# Patient Record
Sex: MALE | Race: WHITE | NOT HISPANIC OR LATINO | Employment: OTHER | URBAN - METROPOLITAN AREA
[De-identification: names, ages, dates, MRNs, and addresses within clinical notes are randomized per-mention and may not be internally consistent; named-entity substitution may affect disease eponyms.]

---

## 2018-04-04 ENCOUNTER — HISTORICAL (OUTPATIENT)
Dept: MEDSURG UNIT | Facility: HOSPITAL | Age: 73
End: 2018-04-04

## 2018-04-05 LAB
ABS NEUT (OLG): 8.78 X10(3)/MCL (ref 2.1–9.2)
ALBUMIN SERPL-MCNC: 3.2 GM/DL (ref 3.4–5)
ALBUMIN/GLOB SERPL: 0.9 {RATIO}
ALP SERPL-CCNC: 73 UNIT/L (ref 50–136)
ALT SERPL-CCNC: 25 UNIT/L (ref 12–78)
AST SERPL-CCNC: 17 UNIT/L (ref 15–37)
BILIRUB SERPL-MCNC: 0.3 MG/DL (ref 0.2–1)
BILIRUBIN DIRECT+TOT PNL SERPL-MCNC: 0.1 MG/DL (ref 0–0.2)
BILIRUBIN DIRECT+TOT PNL SERPL-MCNC: 0.2 MG/DL (ref 0–0.8)
BUN SERPL-MCNC: 19 MG/DL (ref 7–18)
CALCIUM SERPL-MCNC: 8.9 MG/DL (ref 8.5–10.1)
CHLORIDE SERPL-SCNC: 110 MMOL/L (ref 98–107)
CO2 SERPL-SCNC: 26 MMOL/L (ref 21–32)
CREAT SERPL-MCNC: 1 MG/DL (ref 0.7–1.3)
ERYTHROCYTE [DISTWIDTH] IN BLOOD BY AUTOMATED COUNT: 12.9 % (ref 11.5–17)
GLOBULIN SER-MCNC: 3.7 GM/DL (ref 2.4–3.5)
GLUCOSE SERPL-MCNC: 87 MG/DL (ref 74–106)
HCT VFR BLD AUTO: 38.6 % (ref 42–52)
HGB BLD-MCNC: 12.8 GM/DL (ref 14–18)
LYMPHOCYTES NFR BLD MANUAL: 19 % (ref 13–40)
MCH RBC QN AUTO: 29.8 PG (ref 27–31)
MCHC RBC AUTO-ENTMCNC: 33.2 GM/DL (ref 33–36)
MCV RBC AUTO: 89.8 FL (ref 80–94)
MONOCYTES NFR BLD MANUAL: 15 % (ref 2–11)
NEUTROPHILS NFR BLD MANUAL: 67 % (ref 47–80)
PLATELET # BLD AUTO: 205 X10(3)/MCL (ref 130–400)
PLATELET # BLD EST: NORMAL 10*3/UL
PMV BLD AUTO: 9.7 FL (ref 7.4–10.4)
POTASSIUM SERPL-SCNC: 4 MMOL/L (ref 3.5–5.1)
PROT SERPL-MCNC: 6.9 GM/DL (ref 6.4–8.2)
RBC # BLD AUTO: 4.3 X10(6)/MCL (ref 4.7–6.1)
SODIUM SERPL-SCNC: 143 MMOL/L (ref 136–145)
WBC # SPEC AUTO: 14 X10(3)/MCL (ref 4.5–11.5)

## 2020-09-02 ENCOUNTER — HISTORICAL (OUTPATIENT)
Dept: ANESTHESIOLOGY | Facility: HOSPITAL | Age: 75
End: 2020-09-02

## 2020-09-03 ENCOUNTER — HISTORICAL (OUTPATIENT)
Dept: ANESTHESIOLOGY | Facility: HOSPITAL | Age: 75
End: 2020-09-03

## 2020-09-11 ENCOUNTER — HISTORICAL (OUTPATIENT)
Dept: ADMINISTRATIVE | Facility: HOSPITAL | Age: 75
End: 2020-09-11

## 2020-09-11 LAB
APPEARANCE, UA: CLEAR
BACTERIA SPEC CULT: ABNORMAL /HPF
BILIRUB UR QL STRIP: NEGATIVE
COLOR UR: YELLOW
GLUCOSE (UA): NEGATIVE
HGB UR QL STRIP: NEGATIVE
HYALINE CASTS #/AREA URNS LPF: ABNORMAL /[LPF]
KETONES UR QL STRIP: NEGATIVE
LEUKOCYTE ESTERASE UR QL STRIP: NEGATIVE
NITRITE UR QL STRIP: NEGATIVE
PH UR STRIP: 5 [PH] (ref 5–9)
PROT UR QL STRIP: NEGATIVE
RBC #/AREA URNS HPF: ABNORMAL /[HPF]
SP GR UR STRIP: 1.02 (ref 1–1.03)
SQUAMOUS EPITHELIAL, UA: ABNORMAL
UROBILINOGEN UR STRIP-ACNC: 1
WBC #/AREA URNS HPF: ABNORMAL /[HPF]

## 2020-09-21 ENCOUNTER — HOSPITAL ENCOUNTER (OUTPATIENT)
Dept: MEDSURG UNIT | Facility: HOSPITAL | Age: 75
End: 2020-09-22

## 2020-09-21 LAB
ABS NEUT (OLG): 10.1 X10(3)/MCL (ref 1.5–6.9)
ABS NEUT (OLG): 10.3 X10(3)/MCL (ref 1.5–6.9)
ALBUMIN SERPL-MCNC: 3.3 GM/DL (ref 3.4–4.8)
ALBUMIN/GLOB SERPL: 0.7 RATIO (ref 1.1–2)
ALP SERPL-CCNC: 126 UNIT/L (ref 40–150)
ALT SERPL-CCNC: 27 UNIT/L (ref 0–55)
APPEARANCE, UA: CLEAR
AST SERPL-CCNC: 23 UNIT/L (ref 5–34)
BACTERIA SPEC CULT: ABNORMAL /HPF
BASOPHILS NFR BLD MANUAL: 0 % (ref 0–1)
BASOPHILS NFR BLD MANUAL: 0 % (ref 0–1)
BILIRUB SERPL-MCNC: 0.3 MG/DL
BILIRUB UR QL STRIP: NEGATIVE
BILIRUBIN DIRECT+TOT PNL SERPL-MCNC: 0.1 MG/DL (ref 0–0.8)
BILIRUBIN DIRECT+TOT PNL SERPL-MCNC: 0.2 MG/DL (ref 0–0.5)
BUN SERPL-MCNC: 18 MG/DL (ref 8.4–25.7)
BUN SERPL-MCNC: 19 MG/DL (ref 8.4–25.7)
CALCIUM SERPL-MCNC: 10.8 MG/DL (ref 8.8–10)
CALCIUM SERPL-MCNC: 11.3 MG/DL (ref 8.8–10)
CHLORIDE SERPL-SCNC: 105 MMOL/L (ref 98–107)
CHLORIDE SERPL-SCNC: 107 MMOL/L (ref 98–107)
CO2 SERPL-SCNC: 28 MMOL/L (ref 23–31)
CO2 SERPL-SCNC: 31 MMOL/L (ref 23–31)
COLOR UR: YELLOW
CREAT SERPL-MCNC: 0.74 MG/DL (ref 0.73–1.18)
CREAT SERPL-MCNC: 0.96 MG/DL (ref 0.73–1.18)
CREAT/UREA NIT SERPL: 24
EOSINOPHIL NFR BLD MANUAL: 1 % (ref 0–5)
EOSINOPHIL NFR BLD MANUAL: 2 % (ref 0–5)
ERYTHROCYTE [DISTWIDTH] IN BLOOD BY AUTOMATED COUNT: 13.2 % (ref 11.5–17)
ERYTHROCYTE [DISTWIDTH] IN BLOOD BY AUTOMATED COUNT: 13.2 % (ref 11.5–17)
GLOBULIN SER-MCNC: 4.8 GM/DL (ref 2.4–3.5)
GLUCOSE (UA): NEGATIVE MG/DL
GLUCOSE SERPL-MCNC: 113 MG/DL (ref 82–115)
GLUCOSE SERPL-MCNC: 130 MG/DL (ref 82–115)
GRANULOCYTES NFR BLD MANUAL: 58 % (ref 47–80)
GRANULOCYTES NFR BLD MANUAL: 62 % (ref 47–80)
HCO3 UR-SCNC: 32.7 MMOL/L (ref 22–26)
HCO3 UR-SCNC: 32.7 MMOL/L (ref 22–26)
HCT VFR BLD AUTO: 49.6 % (ref 42–52)
HCT VFR BLD AUTO: 50.5 % (ref 42–52)
HGB BLD-MCNC: 16.2 GM/DL (ref 14–18)
HGB BLD-MCNC: 16.4 GM/DL (ref 14–18)
HGB UR QL STRIP: ABNORMAL
KETONES UR QL STRIP: NEGATIVE MG/DL
LEUKOCYTE ESTERASE UR QL STRIP: ABNORMAL
LYMPHOCYTES NFR BLD MANUAL: 29 % (ref 15–40)
LYMPHOCYTES NFR BLD MANUAL: 34 % (ref 15–40)
MCH RBC QN AUTO: 30 PG (ref 27–34)
MCH RBC QN AUTO: 30 PG (ref 27–34)
MCHC RBC AUTO-ENTMCNC: 32 GM/DL (ref 31–36)
MCHC RBC AUTO-ENTMCNC: 33 GM/DL (ref 31–36)
MCV RBC AUTO: 93 FL (ref 80–99)
MCV RBC AUTO: 93 FL (ref 80–99)
MONOCYTES NFR BLD MANUAL: 6 % (ref 4–12)
MONOCYTES NFR BLD MANUAL: 8 % (ref 4–12)
NITRITE UR QL STRIP: NEGATIVE
PCO2 BLDA: 50.9 MMHG (ref 35–45)
PCO2 BLDA: 50.9 MMHG (ref 35–45)
PH SMN: 7.42 [PH] (ref 7.35–7.45)
PH SMN: 7.42 [PH] (ref 7.35–7.45)
PH UR STRIP: 5 [PH] (ref 4.6–8)
PHENOBARB SERPL-MCNC: 41.3 UG/ML (ref 15–40)
PHENYTOIN SERPL-MCNC: 18.5 UG/ML (ref 10–20)
PLATELET # BLD AUTO: 277 X10(3)/MCL (ref 140–400)
PLATELET # BLD AUTO: 294 X10(3)/MCL (ref 140–400)
PLATELET # BLD EST: ADEQUATE 10*3/UL
PLATELET # BLD EST: ADEQUATE 10*3/UL
PMV BLD AUTO: 10.1 FL (ref 6.8–10)
PMV BLD AUTO: 10.4 FL (ref 6.8–10)
PO2 BLDA: 100 MMHG (ref 80–100)
PO2 BLDA: 100 MMHG (ref 80–100)
POC ALLENS TEST: POSITIVE
POC BE: 8 (ref -2–3)
POC BE: 8 MMOL/L (ref -2–3)
POC CO2: 34 MMOL/L (ref 22–27)
POC SATURATED O2: 98 % (ref 96–97)
POC SATURATED O2: 98 MMHG (ref 96–97)
POC SITE: ABNORMAL
POC TCO2: 34 MMOL/L (ref 24–29)
POC TREATMENT: ABNORMAL
POTASSIUM SERPL-SCNC: 3.6 MMOL/L (ref 3.5–5.1)
POTASSIUM SERPL-SCNC: 4.1 MMOL/L (ref 3.5–5.1)
PROT SERPL-MCNC: 8.1 GM/DL (ref 5.8–7.6)
PROT UR QL STRIP: NEGATIVE MG/DL
RBC # BLD AUTO: 5.33 X10(6)/MCL (ref 4.7–6.1)
RBC # BLD AUTO: 5.42 X10(6)/MCL (ref 4.7–6.1)
RBC #/AREA URNS HPF: ABNORMAL /HPF
RBC MORPH BLD: NORMAL
RBC MORPH BLD: NORMAL
SODIUM SERPL-SCNC: 148 MMOL/L (ref 136–145)
SODIUM SERPL-SCNC: 148 MMOL/L (ref 136–145)
SP GR UR STRIP: >=1.03 (ref 1–1.03)
SQUAMOUS EPITHELIAL, UA: ABNORMAL /LPF
TSH SERPL-ACNC: 1.68 UIU/ML (ref 0.35–4.94)
UROBILINOGEN UR STRIP-ACNC: 0.2 EU/DL
WBC # SPEC AUTO: 17.9 X10(3)/MCL (ref 4.5–11.5)
WBC # SPEC AUTO: 18.4 X10(3)/MCL (ref 4.5–11.5)
WBC #/AREA URNS HPF: ABNORMAL /HPF

## 2020-09-22 LAB
ABS NEUT (OLG): 7.4 X10(3)/MCL (ref 1.5–6.9)
BASOPHILS # BLD AUTO: 0.1 X10(3)/MCL (ref 0–0.1)
BASOPHILS NFR BLD AUTO: 1 % (ref 0–1)
BUN SERPL-MCNC: 16 MG/DL (ref 8.4–25.7)
CALCIUM SERPL-MCNC: 11 MG/DL (ref 8.8–10)
CHLORIDE SERPL-SCNC: 106 MMOL/L (ref 98–107)
CO2 SERPL-SCNC: 30 MMOL/L (ref 23–31)
CREAT SERPL-MCNC: 0.78 MG/DL (ref 0.73–1.18)
CREAT/UREA NIT SERPL: 21
EOSINOPHIL # BLD AUTO: 0.2 X10(3)/MCL (ref 0–0.6)
EOSINOPHIL NFR BLD AUTO: 1 % (ref 0–5)
ERYTHROCYTE [DISTWIDTH] IN BLOOD BY AUTOMATED COUNT: 13.1 % (ref 11.5–17)
GLUCOSE SERPL-MCNC: 110 MG/DL (ref 82–115)
HCT VFR BLD AUTO: 49.9 % (ref 42–52)
HGB BLD-MCNC: 16.1 GM/DL (ref 14–18)
IMM GRANULOCYTES # BLD AUTO: 0.07 10*3/UL (ref 0–0.02)
IMM GRANULOCYTES NFR BLD AUTO: 0.5 % (ref 0–0.43)
LYMPHOCYTES # BLD AUTO: 5.1 X10(3)/MCL (ref 0.5–4.1)
LYMPHOCYTES NFR BLD AUTO: 36 % (ref 15–40)
MCH RBC QN AUTO: 30 PG (ref 27–34)
MCHC RBC AUTO-ENTMCNC: 32 GM/DL (ref 31–36)
MCV RBC AUTO: 92 FL (ref 80–99)
MONOCYTES # BLD AUTO: 1.5 X10(3)/MCL (ref 0–1.1)
MONOCYTES NFR BLD AUTO: 10 % (ref 4–12)
NEUTROPHILS # BLD AUTO: 7.4 X10(3)/MCL (ref 1.5–6.9)
NEUTROPHILS NFR BLD AUTO: 52 % (ref 43–75)
PLATELET # BLD AUTO: 255 X10(3)/MCL (ref 140–400)
PMV BLD AUTO: 10.2 FL (ref 6.8–10)
POTASSIUM SERPL-SCNC: 3.7 MMOL/L (ref 3.5–5.1)
RBC # BLD AUTO: 5.43 X10(6)/MCL (ref 4.7–6.1)
SODIUM SERPL-SCNC: 146 MMOL/L (ref 136–145)
WBC # SPEC AUTO: 14.4 X10(3)/MCL (ref 4.5–11.5)

## 2020-09-24 LAB — FINAL CULTURE: NO GROWTH

## 2020-09-27 LAB — FINAL CULTURE: NORMAL

## 2020-10-04 ENCOUNTER — HISTORICAL (OUTPATIENT)
Dept: ADMINISTRATIVE | Facility: HOSPITAL | Age: 75
End: 2020-10-04

## 2020-10-04 LAB
ABS NEUT (OLG): 4.94 X10(3)/MCL (ref 2.1–9.2)
ALBUMIN SERPL-MCNC: 2.8 GM/DL (ref 3.4–4.8)
ALBUMIN/GLOB SERPL: 0.8 RATIO (ref 1.1–2)
ALP SERPL-CCNC: 92 UNIT/L (ref 40–150)
ALT SERPL-CCNC: 23 UNIT/L (ref 0–55)
AST SERPL-CCNC: 15 UNIT/L (ref 5–34)
BASOPHILS # BLD AUTO: 0.1 X10(3)/MCL (ref 0–0.2)
BASOPHILS NFR BLD AUTO: 1 %
BILIRUB SERPL-MCNC: 0.3 MG/DL
BILIRUBIN DIRECT+TOT PNL SERPL-MCNC: 0.1 MG/DL (ref 0–0.8)
BILIRUBIN DIRECT+TOT PNL SERPL-MCNC: 0.2 MG/DL (ref 0–0.5)
BUN SERPL-MCNC: 5.3 MG/DL (ref 8.4–25.7)
CALCIUM SERPL-MCNC: 8.6 MG/DL (ref 8.8–10)
CHLORIDE SERPL-SCNC: 103 MMOL/L (ref 98–107)
CHOLEST SERPL-MCNC: 114 MG/DL
CHOLEST/HDLC SERPL: 4 {RATIO} (ref 0–5)
CO2 SERPL-SCNC: 28 MMOL/L (ref 23–31)
CREAT SERPL-MCNC: 0.71 MG/DL (ref 0.73–1.18)
CRP SERPL HS-MCNC: 1.31 MG/DL
D DIMER PPP IA.FEU-MCNC: 0.57 MCG/ML FEU (ref 0–0.5)
DEPRECATED CALCIDIOL+CALCIFEROL SERPL-MC: 32.1 NG/ML (ref 6.6–49.9)
EOSINOPHIL # BLD AUTO: 0.4 X10(3)/MCL (ref 0–0.9)
EOSINOPHIL NFR BLD AUTO: 3 %
ERYTHROCYTE [DISTWIDTH] IN BLOOD BY AUTOMATED COUNT: 13 % (ref 11.5–17)
ERYTHROCYTE [SEDIMENTATION RATE] IN BLOOD: 28 MM/HR (ref 0–15)
FERRITIN SERPL-MCNC: 180.48 NG/ML (ref 21.81–274.66)
GLOBULIN SER-MCNC: 3.3 GM/DL (ref 2.4–3.5)
GLUCOSE SERPL-MCNC: 96 MG/DL (ref 82–115)
HCT VFR BLD AUTO: 40.1 % (ref 42–52)
HDLC SERPL-MCNC: 28 MG/DL (ref 35–60)
HGB BLD-MCNC: 12.8 GM/DL (ref 14–18)
LDLC SERPL CALC-MCNC: 66 MG/DL (ref 50–140)
LYMPHOCYTES # BLD AUTO: 5.3 X10(3)/MCL (ref 0.6–4.6)
LYMPHOCYTES NFR BLD AUTO: 45 %
MCH RBC QN AUTO: 29.8 PG (ref 27–31)
MCHC RBC AUTO-ENTMCNC: 31.9 GM/DL (ref 33–36)
MCV RBC AUTO: 93.5 FL (ref 80–94)
MONOCYTES # BLD AUTO: 1.1 X10(3)/MCL (ref 0.1–1.3)
MONOCYTES NFR BLD AUTO: 9 %
NEUTROPHILS # BLD AUTO: 4.94 X10(3)/MCL (ref 2.1–9.2)
NEUTROPHILS NFR BLD AUTO: 42 %
PLATELET # BLD AUTO: 256 X10(3)/MCL (ref 130–400)
PMV BLD AUTO: 10.4 FL (ref 9.4–12.4)
POTASSIUM SERPL-SCNC: 3.6 MMOL/L (ref 3.5–5.1)
PREALB SERPL-MCNC: 19 MG/DL (ref 16–42)
PROT SERPL-MCNC: 6.1 GM/DL (ref 5.8–7.6)
RBC # BLD AUTO: 4.29 X10(6)/MCL (ref 4.7–6.1)
SODIUM SERPL-SCNC: 140 MMOL/L (ref 136–145)
TRIGL SERPL-MCNC: 100 MG/DL (ref 34–140)
TSH SERPL-ACNC: 2 UIU/ML (ref 0.35–4.94)
VLDLC SERPL CALC-MCNC: 20 MG/DL
WBC # SPEC AUTO: 11.9 X10(3)/MCL (ref 4.5–11.5)

## 2020-10-13 ENCOUNTER — HISTORICAL (OUTPATIENT)
Dept: ADMINISTRATIVE | Facility: HOSPITAL | Age: 75
End: 2020-10-13

## 2020-10-13 LAB
ALBUMIN SERPL-MCNC: 2.8 GM/DL (ref 3.4–4.8)
ALBUMIN/GLOB SERPL: 0.9 RATIO (ref 1.1–2)
ALP SERPL-CCNC: 113 UNIT/L (ref 40–150)
ALT SERPL-CCNC: 19 UNIT/L (ref 0–55)
AST SERPL-CCNC: 13 UNIT/L (ref 5–34)
BILIRUB SERPL-MCNC: 0.3 MG/DL
BILIRUBIN DIRECT+TOT PNL SERPL-MCNC: 0.1 MG/DL (ref 0–0.8)
BILIRUBIN DIRECT+TOT PNL SERPL-MCNC: 0.2 MG/DL (ref 0–0.5)
BUN SERPL-MCNC: 11.1 MG/DL (ref 8.4–25.7)
CALCIUM SERPL-MCNC: 8.6 MG/DL (ref 8.8–10)
CHLORIDE SERPL-SCNC: 100 MMOL/L (ref 98–107)
CO2 SERPL-SCNC: 33 MMOL/L (ref 23–31)
CREAT SERPL-MCNC: 0.75 MG/DL (ref 0.73–1.18)
ERYTHROCYTE [DISTWIDTH] IN BLOOD BY AUTOMATED COUNT: 13.2 % (ref 11.5–17)
GLOBULIN SER-MCNC: 3.1 GM/DL (ref 2.4–3.5)
GLUCOSE SERPL-MCNC: 87 MG/DL (ref 82–115)
HCT VFR BLD AUTO: 38.7 % (ref 42–52)
HGB BLD-MCNC: 12.5 GM/DL (ref 14–18)
MCH RBC QN AUTO: 30.7 PG (ref 27–31)
MCHC RBC AUTO-ENTMCNC: 32.3 GM/DL (ref 33–36)
MCV RBC AUTO: 95.1 FL (ref 80–94)
PHENYTOIN SERPL-MCNC: 5.9 UG/ML (ref 10–20)
PLATELET # BLD AUTO: 299 X10(3)/MCL (ref 130–400)
PMV BLD AUTO: 10.3 FL (ref 9.4–12.4)
POTASSIUM SERPL-SCNC: 3.7 MMOL/L (ref 3.5–5.1)
PROT SERPL-MCNC: 5.9 GM/DL (ref 5.8–7.6)
RBC # BLD AUTO: 4.07 X10(6)/MCL (ref 4.7–6.1)
SODIUM SERPL-SCNC: 139 MMOL/L (ref 136–145)
WBC # SPEC AUTO: 11.4 X10(3)/MCL (ref 4.5–11.5)

## 2020-11-03 ENCOUNTER — HISTORICAL (OUTPATIENT)
Dept: ADMINISTRATIVE | Facility: HOSPITAL | Age: 75
End: 2020-11-03

## 2020-11-03 LAB
ABS NEUT (OLG): 3.29 X10(3)/MCL (ref 2.1–9.2)
ALBUMIN SERPL-MCNC: 3 GM/DL (ref 3.4–4.8)
ALBUMIN/GLOB SERPL: 0.9 RATIO (ref 1.1–2)
ALP SERPL-CCNC: 107 UNIT/L (ref 40–150)
ALT SERPL-CCNC: 14 UNIT/L (ref 0–55)
ANISOCYTOSIS BLD QL SMEAR: 1
AST SERPL-CCNC: 13 UNIT/L (ref 5–34)
BASOPHILS # BLD AUTO: 0.1 X10(3)/MCL (ref 0–0.2)
BASOPHILS NFR BLD AUTO: 1 %
BILIRUB SERPL-MCNC: 0.3 MG/DL
BILIRUBIN DIRECT+TOT PNL SERPL-MCNC: 0.1 MG/DL (ref 0–0.5)
BILIRUBIN DIRECT+TOT PNL SERPL-MCNC: 0.2 MG/DL (ref 0–0.8)
BUN SERPL-MCNC: 12.4 MG/DL (ref 8.4–25.7)
CALCIUM SERPL-MCNC: 8.9 MG/DL (ref 8.8–10)
CHLORIDE SERPL-SCNC: 101 MMOL/L (ref 98–107)
CHOLEST SERPL-MCNC: 112 MG/DL
CHOLEST/HDLC SERPL: 3 {RATIO} (ref 0–5)
CO2 SERPL-SCNC: 31 MMOL/L (ref 23–31)
CREAT SERPL-MCNC: 0.75 MG/DL (ref 0.73–1.18)
EOSINOPHIL # BLD AUTO: 0.3 X10(3)/MCL (ref 0–0.9)
EOSINOPHIL NFR BLD AUTO: 3 %
ERYTHROCYTE [DISTWIDTH] IN BLOOD BY AUTOMATED COUNT: 13 % (ref 11.5–17)
GLOBULIN SER-MCNC: 3.2 GM/DL (ref 2.4–3.5)
GLUCOSE SERPL-MCNC: 114 MG/DL (ref 82–115)
HCT VFR BLD AUTO: 45 % (ref 42–52)
HDLC SERPL-MCNC: 38 MG/DL (ref 35–60)
HGB BLD-MCNC: 14.4 GM/DL (ref 14–18)
IMM GRANULOCYTES # BLD AUTO: 0 10*3/UL
IMM GRANULOCYTES NFR BLD AUTO: 0 %
LDLC SERPL CALC-MCNC: 55 MG/DL (ref 50–140)
LYMPHOCYTES # BLD AUTO: 4.4 X10(3)/MCL (ref 0.6–4.6)
LYMPHOCYTES NFR BLD AUTO: 52 %
MACROCYTES BLD QL SMEAR: 1 /MCL
MAGNESIUM SERPL-MCNC: 1.8 MG/DL (ref 1.6–2.6)
MCH RBC QN AUTO: 30.1 PG (ref 27–31)
MCHC RBC AUTO-ENTMCNC: 32 GM/DL (ref 33–36)
MCV RBC AUTO: 93.9 FL (ref 80–94)
MONOCYTES # BLD AUTO: 0.4 X10(3)/MCL (ref 0.1–1.3)
MONOCYTES NFR BLD AUTO: 5 %
NEUTROPHILS # BLD AUTO: 3.29 X10(3)/MCL (ref 2.1–9.2)
NEUTROPHILS NFR BLD AUTO: 39 %
PHENOBARB SERPL-MCNC: 44.1 UG/ML (ref 15–40)
PHENYTOIN SERPL-MCNC: 7.1 UG/ML (ref 10–20)
PLATELET # BLD AUTO: 241 X10(3)/MCL (ref 130–400)
PLATELET # BLD EST: ADEQUATE 10*3/UL
PMV BLD AUTO: 11.2 FL (ref 9.4–12.4)
POTASSIUM SERPL-SCNC: 4.1 MMOL/L (ref 3.5–5.1)
PROT SERPL-MCNC: 6.2 GM/DL (ref 5.8–7.6)
RBC # BLD AUTO: 4.79 X10(6)/MCL (ref 4.7–6.1)
RBC MORPH BLD: ABNORMAL
SODIUM SERPL-SCNC: 141 MMOL/L (ref 136–145)
TRIGL SERPL-MCNC: 96 MG/DL (ref 34–140)
TSH SERPL-ACNC: 2.23 UIU/ML (ref 0.35–4.94)
VLDLC SERPL CALC-MCNC: 19 MG/DL
WBC # SPEC AUTO: 8.5 X10(3)/MCL (ref 4.5–11.5)

## 2020-11-25 ENCOUNTER — HISTORICAL (OUTPATIENT)
Dept: ADMINISTRATIVE | Facility: HOSPITAL | Age: 75
End: 2020-11-25

## 2020-11-25 LAB
ALBUMIN SERPL-MCNC: 3 GM/DL (ref 3.4–4.8)
ALBUMIN/GLOB SERPL: 1 RATIO (ref 1.1–2)
ALP SERPL-CCNC: 101 UNIT/L (ref 40–150)
ALT SERPL-CCNC: 13 UNIT/L (ref 0–55)
AST SERPL-CCNC: 11 UNIT/L (ref 5–34)
BILIRUB SERPL-MCNC: 0.2 MG/DL
BILIRUBIN DIRECT+TOT PNL SERPL-MCNC: 0.1 MG/DL (ref 0–0.5)
BILIRUBIN DIRECT+TOT PNL SERPL-MCNC: 0.1 MG/DL (ref 0–0.8)
BUN SERPL-MCNC: 13.8 MG/DL (ref 8.4–25.7)
CALCIUM SERPL-MCNC: 9.1 MG/DL (ref 8.8–10)
CHLORIDE SERPL-SCNC: 102 MMOL/L (ref 98–107)
CO2 SERPL-SCNC: 26 MMOL/L (ref 23–31)
CREAT SERPL-MCNC: 0.84 MG/DL (ref 0.73–1.18)
GLOBULIN SER-MCNC: 3 GM/DL (ref 2.4–3.5)
GLUCOSE SERPL-MCNC: 120 MG/DL (ref 82–115)
PHENOBARB SERPL-MCNC: 42.3 UG/ML (ref 15–40)
PHENYTOIN SERPL-MCNC: <1.8 UG/ML (ref 10–20)
POTASSIUM SERPL-SCNC: 4.1 MMOL/L (ref 3.5–5.1)
PROT SERPL-MCNC: 6 GM/DL (ref 5.8–7.6)
PSA SERPL-MCNC: 0.86 NG/ML
SODIUM SERPL-SCNC: 139 MMOL/L (ref 136–145)

## 2020-11-30 ENCOUNTER — HISTORICAL (OUTPATIENT)
Dept: ADMINISTRATIVE | Facility: HOSPITAL | Age: 75
End: 2020-11-30

## 2020-11-30 LAB
ABS NEUT (OLG): 5.24 X10(3)/MCL (ref 2.1–9.2)
APTT PPP: 33.5 SECOND(S) (ref 23.2–33.7)
BASOPHILS # BLD AUTO: 0.1 X10(3)/MCL (ref 0–0.2)
BASOPHILS NFR BLD AUTO: 1 %
EOSINOPHIL # BLD AUTO: 0.3 X10(3)/MCL (ref 0–0.9)
EOSINOPHIL NFR BLD AUTO: 2 %
ERYTHROCYTE [DISTWIDTH] IN BLOOD BY AUTOMATED COUNT: 13.8 % (ref 11.5–17)
HCT VFR BLD AUTO: 39.5 % (ref 42–52)
HGB BLD-MCNC: 12.7 GM/DL (ref 14–18)
INR PPP: 1.1 (ref 0–1.3)
LYMPHOCYTES # BLD AUTO: 4.2 X10(3)/MCL (ref 0.6–4.6)
LYMPHOCYTES NFR BLD AUTO: 39 %
MCH RBC QN AUTO: 30.2 PG (ref 27–31)
MCHC RBC AUTO-ENTMCNC: 32.2 GM/DL (ref 33–36)
MCV RBC AUTO: 94 FL (ref 80–94)
MONOCYTES # BLD AUTO: 1 X10(3)/MCL (ref 0.1–1.3)
MONOCYTES NFR BLD AUTO: 9 %
NEUTROPHILS # BLD AUTO: 5.24 X10(3)/MCL (ref 2.1–9.2)
NEUTROPHILS NFR BLD AUTO: 49 %
PHENOBARB SERPL-MCNC: 33.7 UG/ML (ref 15–40)
PLATELET # BLD AUTO: 231 X10(3)/MCL (ref 130–400)
PMV BLD AUTO: 12 FL (ref 9.4–12.4)
PROTHROMBIN TIME: 13.9 SECOND(S) (ref 11.1–13.7)
RBC # BLD AUTO: 4.2 X10(6)/MCL (ref 4.7–6.1)
WBC # SPEC AUTO: 10.8 X10(3)/MCL (ref 4.5–11.5)

## 2021-01-22 ENCOUNTER — HISTORICAL (OUTPATIENT)
Dept: ADMINISTRATIVE | Facility: HOSPITAL | Age: 76
End: 2021-01-22

## 2021-01-22 LAB
ABS NEUT (OLG): 3 X10(3)/MCL (ref 2.1–9.2)
ALBUMIN SERPL-MCNC: 3.6 GM/DL (ref 3.4–4.8)
ALBUMIN/GLOB SERPL: 1.1 RATIO (ref 1.1–2)
ALP SERPL-CCNC: 152 UNIT/L (ref 40–150)
ALT SERPL-CCNC: 19 UNIT/L (ref 0–55)
AST SERPL-CCNC: 15 UNIT/L (ref 5–34)
BASOPHILS # BLD AUTO: 0.1 X10(3)/MCL (ref 0–0.2)
BASOPHILS NFR BLD AUTO: 1 %
BILIRUB SERPL-MCNC: 0.2 MG/DL
BILIRUBIN DIRECT+TOT PNL SERPL-MCNC: 0.1 MG/DL (ref 0–0.5)
BILIRUBIN DIRECT+TOT PNL SERPL-MCNC: 0.1 MG/DL (ref 0–0.8)
BUN SERPL-MCNC: 12.7 MG/DL (ref 8.4–25.7)
CALCIUM SERPL-MCNC: 9.4 MG/DL (ref 8.8–10)
CHLORIDE SERPL-SCNC: 103 MMOL/L (ref 98–107)
CO2 SERPL-SCNC: 27 MMOL/L (ref 23–31)
CREAT SERPL-MCNC: 0.73 MG/DL (ref 0.73–1.18)
EOSINOPHIL # BLD AUTO: 0.4 X10(3)/MCL (ref 0–0.9)
EOSINOPHIL NFR BLD AUTO: 5 %
ERYTHROCYTE [DISTWIDTH] IN BLOOD BY AUTOMATED COUNT: 14.3 % (ref 11.5–17)
GLOBULIN SER-MCNC: 3.4 GM/DL (ref 2.4–3.5)
GLUCOSE SERPL-MCNC: 82 MG/DL (ref 82–115)
HCT VFR BLD AUTO: 46.7 % (ref 42–52)
HGB BLD-MCNC: 14.5 GM/DL (ref 14–18)
LYMPHOCYTES # BLD AUTO: 4.5 X10(3)/MCL (ref 0.6–4.6)
LYMPHOCYTES NFR BLD AUTO: 50 %
MCH RBC QN AUTO: 29.1 PG (ref 27–31)
MCHC RBC AUTO-ENTMCNC: 31 GM/DL (ref 33–36)
MCV RBC AUTO: 93.6 FL (ref 80–94)
MONOCYTES # BLD AUTO: 0.9 X10(3)/MCL (ref 0.1–1.3)
MONOCYTES NFR BLD AUTO: 10 %
NEUTROPHILS # BLD AUTO: 3 X10(3)/MCL (ref 2.1–9.2)
NEUTROPHILS NFR BLD AUTO: 34 %
PHENOBARB SERPL-MCNC: 30.8 UG/ML (ref 15–40)
PHENYTOIN SERPL-MCNC: 11.5 UG/ML (ref 10–20)
PLATELET # BLD AUTO: 251 X10(3)/MCL (ref 130–400)
PMV BLD AUTO: 11.5 FL (ref 9.4–12.4)
POTASSIUM SERPL-SCNC: 4.3 MMOL/L (ref 3.5–5.1)
PROT SERPL-MCNC: 7 GM/DL (ref 5.8–7.6)
RBC # BLD AUTO: 4.99 X10(6)/MCL (ref 4.7–6.1)
SODIUM SERPL-SCNC: 138 MMOL/L (ref 136–145)
WBC # SPEC AUTO: 9 X10(3)/MCL (ref 4.5–11.5)

## 2021-02-09 ENCOUNTER — HISTORICAL (OUTPATIENT)
Dept: ADMINISTRATIVE | Facility: HOSPITAL | Age: 76
End: 2021-02-09

## 2021-02-09 LAB
PHENOBARB SERPL-MCNC: 28.4 UG/ML (ref 15–40)
PHENYTOIN SERPL-MCNC: 8.4 UG/ML (ref 10–20)

## 2021-02-24 ENCOUNTER — HISTORICAL (OUTPATIENT)
Dept: ADMINISTRATIVE | Facility: HOSPITAL | Age: 76
End: 2021-02-24

## 2021-02-24 LAB — PHENYTOIN SERPL-MCNC: 9 UG/ML (ref 10–20)

## 2021-03-02 ENCOUNTER — HISTORICAL (OUTPATIENT)
Dept: ADMINISTRATIVE | Facility: HOSPITAL | Age: 76
End: 2021-03-02

## 2021-03-02 LAB
ABS NEUT (OLG): 3.31 X10(3)/MCL (ref 2.1–9.2)
ALBUMIN SERPL-MCNC: 3.1 GM/DL (ref 3.4–4.8)
ALBUMIN/GLOB SERPL: 1.1 RATIO (ref 1.1–2)
ALP SERPL-CCNC: 99 UNIT/L (ref 40–150)
ALT SERPL-CCNC: 16 UNIT/L (ref 0–55)
AST SERPL-CCNC: 13 UNIT/L (ref 5–34)
BASOPHILS # BLD AUTO: 0.1 X10(3)/MCL (ref 0–0.2)
BASOPHILS NFR BLD AUTO: 1 %
BILIRUB SERPL-MCNC: 0.2 MG/DL
BILIRUBIN DIRECT+TOT PNL SERPL-MCNC: 0.1 MG/DL (ref 0–0.5)
BILIRUBIN DIRECT+TOT PNL SERPL-MCNC: 0.1 MG/DL (ref 0–0.8)
BUN SERPL-MCNC: 10.1 MG/DL (ref 8.4–25.7)
CALCIUM SERPL-MCNC: 8.8 MG/DL (ref 8.8–10)
CHLORIDE SERPL-SCNC: 109 MMOL/L (ref 98–107)
CO2 SERPL-SCNC: 26 MMOL/L (ref 23–31)
CREAT SERPL-MCNC: 0.69 MG/DL (ref 0.73–1.18)
EOSINOPHIL # BLD AUTO: 0.4 X10(3)/MCL (ref 0–0.9)
EOSINOPHIL NFR BLD AUTO: 3 %
ERYTHROCYTE [DISTWIDTH] IN BLOOD BY AUTOMATED COUNT: 14.9 % (ref 11.5–17)
GLOBULIN SER-MCNC: 2.8 GM/DL (ref 2.4–3.5)
GLUCOSE SERPL-MCNC: 81 MG/DL (ref 82–115)
HCT VFR BLD AUTO: 40.1 % (ref 42–52)
HGB BLD-MCNC: 12.6 GM/DL (ref 14–18)
LYMPHOCYTES # BLD AUTO: 5.4 X10(3)/MCL (ref 0.6–4.6)
LYMPHOCYTES NFR BLD AUTO: 53 %
MCH RBC QN AUTO: 28.8 PG (ref 27–31)
MCHC RBC AUTO-ENTMCNC: 31.4 GM/DL (ref 33–36)
MCV RBC AUTO: 91.8 FL (ref 80–94)
MONOCYTES # BLD AUTO: 1 X10(3)/MCL (ref 0.1–1.3)
MONOCYTES NFR BLD AUTO: 10 %
NEUTROPHILS # BLD AUTO: 3.31 X10(3)/MCL (ref 2.1–9.2)
NEUTROPHILS NFR BLD AUTO: 33 %
PHENYTOIN SERPL-MCNC: 6.2 UG/ML (ref 10–20)
PLATELET # BLD AUTO: 200 X10(3)/MCL (ref 130–400)
PMV BLD AUTO: 11.1 FL (ref 9.4–12.4)
POTASSIUM SERPL-SCNC: 4.1 MMOL/L (ref 3.5–5.1)
PROT SERPL-MCNC: 5.9 GM/DL (ref 5.8–7.6)
RBC # BLD AUTO: 4.37 X10(6)/MCL (ref 4.7–6.1)
SODIUM SERPL-SCNC: 141 MMOL/L (ref 136–145)
WBC # SPEC AUTO: 10.2 X10(3)/MCL (ref 4.5–11.5)

## 2021-05-13 ENCOUNTER — HISTORICAL (OUTPATIENT)
Dept: LAB | Facility: HOSPITAL | Age: 76
End: 2021-05-13

## 2021-05-13 LAB
ABS NEUT (OLG): 3.9 X10(3)/MCL (ref 1.5–6.9)
ALBUMIN SERPL-MCNC: 3.4 GM/DL (ref 3.4–4.8)
ALBUMIN/GLOB SERPL: 1 RATIO (ref 1.1–2)
ALP SERPL-CCNC: 104 UNIT/L (ref 40–150)
ALT SERPL-CCNC: 18 UNIT/L (ref 0–55)
AST SERPL-CCNC: 17 UNIT/L (ref 5–34)
BASOPHILS NFR BLD MANUAL: 0 % (ref 0–1)
BILIRUB SERPL-MCNC: 0.2 MG/DL
BILIRUBIN DIRECT+TOT PNL SERPL-MCNC: 0.1 MG/DL (ref 0–0.5)
BILIRUBIN DIRECT+TOT PNL SERPL-MCNC: 0.1 MG/DL (ref 0–0.8)
BUN SERPL-MCNC: 10 MG/DL (ref 8.4–25.7)
CALCIUM SERPL-MCNC: 9.7 MG/DL (ref 8.8–10)
CHLORIDE SERPL-SCNC: 105 MMOL/L (ref 98–107)
CO2 SERPL-SCNC: 30 MMOL/L (ref 23–31)
CREAT SERPL-MCNC: 0.84 MG/DL (ref 0.73–1.18)
EOSINOPHIL NFR BLD MANUAL: 1 % (ref 0–5)
ERYTHROCYTE [DISTWIDTH] IN BLOOD BY AUTOMATED COUNT: 14 % (ref 11.5–17)
GLOBULIN SER-MCNC: 3.5 GM/DL (ref 2.4–3.5)
GLUCOSE SERPL-MCNC: 106 MG/DL (ref 82–115)
GRANULOCYTES NFR BLD MANUAL: 34 % (ref 47–80)
HCT VFR BLD AUTO: 45.5 % (ref 42–52)
HGB BLD-MCNC: 14.4 GM/DL (ref 14–18)
LYMPHOCYTES NFR BLD MANUAL: 54 % (ref 15–40)
MCH RBC QN AUTO: 29 PG (ref 27–34)
MCHC RBC AUTO-ENTMCNC: 32 GM/DL (ref 31–36)
MCV RBC AUTO: 92 FL (ref 80–99)
MONOCYTES NFR BLD MANUAL: 11 % (ref 4–12)
PHENOBARB SERPL-MCNC: 24.6 UG/ML (ref 15–40)
PLATELET # BLD AUTO: 271 X10(3)/MCL (ref 140–400)
PLATELET # BLD EST: ADEQUATE 10*3/UL
PMV BLD AUTO: 10 FL (ref 6.8–10)
POTASSIUM SERPL-SCNC: 4.5 MMOL/L (ref 3.5–5.1)
PROT SERPL-MCNC: 6.9 GM/DL (ref 5.8–7.6)
RBC # BLD AUTO: 4.94 X10(6)/MCL (ref 4.7–6.1)
RBC MORPH BLD: NORMAL
SODIUM SERPL-SCNC: 141 MMOL/L (ref 136–145)
WBC # SPEC AUTO: 12.4 X10(3)/MCL (ref 4.5–11.5)

## 2021-06-08 ENCOUNTER — HISTORICAL (OUTPATIENT)
Dept: ADMINISTRATIVE | Facility: HOSPITAL | Age: 76
End: 2021-06-08

## 2021-06-08 LAB
ABS NEUT (OLG): 3.8 X10(3)/MCL (ref 1.5–6.9)
BASOPHILS NFR BLD MANUAL: 0 % (ref 0–1)
EOSINOPHIL NFR BLD MANUAL: 1 % (ref 0–5)
ERYTHROCYTE [DISTWIDTH] IN BLOOD BY AUTOMATED COUNT: 14 % (ref 11.5–17)
GRANULOCYTES NFR BLD MANUAL: 24 % (ref 47–80)
HCT VFR BLD AUTO: 47.6 % (ref 42–52)
HGB BLD-MCNC: 15.7 GM/DL (ref 14–18)
LYMPHOCYTES NFR BLD MANUAL: 60 % (ref 15–40)
MCH RBC QN AUTO: 30 PG (ref 27–34)
MCHC RBC AUTO-ENTMCNC: 33 GM/DL (ref 31–36)
MCV RBC AUTO: 90 FL (ref 80–99)
MONOCYTES NFR BLD MANUAL: 15 % (ref 4–12)
PLATELET # BLD AUTO: 209 X10(3)/MCL (ref 140–400)
PLATELET # BLD EST: ADEQUATE 10*3/UL
PMV BLD AUTO: 10.9 FL (ref 6.8–10)
RBC # BLD AUTO: 5.31 X10(6)/MCL (ref 4.7–6.1)
RBC MORPH BLD: NORMAL
WBC # SPEC AUTO: 13.2 X10(3)/MCL (ref 4.5–11.5)

## 2021-06-29 ENCOUNTER — HISTORICAL (OUTPATIENT)
Dept: LAB | Facility: HOSPITAL | Age: 76
End: 2021-06-29

## 2021-06-29 LAB
ABS NEUT (OLG): 4 X10(3)/MCL (ref 1.5–6.9)
ALBUMIN SERPL-MCNC: 3.6 GM/DL (ref 3.4–4.8)
ALBUMIN/GLOB SERPL: 1 RATIO (ref 1.1–2)
ALP SERPL-CCNC: 101 UNIT/L (ref 40–150)
ALT SERPL-CCNC: 19 UNIT/L (ref 0–55)
AST SERPL-CCNC: 18 UNIT/L (ref 5–34)
BILIRUB SERPL-MCNC: 0.2 MG/DL
BILIRUBIN DIRECT+TOT PNL SERPL-MCNC: 0.1 MG/DL (ref 0–0.5)
BILIRUBIN DIRECT+TOT PNL SERPL-MCNC: 0.1 MG/DL (ref 0–0.8)
BUN SERPL-MCNC: 11 MG/DL (ref 8.4–25.7)
CALCIUM SERPL-MCNC: 9.4 MG/DL (ref 8.8–10)
CHLORIDE SERPL-SCNC: 107 MMOL/L (ref 98–107)
CHOLEST SERPL-MCNC: 118 MG/DL
CHOLEST/HDLC SERPL: 3 {RATIO} (ref 0–5)
CO2 SERPL-SCNC: 26 MMOL/L (ref 23–31)
CREAT SERPL-MCNC: 0.83 MG/DL (ref 0.73–1.18)
ERYTHROCYTE [DISTWIDTH] IN BLOOD BY AUTOMATED COUNT: 14.2 % (ref 11.5–17)
GLOBULIN SER-MCNC: 3.6 GM/DL (ref 2.4–3.5)
GLUCOSE SERPL-MCNC: 83 MG/DL (ref 82–115)
HCT VFR BLD AUTO: 45 % (ref 42–52)
HDLC SERPL-MCNC: 43 MG/DL (ref 35–60)
HGB BLD-MCNC: 15 GM/DL (ref 14–18)
LDLC SERPL CALC-MCNC: 58 MG/DL (ref 50–140)
MCH RBC QN AUTO: 30 PG (ref 27–34)
MCHC RBC AUTO-ENTMCNC: 33 GM/DL (ref 31–36)
MCV RBC AUTO: 89 FL (ref 80–99)
PHENOBARB SERPL-MCNC: 27.9 UG/ML (ref 15–40)
PHENYTOIN SERPL-MCNC: 10.6 UG/ML (ref 10–20)
PLATELET # BLD AUTO: 166 X10(3)/MCL (ref 140–400)
PMV BLD AUTO: 10.6 FL (ref 6.8–10)
POTASSIUM SERPL-SCNC: 4.6 MMOL/L (ref 3.5–5.1)
PROT SERPL-MCNC: 7.2 GM/DL (ref 5.8–7.6)
RBC # BLD AUTO: 5.05 X10(6)/MCL (ref 4.7–6.1)
SODIUM SERPL-SCNC: 140 MMOL/L (ref 136–145)
TRIGL SERPL-MCNC: 84 MG/DL (ref 34–140)
VLDLC SERPL CALC-MCNC: 17 MG/DL
WBC # SPEC AUTO: 13.7 X10(3)/MCL (ref 4.5–11.5)

## 2021-08-03 ENCOUNTER — HISTORICAL (OUTPATIENT)
Dept: ADMINISTRATIVE | Facility: HOSPITAL | Age: 76
End: 2021-08-03

## 2021-08-03 LAB
PHENOBARB SERPL-MCNC: 27 UG/ML (ref 15–40)
PHENYTOIN SERPL-MCNC: 11.8 UG/ML (ref 10–20)

## 2021-08-17 ENCOUNTER — HISTORICAL (OUTPATIENT)
Dept: LAB | Facility: HOSPITAL | Age: 76
End: 2021-08-17

## 2021-08-17 LAB
ABS NEUT (OLG): 3.9 X10(3)/MCL (ref 1.5–6.9)
ALBUMIN SERPL-MCNC: 3.5 GM/DL (ref 3.4–4.8)
ALBUMIN/GLOB SERPL: 1.2 RATIO (ref 1.1–2)
ALP SERPL-CCNC: 90 UNIT/L (ref 40–150)
ALT SERPL-CCNC: 17 UNIT/L (ref 0–55)
AST SERPL-CCNC: 15 UNIT/L (ref 5–34)
BILIRUB SERPL-MCNC: 0.4 MG/DL
BILIRUBIN DIRECT+TOT PNL SERPL-MCNC: 0.2 MG/DL (ref 0–0.5)
BILIRUBIN DIRECT+TOT PNL SERPL-MCNC: 0.2 MG/DL (ref 0–0.8)
BUN SERPL-MCNC: 10 MG/DL (ref 8.4–25.7)
CALCIUM SERPL-MCNC: 9.6 MG/DL (ref 8.8–10)
CHLORIDE SERPL-SCNC: 108 MMOL/L (ref 98–107)
CHOLEST SERPL-MCNC: 103 MG/DL
CHOLEST/HDLC SERPL: 3 {RATIO} (ref 0–5)
CO2 SERPL-SCNC: 24 MMOL/L (ref 23–31)
CREAT SERPL-MCNC: 0.84 MG/DL (ref 0.73–1.18)
ERYTHROCYTE [DISTWIDTH] IN BLOOD BY AUTOMATED COUNT: 14.6 % (ref 11.5–17)
GLOBULIN SER-MCNC: 3 GM/DL (ref 2.4–3.5)
GLUCOSE SERPL-MCNC: 122 MG/DL (ref 82–115)
HCT VFR BLD AUTO: 46.6 % (ref 42–52)
HDLC SERPL-MCNC: 33 MG/DL (ref 35–60)
HGB BLD-MCNC: 15.1 GM/DL (ref 14–18)
LDLC SERPL CALC-MCNC: 56 MG/DL (ref 50–140)
MAGNESIUM SERPL-MCNC: 1.7 MG/DL (ref 1.6–2.6)
MCH RBC QN AUTO: 30 PG (ref 27–34)
MCHC RBC AUTO-ENTMCNC: 32 GM/DL (ref 31–36)
MCV RBC AUTO: 92 FL (ref 80–99)
PLATELET # BLD AUTO: 116 X10(3)/MCL (ref 140–400)
PMV BLD AUTO: 11.9 FL (ref 6.8–10)
POTASSIUM SERPL-SCNC: 4.5 MMOL/L (ref 3.5–5.1)
PROT SERPL-MCNC: 6.5 GM/DL (ref 5.8–7.6)
RBC # BLD AUTO: 5.09 X10(6)/MCL (ref 4.7–6.1)
SODIUM SERPL-SCNC: 141 MMOL/L (ref 136–145)
TRIGL SERPL-MCNC: 69 MG/DL (ref 34–140)
VLDLC SERPL CALC-MCNC: 14 MG/DL
WBC # SPEC AUTO: 15.2 X10(3)/MCL (ref 4.5–11.5)

## 2021-09-28 ENCOUNTER — HISTORICAL (OUTPATIENT)
Dept: ADMINISTRATIVE | Facility: HOSPITAL | Age: 76
End: 2021-09-28

## 2021-09-28 LAB
ABS NEUT (OLG): 5.1 X10(3)/MCL (ref 1.5–6.9)
ALBUMIN SERPL-MCNC: 3.6 GM/DL (ref 3.4–4.8)
ALBUMIN/GLOB SERPL: 1 RATIO (ref 1.1–2)
ALP SERPL-CCNC: 87 UNIT/L (ref 40–150)
ALT SERPL-CCNC: 22 UNIT/L (ref 0–55)
AST SERPL-CCNC: 15 UNIT/L (ref 5–34)
BASOPHILS NFR BLD MANUAL: 0 % (ref 0–1)
BILIRUB SERPL-MCNC: 0.4 MG/DL
BILIRUBIN DIRECT+TOT PNL SERPL-MCNC: 0.2 MG/DL (ref 0–0.5)
BILIRUBIN DIRECT+TOT PNL SERPL-MCNC: 0.2 MG/DL (ref 0–0.8)
BUN SERPL-MCNC: 13 MG/DL (ref 8.4–25.7)
CALCIUM SERPL-MCNC: 9.6 MG/DL (ref 8.8–10)
CHLORIDE SERPL-SCNC: 105 MMOL/L (ref 98–107)
CO2 SERPL-SCNC: 27 MMOL/L (ref 23–31)
CREAT SERPL-MCNC: 0.89 MG/DL (ref 0.73–1.18)
EOSINOPHIL NFR BLD MANUAL: 0 % (ref 0–5)
ERYTHROCYTE [DISTWIDTH] IN BLOOD BY AUTOMATED COUNT: 14.2 % (ref 11.5–17)
GLOBULIN SER-MCNC: 3.5 GM/DL (ref 2.4–3.5)
GLUCOSE SERPL-MCNC: 110 MG/DL (ref 82–115)
GRANULOCYTES NFR BLD MANUAL: 30 % (ref 47–80)
HCT VFR BLD AUTO: 48.1 % (ref 42–52)
HGB BLD-MCNC: 15.6 GM/DL (ref 14–18)
IGG SERPL-MCNC: 1477 MG/DL (ref 240–1822)
LDH SERPL-CCNC: 165 UNIT/L (ref 140–271)
LYMPHOCYTES NFR BLD MANUAL: 57 % (ref 15–40)
MCH RBC QN AUTO: 30 PG (ref 27–34)
MCHC RBC AUTO-ENTMCNC: 32 GM/DL (ref 31–36)
MCV RBC AUTO: 91 FL (ref 80–99)
MONOCYTES NFR BLD MANUAL: 13 % (ref 4–12)
PLATELET # BLD AUTO: 205 X10(3)/MCL (ref 140–400)
PLATELET # BLD EST: ADEQUATE 10*3/UL
PMV BLD AUTO: 10.6 FL (ref 6.8–10)
POTASSIUM SERPL-SCNC: 4 MMOL/L (ref 3.5–5.1)
PROT SERPL-MCNC: 7.1 GM/DL (ref 5.8–7.6)
RBC # BLD AUTO: 5.28 X10(6)/MCL (ref 4.7–6.1)
RBC MORPH BLD: NORMAL
SODIUM SERPL-SCNC: 138 MMOL/L (ref 136–145)
WBC # SPEC AUTO: 18.4 X10(3)/MCL (ref 4.5–11.5)

## 2022-04-13 ENCOUNTER — HISTORICAL (OUTPATIENT)
Dept: ADMINISTRATIVE | Facility: HOSPITAL | Age: 77
End: 2022-04-13
Payer: MEDICARE

## 2022-04-13 LAB
ABS NEUT (OLG): 3.9 (ref 1.5–6.9)
ALBUMIN SERPL-MCNC: 3.8 G/DL (ref 3.4–4.8)
ALBUMIN/GLOB SERPL: 0.9 {RATIO} (ref 1.1–2)
ALP SERPL-CCNC: 87 U/L (ref 40–150)
ALT SERPL-CCNC: 20 U/L (ref 0–55)
AST SERPL-CCNC: 21 U/L (ref 5–34)
BASOPHILS # BLD AUTO: 0.2 10*3/UL (ref 0–0.1)
BASOPHILS NFR BLD AUTO: 1 % (ref 0–1)
BILIRUB SERPL-MCNC: 0.2 MG/DL
BILIRUBIN DIRECT+TOT PNL SERPL-MCNC: 0.1 (ref 0–0.5)
BILIRUBIN DIRECT+TOT PNL SERPL-MCNC: 0.1 (ref 0–0.8)
BUN SERPL-MCNC: 16 MG/DL (ref 8.4–25.7)
CALCIUM SERPL-MCNC: 9.8 MG/DL (ref 8.7–10.5)
CHLORIDE SERPL-SCNC: 106 MMOL/L (ref 98–107)
CO2 SERPL-SCNC: 20 MMOL/L (ref 23–31)
CREAT SERPL-MCNC: 0.89 MG/DL (ref 0.73–1.18)
EOSINOPHIL # BLD AUTO: 0.2 10*3/UL (ref 0–0.6)
EOSINOPHIL NFR BLD AUTO: 2 % (ref 0–5)
ERYTHROCYTE [DISTWIDTH] IN BLOOD BY AUTOMATED COUNT: 14.3 % (ref 11.5–17)
GLOBULIN SER-MCNC: 4.2 G/DL (ref 2.4–3.5)
GLUCOSE SERPL-MCNC: 82 MG/DL (ref 82–115)
HCT VFR BLD AUTO: 43.9 % (ref 42–52)
HGB BLD-MCNC: 15.3 G/DL (ref 14–18)
ICTERIC INTERF INDEX SERPL-ACNC: 0
IMM GRANULOCYTES # BLD AUTO: 0.05 10*3/UL (ref 0–0.02)
IMM GRANULOCYTES NFR BLD AUTO: 0.3 % (ref 0–0.43)
LDH SERPL-CCNC: 435 U/L (ref 140–271)
LIPEMIC INTERF INDEX SERPL-ACNC: >10
LYMPHOCYTES # BLD AUTO: 10.4 10*3/UL (ref 0.5–4.1)
LYMPHOCYTES NFR BLD AUTO: 67 % (ref 15–40)
MANUAL DIFF? (OHS): NO
MCH RBC QN AUTO: 29 PG (ref 27–34)
MCHC RBC AUTO-ENTMCNC: 35 G/DL (ref 31–36)
MCV RBC AUTO: 83 FL (ref 80–99)
MONOCYTES # BLD AUTO: 0.8 10*3/UL (ref 0–1.1)
MONOCYTES NFR BLD AUTO: 5 % (ref 4–12)
NEUTROPHILS # BLD AUTO: 3.9 10*3/UL (ref 1.5–6.9)
NEUTROPHILS NFR BLD AUTO: 25 % (ref 43–75)
PLATELET # BLD AUTO: 176 10*3/UL (ref 140–400)
PMV BLD AUTO: 11.4 FL (ref 6.8–10)
POTASSIUM SERPL-SCNC: 4.6 MMOL/L (ref 3.5–5.1)
PROT SERPL-MCNC: 8 G/DL (ref 5.8–7.6)
RBC # BLD AUTO: 5.29 10*6/UL (ref 4.7–6.1)
SODIUM SERPL-SCNC: 136 MMOL/L (ref 136–145)
WBC # SPEC AUTO: 15.6 10*3/UL (ref 4.5–11.5)

## 2022-04-14 LAB — IGG SERPL-MCNC: 1490 MG/DL (ref 240–1822)
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
## 2022-07-08 ENCOUNTER — OFFICE VISIT (OUTPATIENT)
Dept: HEMATOLOGY/ONCOLOGY | Facility: CLINIC | Age: 77
End: 2022-07-08
Payer: MEDICARE

## 2022-07-08 DIAGNOSIS — C91.10 CLL (CHRONIC LYMPHOCYTIC LEUKEMIA): Primary | ICD-10-CM

## 2022-07-08 PROCEDURE — 99442 PR PHYSICIAN TELEPHONE EVALUATION 11-20 MIN: ICD-10-PCS | Mod: 95,,,

## 2022-07-08 PROCEDURE — 99442 PR PHYSICIAN TELEPHONE EVALUATION 11-20 MIN: CPT | Mod: 95,,,

## 2022-07-08 RX ORDER — ATORVASTATIN CALCIUM 40 MG/1
40 TABLET, FILM COATED ORAL NIGHTLY
COMMUNITY

## 2022-07-08 RX ORDER — METOPROLOL SUCCINATE 25 MG/1
1 TABLET, EXTENDED RELEASE ORAL 2 TIMES DAILY
COMMUNITY
Start: 2022-06-28

## 2022-07-08 RX ORDER — FUROSEMIDE 40 MG/1
1 TABLET ORAL DAILY
Status: ON HOLD | COMMUNITY
Start: 2022-07-05 | End: 2022-12-12 | Stop reason: SDUPTHER

## 2022-07-08 RX ORDER — LEVETIRACETAM 1000 MG/1
1 TABLET ORAL 2 TIMES DAILY
COMMUNITY
Start: 2022-06-28

## 2022-07-08 RX ORDER — PHENOBARBITAL 64.8 MG/1
1 TABLET ORAL 2 TIMES DAILY
Status: ON HOLD | COMMUNITY
Start: 2022-06-18 | End: 2022-10-14 | Stop reason: HOSPADM

## 2022-07-08 RX ORDER — PANTOPRAZOLE SODIUM 40 MG/1
40 TABLET, DELAYED RELEASE ORAL DAILY
COMMUNITY

## 2022-07-08 RX ORDER — CLOPIDOGREL BISULFATE 75 MG/1
1 TABLET ORAL DAILY
COMMUNITY
Start: 2022-06-28

## 2022-07-08 RX ORDER — TAMSULOSIN HYDROCHLORIDE 0.4 MG/1
1 CAPSULE ORAL NIGHTLY
COMMUNITY
Start: 2022-06-30

## 2022-07-08 RX ORDER — PHENYTOIN 50 MG/1
1 TABLET, CHEWABLE ORAL DAILY
Status: ON HOLD | COMMUNITY
Start: 2022-07-05 | End: 2022-10-14 | Stop reason: HOSPADM

## 2022-07-08 RX ORDER — LISINOPRIL 5 MG/1
1 TABLET ORAL DAILY
COMMUNITY
Start: 2022-06-30

## 2022-07-08 RX ORDER — APIXABAN 5 MG/1
1 TABLET, FILM COATED ORAL 2 TIMES DAILY
COMMUNITY
Start: 2022-07-05

## 2022-07-08 RX ORDER — VENLAFAXINE 75 MG/1
75 TABLET ORAL 2 TIMES DAILY
Status: ON HOLD | COMMUNITY
End: 2022-10-14 | Stop reason: SDUPTHER

## 2022-07-08 RX ORDER — PHENYTOIN SODIUM 100 MG/1
1 CAPSULE, EXTENDED RELEASE ORAL 2 TIMES DAILY
Status: ON HOLD | COMMUNITY
Start: 2022-07-05 | End: 2022-10-14 | Stop reason: HOSPADM

## 2022-07-08 NOTE — PROGRESS NOTES
Established Patient - Audio Only Telehealth Visit     The patient location is: Home  The chief complaint leading to consultation is: CLL  Visit type: Virtual visit with audio only (telephone)  Total time spent with patient: 12 minutes       The reason for the audio only service rather than synchronous audio and video virtual visit was related to technical difficulties or patient preference/necessity.     Each patient to whom I provide medical services by telemedicine is:  (1) informed of the relationship between the physician and patient and the respective role of any other health care provider with respect to management of the patient; and (2) notified that they may decline to receive medical services by telemedicine and may withdraw from such care at any time. Patient verbally consented to receive this service via voice-only telephone call.       HPI:    Mr. Wayne is a 74yo male referred by Dr. Schmidt for CLL. He is a telephone visit  from the nursing home. His nurse is Flavia is with him for today's visit.He is feeling well today. Has no complaints. No seizures since 1/18/22. No fevers or recurring infections. Denies any abdominal pain, diarrhea or constipation or reflux. He denies abnormal bruising/bleeding, abnormal lumps or masses.   No SOB or CP. No pain.     Assessment and plan:     1. CLL - diagnosis. No anemia, thrombocytopenia, massive splenomegaly.  today. Normal IgG level. He does not meet criteria for treatment at this time. Recommend continued close observation.   2. R axillary adenopathy- No adenopathy palpated on exam today. Will continue to monitor closely.   3. Weight loss. He has lost 22 pounds since May 2021. Weight stable since last visit. He denies trying to loose weight. He does have poor dentition. States he does not feel it has affected his eating. Recommend close monitoring.         Telemedicine w/NP 3 months - cbc, cmp, IgG, LDH.   Please call in the interim if anything concerning  including but not limited to - fevers, night sweats, new or unexplained pain or weight loss.     Diane Jones, LAURENCEP-C                     This service was not originating from a related E/M service provided within the previous 7 days nor will  to an E/M service or procedure within the next 24 hours or my soonest available appointment.  Prevailing standard of care was able to be met in this audio-only visit.

## 2022-07-20 ENCOUNTER — HOSPITAL ENCOUNTER (EMERGENCY)
Facility: HOSPITAL | Age: 77
Discharge: HOME | End: 2022-07-20
Attending: EMERGENCY MEDICINE
Payer: MEDICARE

## 2022-07-20 VITALS
RESPIRATION RATE: 13 BRPM | BODY MASS INDEX: 35.36 KG/M2 | WEIGHT: 220 LBS | HEIGHT: 66 IN | DIASTOLIC BLOOD PRESSURE: 83 MMHG | OXYGEN SATURATION: 96 % | SYSTOLIC BLOOD PRESSURE: 145 MMHG | HEART RATE: 67 BPM

## 2022-07-20 DIAGNOSIS — Z13.9 ENCOUNTER FOR MEDICAL SCREENING EXAMINATION: ICD-10-CM

## 2022-07-20 PROCEDURE — 93005 ELECTROCARDIOGRAM TRACING: CPT

## 2022-07-20 PROCEDURE — 99284 EMERGENCY DEPT VISIT MOD MDM: CPT | Mod: 25

## 2022-07-20 NOTE — ED NOTES
Pt transported per [facility] form [facility] who reported pt c/o sob, chest pain and low O2 sats. Pt awake and alert without complaints at present, brother at bedside.
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
## 2022-07-20 NOTE — DISCHARGE INSTRUCTIONS
Your chest x-ray is clear of any infiltrates or concerns of infection or fluid overload.  Your EKG shows no concerning findings.  Your oxygen saturation is between 94 95% on room air.    Any worsening of symptoms or concerns please seek medical attention promptly.

## 2022-10-05 ENCOUNTER — HOSPITAL ENCOUNTER (EMERGENCY)
Facility: HOSPITAL | Age: 77
Discharge: ANOTHER HOSPITAL | End: 2022-10-05
Attending: STUDENT IN AN ORGANIZED HEALTH CARE EDUCATION/TRAINING PROGRAM
Payer: MEDICARE

## 2022-10-05 VITALS
DIASTOLIC BLOOD PRESSURE: 53 MMHG | OXYGEN SATURATION: 100 % | HEIGHT: 67 IN | HEART RATE: 63 BPM | TEMPERATURE: 98 F | WEIGHT: 264.56 LBS | BODY MASS INDEX: 41.52 KG/M2 | SYSTOLIC BLOOD PRESSURE: 90 MMHG | RESPIRATION RATE: 20 BRPM

## 2022-10-05 DIAGNOSIS — J39.8 TRACHEAL STRICTURE: ICD-10-CM

## 2022-10-05 DIAGNOSIS — N30.00 ACUTE CYSTITIS WITHOUT HEMATURIA: ICD-10-CM

## 2022-10-05 DIAGNOSIS — R06.03 RESPIRATORY DISTRESS: ICD-10-CM

## 2022-10-05 DIAGNOSIS — T88.4XXA DIFFICULT AIRWAY FOR INTUBATION, INITIAL ENCOUNTER: ICD-10-CM

## 2022-10-05 DIAGNOSIS — G40.201 STATUS EPILEPTICUS DUE TO COMPLEX PARTIAL SEIZURE: Primary | ICD-10-CM

## 2022-10-05 LAB
ABS NEUT CALC (OHS): 5.57 X10(3)/MCL (ref 2.1–9.2)
ALBUMIN SERPL-MCNC: 3.6 GM/DL (ref 3.4–4.8)
ALBUMIN/GLOB SERPL: 0.9 RATIO (ref 1.1–2)
ALLENS TEST: ABNORMAL
ALP SERPL-CCNC: 120 UNIT/L (ref 40–150)
ALT SERPL-CCNC: 21 UNIT/L (ref 0–55)
AMPHET UR QL SCN: NEGATIVE
APPEARANCE UR: ABNORMAL
APTT PPP: 24.1 SECONDS (ref 23.2–33.7)
AST SERPL-CCNC: 17 UNIT/L (ref 5–34)
BACTERIA #/AREA URNS AUTO: ABNORMAL /HPF
BARBITURATE SCN PRESENT UR: POSITIVE
BENZODIAZ UR QL SCN: NEGATIVE
BILIRUB UR QL STRIP.AUTO: NEGATIVE MG/DL
BILIRUBIN DIRECT+TOT PNL SERPL-MCNC: 0.3 MG/DL
BNP BLD-MCNC: 71.5 PG/ML
BUN SERPL-MCNC: 11 MG/DL (ref 8.4–25.7)
CALCIUM SERPL-MCNC: 9.2 MG/DL (ref 8.8–10)
CANNABINOIDS UR QL SCN: NEGATIVE
CHLORIDE SERPL-SCNC: 101 MMOL/L (ref 98–107)
CO2 SERPL-SCNC: 25 MMOL/L (ref 23–31)
COCAINE UR QL SCN: NEGATIVE
COLOR UR AUTO: YELLOW
CREAT SERPL-MCNC: 0.94 MG/DL (ref 0.73–1.18)
DELSYS: ABNORMAL
EOSINOPHIL NFR BLD MANUAL: 0.73 X10(3)/MCL (ref 0–0.9)
EOSINOPHIL NFR BLD MANUAL: 3 % (ref 0–8)
ERYTHROCYTE [DISTWIDTH] IN BLOOD BY AUTOMATED COUNT: 14.3 % (ref 11.5–17)
ETHANOL SERPL-MCNC: <10 MG/DL
FENTANYL UR QL SCN: NEGATIVE
GFR SERPLBLD CREATININE-BSD FMLA CKD-EPI: >60 MLS/MIN/1.73/M2
GLOBULIN SER-MCNC: 3.9 GM/DL (ref 2.4–3.5)
GLUCOSE SERPL-MCNC: 107 MG/DL (ref 82–115)
GLUCOSE UR QL STRIP.AUTO: NEGATIVE MG/DL
HCO3 UR-SCNC: 25.7 MMOL/L (ref 24–28)
HCT VFR BLD AUTO: 49.7 % (ref 42–52)
HGB BLD-MCNC: 15.5 GM/DL (ref 14–18)
IMM GRANULOCYTES # BLD AUTO: 0.07 X10(3)/MCL (ref 0–0.04)
IMM GRANULOCYTES NFR BLD AUTO: 0.3 %
INR BLD: 0.99 (ref 0–1.3)
KETONES UR QL STRIP.AUTO: NEGATIVE MG/DL
LACTATE SERPL-SCNC: 1.8 MMOL/L (ref 0.5–2.2)
LEUKOCYTE ESTERASE UR QL STRIP.AUTO: ABNORMAL UNIT/L
LIPASE SERPL-CCNC: 14 U/L
LYMPHOCYTES NFR BLD MANUAL: 16.46 X10(3)/MCL
LYMPHOCYTES NFR BLD MANUAL: 68 % (ref 13–40)
MAGNESIUM SERPL-MCNC: 1.7 MG/DL (ref 1.6–2.6)
MCH RBC QN AUTO: 29 PG (ref 27–31)
MCHC RBC AUTO-ENTMCNC: 31.2 MG/DL (ref 33–36)
MCV RBC AUTO: 92.9 FL (ref 80–94)
MDMA UR QL SCN: NEGATIVE
MONOCYTES NFR BLD MANUAL: 1.45 X10(3)/MCL (ref 0.1–1.3)
MONOCYTES NFR BLD MANUAL: 6 % (ref 2–11)
NEUTROPHILS NFR BLD MANUAL: 23 % (ref 47–80)
NITRITE UR QL STRIP.AUTO: POSITIVE
OPIATES UR QL SCN: NEGATIVE
PCO2 BLDA: 45.7 MMHG (ref 35–45)
PCP UR QL: NEGATIVE
PH SMN: 7.36 [PH] (ref 7.35–7.45)
PH UR STRIP.AUTO: 6.5 [PH]
PH UR: 6.5 [PH] (ref 3–11)
PHENOBARB SERPL-MCNC: 34.5 UG/ML (ref 15–40)
PHENYTOIN SERPL-MCNC: 11 UG/ML (ref 10–20)
PHOSPHATE SERPL-MCNC: 3.5 MG/DL (ref 2.3–4.7)
PLATELET # BLD AUTO: 234 X10(3)/MCL (ref 130–400)
PLATELET # BLD EST: ADEQUATE 10*3/UL
PMV BLD AUTO: 9.5 FL (ref 7.4–10.4)
PO2 BLDA: 146 MMHG (ref 80–100)
POC BE: 0 MMOL/L
POC SATURATED O2: 99 % (ref 95–100)
POC TCO2: 27 MMOL/L (ref 23–27)
POTASSIUM SERPL-SCNC: 3.9 MMOL/L (ref 3.5–5.1)
PROT SERPL-MCNC: 7.5 GM/DL (ref 5.8–7.6)
PROT UR QL STRIP.AUTO: ABNORMAL MG/DL
PROTHROMBIN TIME: 13 SECONDS (ref 12.5–14.5)
RBC # BLD AUTO: 5.35 X10(6)/MCL (ref 4.7–6.1)
RBC #/AREA URNS AUTO: ABNORMAL /HPF
RBC MORPH BLD: NORMAL
RBC UR QL AUTO: ABNORMAL UNIT/L
SAMPLE: ABNORMAL
SITE: ABNORMAL
SODIUM SERPL-SCNC: 134 MMOL/L (ref 136–145)
SP GR UR STRIP.AUTO: 1.02
SPECIFIC GRAVITY, URINE AUTO (.000) (OHS): 1.02 (ref 1–1.03)
SQUAMOUS #/AREA URNS AUTO: ABNORMAL /HPF
TROPONIN I SERPL-MCNC: 0.04 NG/ML (ref 0–0.04)
TSH SERPL-ACNC: 3.11 UIU/ML (ref 0.35–4.94)
UROBILINOGEN UR STRIP-ACNC: 0.2 MG/DL
WBC # SPEC AUTO: 24.2 X10(3)/MCL (ref 4.5–11.5)
WBC #/AREA URNS AUTO: ABNORMAL /HPF

## 2022-10-05 PROCEDURE — 51702 INSERT TEMP BLADDER CATH: CPT

## 2022-10-05 PROCEDURE — 63600175 PHARM REV CODE 636 W HCPCS: Performed by: STUDENT IN AN ORGANIZED HEALTH CARE EDUCATION/TRAINING PROGRAM

## 2022-10-05 PROCEDURE — 25000003 PHARM REV CODE 250

## 2022-10-05 PROCEDURE — 82077 ASSAY SPEC XCP UR&BREATH IA: CPT | Performed by: STUDENT IN AN ORGANIZED HEALTH CARE EDUCATION/TRAINING PROGRAM

## 2022-10-05 PROCEDURE — 83735 ASSAY OF MAGNESIUM: CPT | Performed by: STUDENT IN AN ORGANIZED HEALTH CARE EDUCATION/TRAINING PROGRAM

## 2022-10-05 PROCEDURE — 85025 COMPLETE CBC W/AUTO DIFF WBC: CPT | Performed by: STUDENT IN AN ORGANIZED HEALTH CARE EDUCATION/TRAINING PROGRAM

## 2022-10-05 PROCEDURE — 80184 ASSAY OF PHENOBARBITAL: CPT | Performed by: STUDENT IN AN ORGANIZED HEALTH CARE EDUCATION/TRAINING PROGRAM

## 2022-10-05 PROCEDURE — 36600 WITHDRAWAL OF ARTERIAL BLOOD: CPT

## 2022-10-05 PROCEDURE — 85610 PROTHROMBIN TIME: CPT | Performed by: STUDENT IN AN ORGANIZED HEALTH CARE EDUCATION/TRAINING PROGRAM

## 2022-10-05 PROCEDURE — 87040 BLOOD CULTURE FOR BACTERIA: CPT | Performed by: STUDENT IN AN ORGANIZED HEALTH CARE EDUCATION/TRAINING PROGRAM

## 2022-10-05 PROCEDURE — 87186 SC STD MICRODIL/AGAR DIL: CPT | Performed by: STUDENT IN AN ORGANIZED HEALTH CARE EDUCATION/TRAINING PROGRAM

## 2022-10-05 PROCEDURE — 81001 URINALYSIS AUTO W/SCOPE: CPT | Performed by: STUDENT IN AN ORGANIZED HEALTH CARE EDUCATION/TRAINING PROGRAM

## 2022-10-05 PROCEDURE — 84484 ASSAY OF TROPONIN QUANT: CPT | Performed by: STUDENT IN AN ORGANIZED HEALTH CARE EDUCATION/TRAINING PROGRAM

## 2022-10-05 PROCEDURE — 83690 ASSAY OF LIPASE: CPT | Performed by: STUDENT IN AN ORGANIZED HEALTH CARE EDUCATION/TRAINING PROGRAM

## 2022-10-05 PROCEDURE — 99291 CRITICAL CARE FIRST HOUR: CPT | Mod: 25

## 2022-10-05 PROCEDURE — 96365 THER/PROPH/DIAG IV INF INIT: CPT | Mod: 59

## 2022-10-05 PROCEDURE — 85730 THROMBOPLASTIN TIME PARTIAL: CPT | Performed by: STUDENT IN AN ORGANIZED HEALTH CARE EDUCATION/TRAINING PROGRAM

## 2022-10-05 PROCEDURE — 96375 TX/PRO/DX INJ NEW DRUG ADDON: CPT | Mod: 59

## 2022-10-05 PROCEDURE — 93010 EKG 12-LEAD: ICD-10-PCS | Mod: ,,, | Performed by: INTERNAL MEDICINE

## 2022-10-05 PROCEDURE — 99900031 HC PATIENT EDUCATION (STAT)

## 2022-10-05 PROCEDURE — 27200966 HC CLOSED SUCTION SYSTEM

## 2022-10-05 PROCEDURE — 99900035 HC TECH TIME PER 15 MIN (STAT)

## 2022-10-05 PROCEDURE — 83880 ASSAY OF NATRIURETIC PEPTIDE: CPT | Performed by: STUDENT IN AN ORGANIZED HEALTH CARE EDUCATION/TRAINING PROGRAM

## 2022-10-05 PROCEDURE — 99292 CRITICAL CARE ADDL 30 MIN: CPT | Mod: 25

## 2022-10-05 PROCEDURE — 85027 COMPLETE CBC AUTOMATED: CPT | Performed by: STUDENT IN AN ORGANIZED HEALTH CARE EDUCATION/TRAINING PROGRAM

## 2022-10-05 PROCEDURE — 93010 ELECTROCARDIOGRAM REPORT: CPT | Mod: ,,, | Performed by: INTERNAL MEDICINE

## 2022-10-05 PROCEDURE — 96374 THER/PROPH/DIAG INJ IV PUSH: CPT | Mod: 59

## 2022-10-05 PROCEDURE — 80307 DRUG TEST PRSMV CHEM ANLYZR: CPT | Performed by: STUDENT IN AN ORGANIZED HEALTH CARE EDUCATION/TRAINING PROGRAM

## 2022-10-05 PROCEDURE — 93005 ELECTROCARDIOGRAM TRACING: CPT | Mod: 59

## 2022-10-05 PROCEDURE — 84443 ASSAY THYROID STIM HORMONE: CPT | Performed by: STUDENT IN AN ORGANIZED HEALTH CARE EDUCATION/TRAINING PROGRAM

## 2022-10-05 PROCEDURE — 80185 ASSAY OF PHENYTOIN TOTAL: CPT | Performed by: STUDENT IN AN ORGANIZED HEALTH CARE EDUCATION/TRAINING PROGRAM

## 2022-10-05 PROCEDURE — 94761 N-INVAS EAR/PLS OXIMETRY MLT: CPT

## 2022-10-05 PROCEDURE — 36556 INSERT NON-TUNNEL CV CATH: CPT

## 2022-10-05 PROCEDURE — 83605 ASSAY OF LACTIC ACID: CPT | Performed by: STUDENT IN AN ORGANIZED HEALTH CARE EDUCATION/TRAINING PROGRAM

## 2022-10-05 PROCEDURE — 84100 ASSAY OF PHOSPHORUS: CPT | Performed by: STUDENT IN AN ORGANIZED HEALTH CARE EDUCATION/TRAINING PROGRAM

## 2022-10-05 PROCEDURE — 36415 COLL VENOUS BLD VENIPUNCTURE: CPT | Performed by: STUDENT IN AN ORGANIZED HEALTH CARE EDUCATION/TRAINING PROGRAM

## 2022-10-05 PROCEDURE — 94002 VENT MGMT INPAT INIT DAY: CPT

## 2022-10-05 PROCEDURE — 80053 COMPREHEN METABOLIC PANEL: CPT | Performed by: STUDENT IN AN ORGANIZED HEALTH CARE EDUCATION/TRAINING PROGRAM

## 2022-10-05 PROCEDURE — 31500 INSERT EMERGENCY AIRWAY: CPT

## 2022-10-05 PROCEDURE — 25000003 PHARM REV CODE 250: Performed by: STUDENT IN AN ORGANIZED HEALTH CARE EDUCATION/TRAINING PROGRAM

## 2022-10-05 PROCEDURE — 96376 TX/PRO/DX INJ SAME DRUG ADON: CPT | Mod: 59

## 2022-10-05 RX ORDER — ROCURONIUM BROMIDE 10 MG/ML
80 INJECTION, SOLUTION INTRAVENOUS
Status: COMPLETED | OUTPATIENT
Start: 2022-10-05 | End: 2022-10-05

## 2022-10-05 RX ORDER — CIPROFLOXACIN 2 MG/ML
400 INJECTION, SOLUTION INTRAVENOUS
Status: DISCONTINUED | OUTPATIENT
Start: 2022-10-05 | End: 2022-10-06 | Stop reason: HOSPADM

## 2022-10-05 RX ORDER — AMIODARONE HYDROCHLORIDE 200 MG/1
200 TABLET ORAL DAILY
COMMUNITY
Start: 2022-07-18

## 2022-10-05 RX ORDER — ETOMIDATE 2 MG/ML
20 INJECTION INTRAVENOUS
Status: COMPLETED | OUTPATIENT
Start: 2022-10-05 | End: 2022-10-05

## 2022-10-05 RX ORDER — PHENOBARBITAL 32.4 MG/1
32.4 TABLET ORAL DAILY
Status: ON HOLD | COMMUNITY
End: 2022-10-14 | Stop reason: SDUPTHER

## 2022-10-05 RX ORDER — PROPOFOL 10 MG/ML
0-50 INJECTION, EMULSION INTRAVENOUS CONTINUOUS
Status: DISCONTINUED | OUTPATIENT
Start: 2022-10-05 | End: 2022-10-06 | Stop reason: HOSPADM

## 2022-10-05 RX ORDER — KETAMINE HYDROCHLORIDE 50 MG/ML
INJECTION, SOLUTION INTRAMUSCULAR; INTRAVENOUS
Status: COMPLETED
Start: 2022-10-05 | End: 2022-10-05

## 2022-10-05 RX ORDER — LORAZEPAM 2 MG/ML
2 INJECTION INTRAMUSCULAR
Status: COMPLETED | OUTPATIENT
Start: 2022-10-05 | End: 2022-10-05

## 2022-10-05 RX ORDER — ROCURONIUM BROMIDE 10 MG/ML
100 INJECTION, SOLUTION INTRAVENOUS ONCE
Status: COMPLETED | OUTPATIENT
Start: 2022-10-05 | End: 2022-10-05

## 2022-10-05 RX ORDER — ROCURONIUM BROMIDE 10 MG/ML
20 INJECTION, SOLUTION INTRAVENOUS ONCE
Status: COMPLETED | OUTPATIENT
Start: 2022-10-05 | End: 2022-10-05

## 2022-10-05 RX ORDER — LEVETIRACETAM 500 MG/5ML
1000 INJECTION, SOLUTION, CONCENTRATE INTRAVENOUS
Status: COMPLETED | OUTPATIENT
Start: 2022-10-05 | End: 2022-10-05

## 2022-10-05 RX ADMIN — LEVETIRACETAM 1000 MG: 100 INJECTION, SOLUTION INTRAVENOUS at 07:10

## 2022-10-05 RX ADMIN — ROCURONIUM BROMIDE 80 MG: 10 INJECTION INTRAVENOUS at 08:10

## 2022-10-05 RX ADMIN — KETAMINE HYDROCHLORIDE 120 MG: 50 INJECTION INTRAMUSCULAR; INTRAVENOUS at 09:10

## 2022-10-05 RX ADMIN — ROCURONIUM BROMIDE 100 MG: 10 SOLUTION INTRAVENOUS at 10:10

## 2022-10-05 RX ADMIN — ETOMIDATE 20 MG: 2 INJECTION INTRAVENOUS at 10:10

## 2022-10-05 RX ADMIN — LORAZEPAM 2 MG: 2 INJECTION INTRAMUSCULAR; INTRAVENOUS at 07:10

## 2022-10-05 RX ADMIN — ROCURONIUM BROMIDE 20 MG: 10 SOLUTION INTRAVENOUS at 08:10

## 2022-10-05 RX ADMIN — PROPOFOL 10 MCG/KG/MIN: 10 INJECTION, EMULSION INTRAVENOUS at 08:10

## 2022-10-05 RX ADMIN — CIPROFLOXACIN 400 MG: 2 INJECTION, SOLUTION INTRAVENOUS at 10:10

## 2022-10-05 RX ADMIN — ETOMIDATE 20 MG: 2 INJECTION INTRAVENOUS at 08:10

## 2022-10-06 ENCOUNTER — HOSPITAL ENCOUNTER (INPATIENT)
Facility: HOSPITAL | Age: 77
LOS: 8 days | Discharge: NURSING, REHAB OR LONG-TERM CARE | DRG: 100 | End: 2022-10-14
Attending: EMERGENCY MEDICINE | Admitting: INTERNAL MEDICINE
Payer: MEDICARE

## 2022-10-06 DIAGNOSIS — D72.829 LEUKOCYTOSIS, UNSPECIFIED TYPE: ICD-10-CM

## 2022-10-06 DIAGNOSIS — N30.01 ACUTE CYSTITIS WITH HEMATURIA: ICD-10-CM

## 2022-10-06 DIAGNOSIS — G40.802 FRONTAL LOBE EPILEPSY: ICD-10-CM

## 2022-10-06 DIAGNOSIS — J96.02 ACUTE RESPIRATORY FAILURE WITH HYPERCAPNIA: Primary | ICD-10-CM

## 2022-10-06 PROBLEM — R56.9 SEIZURES: Status: ACTIVE | Noted: 2022-10-06

## 2022-10-06 LAB
ALBUMIN SERPL-MCNC: 3.1 GM/DL (ref 3.4–4.8)
ALBUMIN/GLOB SERPL: 1 RATIO (ref 1.1–2)
ALP SERPL-CCNC: 106 UNIT/L (ref 40–150)
ALT SERPL-CCNC: 16 UNIT/L (ref 0–55)
APPEARANCE UR: ABNORMAL
AST SERPL-CCNC: 16 UNIT/L (ref 5–34)
BACTERIA #/AREA URNS AUTO: ABNORMAL /HPF
BASOPHILS # BLD AUTO: 0.09 X10(3)/MCL (ref 0–0.2)
BASOPHILS NFR BLD AUTO: 0.4 %
BILIRUB UR QL STRIP.AUTO: NEGATIVE MG/DL
BILIRUBIN DIRECT+TOT PNL SERPL-MCNC: 0.3 MG/DL
BUN SERPL-MCNC: 10.2 MG/DL (ref 8.4–25.7)
CALCIUM SERPL-MCNC: 8.9 MG/DL (ref 8.8–10)
CHLORIDE SERPL-SCNC: 106 MMOL/L (ref 98–107)
CO2 SERPL-SCNC: 25 MMOL/L (ref 23–31)
COLOR UR AUTO: ABNORMAL
CORRECTED TEMPERATURE (PCO2): 43 MMHG (ref 35–45)
CORRECTED TEMPERATURE (PH): 7.39 (ref 7.35–7.45)
CORRECTED TEMPERATURE (PO2): 65 MMHG (ref 80–100)
CREAT SERPL-MCNC: 0.91 MG/DL (ref 0.73–1.18)
EOSINOPHIL # BLD AUTO: 0.18 X10(3)/MCL (ref 0–0.9)
EOSINOPHIL NFR BLD AUTO: 0.8 %
ERYTHROCYTE [DISTWIDTH] IN BLOOD BY AUTOMATED COUNT: 14.4 % (ref 11.5–17)
GFR SERPLBLD CREATININE-BSD FMLA CKD-EPI: >60 MLS/MIN/1.73/M2
GLOBULIN SER-MCNC: 3 GM/DL (ref 2.4–3.5)
GLUCOSE SERPL-MCNC: 150 MG/DL (ref 82–115)
GLUCOSE UR QL STRIP.AUTO: NEGATIVE MG/DL
HCO3 UR-SCNC: 26 MMOL/L (ref 22–26)
HCT VFR BLD AUTO: 43.2 % (ref 42–52)
HGB BLD-MCNC: 13.7 GM/DL (ref 14–18)
HGB BLD-MCNC: 13.9 G/DL (ref 12–16)
IMM GRANULOCYTES # BLD AUTO: 0.07 X10(3)/MCL (ref 0–0.04)
IMM GRANULOCYTES NFR BLD AUTO: 0.3 %
KETONES UR QL STRIP.AUTO: NEGATIVE MG/DL
LACTATE SERPL-SCNC: 1.8 MMOL/L (ref 0.5–2.2)
LEUKOCYTE ESTERASE UR QL STRIP.AUTO: ABNORMAL UNIT/L
LYMPHOCYTES # BLD AUTO: 11.87 X10(3)/MCL (ref 0.6–4.6)
LYMPHOCYTES NFR BLD AUTO: 53.8 %
MAGNESIUM SERPL-MCNC: 1.8 MG/DL (ref 1.6–2.6)
MCH RBC QN AUTO: 28.8 PG (ref 27–31)
MCHC RBC AUTO-ENTMCNC: 31.7 MG/DL (ref 33–36)
MCV RBC AUTO: 90.8 FL (ref 80–94)
MONOCYTES # BLD AUTO: 1.48 X10(3)/MCL (ref 0.1–1.3)
MONOCYTES NFR BLD AUTO: 6.7 %
NEUTROPHILS # BLD AUTO: 8.4 X10(3)/MCL (ref 2.1–9.2)
NEUTROPHILS NFR BLD AUTO: 38 %
NITRITE UR QL STRIP.AUTO: POSITIVE
NRBC BLD AUTO-RTO: 0 %
PCO2 BLDA: 42 MMHG
PCO2 BLDA: 43 MMHG (ref 35–45)
PH SMN: 7.39 [PH] (ref 7.35–7.45)
PH SMN: 7.43 [PH]
PH UR STRIP.AUTO: 6 [PH]
PHOSPHATE SERPL-MCNC: 3.1 MG/DL (ref 2.3–4.7)
PLATELET # BLD AUTO: 204 X10(3)/MCL (ref 130–400)
PMV BLD AUTO: 9.8 FL (ref 7.4–10.4)
PO2 BLDA: 62 MMHG
PO2 BLDA: 65 MMHG (ref 80–100)
POC BASE DEFICIT: 0.7 MMOL/L (ref -2–2)
POC BASE DEFICIT: 3.2 MMOL/L
POC COHB: 1.5 %
POC HCO3: 27.9 MMOL/L
POC IONIZED CALCIUM: 1.17 MMOL/L (ref 1.12–1.23)
POC IONIZED CALCIUM: 1.2 MMOL/L
POC METHB: 1.1 % (ref 0.4–1.5)
POC O2HB: 92.5 % (ref 94–97)
POC SATURATED O2: 92 %
POC SATURATED O2: 92.2 %
POC TEMPERATURE: 37 C
POC TEMPERATURE: 37 °C
POTASSIUM BLD-SCNC: 3.2 MMOL/L (ref 3.5–5)
POTASSIUM BLD-SCNC: 3.3 MMOL/L
POTASSIUM SERPL-SCNC: 3.7 MMOL/L (ref 3.5–5.1)
PROT SERPL-MCNC: 6.1 GM/DL (ref 5.8–7.6)
PROT UR QL STRIP.AUTO: ABNORMAL MG/DL
RBC # BLD AUTO: 4.76 X10(6)/MCL (ref 4.7–6.1)
RBC #/AREA URNS AUTO: ABNORMAL /HPF
RBC UR QL AUTO: ABNORMAL UNIT/L
SARS-COV-2 RDRP RESP QL NAA+PROBE: NEGATIVE
SODIUM BLD-SCNC: 132 MMOL/L (ref 137–145)
SODIUM BLD-SCNC: 135 MMOL/L (ref 137–145)
SODIUM SERPL-SCNC: 136 MMOL/L (ref 136–145)
SP GR UR STRIP.AUTO: >1.03 (ref 1–1.03)
SPECIMEN SOURCE: ABNORMAL
SPECIMEN SOURCE: ABNORMAL
SQUAMOUS #/AREA URNS AUTO: ABNORMAL /HPF
TSH SERPL-ACNC: 3.22 UIU/ML (ref 0.35–4.94)
UROBILINOGEN UR STRIP-ACNC: 0.2 MG/DL
VIT B12 SERPL-MCNC: 297 PG/ML (ref 213–816)
WBC # SPEC AUTO: 22.1 X10(3)/MCL (ref 4.5–11.5)
WBC #/AREA URNS AUTO: ABNORMAL /HPF

## 2022-10-06 PROCEDURE — 25000003 PHARM REV CODE 250: Performed by: INTERNAL MEDICINE

## 2022-10-06 PROCEDURE — 82607 VITAMIN B-12: CPT | Performed by: STUDENT IN AN ORGANIZED HEALTH CARE EDUCATION/TRAINING PROGRAM

## 2022-10-06 PROCEDURE — 81001 URINALYSIS AUTO W/SCOPE: CPT | Performed by: STUDENT IN AN ORGANIZED HEALTH CARE EDUCATION/TRAINING PROGRAM

## 2022-10-06 PROCEDURE — 85025 COMPLETE CBC W/AUTO DIFF WBC: CPT | Performed by: STUDENT IN AN ORGANIZED HEALTH CARE EDUCATION/TRAINING PROGRAM

## 2022-10-06 PROCEDURE — 99291 CRITICAL CARE FIRST HOUR: CPT | Mod: 25

## 2022-10-06 PROCEDURE — 25000242 PHARM REV CODE 250 ALT 637 W/ HCPCS: Performed by: STUDENT IN AN ORGANIZED HEALTH CARE EDUCATION/TRAINING PROGRAM

## 2022-10-06 PROCEDURE — 25000242 PHARM REV CODE 250 ALT 637 W/ HCPCS: Performed by: INTERNAL MEDICINE

## 2022-10-06 PROCEDURE — 99900035 HC TECH TIME PER 15 MIN (STAT)

## 2022-10-06 PROCEDURE — 95819 EEG AWAKE AND ASLEEP: CPT

## 2022-10-06 PROCEDURE — 99900026 HC AIRWAY MAINTENANCE (STAT)

## 2022-10-06 PROCEDURE — 84100 ASSAY OF PHOSPHORUS: CPT | Performed by: STUDENT IN AN ORGANIZED HEALTH CARE EDUCATION/TRAINING PROGRAM

## 2022-10-06 PROCEDURE — 63600175 PHARM REV CODE 636 W HCPCS: Performed by: INTERNAL MEDICINE

## 2022-10-06 PROCEDURE — 94640 AIRWAY INHALATION TREATMENT: CPT

## 2022-10-06 PROCEDURE — 51702 INSERT TEMP BLADDER CATH: CPT

## 2022-10-06 PROCEDURE — 27000221 HC OXYGEN, UP TO 24 HOURS

## 2022-10-06 PROCEDURE — 25000003 PHARM REV CODE 250: Performed by: STUDENT IN AN ORGANIZED HEALTH CARE EDUCATION/TRAINING PROGRAM

## 2022-10-06 PROCEDURE — 99900031 HC PATIENT EDUCATION (STAT)

## 2022-10-06 PROCEDURE — 36600 WITHDRAWAL OF ARTERIAL BLOOD: CPT

## 2022-10-06 PROCEDURE — 31500 INSERT EMERGENCY AIRWAY: CPT

## 2022-10-06 PROCEDURE — 94761 N-INVAS EAR/PLS OXIMETRY MLT: CPT

## 2022-10-06 PROCEDURE — 63600175 PHARM REV CODE 636 W HCPCS: Performed by: EMERGENCY MEDICINE

## 2022-10-06 PROCEDURE — 94002 VENT MGMT INPAT INIT DAY: CPT

## 2022-10-06 PROCEDURE — 36415 COLL VENOUS BLD VENIPUNCTURE: CPT | Performed by: STUDENT IN AN ORGANIZED HEALTH CARE EDUCATION/TRAINING PROGRAM

## 2022-10-06 PROCEDURE — 51798 US URINE CAPACITY MEASURE: CPT

## 2022-10-06 PROCEDURE — 80053 COMPREHEN METABOLIC PANEL: CPT | Performed by: STUDENT IN AN ORGANIZED HEALTH CARE EDUCATION/TRAINING PROGRAM

## 2022-10-06 PROCEDURE — 84443 ASSAY THYROID STIM HORMONE: CPT | Performed by: STUDENT IN AN ORGANIZED HEALTH CARE EDUCATION/TRAINING PROGRAM

## 2022-10-06 PROCEDURE — 83605 ASSAY OF LACTIC ACID: CPT | Performed by: STUDENT IN AN ORGANIZED HEALTH CARE EDUCATION/TRAINING PROGRAM

## 2022-10-06 PROCEDURE — 82803 BLOOD GASES ANY COMBINATION: CPT

## 2022-10-06 PROCEDURE — 27200966 HC CLOSED SUCTION SYSTEM

## 2022-10-06 PROCEDURE — 20000000 HC ICU ROOM

## 2022-10-06 PROCEDURE — 83735 ASSAY OF MAGNESIUM: CPT | Performed by: STUDENT IN AN ORGANIZED HEALTH CARE EDUCATION/TRAINING PROGRAM

## 2022-10-06 PROCEDURE — C9113 INJ PANTOPRAZOLE SODIUM, VIA: HCPCS | Performed by: STUDENT IN AN ORGANIZED HEALTH CARE EDUCATION/TRAINING PROGRAM

## 2022-10-06 PROCEDURE — 63600175 PHARM REV CODE 636 W HCPCS: Performed by: STUDENT IN AN ORGANIZED HEALTH CARE EDUCATION/TRAINING PROGRAM

## 2022-10-06 PROCEDURE — 87635 SARS-COV-2 COVID-19 AMP PRB: CPT | Performed by: EMERGENCY MEDICINE

## 2022-10-06 RX ORDER — HYDRALAZINE HYDROCHLORIDE 20 MG/ML
10 INJECTION INTRAMUSCULAR; INTRAVENOUS EVERY 4 HOURS PRN
Status: DISCONTINUED | OUTPATIENT
Start: 2022-10-06 | End: 2022-10-14 | Stop reason: HOSPADM

## 2022-10-06 RX ORDER — LEVETIRACETAM 100 MG/ML
1000 SOLUTION ORAL 2 TIMES DAILY
Status: DISCONTINUED | OUTPATIENT
Start: 2022-10-06 | End: 2022-10-12

## 2022-10-06 RX ORDER — LEVETIRACETAM 500 MG/5ML
500 INJECTION, SOLUTION, CONCENTRATE INTRAVENOUS EVERY 12 HOURS
Status: DISCONTINUED | OUTPATIENT
Start: 2022-10-06 | End: 2022-10-06

## 2022-10-06 RX ORDER — PHENYTOIN 50 MG/1
100 TABLET, CHEWABLE ORAL 3 TIMES DAILY
Status: DISCONTINUED | OUTPATIENT
Start: 2022-10-06 | End: 2022-10-06

## 2022-10-06 RX ORDER — IPRATROPIUM BROMIDE AND ALBUTEROL SULFATE 2.5; .5 MG/3ML; MG/3ML
3 SOLUTION RESPIRATORY (INHALATION)
Status: DISCONTINUED | OUTPATIENT
Start: 2022-10-06 | End: 2022-10-10

## 2022-10-06 RX ORDER — AMIODARONE HYDROCHLORIDE 200 MG/1
200 TABLET ORAL DAILY
Status: DISCONTINUED | OUTPATIENT
Start: 2022-10-06 | End: 2022-10-14 | Stop reason: HOSPADM

## 2022-10-06 RX ORDER — PHENOBARBITAL 32.4 MG/1
64.8 TABLET ORAL 2 TIMES DAILY
Status: DISCONTINUED | OUTPATIENT
Start: 2022-10-06 | End: 2022-10-14 | Stop reason: HOSPADM

## 2022-10-06 RX ORDER — SODIUM CHLORIDE 0.9 % (FLUSH) 0.9 %
10 SYRINGE (ML) INJECTION
Status: DISCONTINUED | OUTPATIENT
Start: 2022-10-06 | End: 2022-10-14 | Stop reason: HOSPADM

## 2022-10-06 RX ORDER — LEVETIRACETAM 500 MG/5ML
1000 INJECTION, SOLUTION, CONCENTRATE INTRAVENOUS EVERY 12 HOURS
Status: DISCONTINUED | OUTPATIENT
Start: 2022-10-06 | End: 2022-10-06

## 2022-10-06 RX ORDER — PANTOPRAZOLE SODIUM 40 MG/10ML
40 INJECTION, POWDER, LYOPHILIZED, FOR SOLUTION INTRAVENOUS DAILY
Status: DISCONTINUED | OUTPATIENT
Start: 2022-10-06 | End: 2022-10-06

## 2022-10-06 RX ORDER — CIPROFLOXACIN 2 MG/ML
400 INJECTION, SOLUTION INTRAVENOUS
Status: DISCONTINUED | OUTPATIENT
Start: 2022-10-06 | End: 2022-10-08

## 2022-10-06 RX ORDER — PROPOFOL 10 MG/ML
0-50 INJECTION, EMULSION INTRAVENOUS CONTINUOUS
Status: DISCONTINUED | OUTPATIENT
Start: 2022-10-06 | End: 2022-10-07

## 2022-10-06 RX ORDER — CLOPIDOGREL BISULFATE 75 MG/1
75 TABLET ORAL DAILY
Status: DISCONTINUED | OUTPATIENT
Start: 2022-10-06 | End: 2022-10-14 | Stop reason: HOSPADM

## 2022-10-06 RX ORDER — LISINOPRIL 5 MG/1
5 TABLET ORAL DAILY
Status: DISCONTINUED | OUTPATIENT
Start: 2022-10-06 | End: 2022-10-14 | Stop reason: HOSPADM

## 2022-10-06 RX ORDER — LABETALOL HYDROCHLORIDE 5 MG/ML
20 INJECTION, SOLUTION INTRAVENOUS EVERY 4 HOURS PRN
Status: DISCONTINUED | OUTPATIENT
Start: 2022-10-06 | End: 2022-10-14 | Stop reason: HOSPADM

## 2022-10-06 RX ORDER — METOPROLOL SUCCINATE 25 MG/1
25 TABLET, EXTENDED RELEASE ORAL DAILY
Status: DISCONTINUED | OUTPATIENT
Start: 2022-10-06 | End: 2022-10-14 | Stop reason: HOSPADM

## 2022-10-06 RX ORDER — MUPIROCIN 20 MG/G
OINTMENT TOPICAL 2 TIMES DAILY
Status: COMPLETED | OUTPATIENT
Start: 2022-10-06 | End: 2022-10-10

## 2022-10-06 RX ORDER — VENLAFAXINE 37.5 MG/1
75 TABLET ORAL 2 TIMES DAILY
Status: DISCONTINUED | OUTPATIENT
Start: 2022-10-06 | End: 2022-10-14 | Stop reason: HOSPADM

## 2022-10-06 RX ORDER — PANTOPRAZOLE SODIUM 40 MG/1
40 FOR SUSPENSION ORAL DAILY
Status: DISCONTINUED | OUTPATIENT
Start: 2022-10-07 | End: 2022-10-14 | Stop reason: HOSPADM

## 2022-10-06 RX ORDER — ATORVASTATIN CALCIUM 40 MG/1
40 TABLET, FILM COATED ORAL NIGHTLY
Status: DISCONTINUED | OUTPATIENT
Start: 2022-10-06 | End: 2022-10-14 | Stop reason: HOSPADM

## 2022-10-06 RX ORDER — PHENYTOIN 125 MG/5ML
100 SUSPENSION ORAL 3 TIMES DAILY
Status: DISCONTINUED | OUTPATIENT
Start: 2022-10-06 | End: 2022-10-07

## 2022-10-06 RX ORDER — FUROSEMIDE 40 MG/1
40 TABLET ORAL DAILY
Status: DISCONTINUED | OUTPATIENT
Start: 2022-10-06 | End: 2022-10-10

## 2022-10-06 RX ORDER — SODIUM CHLORIDE 9 MG/ML
INJECTION, SOLUTION INTRAVENOUS CONTINUOUS
Status: DISCONTINUED | OUTPATIENT
Start: 2022-10-06 | End: 2022-10-07

## 2022-10-06 RX ADMIN — METOPROLOL SUCCINATE 25 MG: 25 TABLET, EXTENDED RELEASE ORAL at 05:10

## 2022-10-06 RX ADMIN — APIXABAN 5 MG: 5 TABLET, FILM COATED ORAL at 08:10

## 2022-10-06 RX ADMIN — SODIUM CHLORIDE: 9 INJECTION, SOLUTION INTRAVENOUS at 03:10

## 2022-10-06 RX ADMIN — PROPOFOL 5 MCG/KG/MIN: 10 INJECTION, EMULSION INTRAVENOUS at 01:10

## 2022-10-06 RX ADMIN — LEVETIRACETAM 1000 MG: 100 SOLUTION ORAL at 08:10

## 2022-10-06 RX ADMIN — VENLAFAXINE 75 MG: 37.5 TABLET ORAL at 08:10

## 2022-10-06 RX ADMIN — PHENOBARBITAL 64.8 MG: 32.4 TABLET ORAL at 08:10

## 2022-10-06 RX ADMIN — PANTOPRAZOLE SODIUM 40 MG: 40 INJECTION, POWDER, FOR SOLUTION INTRAVENOUS at 08:10

## 2022-10-06 RX ADMIN — IPRATROPIUM BROMIDE AND ALBUTEROL SULFATE 3 ML: 2.5; .5 SOLUTION RESPIRATORY (INHALATION) at 04:10

## 2022-10-06 RX ADMIN — MUPIROCIN: 20 OINTMENT TOPICAL at 09:10

## 2022-10-06 RX ADMIN — IPRATROPIUM BROMIDE AND ALBUTEROL SULFATE 3 ML: 2.5; .5 SOLUTION RESPIRATORY (INHALATION) at 08:10

## 2022-10-06 RX ADMIN — PHENYTOIN 100 MG: 125 SUSPENSION ORAL at 08:10

## 2022-10-06 RX ADMIN — IPRATROPIUM BROMIDE AND ALBUTEROL SULFATE 3 ML: 2.5; .5 SOLUTION RESPIRATORY (INHALATION) at 11:10

## 2022-10-06 RX ADMIN — CIPROFLOXACIN 400 MG: 2 INJECTION, SOLUTION INTRAVENOUS at 06:10

## 2022-10-06 RX ADMIN — FUROSEMIDE 40 MG: 40 TABLET ORAL at 05:10

## 2022-10-06 RX ADMIN — PROPOFOL 5 MCG/KG/MIN: 10 INJECTION, EMULSION INTRAVENOUS at 03:10

## 2022-10-06 RX ADMIN — SODIUM CHLORIDE: 9 INJECTION, SOLUTION INTRAVENOUS at 02:10

## 2022-10-06 RX ADMIN — PHENYTOIN 100 MG: 125 SUSPENSION ORAL at 03:10

## 2022-10-06 RX ADMIN — AMIODARONE HYDROCHLORIDE 200 MG: 200 TABLET ORAL at 08:10

## 2022-10-06 RX ADMIN — CIPROFLOXACIN 400 MG: 2 INJECTION, SOLUTION INTRAVENOUS at 07:10

## 2022-10-06 RX ADMIN — CLOPIDOGREL 75 MG: 75 TABLET, FILM COATED ORAL at 05:10

## 2022-10-06 RX ADMIN — LISINOPRIL 5 MG: 5 TABLET ORAL at 05:10

## 2022-10-06 RX ADMIN — HYDROCORTISONE SODIUM SUCCINATE 100 MG: 100 INJECTION, POWDER, FOR SOLUTION INTRAMUSCULAR; INTRAVENOUS at 08:10

## 2022-10-06 RX ADMIN — LEVETIRACETAM 500 MG: 100 INJECTION, SOLUTION INTRAVENOUS at 08:10

## 2022-10-06 RX ADMIN — ATORVASTATIN CALCIUM 40 MG: 40 TABLET, FILM COATED ORAL at 08:10

## 2022-10-06 NOTE — PLAN OF CARE
Problem: Adult Inpatient Plan of Care  Goal: Plan of Care Review  Outcome: Ongoing, Progressing  Goal: Patient-Specific Goal (Individualized)  Outcome: Ongoing, Progressing  Goal: Absence of Hospital-Acquired Illness or Injury  Outcome: Ongoing, Progressing  Goal: Optimal Comfort and Wellbeing  Outcome: Ongoing, Progressing  Goal: Readiness for Transition of Care  Outcome: Ongoing, Progressing     Problem: Bariatric Environmental Safety  Goal: Safety Maintained with Care  Outcome: Ongoing, Progressing     Problem: Infection  Goal: Absence of Infection Signs and Symptoms  Outcome: Ongoing, Progressing     Problem: Communication Impairment (Mechanical Ventilation, Invasive)  Goal: Effective Communication  Outcome: Ongoing, Progressing     Problem: Device-Related Complication Risk (Mechanical Ventilation, Invasive)  Goal: Optimal Device Function  Outcome: Ongoing, Progressing     Problem: Inability to Wean (Mechanical Ventilation, Invasive)  Goal: Mechanical Ventilation Liberation  Outcome: Ongoing, Progressing     Problem: Nutrition Impairment (Mechanical Ventilation, Invasive)  Goal: Optimal Nutrition Delivery  Outcome: Ongoing, Progressing     Problem: Skin and Tissue Injury (Mechanical Ventilation, Invasive)  Goal: Absence of Device-Related Skin and Tissue Injury  Outcome: Ongoing, Progressing     Problem: Ventilator-Induced Lung Injury (Mechanical Ventilation, Invasive)  Goal: Absence of Ventilator-Induced Lung Injury  Outcome: Ongoing, Progressing     Problem: Communication Impairment (Artificial Airway)  Goal: Effective Communication  Outcome: Ongoing, Progressing     Problem: Device-Related Complication Risk (Artificial Airway)  Goal: Optimal Device Function  Outcome: Ongoing, Progressing     Problem: Skin and Tissue Injury (Artificial Airway)  Goal: Absence of Device-Related Skin or Tissue Injury  Outcome: Ongoing, Progressing     Problem: Noninvasive Ventilation Acute  Goal: Effective Unassisted Ventilation  and Oxygenation  Outcome: Ongoing, Progressing     Problem: Skin Injury Risk Increased  Goal: Skin Health and Integrity  Outcome: Ongoing, Progressing

## 2022-10-06 NOTE — H&P
[facility] - 7th Floor ICU  Pulmonary Critical Care Note    Patient Name: John Wayne  MRN: [id number]  Admission Date: 10/6/2022  Hospital Length of Stay: 0 days  Code Status: Full Code  Attending Provider: Jenifer Muniz MD  Primary Care Provider: SHAY Schmidt MD     Subjective:     HPI:   Pt is a 75 y/o male with PMHx of HTN, CAD, NSTEMI, seizure disorder, A. fib/a flutter on Eliquis, COPD, and chronic venous insufficiency who presented to outside facility from Nursing Home via EMS with complaints of seizures of unknown quantity and duration. It is unknown if pt had seizure at the nursing home prior arrival.   Due to continued deterioration in respiratory status, pt was sedated and intubated at [facility]. Loaded with Keppra. EKG showing aflutter with 1st degree AV block.      Patient last seen in March and April 2021 for similar episode. Per chart review, is currently on Keppra, Phenytoin, and Phenobarbital for seizures. Since arrival, pt has had no seizure-like activity. He is arousable and responsive to requests. Admitted to ICU for acute hypoxemic respiratory failure.       Past Medical History:   Diagnosis Date    Anticoagulant long-term use     COPD (chronic obstructive pulmonary disease)     Coronary artery disease     Difficult intubation     GERD (gastroesophageal reflux disease)     Hypertension     Seizures    CAD  NSTEMI  Afib/Aflutter on Eliquis  Chronic venous insufficiency    Social History     Socioeconomic History    Marital status:    Tobacco Use    Smoking status: Former    Smokeless tobacco: Never   Substance and Sexual Activity    Alcohol use: Not Currently    Drug use: Never           Objective:     Current Outpatient Medications   Medication Instructions    amiodarone (PACERONE) 200 mg, Oral, Daily    atorvastatin (LIPITOR) 40 mg, Oral, Nightly    clopidogreL (PLAVIX) 75 mg tablet 1 tablet, Oral, Daily    ELIQUIS 5 mg Tab 1 tablet, Oral, 2 times daily    furosemide (LASIX) 40  MG tablet 1 tablet, Oral, Daily    levETIRAcetam (KEPPRA) 1000 MG tablet 1 tablet, Oral, 2 times daily    lisinopriL (PRINIVIL,ZESTRIL) 5 MG tablet 1 tablet, Oral, Daily    metoprolol succinate (TOPROL-XL) 25 MG 24 hr tablet 1 tablet, Oral, Daily    pantoprazole (PROTONIX) 40 mg, Oral, Daily    PHENobarbitaL (LUMINAL) 64.8 MG tablet 1 tablet, Oral, 2 times daily    PHENobarbitaL 32.4 mg, Oral, Daily    phenytoin (DILANTIN) 100 MG ER capsule 1 capsule, Oral, 2 times daily    phenytoin (DILANTIN) 50 mg chewable tablet 1 tablet, Oral, Daily    tamsulosin (FLOMAX) 0.4 mg Cap 1 tablet, Oral, Daily    venlafaxine (EFFEXOR) 75 mg, Oral, 2 times daily       Current Inpatient Medications   mupirocin   Nasal BID         No intake or output data in the 24 hours ending 10/06/22 0155    Review of Systems   Unable to perform ROS: Intubated      Vital Signs (Most Recent):  Temp: 97.5 °F (36.4 °C) (10/06/22 0034)  Pulse: 87 (10/06/22 0040)  Resp: (!) 24 (10/06/22 0040)  BP: 122/64 (10/06/22 0034)  SpO2: 98 % (10/06/22 0040)    Body mass index is 41.43 kg/m².  Weight: 120 kg (264 lb 8.8 oz) Vital Signs (24h Range):  Temp:  [97.5 °F (36.4 °C)-97.6 °F (36.4 °C)] 97.5 °F (36.4 °C)  Pulse:  [] 87  Resp:  [16-40] 24  SpO2:  [88 %-100 %] 98 %  BP: ()/() 122/64     Physical Exam  Constitutional:       Appearance: He is obese.      Interventions: He is sedated and intubated.   HENT:      Head: Normocephalic and atraumatic.      Right Ear: External ear normal.      Left Ear: External ear normal.   Eyes:      Extraocular Movements: Extraocular movements intact.      Pupils: Pupils are equal, round, and reactive to light.   Cardiovascular:      Rate and Rhythm: Regular rhythm. Bradycardia present.      Pulses: Normal pulses.   Pulmonary:      Effort: Pulmonary effort is normal. No respiratory distress. He is intubated.      Breath sounds: Normal breath sounds.   Abdominal:      General: Bowel sounds are decreased.       Palpations: Abdomen is soft.   Musculoskeletal:      Cervical back: Normal range of motion.      Right lower le+ Pitting Edema present.      Left lower le+ Pitting Edema present.      Right ankle: Swelling present.      Left ankle: Swelling present.      Right foot: Swelling present.      Left foot: Swelling present.      Comments: BLE 2+ pitting edema. Hyperkeratosis on bilateral feet.   Neurological:      GCS: GCS eye subscore is 4. GCS verbal subscore is 1. GCS motor subscore is 6.      Sensory: Sensation is intact.      Comments: Gag reflex +, pupillary reflex +; withdraws from pain.         Mechanical ventilation support:  Vent Mode: A/C (10/06/22 0040)  Ventilator Initiated: Yes (10/06/22 0040)  Set Rate: 20 BPM (10/06/22 0040)  Vt Set: 470 mL (10/06/22 0040)  PEEP/CPAP: 5 cmH20 (10/06/22 0040)  Oxygen Concentration (%): 100 (10/06/22 0040)  Peak Airway Pressure: 18 cmH2O (10/06/22 0040)  Total Ve: 11.2 mL (10/06/22 0040)  F/VT Ratio<105 (RSBI): (!) 59.41 (10/06/22 0040)    Lines/Drains/Airways       Central Venous Catheter Line  Duration             Percutaneous Central Line Insertion/Assessment - Triple Lumen  10/05/22 2200 right femoral <1 day              Drain  Duration                  NG/OG Tube 10/05/22 2225 orogastric 18 Fr. Center mouth <1 day         Urethral Catheter 10/05/22 2200 16 Fr. <1 day              Airway  Duration                  Airway - Non-Surgical 10/05/22 2200 Endotracheal Tube <1 day              Peripheral Intravenous Line  Duration                  Peripheral IV - Single Lumen 10/05/22 2130 18 G Anterior;Distal;Right Upper Arm <1 day                    Significant Labs:    Lab Results   Component Value Date    WBC 24.2 (H) 10/05/2022    HGB 15.5 10/05/2022    HCT 49.7 10/05/2022    MCV 92.9 10/05/2022     10/05/2022         BMP  Lab Results   Component Value Date     (L) 10/05/2022    K 3.9 10/05/2022    CO2 25 10/05/2022    BUN 11.0 10/05/2022    CREATININE  0.94 10/05/2022    CALCIUM 9.2 10/05/2022    EGFRNONAA >60 04/13/2022       ABG  Recent Labs   Lab 10/05/22  1914   PH 7.358   PO2 146*   PCO2 45.7*   HCO3 25.7   BE 0           Significant Imaging:  Chest x-ray 10/05/2022:  My read.  ET tube visible airway looks patent.  No bony deformities or fractures.  Cardiac silhouette appears to be within normal limits.  Diaphragm is obscured on the left.  Costophrenic angle visible on the right.  No appreciable edema or effusion.  Diffuse patchy opacities on lung bilateral fields.        Assessment/Plan:     Assessment  Acute hypoxemic respiratory failure  Seizure disorder, recurrent despite reported compliance with medications  Afib/Aflutter with variable AV block  Acute Cystitis  Chronic venous insufficiency   Ischemic Cardiomyopathy- last echo in 10/2021 reportedly with EF of 25-30%   HTN  CAD w/ history of NSTEMI      Plan  -admit to ICU under seizure monitoring.  -Continue to monitor respiratory status. Wean vent as tolerated.  -received Keppra 1000 mg in the E D.  Will continue Keppra 500 IV BID in the morning.   -Holding home phenobarbital and phenytoin.   -Continue propofol; wean as tolerated.  -Awaiting medical records from nursing home.  -Seizure precautions  -Consult Neurology ordered EEG in a.m. Ordered non-con head CT.  -Continue Cipro started in [facility] ED. Deescalate as sensitivities result.  -Repeat labs.  Replace electrolytes as needed. Await blood culture, urine culture results.        DVT Prophylaxis: SCD  GI Prophylaxis: Protonix     Greater than 30 minutes of critical care was time spent personally by me on the following activities: development of treatment plan with patient or surrogate and bedside caregivers, discussions with consultants, evaluation of patient's response to treatment, examination of patient, ordering and performing treatments and interventions, ordering and review of laboratory studies, ordering and review of radiographic studies, pulse  oximetry, re-evaluation of patient's condition.  This critical care time did not overlap with that of any other provider or involve time for any procedures.     Baudilio Nj MD  Pulmonary Critical Care Medicine  [facility] - 7th Floor ICU

## 2022-10-06 NOTE — HPI
75 y/o M with PMH frontal lobe epilepsy, COPD, CAD, a. fib/flutter on eliquis, CLL, CHF, and HTN who presented to [facility] ED from the NH where he resides on 10/5 for seizures.  pt reportedly had witnessed seizure at NH lasting approx 25 min. seizure activity described as generalized muscle fasciculations and tachypnea w/o LOC, which are how his seizures usually present. pt had additional seizure while at [facility]'s ED followed by stridor and was subsequently intubated. en route from [facility] to [facility] ED pt noted to have another seizure with associated respiratory distress and was given ativan IV.     pt's neurologist Dr. Eckert and home AEDs include, keppra 1 gm BID, phenobarbital 64.8 mg BID, and phenytoin 100 mg TID, which have been resumed. phenobarbital and phenytoin serum levels therapeutic. labs significant for UTI. CT head w/o negative for acute intracranial processes.    of note, pt treated several other times for breakthrough seizures and admitted approx 1-1.5 yrs ago for status epilepticus. during that admission there was some concern regarding pt receiving AEDs correctly at NH per pt's neurologist's office notes.

## 2022-10-06 NOTE — CONSULTS
[facility] - 7th Floor ICU  Neurology  Consult Note    Patient Name: John Wayne  MRN: [id number]  Admission Date: 10/6/2022  Hospital Length of Stay: 0 days  Code Status: Full Code   Attending Provider: Jenifer Muniz MD   Consulting Provider: Sharee Martin [facility]  Primary Care Physician: SHAY Schmidt MD  Principal Problem:<principal problem not specified>    Inpatient consult to Neurology  Consult performed by: Sharee Martin [facility]  Consult ordered by: Baudilio Nj MD         Subjective:     Chief Complaint:       HPI:   75 y/o M with PMH frontal lobe epilepsy, COPD, CAD, a. fib/flutter on eliquis, CLL, CHF, and HTN who presented to [facility] ED from the NH where he resides on 10/5 for seizures.  pt reportedly had witnessed seizure at NH lasting approx 25 min. seizure activity described as generalized muscle fasciculations and tachypnea w/o LOC, which are how his seizures usually present. pt had additional seizure while at [facility]'s ED followed by stridor and was subsequently intubated. en route from [facility] to [facility] ED pt noted to have another seizure with associated respiratory distress and was given ativan IV.     pt's neurologist Dr. Eckert and home AEDs include, keppra 1 gm BID, phenobarbital 64.8 mg BID, and phenytoin 100 mg TID, which have been resumed. phenobarbital and phenytoin serum levels therapeutic. labs significant for UTI. CT head w/o negative for acute intracranial processes.    of note, pt treated several other times for breakthrough seizures and admitted approx 1-1.5 yrs ago for status epilepticus. during that admission there was some concern regarding pt receiving AEDs correctly at NH per pt's neurologist's office notes.        Past Medical History:   Diagnosis Date    Anticoagulant long-term use     COPD (chronic obstructive pulmonary disease)     Coronary artery disease     Difficult intubation     GERD (gastroesophageal reflux disease)      Hypertension     Seizures        Past Surgical History:   Procedure Laterality Date    cardiac catheterisation  10/18/2016    PERCUTANEOUS BALLOON VALVULOPLASTY  04/04/2018       Review of patient's allergies indicates:   Allergen Reactions    Ancef in dextrose (iso-osm)     Codeine     Penicillins        Current Neurological Medications:     Current Facility-Administered Medications on File Prior to Encounter   Medication    [COMPLETED] etomidate injection 20 mg    [COMPLETED] etomidate injection 20 mg    [COMPLETED] ketamine (KETALAR) 50 mg/mL injection    [COMPLETED] levETIRAcetam injection 1,000 mg    [COMPLETED] lorazepam injection 2 mg    [COMPLETED] lorazepam injection 2 mg    [COMPLETED] rocuronium injection 100 mg    [COMPLETED] rocuronium injection 20 mg    [COMPLETED] rocuronium injection 80 mg    [DISCONTINUED] ciprofloxacin (CIPRO)400mg/200ml D5W IVPB 400 mg    [DISCONTINUED] propofol (DIPRIVAN) 10 mg/mL infusion     Current Outpatient Medications on File Prior to Encounter   Medication Sig    amiodarone (PACERONE) 200 MG Tab Take 200 mg by mouth once daily.    atorvastatin (LIPITOR) 40 MG tablet Take 40 mg by mouth every evening.    clopidogreL (PLAVIX) 75 mg tablet Take 1 tablet by mouth Daily.    ELIQUIS 5 mg Tab Take 1 tablet by mouth 2 (two) times a day.    furosemide (LASIX) 40 MG tablet Take 1 tablet by mouth Daily.    levETIRAcetam (KEPPRA) 1000 MG tablet Take 1 tablet by mouth 2 (two) times a day.    lisinopriL (PRINIVIL,ZESTRIL) 5 MG tablet Take 1 tablet by mouth Daily.    metoprolol succinate (TOPROL-XL) 25 MG 24 hr tablet Take 1 tablet by mouth Daily.    pantoprazole (PROTONIX) 40 MG tablet Take 40 mg by mouth Daily.    PHENobarbitaL (LUMINAL) 64.8 MG tablet Take 1 tablet by mouth 2 (two) times daily.    PHENobarbitaL 32.4 MG tablet Take 32.4 mg by mouth Daily.    phenytoin (DILANTIN) 100 MG ER capsule Take 1 capsule by mouth 2 (two) times a day.    phenytoin  (DILANTIN) 50 mg chewable tablet Take 1 tablet by mouth once daily.    tamsulosin (FLOMAX) 0.4 mg Cap Take 1 tablet by mouth Daily.    venlafaxine (EFFEXOR) 75 MG tablet Take 75 mg by mouth 2 (two) times daily.     Family History       Problem Relation (Age of Onset)    Coronary artery disease Father    Heart attack Father          Tobacco Use    Smoking status: Former    Smokeless tobacco: Never   Substance and Sexual Activity    Alcohol use: Not Currently    Drug use: Never    Sexual activity: Not on file     Review of Systems  Limited 2/2 pt recently extubated prior to exam    Objective:     Vital Signs (Most Recent):  Temp: 98.2 °F (36.8 °C) (10/06/22 0800)  Pulse: 62 (10/06/22 0930)  Resp: 18 (10/06/22 0930)  BP: (!) 163/67 (10/06/22 0930)  SpO2: (!) 94 % (10/06/22 0930)   Vital Signs (24h Range):  Temp:  [97.5 °F (36.4 °C)-98.2 °F (36.8 °C)] 98.2 °F (36.8 °C)  Pulse:  [] 62  Resp:  [4-40] 18  SpO2:  [88 %-100 %] 94 %  BP: ()/() 163/67     Weight: 118 kg (260 lb 2.3 oz)  Body mass index is 40.74 kg/m².    Physical Exam  Vitals reviewed.   Constitutional:       Appearance: He is obese.   HENT:      Head: Normocephalic and atraumatic.      Nose: Nose normal.      Mouth/Throat:      Mouth: Mucous membranes are moist.      Pharynx: Oropharynx is clear.   Eyes:      Extraocular Movements: Extraocular movements intact and EOM normal.      Pupils: Pupils are equal, round, and reactive to light.   Pulmonary:      Effort: Pulmonary effort is normal.   Skin:     General: Skin is warm and dry.   Neurological:      Mental Status: He is oriented to person, place, and time and easily aroused.   Psychiatric:         Speech: Speech normal.         Behavior: Behavior is cooperative.       NEUROLOGICAL EXAMINATION:     MENTAL STATUS   Oriented to person, place, and time.   Follows 1 step commands.   Attention: decreased. Concentration: decreased.   Speech: speech is normal   Level of consciousness:  drowsy ,  arousable by verbal stimuli    CRANIAL NERVES     CN II   Visual fields full to confrontation.     CN III, IV, VI   Pupils are equal, round, and reactive to light.  Extraocular motions are normal.   Nystagmus: none   Conjugate gaze: present    CN V   Facial sensation intact.     CN VII   Facial expression full, symmetric.     MOTOR EXAM   Overall muscle tone: normal    Strength   Right deltoid: 4/5  Left deltoid: 4/5  Right biceps: 4/5  Left biceps: 4/5  Right triceps: 4/5  Left triceps: 4/5  Right quadriceps: 2/5  Left quadriceps: 2/5  Right hamstrin/5  Left hamstrin/5  Right anterior tibial: 3/5  Left anterior tibial: 3/5  Right posterior tibial: 3/5  Left posterior tibial: 3/5    Significant Labs:   Recent Lab Results  (Last 5 results in the past 24 hours)        10/06/22  0936   10/06/22  0238   10/06/22  0223   10/06/22  0200   10/06/22  0110        Base Deficit 3.2     0.70           Correct Temperature (PH)     7.39           Corrected Temperature (pCO2)     43           Corrected Temperature (pO2)     65  Comment: Result is outside patient normal (reference) range.           POC COHb     1.5           POC MetHb     1.1           POC O2Hb     92.5  Comment: Result is outside patient normal (reference) range.           Specimen source Arterial sample     Arterial sample           Albumin/Globulin Ratio   1.0             Albumin   3.1             Alkaline Phosphatase   106             ALT   16             Appearance, UA       Cloudy         AST   16             Bacteria, UA       1+         Baso #   0.09             Basophil %   0.4             BILIRUBIN TOTAL   0.3             Bilirubin, UA       Negative         BUN   10.2             Calcium   8.9             Chloride   106             CO2   25             Color, UA       Light-Yellow         ID NOW COVID-19, ([place])         Negative       Creatinine   0.91             eGFR   >60             Eos #   0.18             Eosinophil %   0.8              Globulin, Total   3.0             Glucose   150             Glucose, UA       Negative         Hematocrit   43.2             Hemoglobin   13.7             Immature Grans (Abs)   0.07             Immature Granulocytes   0.3             Ketones, UA       Negative         Lactate, Kunal   1.8             Leukocytes, UA       1+         Lymph #   11.87             LYMPH %   53.8             Magnesium   1.80             MCH   28.8             MCHC   31.7             MCV   90.8             Mono #   1.48             Mono %   6.7             MPV   9.8             Neut #   8.4             Neut %   38.0             NITRITE UA       Positive         nRBC   0.0             Occult Blood UA       3+         pH, UA       6.0         Phosphorus   3.1             Platelets   204             POC HCO3 27.9     26.0           POC HEMOGLOBIN     13.9           POC Ionized Calcium 1.20     1.17           POC PCO2 42     43           POC PH 7.43     7.39           POC PO2 62  Comment: Result is outside patient normal (reference) range.     65  Comment: Result is outside patient normal (reference) range.           POC Potassium 3.3  Comment: Result is outside patient normal (reference) range.     3.2  Comment: Result is outside patient normal (reference) range.           POC SATURATED O2 92     92.2           POC Sodium 135  Comment: Result is outside patient normal (reference) range.     132  Comment: Result is outside patient normal (reference) range.           POC Temp 37.0     37.0           Potassium   3.7             PROTEIN TOTAL   6.1             Protein, UA       Trace         RBC   4.76             RBC, UA       6-10         RDW   14.4             Sodium   136             Specific Gravity,UA       >1.030         Squam Epithel, UA       5-10         Thyroid Stimulating Hormone   3.2192             Urobilinogen, UA       0.2         Vitamin B-12   297             WBC, UA       21-50         WBC   22.1                                     Significant Imaging:   CT head w/o 10/5/2022:  FINDINGS:  There is no acute intracranial hemorrhage or edema.  There is encephalomalacia in the left cerebellum.  Patchy hypodensities in the subcortical and periventricular white matter likely represent chronic microvascular ischemic changes.     There is no mass effect or midline shift.  There is diffuse parenchymal volume loss.  The basal cisterns are patent. There is no abnormal extra-axial fluid collection.  Carotid and vertebral artery calcifications are noted.     The calvarium and skull base are intact. The visualized paranasal sinuses and the mastoid air cells are clear.     Impression:     1. No acute intracranial abnormality.  2. Chronic microvascular ischemic changes.    I have reviewed all pertinent imaging results/findings within the past 24 hours.    Assessment and Plan:     Seizures  Keppra increased from 500 mg to 1000 mg BID (o/p dose)  Continue phenobarbital 64.8 mg BID and dilantin 100 mg TID  Phenobarbital and phenytoin serum levels therapeutic  Seizure precautions        VTE Risk Mitigation (From admission, onward)         Ordered     apixaban tablet 5 mg  2 times daily         10/06/22 0322     IP VTE HIGH RISK PATIENT  Once         10/06/22 0152     Place sequential compression device  Until discontinued         10/06/22 0152                Thank you for your consult. Further recommendations to follow per MD Sharee Martin, [facility]  Inpatient Neurology  [facility] - 7th Floor ICU

## 2022-10-06 NOTE — ED NOTES
Pt brought to ED via [facility] from [facility] who states pt had a seizure lasting 25 min.  No medications given per EMS.  Pt placed on non-rebreather R/T 78% RA saturation.  On arrival pt exhibits hyperapnea, increased work of breathing, accessory muscle use.  O2 saturation WNL.  +4 edema noted to BLE.  Pt able to follow commands and answer questions.

## 2022-10-06 NOTE — ED NOTES
Pt medical neuro physician note states pt has hx of frontal seizures where he is able to speak through them and he exhibits muscle spasms.

## 2022-10-06 NOTE — Clinical Note
Diagnosis: Acute respiratory failure with hypercapnia [[id number]]   Admitting Provider:: RADHA SMITH [[id number]]   Future Attending Provider: RADHA SMITH [[id number]]   Reason for IP Medical Treatment  (Clinical interventions that can only be accomplished in the IP setting? ) :: acute respiratory failure   Estimated Length of Stay:: 3-4 midnights   I certify that Inpatient services for greater than or equal to 2 midnights are medically necessary:: Yes   Plans for Post-Acute care--if anticipated (pick the single best option):: A. No post acute care anticipated at this time

## 2022-10-06 NOTE — ED PROVIDER NOTES
Encounter Date: 10/5/2022    SCRIBE #1 NOTE: I, Maddy Reddy, am scribing for, and in the presence of,  Valeria Pederson MD. I have scribed the following portions of the note - Other sections scribed: HPI, ROS, PE.     History     Chief Complaint   Patient presents with    Seizures     Intubated tx from [facility] w/ seizure       This is a 76 y.o. male, with a PMHx of frontal lobe epilepsy, who presents with complaint of 2 seizures prior to arrival. Patient was previously at [facility]. Per medical records, the patient had a seizure while at the hospital and had some stridor, so they intubated the patient. While intubating, the patient had some stenosis of his cords, but they eventually were able to place a 7 in. Per EMS, the patient had the first seizure at the hospital 20-35 minutes prior to their arrival. EMS personnel adds that when they arrived to the hospital the patient was in respiratory distress, and his respiratory rate was in the 40's. EMS personnel states that 10-15 minutes prior to arriving at this ED the patient had another seizure. EMS notes that he had some gasping like breaths prior to arriving at this ED.      The history is provided by the EMS personnel and medical records. The history is limited by the condition of the patient.   Seizures   This is a chronic problem. The current episode started just prior to arrival. There were 2 to 3 seizures. The episode was Witnessed. The seizures Did not continue in the ED. Medications administered prior to arrival include lorazepam IV.   Review of patient's allergies indicates:   Allergen Reactions    Ancef in dextrose (iso-osm)     Codeine     Penicillins      Past Medical History:   Diagnosis Date    Anticoagulant long-term use     COPD (chronic obstructive pulmonary disease)     Coronary artery disease     Difficult intubation     GERD (gastroesophageal reflux disease)     Hypertension     Seizures      Past Surgical History:   Procedure Laterality Date    cardiac  catheterisation  10/18/2016    PERCUTANEOUS BALLOON VALVULOPLASTY  04/04/2018     Family History   Problem Relation Age of Onset    Coronary artery disease Father     Heart attack Father      Social History     Tobacco Use    Smoking status: Former    Smokeless tobacco: Never   Substance Use Topics    Alcohol use: Not Currently    Drug use: Never     Review of Systems   Unable to perform ROS: Intubated   Neurological:  Positive for seizures.     Physical Exam     Initial Vitals [10/06/22 0034]   BP Pulse Resp Temp SpO2   122/64 68 20 97.5 °F (36.4 °C) 100 %      MAP       --         Physical Exam    Nursing note and vitals reviewed.  Constitutional: He appears well-developed and well-nourished. He is not diaphoretic. No distress. He is intubated.   HENT:   Head: Normocephalic and atraumatic.   Nose: Nose normal.   Mouth/Throat: Oropharynx is clear and moist.   Et tube in place   Eyes: Conjunctivae and EOM are normal. Pupils are equal, round, and reactive to light.   Pupils are 3mm and reactive.   Neck: Trachea normal. Neck supple.   Normal range of motion.  Cardiovascular:  Normal rate, regular rhythm, normal heart sounds and intact distal pulses.     Exam reveals no gallop and no friction rub.       No murmur heard.  Pulmonary/Chest: Breath sounds normal. He is intubated. No respiratory distress. He has no wheezes. He has no rhonchi. He has no rales. He exhibits no tenderness.   Lungs are clear.   Abdominal: Abdomen is soft. Bowel sounds are normal. He exhibits no distension and no mass. There is no abdominal tenderness. There is no rebound.   Genitourinary:    Genitourinary Comments: Anaya catheter in place     Musculoskeletal:         General: No tenderness or edema. Normal range of motion.      Cervical back: Normal range of motion and neck supple.      Lumbar back: Normal. No tenderness. Normal range of motion.      Comments: Let thigh cvl     Neurological: He is alert. No cranial nerve deficit or sensory  deficit.   Able to squeeze hand on right.   Skin: Skin is warm and dry. Capillary refill takes less than 2 seconds. No rash and no abscess noted. No erythema. No pallor.   Psychiatric: He has a normal mood and affect. His behavior is normal. Judgment and thought content normal.       ED Course   Critical Care    Date/Time: 10/6/2022 3:16 AM  Performed by: Valeria Pederson MD  Authorized by: Valeria Pederson MD   Direct patient critical care time: 10 minutes  Additional history critical care time: 5 minutes  Ordering / reviewing critical care time: 5 minutes  Documentation critical care time: 10 minutes  Consulting other physicians critical care time: 5 minutes  Total critical care time (exclusive of procedural time) : 35 minutes  Critical care was necessary to treat or prevent imminent or life-threatening deterioration of the following conditions: CNS failure or compromise and respiratory failure.  Critical care was time spent personally by me on the following activities: ventilator management, review of old charts, re-evaluation of patient's condition, pulse oximetry, ordering and review of radiographic studies, ordering and review of laboratory studies, ordering and performing treatments and interventions, examination of patient, obtaining history from patient or surrogate and discussions with consultants.  Subsequent provider of critical care: I assumed direction of critical care for this patient from another provider of my specialty.      Labs Reviewed          Imaging Results    None          Medications   propofol (DIPRIVAN) 10 mg/mL infusion (5 mcg/kg/min × 120 kg Intravenous New Bag 10/6/22 0115)   mupirocin 2 % ointment (has no administration in time range)   sodium chloride 0.9% flush 10 mL (has no administration in time range)   pantoprazole injection 40 mg (has no administration in time range)   hydrALAZINE injection 10 mg (has no administration in time range)   labetaloL injection 20 mg (has no  administration in time range)   lorazepam (ATIVAN) injection 4 mg (has no administration in time range)   0.9%  NaCl infusion (has no administration in time range)   levETIRAcetam injection 500 mg (has no administration in time range)     Medical Decision Making:   History:   Old Medical Records: I decided to obtain old medical records.  Old Records Summarized: records from previous admission(s) and records from another hospital.  Initial Assessment:   Seizure with respiratory distress, itnubated at outside hospital  Differential Diagnosis:   Ich, uti, electrolyute deranagement  Clinical Tests:   Lab Tests: Ordered and Reviewed  Radiological Study: Reviewed  Medical Tests: Reviewed  ED Management:  Propofol, review of labs, imaing, icu admit  Other:   I have discussed this case with another health care provider.        Scribe Attestation:   Scribe #1: I performed the above scribed service and the documentation accurately describes the services I performed. I attest to the accuracy of the note.  Comments: Attending:   Physician Attestation Statement for Scribe #1: IValeria MD, personally performed the services described in this documentation. All medical record entries made by the scribe were at my direction and in my presence.  I have reviewed the chart and agree that the record reflects my personal performance and is accurate and complete.        Attending Attestation:           Physician Attestation for Scribe:  Physician Attestation Statement for Scribe #1: Valeria JOYA MD, reviewed documentation, as scribed by Maddy Reddy in my presence, and it is both accurate and complete.           ED Course as of 10/06/22 0318   Thu Oct 06, 2022   0059 Paged hospitalist [AB]      ED Course User Index  [AB] Maddy Reddy                 Clinical Impression:   Final diagnoses:  [J96.02] Acute respiratory failure with hypercapnia (Primary)  [G40.802] Frontal lobe epilepsy  [D72.829] Leukocytosis,  unspecified type  [N30.01] Acute cystitis with hematuria      ED Disposition Condition    Admit Stable                Valeria Pederson MD  10/06/22 0318

## 2022-10-06 NOTE — ASSESSMENT & PLAN NOTE
Keppra increased from 500 mg to 1000 mg BID (o/p dose)  Continue phenobarbital 64.8 mg BID and dilantin 100 mg TID  Phenobarbital and phenytoin serum levels therapeutic  Seizure precautions

## 2022-10-06 NOTE — ED PROVIDER NOTES
"Encounter Date: 10/5/2022       History     Chief Complaint   Patient presents with    Respiratory Distress     Brought in per [facility] from [place] with reports of "seizures x25 mins." Pt arrives in respiratory distress.      HPI    76-year-old male with a past medical history significant for hypertension, CAD status post stents, epilepsy (per neurology note frontal lobe with localization/secondary generalization-states he remains awake during these seizures), AFib/a flutter on Eliquis, COPD, CLL, significantly to CHF who presents emergency department after seizures.  Supposedly he was having a seizure nursing home for approximately 25 minutes.  No medications wheezing given.  He presents emergency department with significant respiratory distress.  He seems to have a generalized muscle fasciculations and significant tachypnea which per chart review seems to be patient's seizures.    Currently calling the nursing home to see if he might have missed any doses of his antiepileptic drugs    Review of patient's allergies indicates:   Allergen Reactions    Ancef in dextrose (iso-osm)     Codeine     Penicillins      Past Medical History:   Diagnosis Date    Anticoagulant long-term use     COPD (chronic obstructive pulmonary disease)     Coronary artery disease     Difficult intubation     GERD (gastroesophageal reflux disease)     Hypertension     Seizures      Past Surgical History:   Procedure Laterality Date    cardiac catheterisation  10/18/2016    PERCUTANEOUS BALLOON VALVULOPLASTY  04/04/2018     Family History   Problem Relation Age of Onset    Coronary artery disease Father     Heart attack Father      Social History     Tobacco Use    Smoking status: Former    Smokeless tobacco: Never   Substance Use Topics    Alcohol use: Not Currently    Drug use: Never     Review of Systems   Unable to perform ROS: Acuity of condition     Physical Exam     Initial Vitals [10/05/22 1901]   BP Pulse Resp Temp SpO2   (!) 152/90 (!) " 145 (!) 40 97.6 °F (36.4 °C) 99 %      MAP       --         Physical Exam    Nursing note and vitals reviewed.  Constitutional: He appears lethargic. He is Obese . He has a sickly appearance. He appears ill. He appears distressed. Face mask in place.   HENT:   Head: Normocephalic and atraumatic.   Right Ear: External ear normal.   Left Ear: External ear normal.   Eyes: Pupils are equal, round, and reactive to light.   Cardiovascular:  Normal rate and regular rhythm.           3+ pitting edema bilateral lower extremity   Pulmonary/Chest: Accessory muscle usage present. Tachypnea noted. He is in respiratory distress. He has decreased breath sounds. He has no wheezes. He has no rales.   Stridorous   Abdominal: Abdomen is soft. Bowel sounds are normal.   Genitourinary:    Penis normal.   No discharge found.     Neurological: He appears lethargic. He displays tremor. No cranial nerve deficit. He displays seizure activity. GCS eye subscore is 3. GCS verbal subscore is 4. GCS motor subscore is 5.   Skin: Skin is warm. Capillary refill takes less than 2 seconds.   Skin grafts noted to the bilateral upper extremities with thickened skin       ED Course   Intubation    Date/Time: 10/5/2022 10:00 PM  Location procedure was performed: [facility] EMERGENCY DEPARTMENT  Performed by: Anibal Osborn MD  Authorized by: Anibal Osborn MD   Consent Done: Emergent Situation  Indications: respiratory distress, respiratory failure and airway protection  Description of findings: Stenotic tracheal opening   Intubation method: video-assisted  Patient status: paralyzed (RSI)  Preoxygenation: BVM  Sedatives: etomidate, see MAR for details, ketmine and propofol  Paralytic: pancuronium  Laryngoscope size: Glide 4  Tube size: 7.0 mm  Tube type: cuffed  Number of attempts: 3  Ventilation between attempts: BVM  Cricoid pressure: yes  Cords visualized: yes  Post-procedure assessment: chest rise and CO2 detector  Breath sounds: equal and absent  over the epigastrium  Cuff inflated: yes  ETT to lip: 25 cm  Tube secured with: adhesive tape  Chest x-ray interpreted by me.  Chest x-ray findings: endotracheal tube too low  Tube repositioned: tube repositioned successfully  Patient tolerance: Patient tolerated the procedure well with no immediate complications  Complications: Yes (see below)  Comments: The 1st attempt at intubation was at approximately 6:00 p.m..  Ultimately the IV went bad and infiltrated.  After he received rocuronium and etomidate patient was drowsy but still has significant gag reflex.  He ended up getting some a ketamine and propofol although the IV infiltrated that time.  He had skin grafts to the upper extremities with significant edema to the lower extremities.  This made extremely difficult to get an IV access.  Patient was still able to maintain his airway despite having all these medications.  A right-sided femoral central line was placed.  At 2200 20 of etomidate and 100 of rock was given and patient was successfully intubated.  Attempt with the 1st pass with a little too was unsuccessful as patient has significant stenosis near the vocal cords.  I then tried to pass a 7.5 but also was meeting significant resistance and was unable to pass the tube.  I then used a 7.0 ET tube and was able to successfully pass the tube although it did take quite amount of force.  It seems like there is some type of stricture versus mass right below the vocal cords.  I did note in his history that he has a difficult intubation.  His lowest O2 sat was 90%.    It is to note, with the significant amount of medications that were given I was concerned IV was not working appropriately.  After the etomidate route and propofol was given we were able to draw back blood and it was flushing well.  It was not until the last dose was given that the infiltrate was noted and the IV was no longer working.      Central Line    Date/Time: 10/5/2022 9:40 PM  Performed by:  Anibal Osborn MD  Authorized by: Anibal Osborn MD     Location procedure was performed:  [facility] EMERGENCY DEPARTMENT  Consent Done ?:  Emergent Situation  Time out complete?: Verified correct patient, procedure, equipment, staff, and site/side    Indications:  Med administration  Anesthesia:  Local infiltration  Local anesthetic:  Lidocaine 1% without epinephrine  Anesthetic total (ml):  5  Preparation:  Skin prepped with ChloraPrep  Skin prep agent dried: Skin prep agent completely dried prior to procedure    Sterile barriers: All five maximal sterile barriers used - gloves, gown, cap, mask and large sterile sheet    Hand hygiene: Hand hygiene performed immediately prior to central venous catheter insertion    Location:  Right femoral  Site selection rationale:  Patient had collapse of the right IJ and with multiple sedating medications or born I emergently went to a right femoral  Catheter type:  Triple lumen  Catheter size:  7 Fr  Ultrasound guidance: Yes    Vessel Caliber:  Medium   patent  Comprressibility:  Normal  Needle advanced into vessel with real time ultrasound guidance.    Guidewire confirmed in vessel.    Image recorded and saved.    Sterile gel used.  Manometry: No    Number of attempts:  1  Securement:  Line sutured, sterile dressing applied, blood return through all ports and chlorhexidine patch  Complications: No    XRay:  Placement verified by x-ray and successful placementTermination Site: inferior vena cava  Critical Care    Date/Time: 10/5/2022 10:42 PM  Performed by: Anibal Osborn MD  Authorized by: Anibal Osborn MD   Direct patient critical care time: 120 minutes  Additional history critical care time: 5 minutes  Ordering / reviewing critical care time: 10 minutes  Documentation critical care time: 5 minutes  Total critical care time (exclusive of procedural time) : 140 minutes  Critical care time was exclusive of separately billable procedures and treating other  patients.  Critical care was necessary to treat or prevent imminent or life-threatening deterioration of the following conditions: CNS failure or compromise and respiratory failure.  Critical care was time spent personally by me on the following activities: blood draw for specimens, interpretation of cardiac output measurements, evaluation of patient's response to treatment, examination of patient, ordering and performing treatments and interventions, ordering and review of laboratory studies, ordering and review of radiographic studies, pulse oximetry, re-evaluation of patient's condition, ventilator management, vascular access procedures and review of old charts.      Labs Reviewed   COMPREHENSIVE METABOLIC PANEL - Abnormal; Notable for the following components:       Result Value    Sodium Level 134 (*)     Globulin 3.9 (*)     Albumin/Globulin Ratio 0.9 (*)     All other components within normal limits   URINALYSIS, REFLEX TO URINE CULTURE - Abnormal; Notable for the following components:    Appearance, UA Slightly Cloudy (*)     Protein, UA Trace (*)     Blood, UA Trace-Lysed (*)     Nitrites, UA Positive (*)     Leukocyte Esterase, UA Trace (*)     All other components within normal limits   DRUG SCREEN, URINE (BEAKER) - Abnormal; Notable for the following components:    Barbituates, Urine Positive (*)     All other components within normal limits    Narrative:     Cut off concentrations:    Amphetamines - 1000 ng/ml  Barbiturates - 200 ng/ml  Benzodiazepine - 200 ng/ml  Cannabinoids (THC) - 50 ng/ml  Cocaine - 300 ng/ml  Fentanyl - 1.0 ng/ml  MDMA - 500 ng/ml  Opiates - 300 ng/ml   Phencyclidine (PCP) - 25 ng/ml    Specimen submitted for drug analysis and tested for pH and specific gravity in order to evaluate sample integrity. Suspect tampering if specific gravity is <1.003 and/or pH is not within the range of 4.5 - 8.0  False negatives may result form substances such as bleach added to urine.  False  positives may result for the presence of a substance with similar chemical structure to the drug or its metabolite.    This test provides only a PRELIMINARY analytical test result. A more specific alternate chemical method must be used in order to obtain a confirmed analytical result. Gas chromatography/mass spectrometry (GC/MS) is the preferred confirmatory method. Other chemical confirmation methods are available. Clinical consideration and professional judgement should be applied to any drug of abuse test result, particularly when preliminary positive results are used.    Positive results will be confirmed only at the physicians request. Unconfirmed screening results are to be used only for medical purposes (treatment).        CBC WITH DIFFERENTIAL - Abnormal; Notable for the following components:    WBC 24.2 (*)     MCHC 31.2 (*)     IG# 0.07 (*)     All other components within normal limits   MANUAL DIFFERENTIAL - Abnormal; Notable for the following components:    Neut Man 23 (*)     Lymph Man 68 (*)     Abs Mono 1.452 (*)     Abs Lymp 16.456 (*)     All other components within normal limits   URINALYSIS, MICROSCOPIC - Abnormal; Notable for the following components:    Bacteria, UA Many (*)     WBC, UA 11-20 (*)     Squamous Epithelial Cells, UA Few (*)     All other components within normal limits   ISTAT PROCEDURE - Abnormal; Notable for the following components:    POC PCO2 45.7 (*)     POC PO2 146 (*)     All other components within normal limits   APTT - Normal   ALCOHOL,MEDICAL (ETHANOL) - Normal   LACTIC ACID, PLASMA - Normal   LIPASE - Normal   MAGNESIUM - Normal   TROPONIN I - Normal   TSH - Normal   B-TYPE NATRIURETIC PEPTIDE - Normal   PHOSPHORUS - Normal   PROTIME-INR - Normal   PHENOBARBITAL LEVEL - Normal   PHENYTOIN LEVEL, TOTAL - Normal   CULTURE, URINE   CBC W/ AUTO DIFFERENTIAL    Narrative:     The following orders were created for panel order CBC auto differential.  Procedure                                Abnormality         Status                     ---------                               -----------         ------                     CBC with Differential[[id number]]        Abnormal            Final result               Manual Differential[[id number]]          Abnormal            Final result                 Please view results for these tests on the individual orders.     EKG Readings: (Independently Interpreted)   Initial Reading: No STEMI. Rhythm: Normal Sinus Rhythm. Heart Rate: 69. Conduction: 1st Degree AV Block. ST Segments: Normal ST Segments. Clinical Impression: AV Block - 1st Degree and Normal Sinus Rhythm     Imaging Results              X-Ray Chest AP Portable (Preliminary result)  Result time 10/05/22 22:44:14      ED Interpretation by Anibal Osborn MD (10/05/22 22:44:14, [facility] - Emergency Dept, Emergency Medicine)    ET tube was towards the right mainstem.  It was removed and now the appropriate location as well as the OG tube                                     X-Ray Abdomen AP 1 View (Preliminary result)  Result time 10/05/22 22:43:59      ED Interpretation by Anibal Osborn MD (10/05/22 22:43:59, [facility] - Emergency Dept, Emergency Medicine)    Appropriate location of the central line                                     X-Ray Chest 1 View (Final result)  Result time 10/05/22 19:37:12      Final result by Damian Cole MD (10/05/22 19:37:12)                   Impression:      No acute cardiopulmonary process.      Electronically signed by: Damian Cole  Date:    10/05/2022  Time:    19:37               Narrative:    EXAMINATION:  XR CHEST 1 VIEW    CLINICAL HISTORY:  Acute respiratory distress    TECHNIQUE:  Single view of the chest    COMPARISON:  07/20/2022    FINDINGS:  No focal opacification, pleural effusion, or pneumothorax.    Cardiomediastinal silhouette remains prominent.    No acute osseous abnormality.                                        Medications   propofol (DIPRIVAN) 10 mg/mL infusion (0 mcg/kg/min × 104.3 kg Intravenous Rate/Dose Change 10/5/22 2329)   ciprofloxacin (CIPRO)400mg/200ml D5W IVPB 400 mg (400 mg Intravenous New Bag 10/5/22 2241)   lorazepam injection 2 mg (2 mg Intravenous Given 10/5/22 1912)   levETIRAcetam injection 1,000 mg (1,000 mg Intravenous Given 10/5/22 1934)   lorazepam injection 2 mg (2 mg Intravenous Given 10/5/22 1947)   etomidate injection 20 mg (20 mg Intravenous Given by Other 10/5/22 2019)   rocuronium injection 80 mg (80 mg Intravenous Given by Other 10/5/22 2022)   ketamine (KETALAR) 50 mg/mL injection (120 mg  Given 10/5/22 2100)   rocuronium injection 100 mg (100 mg Intravenous Given 10/5/22 2213)   etomidate injection 20 mg (20 mg Intravenous Given 10/5/22 2213)   rocuronium injection 20 mg (20 mg Intravenous Given by Other 10/5/22 2040)     Medical Decision Making:   Differential Diagnosis:   Status epilepticus, sepsis hypoxia, respiratory distress/failure, atypical seizures  ED Management:  2 mg of Ativan was given to patient as well as 1000 mg of Keppra.  Patient remained with the seizure-like activities.  Will give patient additional 2 mg of Ativan.  If these seizure-like activities continue and patient does not improve with his respiratory status he would likely need to be intubated.  It seems per chart review that he had to be intubated to 3 times in the past for the exact same situation.           ED Course as of 10/05/22 2332   Wed Oct 05, 2022   2005 Patient remains with significant respiratory distress and he is even more tachypneic now than he was previously.  He is unable to speak with the because of the significant respiratory distress.  Even though his satting 100% on a non-rebreather I will go ahead intubated to secure his airway. [BS]   2244 After propofol was given patient no longer having any seizure activity.  Will continue to monitor and patient is placed on from the fall.  He  did receive Ativan as well as Keppra.  Will need to transfer to a facility with neurology services.  Please see above and intubation for the difficulty that was obtained during the procedure.  Patient is currently satting well. [BS]   2254 Patient was speaking appropriately moving all extremities although will obtain a head CT to rule out other etiologies of these seizure-like activities.  As noted, it is patient's typical seizure symptoms that he was having per chart review from 2 different neurologist. [BS]   2257 Dr. Kohli at [facility] accepts patient for transfer [BS]   2313 Anesthesia was called prior to me giving a 2nd dose of RSI because of unsure if there was a hospital policy against re-dosing certain medications.  Taking the all clear to use etomidate and rocuronium for the 2nd trial for intubation. [BS]   2315 Hold off on getting a head CT as a the ambulance is about to show up.  He was moving all extremities and talking.  No current indication for emergent CT of the head especially with history of epilepsy to with similar symptoms [BS]   2332 Have tried to contact the family multiple times that was unsuccessful. 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

## 2022-10-06 NOTE — SUBJECTIVE & OBJECTIVE
Past Medical History:   Diagnosis Date    Anticoagulant long-term use     COPD (chronic obstructive pulmonary disease)     Coronary artery disease     Difficult intubation     GERD (gastroesophageal reflux disease)     Hypertension     Seizures        Past Surgical History:   Procedure Laterality Date    cardiac catheterisation  10/18/2016    PERCUTANEOUS BALLOON VALVULOPLASTY  04/04/2018       Review of patient's allergies indicates:   Allergen Reactions    Ancef in dextrose (iso-osm)     Codeine     Penicillins        Current Neurological Medications:     Current Facility-Administered Medications on File Prior to Encounter   Medication    [COMPLETED] etomidate injection 20 mg    [COMPLETED] etomidate injection 20 mg    [COMPLETED] ketamine (KETALAR) 50 mg/mL injection    [COMPLETED] levETIRAcetam injection 1,000 mg    [COMPLETED] lorazepam injection 2 mg    [COMPLETED] lorazepam injection 2 mg    [COMPLETED] rocuronium injection 100 mg    [COMPLETED] rocuronium injection 20 mg    [COMPLETED] rocuronium injection 80 mg    [DISCONTINUED] ciprofloxacin (CIPRO)400mg/200ml D5W IVPB 400 mg    [DISCONTINUED] propofol (DIPRIVAN) 10 mg/mL infusion     Current Outpatient Medications on File Prior to Encounter   Medication Sig    amiodarone (PACERONE) 200 MG Tab Take 200 mg by mouth once daily.    atorvastatin (LIPITOR) 40 MG tablet Take 40 mg by mouth every evening.    clopidogreL (PLAVIX) 75 mg tablet Take 1 tablet by mouth Daily.    ELIQUIS 5 mg Tab Take 1 tablet by mouth 2 (two) times a day.    furosemide (LASIX) 40 MG tablet Take 1 tablet by mouth Daily.    levETIRAcetam (KEPPRA) 1000 MG tablet Take 1 tablet by mouth 2 (two) times a day.    lisinopriL (PRINIVIL,ZESTRIL) 5 MG tablet Take 1 tablet by mouth Daily.    metoprolol succinate (TOPROL-XL) 25 MG 24 hr tablet Take 1 tablet by mouth Daily.    pantoprazole (PROTONIX) 40 MG tablet Take 40 mg by mouth Daily.    PHENobarbitaL (LUMINAL) 64.8 MG tablet Take 1 tablet by  mouth 2 (two) times daily.    PHENobarbitaL 32.4 MG tablet Take 32.4 mg by mouth Daily.    phenytoin (DILANTIN) 100 MG ER capsule Take 1 capsule by mouth 2 (two) times a day.    phenytoin (DILANTIN) 50 mg chewable tablet Take 1 tablet by mouth once daily.    tamsulosin (FLOMAX) 0.4 mg Cap Take 1 tablet by mouth Daily.    venlafaxine (EFFEXOR) 75 MG tablet Take 75 mg by mouth 2 (two) times daily.     Family History       Problem Relation (Age of Onset)    Coronary artery disease Father    Heart attack Father          Tobacco Use    Smoking status: Former    Smokeless tobacco: Never   Substance and Sexual Activity    Alcohol use: Not Currently    Drug use: Never    Sexual activity: Not on file     Review of Systems  Limited 2/2 pt recently extubated prior to exam    Objective:     Vital Signs (Most Recent):  Temp: 98.2 °F (36.8 °C) (10/06/22 0800)  Pulse: 62 (10/06/22 0930)  Resp: 18 (10/06/22 0930)  BP: (!) 163/67 (10/06/22 0930)  SpO2: (!) 94 % (10/06/22 0930)   Vital Signs (24h Range):  Temp:  [97.5 °F (36.4 °C)-98.2 °F (36.8 °C)] 98.2 °F (36.8 °C)  Pulse:  [] 62  Resp:  [4-40] 18  SpO2:  [88 %-100 %] 94 %  BP: ()/() 163/67     Weight: 118 kg (260 lb 2.3 oz)  Body mass index is 40.74 kg/m².    Physical Exam  Vitals reviewed.   Constitutional:       Appearance: He is obese.   HENT:      Head: Normocephalic and atraumatic.      Nose: Nose normal.      Mouth/Throat:      Mouth: Mucous membranes are moist.      Pharynx: Oropharynx is clear.   Eyes:      Extraocular Movements: Extraocular movements intact and EOM normal.      Pupils: Pupils are equal, round, and reactive to light.   Pulmonary:      Effort: Pulmonary effort is normal.   Skin:     General: Skin is warm and dry.   Neurological:      Mental Status: He is oriented to person, place, and time and easily aroused.   Psychiatric:         Speech: Speech normal.         Behavior: Behavior is cooperative.       NEUROLOGICAL EXAMINATION:     MENTAL  STATUS   Oriented to person, place, and time.   Follows 1 step commands.   Attention: decreased. Concentration: decreased.   Speech: speech is normal   Level of consciousness: drowsy ,  arousable by verbal stimuli    CRANIAL NERVES     CN II   Visual fields full to confrontation.     CN III, IV, VI   Pupils are equal, round, and reactive to light.  Extraocular motions are normal.   Nystagmus: none   Conjugate gaze: present    CN V   Facial sensation intact.     CN VII   Facial expression full, symmetric.     MOTOR EXAM   Overall muscle tone: normal    Strength   Right deltoid: 4/5  Left deltoid: 4/5  Right biceps: 4/5  Left biceps: 4/5  Right triceps: 4/5  Left triceps: 4/5  Right quadriceps: 2/5  Left quadriceps: 2/5  Right hamstrin/5  Left hamstrin/5  Right anterior tibial: 3/5  Left anterior tibial: 3/5  Right posterior tibial: 3/5  Left posterior tibial: 3/5    Significant Labs:   Recent Lab Results  (Last 5 results in the past 24 hours)        10/06/22  0936   10/06/22  0238   10/06/22  0223   10/06/22  0200   10/06/22  0110        Base Deficit 3.2     0.70           Correct Temperature (PH)     7.39           Corrected Temperature (pCO2)     43           Corrected Temperature (pO2)     65  Comment: Result is outside patient normal (reference) range.           POC COHb     1.5           POC MetHb     1.1           POC O2Hb     92.5  Comment: Result is outside patient normal (reference) range.           Specimen source Arterial sample     Arterial sample           Albumin/Globulin Ratio   1.0             Albumin   3.1             Alkaline Phosphatase   106             ALT   16             Appearance, UA       Cloudy         AST   16             Bacteria, UA       1+         Baso #   0.09             Basophil %   0.4             BILIRUBIN TOTAL   0.3             Bilirubin, UA       Negative         BUN   10.2             Calcium   8.9             Chloride   106             CO2   25             Color, UA        Light-Yellow         ID NOW COVID-19, ([place])         Negative       Creatinine   0.91             eGFR   >60             Eos #   0.18             Eosinophil %   0.8             Globulin, Total   3.0             Glucose   150             Glucose, UA       Negative         Hematocrit   43.2             Hemoglobin   13.7             Immature Grans (Abs)   0.07             Immature Granulocytes   0.3             Ketones, UA       Negative         Lactate, Kunal   1.8             Leukocytes, UA       1+         Lymph #   11.87             LYMPH %   53.8             Magnesium   1.80             MCH   28.8             MCHC   31.7             MCV   90.8             Mono #   1.48             Mono %   6.7             MPV   9.8             Neut #   8.4             Neut %   38.0             NITRITE UA       Positive         nRBC   0.0             Occult Blood UA       3+         pH, UA       6.0         Phosphorus   3.1             Platelets   204             POC HCO3 27.9     26.0           POC HEMOGLOBIN     13.9           POC Ionized Calcium 1.20     1.17           POC PCO2 42     43           POC PH 7.43     7.39           POC PO2 62  Comment: Result is outside patient normal (reference) range.     65  Comment: Result is outside patient normal (reference) range.           POC Potassium 3.3  Comment: Result is outside patient normal (reference) range.     3.2  Comment: Result is outside patient normal (reference) range.           POC SATURATED O2 92     92.2           POC Sodium 135  Comment: Result is outside patient normal (reference) range.     132  Comment: Result is outside patient normal (reference) range.           POC Temp 37.0     37.0           Potassium   3.7             PROTEIN TOTAL   6.1             Protein, UA       Trace         RBC   4.76             RBC, UA       6-10         RDW   14.4             Sodium   136             Specific Gravity,UA       >1.030         Squam Epithel, UA       5-10          Thyroid Stimulating Hormone   3.2192             Urobilinogen, UA       0.2         Vitamin B-12   297             WBC, UA       21-50         WBC   22.1                                    Significant Imaging:   CT head w/o 10/5/2022:  FINDINGS:  There is no acute intracranial hemorrhage or edema.  There is encephalomalacia in the left cerebellum.  Patchy hypodensities in the subcortical and periventricular white matter likely represent chronic microvascular ischemic changes.     There is no mass effect or midline shift.  There is diffuse parenchymal volume loss.  The basal cisterns are patent. There is no abnormal extra-axial fluid collection.  Carotid and vertebral artery calcifications are noted.     The calvarium and skull base are intact. The visualized paranasal sinuses and the mastoid air cells are clear.     Impression:     1. No acute intracranial abnormality.  2. Chronic microvascular ischemic changes.    I have reviewed all pertinent imaging results/findings within the past 24 hours.

## 2022-10-06 NOTE — PLAN OF CARE
10/06/22 0848   Discharge Assessment   Assessment Type Discharge Planning Assessment   Source of Information health record   Reason For Admission acute resp failure   Lives With other (see comments)  (l[facility] at [facility])   Facility Arrived From: [facility]   Do you expect to return to your current living situation? Yes   Do you have help at home or someone to help you manage your care at home? Yes   Who are your caregiver(s) and their phone number(s)? [facility] staff   Prior to hospitilization cognitive status: Alert/Oriented   Current cognitive status: Coma/Sedated/Intubated   Walking or Climbing Stairs Difficulty ambulation difficulty, requires equipment;transferring difficulty, requires equipment   Mobility Management WC bound   Dressing/Bathing Difficulty bathing difficulty, assistance 1 person   Dressing/Bathing Management [facility] staff   Equipment Currently Used at Home wheelchair   Patient currently being followed by outpatient case management? No   Do you currently have service(s) that help you manage your care at home? Yes   How Many hours does patient receive services 24   Name and Contact number of agency [facility] staff   Is the pt/caregiver preference to resume services with current agency Yes   Who is going to help you get home at discharge? [facility] van   How do you get to doctors appointments?   ([facility] MD)   Are you on dialysis? No   Discharge Plan A   ([facility])   Discharge Plan B   ([facility])   DME Needed Upon Discharge  other (see comments)  (TBD)   Discharge Barriers Identified None       Pt will return to [facility]

## 2022-10-07 LAB
ABS NEUT (OLG): 9.69 X10(3)/MCL (ref 2.1–9.2)
ALBUMIN SERPL-MCNC: 2.8 GM/DL (ref 3.4–4.8)
ALBUMIN/GLOB SERPL: 1 RATIO (ref 1.1–2)
ALP SERPL-CCNC: 82 UNIT/L (ref 40–150)
ALT SERPL-CCNC: 12 UNIT/L (ref 0–55)
AST SERPL-CCNC: 14 UNIT/L (ref 5–34)
BILIRUBIN DIRECT+TOT PNL SERPL-MCNC: 0.4 MG/DL
BUN SERPL-MCNC: 8.4 MG/DL (ref 8.4–25.7)
CALCIUM SERPL-MCNC: 8.6 MG/DL (ref 8.8–10)
CHLORIDE SERPL-SCNC: 108 MMOL/L (ref 98–107)
CO2 SERPL-SCNC: 24 MMOL/L (ref 23–31)
CREAT SERPL-MCNC: 0.78 MG/DL (ref 0.73–1.18)
ERYTHROCYTE [DISTWIDTH] IN BLOOD BY AUTOMATED COUNT: 14.6 % (ref 11.5–17)
GFR SERPLBLD CREATININE-BSD FMLA CKD-EPI: >60 MLS/MIN/1.73/M2
GLOBULIN SER-MCNC: 2.7 GM/DL (ref 2.4–3.5)
GLUCOSE SERPL-MCNC: 84 MG/DL (ref 82–115)
HCT VFR BLD AUTO: 37.9 % (ref 42–52)
HGB BLD-MCNC: 12.4 GM/DL (ref 14–18)
IMM GRANULOCYTES # BLD AUTO: 0.06 X10(3)/MCL (ref 0–0.04)
IMM GRANULOCYTES NFR BLD AUTO: 0.3 %
INSTRUMENT WBC (OLG): 19 X10(3)/MCL
LYMPHOCYTES NFR BLD MANUAL: 41 %
LYMPHOCYTES NFR BLD MANUAL: 7.79 X10(3)/MCL
MAGNESIUM SERPL-MCNC: 1.7 MG/DL (ref 1.6–2.6)
MCH RBC QN AUTO: 29.3 PG (ref 27–31)
MCHC RBC AUTO-ENTMCNC: 32.7 MG/DL (ref 33–36)
MCV RBC AUTO: 89.6 FL (ref 80–94)
MONOCYTES NFR BLD MANUAL: 1.52 X10(3)/MCL (ref 0.1–1.3)
MONOCYTES NFR BLD MANUAL: 8 %
NEUTROPHILS NFR BLD MANUAL: 51 %
NRBC BLD AUTO-RTO: 0 %
PHOSPHATE SERPL-MCNC: 2.6 MG/DL (ref 2.3–4.7)
PLATELET # BLD AUTO: 170 X10(3)/MCL (ref 130–400)
PLATELET # BLD EST: ADEQUATE 10*3/UL
PMV BLD AUTO: 10.3 FL (ref 7.4–10.4)
POIKILOCYTOSIS BLD QL SMEAR: ABNORMAL
POTASSIUM SERPL-SCNC: 3.6 MMOL/L (ref 3.5–5.1)
PROT SERPL-MCNC: 5.5 GM/DL (ref 5.8–7.6)
RBC # BLD AUTO: 4.23 X10(6)/MCL (ref 4.7–6.1)
RBC MORPH BLD: ABNORMAL
SODIUM SERPL-SCNC: 139 MMOL/L (ref 136–145)
WBC # SPEC AUTO: 19.2 X10(3)/MCL (ref 4.5–11.5)

## 2022-10-07 PROCEDURE — 25000003 PHARM REV CODE 250: Performed by: STUDENT IN AN ORGANIZED HEALTH CARE EDUCATION/TRAINING PROGRAM

## 2022-10-07 PROCEDURE — 80053 COMPREHEN METABOLIC PANEL: CPT | Performed by: STUDENT IN AN ORGANIZED HEALTH CARE EDUCATION/TRAINING PROGRAM

## 2022-10-07 PROCEDURE — 25000242 PHARM REV CODE 250 ALT 637 W/ HCPCS: Performed by: STUDENT IN AN ORGANIZED HEALTH CARE EDUCATION/TRAINING PROGRAM

## 2022-10-07 PROCEDURE — 84100 ASSAY OF PHOSPHORUS: CPT | Performed by: STUDENT IN AN ORGANIZED HEALTH CARE EDUCATION/TRAINING PROGRAM

## 2022-10-07 PROCEDURE — 36415 COLL VENOUS BLD VENIPUNCTURE: CPT | Performed by: INTERNAL MEDICINE

## 2022-10-07 PROCEDURE — 27000221 HC OXYGEN, UP TO 24 HOURS

## 2022-10-07 PROCEDURE — C1751 CATH, INF, PER/CENT/MIDLINE: HCPCS

## 2022-10-07 PROCEDURE — 25000003 PHARM REV CODE 250: Performed by: INTERNAL MEDICINE

## 2022-10-07 PROCEDURE — 51798 US URINE CAPACITY MEASURE: CPT

## 2022-10-07 PROCEDURE — 20000000 HC ICU ROOM

## 2022-10-07 PROCEDURE — 87077 CULTURE AEROBIC IDENTIFY: CPT | Performed by: INTERNAL MEDICINE

## 2022-10-07 PROCEDURE — 51701 INSERT BLADDER CATHETER: CPT

## 2022-10-07 PROCEDURE — 85025 COMPLETE CBC W/AUTO DIFF WBC: CPT | Performed by: STUDENT IN AN ORGANIZED HEALTH CARE EDUCATION/TRAINING PROGRAM

## 2022-10-07 PROCEDURE — 94640 AIRWAY INHALATION TREATMENT: CPT

## 2022-10-07 PROCEDURE — 85027 COMPLETE CBC AUTOMATED: CPT | Performed by: STUDENT IN AN ORGANIZED HEALTH CARE EDUCATION/TRAINING PROGRAM

## 2022-10-07 PROCEDURE — 25000242 PHARM REV CODE 250 ALT 637 W/ HCPCS

## 2022-10-07 PROCEDURE — 83735 ASSAY OF MAGNESIUM: CPT | Performed by: STUDENT IN AN ORGANIZED HEALTH CARE EDUCATION/TRAINING PROGRAM

## 2022-10-07 PROCEDURE — 25000003 PHARM REV CODE 250: Performed by: NURSE PRACTITIONER

## 2022-10-07 PROCEDURE — 94761 N-INVAS EAR/PLS OXIMETRY MLT: CPT

## 2022-10-07 PROCEDURE — 87040 BLOOD CULTURE FOR BACTERIA: CPT | Performed by: NURSE PRACTITIONER

## 2022-10-07 PROCEDURE — 36410 VNPNXR 3YR/> PHY/QHP DX/THER: CPT

## 2022-10-07 PROCEDURE — 63600175 PHARM REV CODE 636 W HCPCS: Performed by: STUDENT IN AN ORGANIZED HEALTH CARE EDUCATION/TRAINING PROGRAM

## 2022-10-07 PROCEDURE — A4216 STERILE WATER/SALINE, 10 ML: HCPCS | Performed by: STUDENT IN AN ORGANIZED HEALTH CARE EDUCATION/TRAINING PROGRAM

## 2022-10-07 PROCEDURE — 36415 COLL VENOUS BLD VENIPUNCTURE: CPT | Performed by: NURSE PRACTITIONER

## 2022-10-07 RX ORDER — TAMSULOSIN HYDROCHLORIDE 0.4 MG/1
0.4 CAPSULE ORAL DAILY
Status: DISCONTINUED | OUTPATIENT
Start: 2022-10-07 | End: 2022-10-14 | Stop reason: HOSPADM

## 2022-10-07 RX ORDER — SODIUM CHLORIDE 0.9 % (FLUSH) 0.9 %
10 SYRINGE (ML) INJECTION EVERY 6 HOURS
Status: DISCONTINUED | OUTPATIENT
Start: 2022-10-07 | End: 2022-10-14 | Stop reason: HOSPADM

## 2022-10-07 RX ORDER — PHENYTOIN 125 MG/5ML
200 SUSPENSION ORAL 2 TIMES DAILY
Status: DISCONTINUED | OUTPATIENT
Start: 2022-10-07 | End: 2022-10-14 | Stop reason: HOSPADM

## 2022-10-07 RX ORDER — SODIUM CHLORIDE 0.9 % (FLUSH) 0.9 %
10 SYRINGE (ML) INJECTION
Status: DISCONTINUED | OUTPATIENT
Start: 2022-10-07 | End: 2022-10-14 | Stop reason: HOSPADM

## 2022-10-07 RX ORDER — TAMSULOSIN HYDROCHLORIDE 0.4 MG/1
0.4 CAPSULE ORAL DAILY
Status: DISCONTINUED | OUTPATIENT
Start: 2022-10-07 | End: 2022-10-07

## 2022-10-07 RX ADMIN — SODIUM CHLORIDE, PRESERVATIVE FREE 10 ML: 5 INJECTION INTRAVENOUS at 12:10

## 2022-10-07 RX ADMIN — APIXABAN 5 MG: 5 TABLET, FILM COATED ORAL at 08:10

## 2022-10-07 RX ADMIN — SODIUM CHLORIDE, PRESERVATIVE FREE 10 ML: 5 INJECTION INTRAVENOUS at 06:10

## 2022-10-07 RX ADMIN — FUROSEMIDE 40 MG: 40 TABLET ORAL at 04:10

## 2022-10-07 RX ADMIN — CIPROFLOXACIN 400 MG: 2 INJECTION, SOLUTION INTRAVENOUS at 06:10

## 2022-10-07 RX ADMIN — PHENOBARBITAL 64.8 MG: 32.4 TABLET ORAL at 08:10

## 2022-10-07 RX ADMIN — LISINOPRIL 5 MG: 5 TABLET ORAL at 04:10

## 2022-10-07 RX ADMIN — PHENYTOIN 200 MG: 125 SUSPENSION ORAL at 09:10

## 2022-10-07 RX ADMIN — PANTOPRAZOLE SODIUM 40 MG: 40 GRANULE, DELAYED RELEASE ORAL at 09:10

## 2022-10-07 RX ADMIN — IPRATROPIUM BROMIDE AND ALBUTEROL SULFATE 3 ML: 2.5; .5 SOLUTION RESPIRATORY (INHALATION) at 12:10

## 2022-10-07 RX ADMIN — VENLAFAXINE 75 MG: 37.5 TABLET ORAL at 09:10

## 2022-10-07 RX ADMIN — VENLAFAXINE 75 MG: 37.5 TABLET ORAL at 08:10

## 2022-10-07 RX ADMIN — IPRATROPIUM BROMIDE AND ALBUTEROL SULFATE 3 ML: 2.5; .5 SOLUTION RESPIRATORY (INHALATION) at 08:10

## 2022-10-07 RX ADMIN — MUPIROCIN: 20 OINTMENT TOPICAL at 09:10

## 2022-10-07 RX ADMIN — METOPROLOL SUCCINATE 25 MG: 25 TABLET, EXTENDED RELEASE ORAL at 04:10

## 2022-10-07 RX ADMIN — AMIODARONE HYDROCHLORIDE 200 MG: 200 TABLET ORAL at 08:10

## 2022-10-07 RX ADMIN — LEVETIRACETAM 1000 MG: 100 SOLUTION ORAL at 09:10

## 2022-10-07 RX ADMIN — PHENYTOIN 200 MG: 125 SUSPENSION ORAL at 08:10

## 2022-10-07 RX ADMIN — APIXABAN 5 MG: 5 TABLET, FILM COATED ORAL at 09:10

## 2022-10-07 RX ADMIN — CLOPIDOGREL 75 MG: 75 TABLET, FILM COATED ORAL at 04:10

## 2022-10-07 RX ADMIN — PHENOBARBITAL 64.8 MG: 32.4 TABLET ORAL at 09:10

## 2022-10-07 RX ADMIN — IPRATROPIUM BROMIDE AND ALBUTEROL SULFATE 3 ML: 2.5; .5 SOLUTION RESPIRATORY (INHALATION) at 07:10

## 2022-10-07 RX ADMIN — TAMSULOSIN HYDROCHLORIDE 0.4 MG: 0.4 CAPSULE ORAL at 09:10

## 2022-10-07 RX ADMIN — ATORVASTATIN CALCIUM 40 MG: 40 TABLET, FILM COATED ORAL at 09:10

## 2022-10-07 NOTE — ASSESSMENT & PLAN NOTE
-Dilantin increased from 100 mg TID to 200 mg BID   -Seizures likely 2/2 UTI, however, dilantin dose increased d/t abnormal EEG and serum phenytoin levels on low end of normal  -Continue keppra 1 gm BID and phenobarbital 64.8 mg BID  -Seizure precautions  -Further recommendations per MD

## 2022-10-07 NOTE — PROGRESS NOTES
Pharmacist Intervention IV to PO Note    John Wayne is a 76 y.o. male being treated with IV medication levetiracetam  pantoprazole    Patient Data:    Vital Signs (Most Recent):  Temp: 98.8 °F (37.1 °C) (10/06/22 1600)  Pulse: 64 (10/06/22 2007)  Resp: 20 (10/06/22 2007)  BP: (!) 123/53 (10/1945)  SpO2: 97 % (10/06/22 2007)   Vital Signs (72h Range):  Temp:  [97.5 °F (36.4 °C)-98.8 °F (37.1 °C)]   Pulse:  []   Resp:  [4-40]   BP: ()/()   SpO2:  [88 %-100 %]      CBC:  Recent Labs   Lab 10/05/22  1925 10/06/22  0238   WBC 24.2* 22.1*   RBC 5.35 4.76   HGB 15.5 13.7*   HCT 49.7 43.2    204   MCV 92.9 90.8   MCH 29.0 28.8   MCHC 31.2* 31.7*     CMP:     Recent Labs   Lab 10/05/22  1925 10/06/22  0238   CALCIUM 9.2 8.9   ALBUMIN 3.6 3.1*   * 136   K 3.9 3.7   CO2 25 25   BUN 11.0 10.2   CREATININE 0.94 0.91   ALKPHOS 120 106   ALT 21 16   AST 17 16   BILITOT 0.3 0.3       Dietary Orders:  Diet Orders            Diet Adult Regular: Regular starting at 10/06 1452            Based on the following criteria, this patient qualifies for intravenous to oral conversion:  [x] The patients gastrointestinal tract is functioning (tolerating medications via oral or enteral route for 24 hours and tolerating food or enteral feeds for 24 hours).  [x] The patient is hemodynamically stable for 24 hours (heart rate <100 beats per minute, systolic blood pressure >99 mm Hg, and respiratory rate <20 breaths per minute).  [x] The patient shows clinical improvement (afebrile for at least 24 hours and white blood cell count downtrending or normalized). Additionally, the patient must be non-neutropenic (absolute neutrophil count >500 cells/mm3).  [x] For antimicrobials, the patient has received IV therapy for at least 24 hours.    IV medication levetiracetam 1000mg bid, pantoprazole 40 mg daily will be changed to oral medication levetiracetam 1000mg bid. Pantoprazole 40 mg daily    Pharmacist's Name: Perri  Oscar  Pharmacist's Extension: [phone]

## 2022-10-07 NOTE — PLAN OF CARE
Problem: Infection  Goal: Absence of Infection Signs and Symptoms  Outcome: Ongoing, Progressing     Problem: Communication Impairment (Mechanical Ventilation, Invasive)  Goal: Effective Communication  Outcome: Ongoing, Progressing  Intervention: Ensure Effective Communication  Flowsheets (Taken 10/7/2022 1469)  Communication Enhancement Strategies:   call light answered in person   extra time allowed for response     Problem: Device-Related Complication Risk (Mechanical Ventilation, Invasive)  Goal: Optimal Device Function  Outcome: Met     Problem: Inability to Wean (Mechanical Ventilation, Invasive)  Goal: Mechanical Ventilation Liberation  Outcome: Met     Problem: Ventilator-Induced Lung Injury (Mechanical Ventilation, Invasive)  Goal: Absence of Ventilator-Induced Lung Injury  Outcome: Met     Problem: Communication Impairment (Artificial Airway)  Goal: Effective Communication  Outcome: Met

## 2022-10-07 NOTE — PROGRESS NOTES
Critical Care - Medicine  ICU Progress Note    Patient Name: John Wayne  MRN: [id number]  Admission Date: 10/6/2022  Hospital Length of Stay: 1 days  Code Status: Full Code  Attending Provider: Jenifer Muniz MD  Primary Care Provider: SHAY Schmidt MD   Principal Problem: <principal problem not specified>    Subjective:     Brief HPI: Pt is a 75 y/o male with PMHx of HTN, CAD, NSTEMI, seizure disorder, A. fib/a flutter on Eliquis, COPD, and chronic venous insufficiency who presented to outside facility from Nursing Home via EMS with complaints of seizures of unknown quantity and duration. It is unknown if pt had seizure at the nursing home prior arrival.   Due to continued deterioration in respiratory status, pt was sedated and intubated at [facility]. Loaded with Keppra. EKG showing aflutter with 1st degree AV block.       Patient last seen in March and April 2021 for similar episode. Per chart review, is currently on Keppra, Phenytoin, and Phenobarbital for seizures. Since arrival, pt has had no seizure-like activity. He is arousable and responsive to requests. Admitted to ICU for acute hypoxemic respiratory failure.     Interval History/Significant Events: s/p intubation satting 97% on 3L NC.  EEG performed 10/6 showing abnormal study w/ diffused slowing consistent w/ moderate cerebral dysfunction and intermittent spike wave activity in both frontal and temporal areas.  No significant events overnight, no seizure activity noted on exam. Tremor present. Vitals stable w/ bradycardia noted.  Patient is awake and alert.  Denies any F/C, N/V, diarrhea, constipation, abdominal pain, headaches, or vision changes.      Objective:     Vital Signs (Most Recent):  Temp: 98.1 °F (36.7 °C) (10/07/22 0400)  Pulse: 62 (10/07/22 0840)  Resp: 16 (10/07/22 0840)  BP: (!) 123/53 (10/07/22 0700)  SpO2: 97 % (10/07/22 0700)   Vital Signs (24h Range):  Temp:  [97.9 °F (36.6 °C)-98.8 °F (37.1 °C)] 98.1 °F (36.7 °C)  Pulse:  [52-74] 62  Resp:   [5-29] 16  SpO2:  [90 %-99 %] 97 %  BP: ()/(41-77) 123/53     Weight: 118 kg (260 lb 2.3 oz)  Body mass index is 40.74 kg/m².      Intake/Output Summary (Last 24 hours) at 10/7/2022 0911  Last data filed at 10/7/2022 0600  Gross per 24 hour   Intake 2636 ml   Output 1225 ml   Net 1411 ml        Physical Exam  Constitutional:       General: He is not in acute distress.     Appearance: He is obese.   HENT:      Head: Normocephalic and atraumatic.   Eyes:      Extraocular Movements: Extraocular movements intact.      Pupils: Pupils are equal, round, and reactive to light.   Cardiovascular:      Rate and Rhythm: Regular rhythm. Bradycardia present.      Pulses: Normal pulses.      Heart sounds: Normal heart sounds.   Pulmonary:      Effort: Pulmonary effort is normal.      Breath sounds: Normal breath sounds.   Abdominal:      General: Bowel sounds are normal.   Musculoskeletal:         General: Swelling present. Normal range of motion.      Cervical back: Normal range of motion and neck supple.      Comments: Significant swelling and pitting edema noted in bilateral lower extremities.     Skin:     General: Skin is warm and dry.   Neurological:      General: No focal deficit present.      Mental Status: He is alert and oriented to person, place, and time.   Psychiatric:         Mood and Affect: Mood normal.         Behavior: Behavior normal.          Current Facility-Administered Medications:     0.9%  NaCl infusion, , Intravenous, Continuous, Baudilio Nj MD, Last Rate: 100 mL/hr at 10/06/22 1424, New Bag at 10/06/22 1424    albuterol-ipratropium 2.5 mg-0.5 mg/3 mL nebulizer solution 3 mL, 3 mL, Nebulization, Q4H WAKE, Baudilio Nj MD, 3 mL at 10/07/22 0840    amiodarone tablet 200 mg, 200 mg, Oral, Daily, Baudilio Nj MD, 200 mg at 10/07/22 0828    apixaban tablet 5 mg, 5 mg, Per OG tube, BID, Baudilio Nj MD, 5 mg at 10/07/22 0828    atorvastatin tablet 40 mg, 40 mg, Oral, QHS, Baudilio Nj MD, 40 mg  at 10/06/22 2033    ciprofloxacin (CIPRO)400mg/200ml D5W IVPB 400 mg, 400 mg, Intravenous, Q12H, Baudilio Nj MD, Stopped at 10/07/22 0720    clopidogreL tablet 75 mg, 75 mg, Oral, Daily, Baudilio Nj MD, 75 mg at 10/06/22 1707    furosemide tablet 40 mg, 40 mg, Oral, Daily, Baudilio Nj MD, 40 mg at 10/06/22 1707    hydrALAZINE injection 10 mg, 10 mg, Intravenous, Q4H PRN, Baudilio Nj MD    labetaloL injection 20 mg, 20 mg, Intravenous, Q4H PRN, Baudilio Nj MD    levetiracetam 500 mg/5 mL (5 mL) liquid Soln 1,000 mg, 1,000 mg, Per NG tube, BID, Jenifer Muniz MD, 1,000 mg at 10/06/22 2032    lisinopriL tablet 5 mg, 5 mg, Oral, Daily, Baudilio Nj MD, 5 mg at 10/06/22 1707    lorazepam (ATIVAN) injection 4 mg, 4 mg, Intravenous, Q5 Min PRN, Baudilio Nj MD    metoprolol succinate (TOPROL-XL) 24 hr tablet 25 mg, 25 mg, Oral, Daily, Baudilio Nj MD, 25 mg at 10/06/22 1707    mupirocin 2 % ointment, , Nasal, BID, Jenifer Muniz MD, Given at 10/06/22 2100    pantoprazole suspension 40 mg, 40 mg, Per NG tube, Daily, Jenifer Muniz MD    PHENobarbitaL tablet 64.8 mg, 64.8 mg, Per NG tube, BID, Harrison Frances Jr., MD, FCCP, 64.8 mg at 10/07/22 0828    phenytoin 125 mg/5 mL suspension 200 mg, 200 mg, Per NG tube, BID, ANGELES Dailey, 200 mg at 10/07/22 0829    propofol (DIPRIVAN) 10 mg/mL infusion, 0-50 mcg/kg/min, Intravenous, Continuous, Valeria Pederson MD, Stopped at 10/06/22 0730    sodium chloride 0.9% flush 10 mL, 10 mL, Intravenous, PRN, Baudilio Nj MD    Flushing PICC Protocol, , , Until Discontinued **AND** sodium chloride 0.9% flush 10 mL, 10 mL, Intravenous, Q6H, 10 mL at 10/07/22 0600 **AND** sodium chloride 0.9% flush 10 mL, 10 mL, Intravenous, PRN, Wilbur Chirinos MD    tamsulosin 24 hr capsule 0.4 mg, 0.4 mg, Oral, Daily, ANGELES Bueno    venlafaxine tablet 75 mg, 75 mg, Oral, BID, Baudilio Nj MD, 75 mg at 10/07/22 0828     Vents:  Vent Mode: CPAP / PSV (10/06/22 0920)  Ventilator  Initiated: Yes (10/06/22 0040)  Set Rate: 10 BPM (change made per Dr. Frances @ 0730) (10/06/22 0825)  Vt Set: 470 mL (10/06/22 0825)  Pressure Support: 10 cmH20 (10/06/22 0920)  PEEP/CPAP: 5 cmH20 (10/06/22 0920)  Oxygen Concentration (%): 30 (10/06/22 0920)  Peak Airway Pressure: 15 cmH2O (10/06/22 0825)  Total Ve: 9.2 mL (10/06/22 0825)  F/VT Ratio<105 (RSBI): (!) 33.13 (10/06/22 0825)    Lines/Drains/Airways       Peripheral Intravenous Line  Duration                  Peripheral IV - Single Lumen 10/05/22 2130 18 G Anterior;Distal;Right Upper Arm 1 day         Midline Catheter Insertion/Assessment  - Single Lumen 10/07/22 0120 Left basilic vein (medial side of arm) <1 day                    Significant Labs:    Recent Labs   Lab 10/07/22  0150   WBC 19.2*   RBC 4.23*   HGB 12.4*   HCT 37.9*      MCV 89.6   MCH 29.3   MCHC 32.7*     Recent Labs   Lab 10/07/22  0150   GLUCOSE 84      K 3.6   CO2 24   BUN 8.4   CREATININE 0.78   MG 1.70       ABG  Recent Labs   Lab 10/05/22  1914 10/06/22  0223 10/06/22  0936   PH 7.358   < > 7.43   PO2 146*   < > 62*   PCO2 45.7*   < > 42   HCO3 25.7   < > 27.9   BE 0  --   --     < > = values in this interval not displayed.           Significant Imaging:  I have reviewed all pertinent imaging results/findings within the past 24 hours.      Assessment & Plan:     Impression:  Chronic seizure disorder, noted multiple tonic clonic generalized seizures at [facility] prior to presentation with reported decreased level of consciousness and poor airway control requiring intubation/mechanical ventilatory support.  Acute respiratory failure secondary to above, requiring mechanical ventilatory support -- resolved.   Acute Cystitis on Cipro   History of paroxysmal atrial fibrillation, on chronic anticoagulation with Eliquis  Coronary artery disease, post previous PTCA/stents  Reported chronic lymphocytic leukemia diagnosis, no details available  Reported chronic  obstructive pulmonary disease, no details available    Plan  -admitted to ICU under seizure monitoring.  -Continue to monitor respiratory status. S/p extubation, satting 97% on 3L NC.  -received Keppra 1000 mg in the E D.  Will continue Keppra 500 IV BID.  - continue home phenobarbital and phenytoin.   -Seizure precautions  -EEG performed showing diffuse slowing consistent with moderate cerebral dysfunction and the presence of intermittent spike wave activity in both frontal and temporal areas, which can be consistent but not conclusive with epileptiform discharges.   - CT head showed no acute intracranial abnormalities w/ chronic microvascular ischemic changes.   -Continue Cipro started in [facility] ED.  Urine culture growing gram negative rods. Deescalate as sensitivities result.  -Repeat labs.  Replace electrolytes as needed.   - blood cultures pending.   -continue Eliquis per hx of Afib   - continue all other ongoing ICU care.  Likely DG today.     GI prophylaxis: Pantoprazole   DVT Prophylaxis: Eliquis     Attending Addendum to Follow:       Berkley Daniels MD  U Internal Medicine, HO-1     [facility] - 7th Floor ICU

## 2022-10-07 NOTE — H&P
[facility]  Hospital Medicine History & Physical Examination       Patient Name: John Wayne  MRN: [id number]  Patient Class: IP- Inpatient   Admission Date: 10/07/2022   Admitting Service: Hospital Medicine   Length of Stay: 1  Attending Physician: Dr. Perez.  Primary Care Provider: SHAY Schmidt MD  Face-to-Face encounter date: 10/07/2022  Code Status: Full  Chief Complaint: Seizures (Intubated tx from [facility] w/ seizure)    Source of Information: Patient. Medical Records      HISTORY OF PRESENT ILLNESS:   John Wayne is a 76 y.o. male with a PMHx of  HTN, CAD s/p NSTEMI, seizure disorder, A. fib/a flutter on Eliquis, COPD, and chronic venous insufficiency who presented to [facility] on 10/6/2022 as a transfer for a higher level of care. He initially presented to [facility] via EMS from [facility] following a reported seizure lasting approximately 25 mins. CT Head negative for acute abnormalities. He was started on Cipro for acute cystitis. Upon ED arrival he was noted to be in significant respiratory distress and he was intubated for airway protection. He was loaded with Keppra and given IV Ativan for seizure-like activity. He was transferred to [facility] for neurology and ICU services. He was admitted to ICU. Neurology was consulted. Repeat CT Head negative for acute abnormality. EEG was abnormal, diffuse slowing consistent with moderate cerebral dysfunction that can be   found toxic metabolic anoxic brain injury or postictal event; presence of intermittent spike wave activity in both frontal temporal areas, which can be consistent but not conclusive with epileptiform discharges. Neurology continues to follow. Seizure medications resumed per neurology. He was extubated, now on NC. Downgraded to the floor on 10/7/22 under hospitalist services.     REVIEW OF SYSTEMS:   Except as documented, all other systems reviewed and negative     PAST MEDICAL HISTORY:     Past Medical History:    Diagnosis Date    Anticoagulant long-term use     COPD (chronic obstructive pulmonary disease)     Coronary artery disease     Difficult intubation     GERD (gastroesophageal reflux disease)     Hypertension     Seizures        PAST SURGICAL HISTORY:     Past Surgical History:   Procedure Laterality Date    cardiac catheterisation  10/18/2016    PERCUTANEOUS BALLOON VALVULOPLASTY  04/04/2018       FAMILY HISTORY:   Reviewed and negative    SOCIAL HISTORY:   Denied alcohol, tobacco or illicit drug use    ALLERGIES:   Ancef in dextrose (iso-osm), Codeine, and Penicillins    HOME MEDICATIONS:     Prior to Admission medications    Medication Sig Start Date End Date Taking? Authorizing Provider   amiodarone (PACERONE) 200 MG Tab Take 200 mg by mouth once daily. 7/18/22   Historical Provider   atorvastatin (LIPITOR) 40 MG tablet Take 40 mg by mouth every evening.    Historical Provider   clopidogreL (PLAVIX) 75 mg tablet Take 1 tablet by mouth Daily. 6/28/22   Historical Provider   ELIQUIS 5 mg Tab Take 1 tablet by mouth 2 (two) times a day. 7/5/22   Historical Provider   furosemide (LASIX) 40 MG tablet Take 1 tablet by mouth Daily. 7/5/22   Historical Provider   levETIRAcetam (KEPPRA) 1000 MG tablet Take 1 tablet by mouth 2 (two) times a day. 6/28/22   Historical Provider   lisinopriL (PRINIVIL,ZESTRIL) 5 MG tablet Take 1 tablet by mouth Daily. 6/30/22   Historical Provider   metoprolol succinate (TOPROL-XL) 25 MG 24 hr tablet Take 1 tablet by mouth Daily. 6/28/22   Historical Provider   pantoprazole (PROTONIX) 40 MG tablet Take 40 mg by mouth Daily.    Historical Provider   PHENobarbitaL (LUMINAL) 64.8 MG tablet Take 1 tablet by mouth 2 (two) times daily. 6/18/22   Historical Provider   PHENobarbitaL 32.4 MG tablet Take 32.4 mg by mouth Daily.    Historical Provider   phenytoin (DILANTIN) 100 MG ER capsule Take 1 capsule by mouth 2 (two) times a day. 7/5/22   Historical Provider   phenytoin (DILANTIN) 50 mg chewable  tablet Take 1 tablet by mouth once daily. 7/5/22   Historical Provider   tamsulosin (FLOMAX) 0.4 mg Cap Take 1 tablet by mouth Daily. 6/30/22   Historical Provider   venlafaxine (EFFEXOR) 75 MG tablet Take 75 mg by mouth 2 (two) times daily.    Historical Provider       __________________________________________________________________________  INPATIENT LIST OF MEDICATIONS     Current Facility-Administered Medications:     0.9%  NaCl infusion, , Intravenous, Continuous, Baudilio Nj MD, Last Rate: 100 mL/hr at 10/06/22 1424, New Bag at 10/06/22 1424    albuterol-ipratropium 2.5 mg-0.5 mg/3 mL nebulizer solution 3 mL, 3 mL, Nebulization, Q4H WAKE, Baudilio Nj MD, 3 mL at 10/07/22 1215    amiodarone tablet 200 mg, 200 mg, Oral, Daily, Baudilio Nj MD, 200 mg at 10/07/22 0828    apixaban tablet 5 mg, 5 mg, Per OG tube, BID, Baudilio Nj MD, 5 mg at 10/07/22 0828    atorvastatin tablet 40 mg, 40 mg, Oral, QHS, Baudilio Nj MD, 40 mg at 10/06/22 2033    ciprofloxacin (CIPRO)400mg/200ml D5W IVPB 400 mg, 400 mg, Intravenous, Q12H, Baudilio Nj MD, Stopped at 10/07/22 0720    clopidogreL tablet 75 mg, 75 mg, Oral, Daily, Baudilio Nj MD, 75 mg at 10/06/22 1707    furosemide tablet 40 mg, 40 mg, Oral, Daily, Baudilio Nj MD, 40 mg at 10/06/22 1707    hydrALAZINE injection 10 mg, 10 mg, Intravenous, Q4H PRN, Baudilio Nj MD    labetaloL injection 20 mg, 20 mg, Intravenous, Q4H PRN, Baudilio Nj MD    levetiracetam 500 mg/5 mL (5 mL) liquid Soln 1,000 mg, 1,000 mg, Per NG tube, BID, Jenifer Muniz MD, 1,000 mg at 10/07/22 0959    lisinopriL tablet 5 mg, 5 mg, Oral, Daily, Baudilio Nj MD, 5 mg at 10/06/22 1707    lorazepam (ATIVAN) injection 4 mg, 4 mg, Intravenous, Q5 Min PRN, Baudilio Nj MD    metoprolol succinate (TOPROL-XL) 24 hr tablet 25 mg, 25 mg, Oral, Daily, Baudilio Nj MD, 25 mg at 10/06/22 1707    mupirocin 2 % ointment, , Nasal, BID, Jenifer Muniz MD, Given at 10/06/22 2100    pantoprazole  suspension 40 mg, 40 mg, Per NG tube, Daily, Jenifer Muniz MD    PHENobarbitaL tablet 64.8 mg, 64.8 mg, Per NG tube, BID, Harrison Frances Jr., MD, FCCP, 64.8 mg at 10/07/22 0828    phenytoin 125 mg/5 mL suspension 200 mg, 200 mg, Per NG tube, BID, Sharee Martin FNP, 200 mg at 10/07/22 0829    propofol (DIPRIVAN) 10 mg/mL infusion, 0-50 mcg/kg/min, Intravenous, Continuous, Valeria Pederson MD, Stopped at 10/06/22 0730    sodium chloride 0.9% flush 10 mL, 10 mL, Intravenous, PRN, Baudilio Nj MD    Flushing PICC Protocol, , , Until Discontinued **AND** sodium chloride 0.9% flush 10 mL, 10 mL, Intravenous, Q6H, 10 mL at 10/07/22 0600 **AND** sodium chloride 0.9% flush 10 mL, 10 mL, Intravenous, PRN, Wilbur Chirinos MD    tamsulosin 24 hr capsule 0.4 mg, 0.4 mg, Oral, Daily, Sharee Bruce, LAURENCEP, 0.4 mg at 10/07/22 0959    venlafaxine tablet 75 mg, 75 mg, Oral, BID, Baudilio Nj MD, 75 mg at 10/07/22 0828    Scheduled Meds:   albuterol-ipratropium  3 mL Nebulization Q4H WAKE    amiodarone  200 mg Oral Daily    apixaban  5 mg Per OG tube BID    atorvastatin  40 mg Oral QHS    ciprofloxacin  400 mg Intravenous Q12H    clopidogreL  75 mg Oral Daily    furosemide  40 mg Oral Daily    levetiracetam  1,000 mg Per NG tube BID    lisinopriL  5 mg Oral Daily    metoprolol succinate  25 mg Oral Daily    mupirocin   Nasal BID    pantoprazole  40 mg Per NG tube Daily    PHENobarbitaL  64.8 mg Per NG tube BID    phenytoin  200 mg Per NG tube BID    sodium chloride 0.9%  10 mL Intravenous Q6H    tamsulosin  0.4 mg Oral Daily    venlafaxine  75 mg Oral BID     Continuous Infusions:   sodium chloride 0.9% 100 mL/hr at 10/06/22 1424    propofoL Stopped (10/06/22 0730)     PRN Meds:.hydrALAZINE, labetaloL, lorazepam, sodium chloride 0.9%, Flushing PICC Protocol **AND** sodium chloride 0.9% **AND** sodium chloride 0.9%    PHYSICAL EXAM:     VITAL SIGNS: 24 HRS MIN & MAX LAST   Temp  Min: 97.9 °F (36.6 °C)  Max: 98.8 °F (37.1 °C) 98.1  °F (36.7 °C)   BP  Min: 98/44  Max: 161/81 (!) 139/51     Pulse  Min: 52  Max: 76  65   Resp  Min: 6  Max: 23 18   SpO2  Min: 90 %  Max: 99 % 95 %       General appearance: Well-developed male in no apparent distress.  HENT: Atraumatic head. Moist mucous membranes of oral cavity.  Eyes: Normal extraocular movements.   Neck: Supple.   Lungs: Clear to auscultation bilaterally.   Heart: Regular rate and rhythm. S1 and S2 present. BLE lymphedema  Abdomen: Soft, non-distended, non-tender. Morbidly obese  Extremities: BLE lymphedema   Skin: No Rash.   Neuro: Motor and sensory exams grossly intact.   Psych/mental status: Appropriate mood and affect. Responds appropriately to questions.     LABS AND IMAGING:     Recent Labs   Lab 10/05/22  1925 10/06/22  0238 10/07/22  0150   WBC 24.2* 22.1* 19.2*   RBC 5.35 4.76 4.23*   HGB 15.5 13.7* 12.4*   HCT 49.7 43.2 37.9*   MCV 92.9 90.8 89.6   MCH 29.0 28.8 29.3   MCHC 31.2* 31.7* 32.7*   RDW 14.3 14.4 14.6    204 170   MPV 9.5 9.8 10.3       Recent Labs   Lab 10/05/22  1914 10/05/22  1925 10/06/22  0223 10/06/22  0238 10/06/22  0936 10/07/22  0150   NA  --  134*  --  136  --  139   K  --  3.9  --  3.7  --  3.6   CO2  --  25  --  25  --  24   BUN  --  11.0  --  10.2  --  8.4   CREATININE  --  0.94  --  0.91  --  0.78   CALCIUM  --  9.2  --  8.9  --  8.6*   PH 7.358  --  7.39  --  7.43  --    MG  --  1.70  --  1.80  --  1.70   ALBUMIN  --  3.6  --  3.1*  --  2.8*   ALKPHOS  --  120  --  106  --  82   ALT  --  21  --  16  --  12   AST  --  17  --  16  --  14   BILITOT  --  0.3  --  0.3  --  0.4       Microbiology Results (last 7 days)       Procedure Component Value Units Date/Time    Blood Culture [[id number]] Collected: 10/07/22 0150    Order Status: Resulted Specimen: Blood from Arm, Left Updated: 10/07/22 0209    Blood Culture [[id number]] Collected: 10/07/22 0150    Order Status: Resulted Specimen: Blood from Arm, Right Updated: 10/07/22 0209             X-Ray Chest AP  Portable  Narrative: EXAMINATION:  XR CHEST AP PORTABLE    CLINICAL HISTORY:  Confirm ETT placement;, .    COMPARISON:  10/05/2022    FINDINGS:  An AP view or more reveals the heart to be mildly enlarged.  There is increasing patchy hazy opacity at the right mid lung.  There is interim placement of an ET tube with the tip below the thoracic inlet and approximately 3 cm above the nallely.  There is interim placement of an NG tube with the tip not included on the exam coursing toward the upper abdomen.  Degenerative changes and curvature are noted to the thoracic spine.  The left hemidiaphragm is obscured.  Impression: 1. Interim increasing patchy hazy opacity right mid lung suspicious for progressing infiltrate and subsegmental atelectasis  2. Obscured left hemidiaphragm suspicious for infiltrate and small left basilar pleural reaction  3. Mildly elevated right hemidiaphragm  4. Mild cardiomegaly  5. Interim placement ET tube and NG tube  6. Atherosclerosis  7. Thoracic spondylosis and scoliosis    Electronically signed by: Campos Dobson  Date:    10/06/2022  Time:    08:47  X-Ray Abdomen AP 1 View  Narrative: EXAMINATION:  XR ABDOMEN AP 1 VIEW    CLINICAL HISTORY:  femoral line;, .    COMPARISON:  None available    FINDINGS:  Single AP view is limited secondary to patient body habitus and lack of beam penetration.  A line/catheter superimposed over the right medial upper thigh with the tip near the lesser trochanter.  A catheter is present within the midline pelvis suspicious for Anaya catheter.  Clinical correlation is indicated.  Iliac stents are evident.  Arthritic changes are evident at the hips bilaterally.  Impression: 1. Suspect right femoral line with the tip below the level of the right lesser trochanter  2. Suspect Anaya catheter  3. Iliac stents  4. Osteoarthritis  5. Atherosclerosis    Electronically signed by: Campos Dobson  Date:    10/06/2022  Time:    08:37  CT Head Without Contrast  Narrative:  EXAMINATION:  CT HEAD WITHOUT CONTRAST    CLINICAL HISTORY:  Mental status change, unknown cause;    TECHNIQUE:  Axial scans were obtained from skull base to the vertex.    Coronal and sagittal reconstructions obtained from the axial data.    Automatic exposure control was utilized to limit radiation dose.    Contrast: None    Radiation Dose:    Total DLP: 1048 mGy*cm    COMPARISON:  CT head dated 01/17/2022    FINDINGS:  There is no acute intracranial hemorrhage or edema.  There is encephalomalacia in the left cerebellum.  Patchy hypodensities in the subcortical and periventricular white matter likely represent chronic microvascular ischemic changes.    There is no mass effect or midline shift.  There is diffuse parenchymal volume loss.  The basal cisterns are patent. There is no abnormal extra-axial fluid collection.  Carotid and vertebral artery calcifications are noted.    The calvarium and skull base are intact. The visualized paranasal sinuses and the mastoid air cells are clear.  Impression: 1. No acute intracranial abnormality.  2. Chronic microvascular ischemic changes.  No significant change from the nighthawk interpretation    Electronically signed by: Tiffanie Keene  Date:    10/06/2022  Time:    06:59  X-Ray Chest 1 View  Narrative: EXAMINATION:  XR CHEST 1 VIEW    CLINICAL HISTORY:  intubated;    TECHNIQUE:  AP chest    COMPARISON:  Chest x-ray dated 10/05/2022    FINDINGS:  The endotracheal tube has its tip 4 cm above the nallely.  Nasogastric tube courses below the diaphragm.  The heart is stable in size.  There is a small right pleural effusion with basilar airspace opacity.  There is no definite visible pneumothorax.  Impression: Small right pleural effusion with basilar airspace opacity.    Electronically signed by: Tiffanie Keene  Date:    10/06/2022  Time:    06:04        ASSESSMENT & PLAN:     Recurrent Seizures   Acute Hypoxemic Respiratory Failure requiring intubation and mechanical  ventilation, now extubation  Acute Cystitis, GNR  Urinary retention  Chronic HFrEF/ ICMO 25-30%  Afib/flutter on Eliquis  Morbid Obesity  Hx of HTN, CAD s/p NSTEMI, seizure disorder, COPD, and chronic venous insufficiency     Plan:  Neurology is following  Continue Keppra, Phenobarbital, Phenytoin  Seizure Precautions  Supplemental o2 as needed  Continue IV abx  Follow Urine Cultures  IV fluids discontinued  Cardiac Monitoring  Reinsert Anaya Catheter due to urinary retention   Labs in AM      VTE Prophylaxis: Eliquis    Discharge Planning and Disposition: TBD    I, Ludy Baker, NP have reviewed and discussed the case with Dr. Hou.  Please see the attending MD's addendum for further assessment and plan.    Ludy Baker, AGACNP-BC  10/07/2022    _______________________________________________________________________________  MD Addendum:  I, Dr. Amada hou, assumed care of this patient today  For the patient encounter, I performed the substantive portion of the visit, I reviewed the NP/PA documentation, treatment plan, and medical decision making.  I had face to face time with this patient     A. History:  75 y/o male with PMHx of HTN, CAD, NSTEMI, seizure disorder, A. fib/a flutter on Eliquis, COPD, and chronic venous insufficiency who presented to outside facility from Nursing Home via EMS with complaints of seizures of unknown quantity and duration. It is unknown if pt had seizure at the nursing home prior arrival. Due to continued deterioration in respiratory status, pt was sedated and [facility]ub[facility] at [facility]. Loaded with Keppra. EKG showing aflutter with 1st degree AV block. He was initially admitted to ICU for acute hypoxemic respiratory failure,  s/p intubation and extubated,.  EEG performed 10/6 showing abnormal study w/ diffused slowing consistent w/ moderate cerebral dysfunction and intermittent spike wave activity in both frontal and temporal areas.      exam:  General appearance: Well-developed male  in no apparent distress.  HENT: Atraumatic head. Moist mucous membranes of oral cavity.  Lungs: Clear to auscultation bilaterally.   Heart: Regular rate and rhythm. S1 and S2 present. BLE lymphedema  Abdomen: Soft, non-distended, non-tender. Morbidly obese  Extremities: BLE lymphedema   Neuro: Motor and sensory exams grossly intact.   Psych/mental status: Appropriate mood and affect. Responds appropriately to questions.        ASSESSMENT & PLAN:   Recurrent Seizures   Acute Hypoxemic Respiratory Failure requiring intubation and mechanical ventilation, now extubation  Acute Cystitis, GNR  Urinary retention  Chronic HFrEF/ ICMO 25-30%  Afib/flutter on Eliquis  Morbid Obesity  Hx of HTN, CAD s/p NSTEMI, seizure disorder, COPD, and chronic venous insufficiency     Plan:  Cardiac Monitoring  F/up Neurology recs   Continue Keppra, Phenobarbital, Phenytoin  Seizure Precautions  Supplemental o2 as needed  Continue IV ciprofloxacin   Follow Urine Cultures  UA is a poor sample   D/C IV fluids   Reinsert Anaya Catheter due to urinary retention   Labs in AM      VTE Prophylaxis: Eliquis        All diagnosis and differential diagnosis have been reviewed; assessment and plan has been documented; I have personally reviewed the labs and test results that are presently available; I have reviewed the patients medication list; I have reviewed the consulting providers response and recommendations. I have reviewed or attempted to review medical records based upon their availability.    All of the patient and family questions have been addressed and answered. Patient's is agreeable to the above stated plan. I will continue to monitor closely and make adjustments to medical management as needed.      10/07/2022

## 2022-10-07 NOTE — CONSULTS
[facility]                       [address] [id number]    PATIENT NAME:       VERN RUCKER   YOB: 1945  CSN:                [id number]   MRN:                [id number]  ADMIT DATE:         10/06/2022 00:33:00  PHYSICIAN:          Linda Guerra MD                            CONSULTATION    DATE OF CONSULT:  10/06/2022 00:00:00    ADDENDUM:  I am dictating for the nurse practitioner Kaylie.    The patient was transferred the nursing home to [facility] for   seizure workup.  Apparently, patient had a witnessed seizure, described as   generalized tonic or clonic seizure.  Patient is known to have seizure in the   past and he is taking Keppra, phenobarbital, and Dilantin.  Serum  on   Dilantin level is therapeutic.  Keppra still pending.  It is sent out.  The   patient was found also to have UTI, treated with antibiotic.  Most likely, in my   opinion since the patient was stable with seizure that the UTI triggered the   seizure, assuming that the patient is taking his medication on a regular basis,   and I am going to do an EEG to the patient to rule out any epilepsy discharges   and if the EEG is negative, then we can assume that breakthrough seizure is   secondary to infection.  Continue on current antiepileptic drugs.        ______________________________  MD RENE Way/HECTOR  DD:  10/06/2022  Time:  08:13PM  DT:  10/06/2022  Time:  08:36PM  Job #:  [id number]      CONSULTATION

## 2022-10-07 NOTE — SUBJECTIVE & OBJECTIVE
Subjective:     Interval History:   EEG significant for epileptiform discharges. Pt extubated yesterday and alert/awake today. Pt endorses concerns for NH not administering AEDs appropriately, however serum levels therapeutic.     Current Neurological Medications:     Current Facility-Administered Medications   Medication Dose Route Frequency Provider Last Rate Last Admin    0.9%  NaCl infusion   Intravenous Continuous Baudilio Nj  mL/hr at 10/06/22 1424 New Bag at 10/06/22 1424    albuterol-ipratropium 2.5 mg-0.5 mg/3 mL nebulizer solution 3 mL  3 mL Nebulization Q4H WAKE Baudilio Nj MD   3 mL at 10/07/22 0840    amiodarone tablet 200 mg  200 mg Oral Daily Baudilio Nj MD   200 mg at 10/07/22 0828    apixaban tablet 5 mg  5 mg Per OG tube BID Baudilio Nj MD   5 mg at 10/07/22 0828    atorvastatin tablet 40 mg  40 mg Oral QHS Baudilio Nj MD   40 mg at 10/06/22 2033    ciprofloxacin (CIPRO)400mg/200ml D5W IVPB 400 mg  400 mg Intravenous Q12H Baudilio Nj MD   Stopped at 10/07/22 0720    clopidogreL tablet 75 mg  75 mg Oral Daily Baudilio Nj MD   75 mg at 10/06/22 1707    furosemide tablet 40 mg  40 mg Oral Daily Baudilio Nj MD   40 mg at 10/06/22 1707    hydrALAZINE injection 10 mg  10 mg Intravenous Q4H PRN Baudilio Nj MD        labetaloL injection 20 mg  20 mg Intravenous Q4H PRN Baudilio Nj MD        levetiracetam 500 mg/5 mL (5 mL) liquid Soln 1,000 mg  1,000 mg Per NG tube BID Jenifer Muniz MD   1,000 mg at 10/06/22 2032    lisinopriL tablet 5 mg  5 mg Oral Daily Baudilio Nj MD   5 mg at 10/06/22 1707    lorazepam (ATIVAN) injection 4 mg  4 mg Intravenous Q5 Min PRN Baudilio Nj MD        metoprolol succinate (TOPROL-XL) 24 hr tablet 25 mg  25 mg Oral Daily Baudilio Nj MD   25 mg at 10/06/22 1707    mupirocin 2 % ointment   Nasal BID Jenifer Muniz MD   Given at 10/06/22 2100    pantoprazole suspension 40 mg  40 mg Per NG tube Daily Jenifer Muniz MD        PHENobarbitaL tablet  64.8 mg  64.8 mg Per NG tube BID Harrison Frances Jr., MD, FCCP   64.8 mg at 10/07/22 0828    phenytoin 125 mg/5 mL suspension 200 mg  200 mg Per NG tube BID Sharee LAURENCE MartinP   200 mg at 10/07/22 0829    propofol (DIPRIVAN) 10 mg/mL infusion  0-50 mcg/kg/min Intravenous Continuous Valeria Pederson MD   Stopped at 10/06/22 0730    sodium chloride 0.9% flush 10 mL  10 mL Intravenous PRN Baudilio Nj MD        sodium chloride 0.9% flush 10 mL  10 mL Intravenous Q6H Wilbur Chirinos MD   10 mL at 10/07/22 0600    And    sodium chloride 0.9% flush 10 mL  10 mL Intravenous PRN Wilbur Chirinos MD        tamsulosin 24 hr capsule 0.4 mg  0.4 mg Oral Daily ANGELES Bueno        venlafaxine tablet 75 mg  75 mg Oral BID Baudilio Nj MD   75 mg at 10/07/22 0828       Review of Systems  A 14pt ros was reviewed & is negative unless o/w documented in the hpi    Objective:     Vital Signs (Most Recent):  Temp: 98.1 °F (36.7 °C) (10/07/22 0400)  Pulse: 62 (10/07/22 0840)  Resp: 16 (10/07/22 0840)  BP: (!) 123/53 (10/07/22 0700)  SpO2: 97 % (10/07/22 0700)   Vital Signs (24h Range):  Temp:  [97.9 °F (36.6 °C)-98.8 °F (37.1 °C)] 98.1 °F (36.7 °C)  Pulse:  [52-74] 62  Resp:  [5-29] 16  SpO2:  [90 %-99 %] 97 %  BP: ()/(41-77) 123/53     Weight: 118 kg (260 lb 2.3 oz)  Body mass index is 40.74 kg/m².    Physical Exam  Vitals reviewed.   Constitutional:       General: He is awake.      Appearance: Normal appearance. He is morbidly obese.   HENT:      Head: Normocephalic and atraumatic.      Nose: Nose normal.      Mouth/Throat:      Mouth: Mucous membranes are moist.      Pharynx: Oropharynx is clear.   Eyes:      Extraocular Movements: Extraocular movements intact and EOM normal.      Pupils: Pupils are equal, round, and reactive to light.   Cardiovascular:      Pulses: Normal pulses.   Pulmonary:      Effort: Pulmonary effort is normal.   Musculoskeletal:         General: Normal range of motion.      Cervical back:  Normal range of motion.   Skin:     General: Skin is warm and dry.   Neurological:      Mental Status: He is alert and oriented to person, place, and time.      Coordination: Finger-Nose-Finger Test normal.   Psychiatric:         Speech: Speech normal.         Behavior: Behavior is cooperative.       NEUROLOGICAL EXAMINATION:     MENTAL STATUS   Oriented to person, place, and time.   Follows 3 step commands.   Attention: normal. Concentration: normal.   Speech: speech is normal   Level of consciousness: alert  Knowledge: good.   Normal comprehension. Praxis: normal     CRANIAL NERVES     CN II   Visual fields full to confrontation.     CN III, IV, VI   Pupils are equal, round, and reactive to light.  Extraocular motions are normal.   Conjugate gaze: present    CN V   Facial sensation intact.     CN VII   Facial expression full, symmetric.     MOTOR EXAM   Muscle bulk: normal  Overall muscle tone: normal       Moves all extremities spontaneously  Does not move BLE against gravity  No focal motor weakness     REFLEXES     Reflexes   Right plantar: normal  Left plantar: normal  Right ankle clonus: absent  Left ankle clonus: absent    SENSORY EXAM   Light touch normal.     GAIT AND COORDINATION      Coordination   Finger to nose coordination: normal    Significant Labs:   Recent Lab Results         10/07/22  0150   10/06/22  0936        Base Deficit   3.2       Specimen source   Arterial sample       Albumin/Globulin Ratio 1.0         Albumin 2.8         Alkaline Phosphatase 82         ALT 12         AST 14         BILIRUBIN TOTAL 0.4         BUN 8.4         Calcium 8.6         Chloride 108         CO2 24         Creatinine 0.78         eGFR >60         Globulin, Total 2.7         Glucose 84         Gran # (ANC) 9.69         Hematocrit 37.9         Hemoglobin 12.4         Immature Grans (Abs) 0.06         Immature Granulocytes 0.3         Instr WBC 19         Lymph # 7.79         Lymph Man 41         Magnesium 1.70          MCH 29.3         MCHC 32.7         MCV 89.6         Mono # 1.52         Mono % 8         MPV 10.3         Neutrophils Relative 51         nRBC 0.0         Phosphorus 2.6         Platelet Estimate Adequate         Platelets 170         POC HCO3   27.9       POC Ionized Calcium   1.20       POC PCO2   42       POC PH   7.43       POC PO2   62  Comment: Result is outside patient normal (reference) range.       POC Potassium   3.3  Comment: Result is outside patient normal (reference) range.       POC SATURATED O2   92       POC Sodium   135  Comment: Result is outside patient normal (reference) range.       POC Temp   37.0       Poikilocytosis 1+         Potassium 3.6         PROTEIN TOTAL 5.5         RBC 4.23         RBC Morph Abnormal         RDW 14.6         Sodium 139         WBC 19.2                 Significant Imaging:   EEG 10/7/2022:  CONCLUSION:  Abnormal study due to the presence of:  1. Diffuse slowing consistent with moderate cerebral dysfunction that can be   found toxic metabolic anoxic brain injury or postictal event.    2. The presence of intermittent spike wave activity in both frontal temporal   areas, which can be consistent but not conclusive with epileptiform discharges.    Clinical correlation suggested.    I have reviewed all pertinent imaging results/findings within the past 24 hours.

## 2022-10-07 NOTE — PROGRESS NOTES
[facility] - 7th Floor ICU  Neurology  Progress Note    Patient Name: John Wayne  MRN: [id number]  Admission Date: 10/6/2022  Hospital Length of Stay: 1 days  Code Status: Full Code   Attending Provider: Jenifer Muniz MD  Primary Care Physician: SHAY Schmidt MD   Principal Problem:<principal problem not specified>    HPI:   77 y/o M with PMH frontal lobe epilepsy, COPD, CAD, a. fib/flutter on eliquis, CLL, CHF, and HTN who presented to [facility] ED from the NH where he resides on 10/5 for seizures.  pt reportedly had witnessed seizure at NH lasting approx 25 min. seizure activity described as generalized muscle fasciculations and tachypnea w/o LOC, which are how his seizures usually present. pt had additional seizure while at [facility]'s ED followed by stridor and was subsequently intubated. en route from [facility] to [facility] ED pt noted to have another seizure with associated respiratory distress and was given ativan IV.     pt's neurologist Dr. Eckert and home AEDs include, keppra 1 gm BID, phenobarbital 64.8 mg BID, and phenytoin 100 mg TID, which have been resumed. phenobarbital and phenytoin serum levels therapeutic. labs significant for UTI. CT head w/o negative for acute intracranial processes.    of note, pt treated several other times for breakthrough seizures and admitted approx 1-1.5 yrs ago for status epilepticus. during that admission there was some concern regarding pt receiving AEDs correctly at NH per pt's neurologist's office notes.       Overview/Hospital Course:  No notes on file        Subjective:     Interval History:   EEG significant for epileptiform discharges. Pt extubated yesterday and alert/awake today. Pt endorses concerns for NH not administering AEDs appropriately, however serum levels therapeutic.     Current Neurological Medications:     Current Facility-Administered Medications   Medication Dose Route Frequency Provider Last Rate Last Admin    0.9%   NaCl infusion   Intravenous Continuous Baudilio Nj  mL/hr at 10/06/22 1424 New Bag at 10/06/22 1424    albuterol-ipratropium 2.5 mg-0.5 mg/3 mL nebulizer solution 3 mL  3 mL Nebulization Q4H WAKE Baudilio Nj MD   3 mL at 10/07/22 0840    amiodarone tablet 200 mg  200 mg Oral Daily Baudilio Nj MD   200 mg at 10/07/22 0828    apixaban tablet 5 mg  5 mg Per OG tube BID Baudilio Nj MD   5 mg at 10/07/22 0828    atorvastatin tablet 40 mg  40 mg Oral QHS Baudilio Nj MD   40 mg at 10/06/22 2033    ciprofloxacin (CIPRO)400mg/200ml D5W IVPB 400 mg  400 mg Intravenous Q12H Baudilio Nj MD   Stopped at 10/07/22 0720    clopidogreL tablet 75 mg  75 mg Oral Daily Baudilio Nj MD   75 mg at 10/06/22 1707    furosemide tablet 40 mg  40 mg Oral Daily Baudilio Nj MD   40 mg at 10/06/22 1707    hydrALAZINE injection 10 mg  10 mg Intravenous Q4H PRN Baudilio Nj MD        labetaloL injection 20 mg  20 mg Intravenous Q4H PRN Baudilio Nj MD        levetiracetam 500 mg/5 mL (5 mL) liquid Soln 1,000 mg  1,000 mg Per NG tube BID Jenifer Muniz MD   1,000 mg at 10/06/22 2032    lisinopriL tablet 5 mg  5 mg Oral Daily Baudilio Nj MD   5 mg at 10/06/22 1707    lorazepam (ATIVAN) injection 4 mg  4 mg Intravenous Q5 Min PRN Baudilio Nj MD        metoprolol succinate (TOPROL-XL) 24 hr tablet 25 mg  25 mg Oral Daily Baudilio Nj MD   25 mg at 10/06/22 1707    mupirocin 2 % ointment   Nasal BID Jenifer Muniz MD   Given at 10/06/22 2100    pantoprazole suspension 40 mg  40 mg Per NG tube Daily Jenifer Muniz MD        PHENobarbitaL tablet 64.8 mg  64.8 mg Per NG tube BID Harrison Frances Jr., MD, FCCP   64.8 mg at 10/07/22 0828    phenytoin 125 mg/5 mL suspension 200 mg  200 mg Per NG tube BID Sharee Martin FNP   200 mg at 10/07/22 0829    propofol (DIPRIVAN) 10 mg/mL infusion  0-50 mcg/kg/min Intravenous Continuous Valeria Pederson MD   Stopped at 10/06/22 0730    sodium chloride 0.9% flush 10 mL   10 mL Intravenous PRN Baudilio Nj MD        sodium chloride 0.9% flush 10 mL  10 mL Intravenous Q6H Wilbur Chirinos MD   10 mL at 10/07/22 0600    And    sodium chloride 0.9% flush 10 mL  10 mL Intravenous PRN Wilbur Chirinos MD        tamsulosin 24 hr capsule 0.4 mg  0.4 mg Oral Daily Sharee Bruce, FNP        venlafaxine tablet 75 mg  75 mg Oral BID Baudilio jN MD   75 mg at 10/07/22 0828       Review of Systems  A 14pt ros was reviewed & is negative unless o/w documented in the hpi    Objective:     Vital Signs (Most Recent):  Temp: 98.1 °F (36.7 °C) (10/07/22 0400)  Pulse: 62 (10/07/22 0840)  Resp: 16 (10/07/22 0840)  BP: (!) 123/53 (10/07/22 0700)  SpO2: 97 % (10/07/22 0700)   Vital Signs (24h Range):  Temp:  [97.9 °F (36.6 °C)-98.8 °F (37.1 °C)] 98.1 °F (36.7 °C)  Pulse:  [52-74] 62  Resp:  [5-29] 16  SpO2:  [90 %-99 %] 97 %  BP: ()/(41-77) 123/53     Weight: 118 kg (260 lb 2.3 oz)  Body mass index is 40.74 kg/m².    Physical Exam  Vitals reviewed.   Constitutional:       General: He is awake.      Appearance: Normal appearance. He is morbidly obese.   HENT:      Head: Normocephalic and atraumatic.      Nose: Nose normal.      Mouth/Throat:      Mouth: Mucous membranes are moist.      Pharynx: Oropharynx is clear.   Eyes:      Extraocular Movements: Extraocular movements intact and EOM normal.      Pupils: Pupils are equal, round, and reactive to light.   Cardiovascular:      Pulses: Normal pulses.   Pulmonary:      Effort: Pulmonary effort is normal.   Musculoskeletal:         General: Normal range of motion.      Cervical back: Normal range of motion.   Skin:     General: Skin is warm and dry.   Neurological:      Mental Status: He is alert and oriented to person, place, and time.      Coordination: Finger-Nose-Finger Test normal.   Psychiatric:         Speech: Speech normal.         Behavior: Behavior is cooperative.       NEUROLOGICAL EXAMINATION:     MENTAL STATUS   Oriented to  person, place, and time.   Follows 3 step commands.   Attention: normal. Concentration: normal.   Speech: speech is normal   Level of consciousness: alert  Knowledge: good.   Normal comprehension. Praxis: normal     CRANIAL NERVES     CN II   Visual fields full to confrontation.     CN III, IV, VI   Pupils are equal, round, and reactive to light.  Extraocular motions are normal.   Conjugate gaze: present    CN V   Facial sensation intact.     CN VII   Facial expression full, symmetric.     MOTOR EXAM   Muscle bulk: normal  Overall muscle tone: normal       Moves all extremities spontaneously  Does not move BLE against gravity  No focal motor weakness     REFLEXES     Reflexes   Right plantar: normal  Left plantar: normal  Right ankle clonus: absent  Left ankle clonus: absent    SENSORY EXAM   Light touch normal.     GAIT AND COORDINATION      Coordination   Finger to nose coordination: normal    Significant Labs:   Recent Lab Results         10/07/22  0150   10/06/22  0936        Base Deficit   3.2       Specimen source   Arterial sample       Albumin/Globulin Ratio 1.0         Albumin 2.8         Alkaline Phosphatase 82         ALT 12         AST 14         BILIRUBIN TOTAL 0.4         BUN 8.4         Calcium 8.6         Chloride 108         CO2 24         Creatinine 0.78         eGFR >60         Globulin, Total 2.7         Glucose 84         Gran # (ANC) 9.69         Hematocrit 37.9         Hemoglobin 12.4         Immature Grans (Abs) 0.06         Immature Granulocytes 0.3         Instr WBC 19         Lymph # 7.79         Lymph Man 41         Magnesium 1.70         MCH 29.3         MCHC 32.7         MCV 89.6         Mono # 1.52         Mono % 8         MPV 10.3         Neutrophils Relative 51         nRBC 0.0         Phosphorus 2.6         Platelet Estimate Adequate         Platelets 170         POC HCO3   27.9       POC Ionized Calcium   1.20       POC PCO2   42       POC PH   7.43       POC PO2   62  Comment:  Result is outside patient normal (reference) range.       POC Potassium   3.3  Comment: Result is outside patient normal (reference) range.       POC SATURATED O2   92       POC Sodium   135  Comment: Result is outside patient normal (reference) range.       POC Temp   37.0       Poikilocytosis 1+         Potassium 3.6         PROTEIN TOTAL 5.5         RBC 4.23         RBC Morph Abnormal         RDW 14.6         Sodium 139         WBC 19.2                 Significant Imaging:   EEG 10/7/2022:  CONCLUSION:  Abnormal study due to the presence of:  1. Diffuse slowing consistent with moderate cerebral dysfunction that can be   found toxic metabolic anoxic brain injury or postictal event.    2. The presence of intermittent spike wave activity in both frontal temporal   areas, which can be consistent but not conclusive with epileptiform discharges.    Clinical correlation suggested.    I have reviewed all pertinent imaging results/findings within the past 24 hours.    Assessment and Plan:     Seizures  -Dilantin increased from 100 mg TID to 200 mg BID   -Seizures likely 2/2 UTI, however, dilantin dose increased d/t abnormal EEG and serum phenytoin levels on low end of normal  -Continue keppra 1 gm BID and phenobarbital 64.8 mg BID  -Seizure precautions  -Further recommendations per MD            VTE Risk Mitigation (From admission, onward)         Ordered     apixaban tablet 5 mg  2 times daily         10/06/22 0322     IP VTE HIGH RISK PATIENT  Once         10/06/22 0152     Place sequential compression device  Until discontinued         10/06/22 0152                MICHEAL Dailey-BC  Inpatient Neurology  [facility] - 7th Floor ICU

## 2022-10-07 NOTE — PROCEDURES
"John Wayne is a 76 y.o. male patient.    Temp: 97.9 °F (36.6 °C) (10/07/22 0000)  Pulse: 72 (10/07/22 0115)  Resp: (!) 23 (10/07/22 0115)  BP: (!) 98/44 (10/07/22 0100)  SpO2: (!) 93 % (10/07/22 0115)  Weight: 118 kg (260 lb 2.3 oz) (10/06/22 0200)  Height: 5' 7" (170.2 cm) (10/06/22 0200)    PICC  Date/Time: 10/7/2022 1:38 AM  Performed by: Luzmaria Baker RN  Consent Done: Yes  Time out: Immediately prior to procedure a time out was called to verify the correct patient, procedure, equipment, support staff and site/side marked as required  Indications: med administration and vascular access  Anesthesia: local infiltration  Local anesthetic: lidocaine 1% without epinephrine  Anesthetic Total (mL): 5  Preparation: skin prepped with ChloraPrep  Skin prep agent dried: skin prep agent completely dried prior to procedure  Sterile barriers: all five maximum sterile barriers used - cap, mask, sterile gown, sterile gloves, and large sterile sheet  Hand hygiene: hand hygiene performed prior to central venous catheter insertion  Location details: left basilic  Catheter type: single lumen  Catheter size: 4 Fr  Catheter Length: 12cm    Ultrasound guidance: yes  Vessel Caliber: medium and patent, compressibility normal  Needle advanced into vessel with real time Ultrasound guidance.  Guidewire confirmed in vessel.  Sterile sheath used.  Number of attempts: 1  Post-procedure: blood return through all ports, sterile dressing applied and chlorhexidine patch    Complications: none  Comments: Arm circumference 36cm        Luzmaria Baker RN  10/7/2022    "

## 2022-10-07 NOTE — PROGRESS NOTES
[facility]                       [address] [address]    PATIENT NAME:       VERN RUCKER   YOB: 1945  CSN:                [id number]   MRN:                [id number]  ADMIT DATE:         10/06/2022 00:33:00  PHYSICIAN:          Linda Guerra MD                            PROGRESS NOTE    DATE:      STUDY:  Electroencephalogram.    REASON FOR STUDY:  Seizure workup.    DESCRIPTION:  This was done using 10-20 system using 21 channels.  The   background activity is consistent of about 6 Hz showing moderate amplitude   during the study.  There was some intermittent spike wave activity on the left   and right frontal temporal area.    CONCLUSION:  Abnormal study due to the presence of:  1. Diffuse slowing consistent with moderate cerebral dysfunction that  can be   found toxic metabolic anoxic brain injury or postictal event.    2. The presence of intermittent spike wave activity in both frontal temporal   areas, which can be consistent but not conclusive with epileptiform discharges.    Clinical correlation suggested.        ______________________________  MD CHANTE WayK/AQS  DD:  10/06/2022  Time:  09:10PM  DT:  10/07/2022  Time:  12:05AM  Job #:  [id number]      PROGRESS NOTE

## 2022-10-08 LAB
ALBUMIN SERPL-MCNC: 2.8 GM/DL (ref 3.4–4.8)
ALBUMIN/GLOB SERPL: 1 RATIO (ref 1.1–2)
ALP SERPL-CCNC: 83 UNIT/L (ref 40–150)
ALT SERPL-CCNC: 12 UNIT/L (ref 0–55)
AST SERPL-CCNC: 17 UNIT/L (ref 5–34)
BACTERIA UR CULT: ABNORMAL
BASOPHILS # BLD AUTO: 0.07 X10(3)/MCL (ref 0–0.2)
BASOPHILS NFR BLD AUTO: 0.4 %
BILIRUBIN DIRECT+TOT PNL SERPL-MCNC: 0.3 MG/DL
BUN SERPL-MCNC: 8.3 MG/DL (ref 8.4–25.7)
CALCIUM SERPL-MCNC: 8.6 MG/DL (ref 8.8–10)
CHLORIDE SERPL-SCNC: 108 MMOL/L (ref 98–107)
CO2 SERPL-SCNC: 25 MMOL/L (ref 23–31)
CREAT SERPL-MCNC: 0.83 MG/DL (ref 0.73–1.18)
EOSINOPHIL # BLD AUTO: 0.25 X10(3)/MCL (ref 0–0.9)
EOSINOPHIL NFR BLD AUTO: 1.4 %
ERYTHROCYTE [DISTWIDTH] IN BLOOD BY AUTOMATED COUNT: 14.4 % (ref 11.5–17)
GFR SERPLBLD CREATININE-BSD FMLA CKD-EPI: >60 MLS/MIN/1.73/M2
GLOBULIN SER-MCNC: 2.9 GM/DL (ref 2.4–3.5)
GLUCOSE SERPL-MCNC: 110 MG/DL (ref 82–115)
HCT VFR BLD AUTO: 37.1 % (ref 42–52)
HGB BLD-MCNC: 12.1 GM/DL (ref 14–18)
IMM GRANULOCYTES # BLD AUTO: 0.06 X10(3)/MCL (ref 0–0.04)
IMM GRANULOCYTES NFR BLD AUTO: 0.3 %
LYMPHOCYTES # BLD AUTO: 9.67 X10(3)/MCL (ref 0.6–4.6)
LYMPHOCYTES NFR BLD AUTO: 54.3 %
MAGNESIUM SERPL-MCNC: 1.7 MG/DL (ref 1.6–2.6)
MCH RBC QN AUTO: 29.2 PG (ref 27–31)
MCHC RBC AUTO-ENTMCNC: 32.6 MG/DL (ref 33–36)
MCV RBC AUTO: 89.4 FL (ref 80–94)
MONOCYTES # BLD AUTO: 1.4 X10(3)/MCL (ref 0.1–1.3)
MONOCYTES NFR BLD AUTO: 7.9 %
MRSA PCR SCRN (OHS): NOT DETECTED
NEUTROPHILS # BLD AUTO: 6.4 X10(3)/MCL (ref 2.1–9.2)
NEUTROPHILS NFR BLD AUTO: 35.7 %
NRBC BLD AUTO-RTO: 0 %
PHOSPHATE SERPL-MCNC: 2.8 MG/DL (ref 2.3–4.7)
PLATELET # BLD AUTO: 174 X10(3)/MCL (ref 130–400)
PMV BLD AUTO: 10.2 FL (ref 7.4–10.4)
POTASSIUM SERPL-SCNC: 4.1 MMOL/L (ref 3.5–5.1)
PROT SERPL-MCNC: 5.7 GM/DL (ref 5.8–7.6)
RBC # BLD AUTO: 4.15 X10(6)/MCL (ref 4.7–6.1)
SODIUM SERPL-SCNC: 139 MMOL/L (ref 136–145)
WBC # SPEC AUTO: 17.8 X10(3)/MCL (ref 4.5–11.5)

## 2022-10-08 PROCEDURE — A4216 STERILE WATER/SALINE, 10 ML: HCPCS | Performed by: STUDENT IN AN ORGANIZED HEALTH CARE EDUCATION/TRAINING PROGRAM

## 2022-10-08 PROCEDURE — 25000003 PHARM REV CODE 250: Performed by: STUDENT IN AN ORGANIZED HEALTH CARE EDUCATION/TRAINING PROGRAM

## 2022-10-08 PROCEDURE — 94761 N-INVAS EAR/PLS OXIMETRY MLT: CPT

## 2022-10-08 PROCEDURE — 94640 AIRWAY INHALATION TREATMENT: CPT

## 2022-10-08 PROCEDURE — 87641 MR-STAPH DNA AMP PROBE: CPT | Performed by: INTERNAL MEDICINE

## 2022-10-08 PROCEDURE — 25000003 PHARM REV CODE 250: Performed by: NURSE PRACTITIONER

## 2022-10-08 PROCEDURE — 84100 ASSAY OF PHOSPHORUS: CPT | Performed by: STUDENT IN AN ORGANIZED HEALTH CARE EDUCATION/TRAINING PROGRAM

## 2022-10-08 PROCEDURE — 20000000 HC ICU ROOM

## 2022-10-08 PROCEDURE — 63600175 PHARM REV CODE 636 W HCPCS: Performed by: NURSE PRACTITIONER

## 2022-10-08 PROCEDURE — 25000003 PHARM REV CODE 250: Performed by: INTERNAL MEDICINE

## 2022-10-08 PROCEDURE — 80053 COMPREHEN METABOLIC PANEL: CPT | Performed by: STUDENT IN AN ORGANIZED HEALTH CARE EDUCATION/TRAINING PROGRAM

## 2022-10-08 PROCEDURE — 25000242 PHARM REV CODE 250 ALT 637 W/ HCPCS

## 2022-10-08 PROCEDURE — 27000221 HC OXYGEN, UP TO 24 HOURS

## 2022-10-08 PROCEDURE — 99232 PR SUBSEQUENT HOSPITAL CARE,LEVL II: ICD-10-PCS | Mod: ,,, | Performed by: PSYCHIATRY & NEUROLOGY

## 2022-10-08 PROCEDURE — 85025 COMPLETE CBC W/AUTO DIFF WBC: CPT | Performed by: STUDENT IN AN ORGANIZED HEALTH CARE EDUCATION/TRAINING PROGRAM

## 2022-10-08 PROCEDURE — 63600175 PHARM REV CODE 636 W HCPCS: Performed by: INTERNAL MEDICINE

## 2022-10-08 PROCEDURE — 83735 ASSAY OF MAGNESIUM: CPT | Performed by: STUDENT IN AN ORGANIZED HEALTH CARE EDUCATION/TRAINING PROGRAM

## 2022-10-08 PROCEDURE — 99900035 HC TECH TIME PER 15 MIN (STAT)

## 2022-10-08 PROCEDURE — 63600175 PHARM REV CODE 636 W HCPCS: Performed by: STUDENT IN AN ORGANIZED HEALTH CARE EDUCATION/TRAINING PROGRAM

## 2022-10-08 PROCEDURE — 36415 COLL VENOUS BLD VENIPUNCTURE: CPT | Performed by: STUDENT IN AN ORGANIZED HEALTH CARE EDUCATION/TRAINING PROGRAM

## 2022-10-08 PROCEDURE — 25000242 PHARM REV CODE 250 ALT 637 W/ HCPCS: Performed by: STUDENT IN AN ORGANIZED HEALTH CARE EDUCATION/TRAINING PROGRAM

## 2022-10-08 PROCEDURE — 99232 SBSQ HOSP IP/OBS MODERATE 35: CPT | Mod: ,,, | Performed by: PSYCHIATRY & NEUROLOGY

## 2022-10-08 PROCEDURE — 87633 RESP VIRUS 12-25 TARGETS: CPT | Performed by: INTERNAL MEDICINE

## 2022-10-08 RX ORDER — LEVOFLOXACIN 5 MG/ML
500 INJECTION, SOLUTION INTRAVENOUS
Status: DISCONTINUED | OUTPATIENT
Start: 2022-10-08 | End: 2022-10-10

## 2022-10-08 RX ADMIN — TAMSULOSIN HYDROCHLORIDE 0.4 MG: 0.4 CAPSULE ORAL at 05:10

## 2022-10-08 RX ADMIN — PHENOBARBITAL 64.8 MG: 32.4 TABLET ORAL at 08:10

## 2022-10-08 RX ADMIN — VANCOMYCIN HYDROCHLORIDE 1500 MG: 1.5 INJECTION, POWDER, LYOPHILIZED, FOR SOLUTION INTRAVENOUS at 12:10

## 2022-10-08 RX ADMIN — AMIODARONE HYDROCHLORIDE 200 MG: 200 TABLET ORAL at 08:10

## 2022-10-08 RX ADMIN — PANTOPRAZOLE SODIUM 40 MG: 40 GRANULE, DELAYED RELEASE ORAL at 08:10

## 2022-10-08 RX ADMIN — ATORVASTATIN CALCIUM 40 MG: 40 TABLET, FILM COATED ORAL at 08:10

## 2022-10-08 RX ADMIN — SODIUM CHLORIDE, PRESERVATIVE FREE 10 ML: 5 INJECTION INTRAVENOUS at 12:10

## 2022-10-08 RX ADMIN — VENLAFAXINE 75 MG: 37.5 TABLET ORAL at 08:10

## 2022-10-08 RX ADMIN — LISINOPRIL 5 MG: 5 TABLET ORAL at 05:10

## 2022-10-08 RX ADMIN — SODIUM CHLORIDE, PRESERVATIVE FREE 10 ML: 5 INJECTION INTRAVENOUS at 06:10

## 2022-10-08 RX ADMIN — MUPIROCIN: 20 OINTMENT TOPICAL at 09:10

## 2022-10-08 RX ADMIN — METOPROLOL SUCCINATE 25 MG: 25 TABLET, EXTENDED RELEASE ORAL at 05:10

## 2022-10-08 RX ADMIN — IPRATROPIUM BROMIDE AND ALBUTEROL SULFATE 3 ML: 2.5; .5 SOLUTION RESPIRATORY (INHALATION) at 04:10

## 2022-10-08 RX ADMIN — LEVETIRACETAM 1000 MG: 100 SOLUTION ORAL at 08:10

## 2022-10-08 RX ADMIN — IPRATROPIUM BROMIDE AND ALBUTEROL SULFATE 3 ML: 2.5; .5 SOLUTION RESPIRATORY (INHALATION) at 01:10

## 2022-10-08 RX ADMIN — PHENYTOIN 200 MG: 125 SUSPENSION ORAL at 08:10

## 2022-10-08 RX ADMIN — IPRATROPIUM BROMIDE AND ALBUTEROL SULFATE 3 ML: 2.5; .5 SOLUTION RESPIRATORY (INHALATION) at 08:10

## 2022-10-08 RX ADMIN — CIPROFLOXACIN 400 MG: 2 INJECTION, SOLUTION INTRAVENOUS at 06:10

## 2022-10-08 RX ADMIN — APIXABAN 5 MG: 5 TABLET, FILM COATED ORAL at 08:10

## 2022-10-08 RX ADMIN — CLOPIDOGREL 75 MG: 75 TABLET, FILM COATED ORAL at 05:10

## 2022-10-08 RX ADMIN — LEVOFLOXACIN 500 MG: 500 INJECTION, SOLUTION INTRAVENOUS at 09:10

## 2022-10-08 RX ADMIN — FUROSEMIDE 40 MG: 40 TABLET ORAL at 05:10

## 2022-10-08 NOTE — PROGRESS NOTES
[facility]                       [address] [address]    PATIENT NAME:       VERN RUCKER   YOB: 1945  CSN:                [id number]   MRN:                [id number]  ADMIT DATE:         10/06/2022 00:33:00  PHYSICIAN:          Linda Guerra MD                            PROGRESS NOTE    DATE:      Dictating a co-sign note for the followup for Sharee Martin.  The patient is in   room 716.  The patient did not have any episodes of seizure today.  He is more   alert, more awake.  EEG did show some intermittent spike wave in the frontal   temporal area.  My recommendation is to raise his Dilantin to make it 400 mg a   day, since his Dilantin level is in the low-normal range and continue on Keppra   and continue on phenobarbital, same dose.  Most likely what triggered the   seizure could be related to UTI plus/minus compliance with medication.  We will   follow the patient as needed.        ______________________________  MD RENE Way/AQS  DD:  10/07/2022  Time:  08:46PM  DT:  10/07/2022  Time:  09:14PM  Job #:  [id number]      PROGRESS NOTE

## 2022-10-08 NOTE — PROGRESS NOTES
Pharmacokinetic Initial Assessment: IV Vancomycin    Assessment/Plan:    Initiate intravenous vancomycin with loading dose of 1500 mg once followed by a maintenance dose of vancomycin 1500 mg IV every 12 hours  Desired empiric serum trough concentration is 15 to 20 mcg/mL  Draw vancomycin trough level 60 min prior to fourth dose on 10/9 at approximately 1100  Pharmacy will continue to follow and monitor vancomycin.      Please contact pharmacy at extension [phone] with any questions regarding this assessment.     Thank you for the consult,   Robbie Abdul       Patient brief summary:  John Wayne is a 76 y.o. male initiated on antimicrobial therapy with IV Vancomycin for treatment of suspected bacteremia    Drug Allergies:   Review of patient's allergies indicates:   Allergen Reactions    Ancef in dextrose (iso-osm)     Codeine     Penicillins        Actual Body Weight:   118kg    Renal Function:   Estimated Creatinine Clearance: 99 mL/min (based on SCr of 0.78 mg/dL).,     Dialysis Method (if applicable):  N/A    CBC (last 72 hours):  Recent Labs   Lab Result Units 10/05/22  1925 10/06/22  0238 10/07/22  0150   WBC x10(3)/mcL 24.2* 22.1* 19.2*   Hgb gm/dL 15.5 13.7* 12.4*   Hct % 49.7 43.2 37.9*   Platelet x10(3)/mcL 234 204 170   Mono % %  --  6.7  --    Monocyte Man % 6  --  8   Eos % %  --  0.8  --    Eos Man % 3  --   --    Basophil % %  --  0.4  --        Metabolic Panel (last 72 hours):  Recent Labs   Lab Result Units 10/05/22  1925 10/05/22  2210 10/06/22  0200 10/06/22  0238 10/07/22  0150   Sodium Level mmol/L 134*  --   --  136 139   Potassium Level mmol/L 3.9  --   --  3.7 3.6   Chloride mmol/L 101  --   --  106 108*   Carbon Dioxide mmol/L 25  --   --  25 24   Glucose Level mg/dL 107  --   --  150* 84   Glucose, UA mg/dL  --  Negative Negative  --   --    Blood Urea Nitrogen mg/dL 11.0  --   --  10.2 8.4   Creatinine mg/dL 0.94  --   --  0.91 0.78   Albumin Level gm/dL 3.6  --   --  3.1* 2.8*   Bilirubin  Total mg/dL 0.3  --   --  0.3 0.4   Alkaline Phosphatase unit/L 120  --   --  106 82   Aspartate Aminotransferase unit/L 17  --   --  16 14   Alanine Aminotransferase unit/L 21  --   --  16 12   Magnesium Level mg/dL 1.70  --   --  1.80 1.70   Phosphorus Level mg/dL 3.5  --   --  3.1 2.6       Drug levels (last 3 results):  No results for input(s): VANCOMYCINRA, VANCORANDOM, VANCOMYCINPE, VANCOPEAK, VANCOMYCINTR, VANCOTROUGH in the last 72 hours.    Microbiologic Results:  Microbiology Results (last 7 days)       Procedure Component Value Units Date/Time    Blood Culture [[id number]] Collected: 10/07/22 2326    Order Status: Sent Specimen: Blood, Venous Updated: 10/07/22 2331    Blood Culture [[id number]] Collected: 10/07/22 2326    Order Status: Sent Specimen: Blood, Venous Updated: 10/07/22 2331    Blood Culture [[id number]]  (Abnormal) Collected: 10/07/22 0150    Order Status: Completed Specimen: Blood from Arm, Left Updated: 10/07/22 2146     GRAM STAIN Gram Positive Cocci, probable Staphylococcus      1 of 1 Pediatric bottle positive      Seen in gram stain of broth only    Blood Culture [[id number]] Collected: 10/07/22 0150    Order Status: Resulted Specimen: Blood from Arm, Right Updated: 10/07/22 0203

## 2022-10-08 NOTE — PROGRESS NOTES
[facility]  Hospital Medicine Progress Note        Chief Complaint: Seizures (Intubated tx from [facility] w/ seizure)     Source of Information: Patient. Medical Records        HISTORY OF PRESENT ILLNESS:   John Wayne is a 76 y.o. male with a PMHx of  HTN, CAD s/p NSTEMI, seizure disorder, A. fib/a flutter on Eliquis, COPD, and chronic venous insufficiency who presented to [facility] on 10/6/2022 as a transfer for a higher level of care. He initially presented to [facility] via EMS from \[facility]"" following a reported seizure lasting approximately 25 mins. CT Head negative for acute abnormalities. He was started on Cipro for acute cystitis. Upon ED arrival he was noted to be in significant respiratory distress and he was intubated for airway protection. He was loaded with Keppra and given IV Ativan for seizure-like activity. He was transferred to [facility] for neurology and ICU services. He was admitted to ICU. Neurology was consulted. Repeat CT Head negative for acute abnormality. EEG was abnormal, diffuse slowing consistent with moderate cerebral dysfunction that can be   found toxic metabolic anoxic brain injury or postictal event; presence of intermittent spike wave activity in both frontal temporal areas, which can be consistent but not conclusive with epileptiform discharges. Neurology continues to follow. Seizure medications resumed per neurology. He was extubated, now on NC. Downgraded to the floor on 10/7/22 under hospitalist services.     Patient currently admitted on account of uncontrolled seizures.  Hospital course complicated by 1/2 blood cultures growing Staph bacteremia.  Right community-acquired pneumonia with effusion.        Today's information   Vitals reviewed remains on 2 L of oxygen   Leukocytosis is worsening  Chest x-ray reviewed with right-sided pneumonia and effusion   1/2 blood cultures growing Staph, follow-up to completion   Urine growing Gram-negative rods    Change Cipro to Levaquin   Check MRSA PCR respiratory panel   Continue seizure meds per neuro recs   Trend CBC         exam:  General appearance: Well-developed male in no apparent distress.  HENT: Atraumatic head. Moist mucous membranes of oral cavity.  Lungs: Clear to auscultation bilaterally.   Heart: Regular rate and rhythm. S1 and S2 present. BLE lymphedema  Abdomen: Soft, non-distended, non-tender. Morbidly obese  Extremities: BLE lymphedema   Neuro: Motor and sensory exams grossly intact.   Psych/mental status: Appropriate mood and affect. Responds appropriately to questions.         ASSESSMENT & PLAN:   1/2 Staph bacteremia   Leukocytosis, worsening   Right-sided pneumonia with effusion  Gram-negative joy UTI    Recurrent Seizures   Acute Hypoxemic Respiratory Failure requiring intubation and mechanical ventilation, now extubation  Urinary retention  Chronic HFrEF/ ICMO 25-30%  Afib/flutter on Eliquis  Morbid Obesity  Hx of HTN, CAD s/p NSTEMI, seizure disorder, COPD, and chronic venous insufficiency      Plan:  Change Cipro to Levaquin , cont vanc   Check MRSA PCR respiratory panel   Continue seizure meds per neuro recs   Trend CBC   Cardiac Monitoring  F/up Neurology recs   Continue Keppra, Phenobarbital, Phenytoin  Seizure Precautions  Supplemental o2 as needed  Follow Urine Cultures  UA is a poor sample   Reinsert Anaya Catheter due to urinary retention   Labs in AM        VTE Prophylaxis: Eliquis     Critical care diagnosis 1/2 Staph bacteremia on antibiotics   Critical care time greater than 35 minutes         VITAL SIGNS: 24 HRS MIN & MAX LAST   Temp  Min: 98.3 °F (36.8 °C)  Max: 98.8 °F (37.1 °C) 98.8 °F (37.1 °C)   BP  Min: 114/52  Max: 162/63 (!) 142/53     Pulse  Min: 54  Max: 76  (!) 55     Resp  Min: 10  Max: 24 10   SpO2  Min: 92 %  Max: 96 % (!) 92 %         Recent Labs   Lab 10/06/22  0238 10/07/22  0150 10/08/22  0124   WBC 22.1* 19.2* 17.8*   RBC 4.76 4.23* 4.15*   HGB 13.7* 12.4* 12.1*    HCT 43.2 37.9* 37.1*   MCV 90.8 89.6 89.4   MCH 28.8 29.3 29.2   MCHC 31.7* 32.7* 32.6*   RDW 14.4 14.6 14.4    170 174   MPV 9.8 10.3 10.2       Recent Labs   Lab 10/05/22  1914 10/05/22  1925 10/06/22  0223 10/06/22  0238 10/06/22  0936 10/07/22  0150 10/08/22  0124   NA  --    < >  --  136  --  139 139   K  --    < >  --  3.7  --  3.6 4.1   CO2  --    < >  --  25  --  24 25   BUN  --    < >  --  10.2  --  8.4 8.3*   CREATININE  --    < >  --  0.91  --  0.78 0.83   CALCIUM  --    < >  --  8.9  --  8.6* 8.6*   PH 7.358  --  7.39  --  7.43  --   --    MG  --    < >  --  1.80  --  1.70 1.70   ALBUMIN  --    < >  --  3.1*  --  2.8* 2.8*   ALKPHOS  --    < >  --  106  --  82 83   ALT  --    < >  --  16  --  12 12   AST  --    < >  --  16  --  14 17   BILITOT  --    < >  --  0.3  --  0.4 0.3    < > = values in this interval not displayed.          Microbiology Results (last 7 days)       Procedure Component Value Units Date/Time    Blood Culture [[id number]]  (Abnormal) Collected: 10/07/22 0150    Order Status: Completed Specimen: Blood from Arm, Left Updated: 10/08/22 0718     CULTURE, BLOOD ([facility]) Identification and Susceptibility To Follow      Gram-positive coccus probable staph     GRAM STAIN Gram Positive Cocci, probable Staphylococcus      1 of 1 Pediatric bottle positive      Seen in gram stain of broth only    Blood Culture [[id number]]  (Normal) Collected: 10/07/22 0150    Order Status: Completed Specimen: Blood from Arm, Right Updated: 10/08/22 0300     CULTURE, BLOOD ([facility]) No Growth At 24 Hours    Blood Culture [[id number]] Collected: 10/07/22 2326    Order Status: Sent Specimen: Blood, Venous Updated: 10/07/22 2331    Blood Culture [[id number]] Collected: 10/07/22 2326    Order Status: Sent Specimen: Blood, Venous Updated: 10/07/22 2331             See below for Radiology    Scheduled Med:   albuterol-ipratropium  3 mL Nebulization Q4H WAKE    amiodarone  200 mg Oral Daily    apixaban  5 mg Per OG tube  BID    atorvastatin  40 mg Oral QHS    clopidogreL  75 mg Oral Daily    furosemide  40 mg Oral Daily    levetiracetam  1,000 mg Per NG tube BID    levoFLOXacin  500 mg Intravenous Q24H    lisinopriL  5 mg Oral Daily    metoprolol succinate  25 mg Oral Daily    mupirocin   Nasal BID    pantoprazole  40 mg Per NG tube Daily    PHENobarbitaL  64.8 mg Per NG tube BID    phenytoin  200 mg Per NG tube BID    sodium chloride 0.9%  10 mL Intravenous Q6H    tamsulosin  0.4 mg Oral Daily    vancomycin (VANCOCIN) IVPB  1,500 mg Intravenous Q12H    venlafaxine  75 mg Oral BID        Continuous Infusions:       PRN Meds:  hydrALAZINE, labetaloL, lorazepam, sodium chloride 0.9%, Flushing PICC Protocol **AND** sodium chloride 0.9% **AND** sodium chloride 0.9%, Pharmacy to dose Vancomycin consult **AND** vancomycin - pharmacy to dose         VTE prophylaxis:     Patient condition:  Stable/Fair/Guarded/ Serious/ Critical    Anticipated discharge and Disposition:         All diagnosis and differential diagnosis have been reviewed; assessment and plan has been documented; I have personally reviewed the labs and test results that are presently available; I have reviewed the patients medication list; I have reviewed the consulting providers response and recommendations. I have reviewed or attempted to review medical records based upon their availability    All of the patient's questions have been  addressed and answered. Patient's is agreeable to the above stated plan. I will continue to monitor closely and make adjustments to medical management as needed.  _____________________________________________________________________    Nutrition Status:    Radiology:  X-Ray Chest AP Portable  Narrative: EXAMINATION:  XR CHEST AP PORTABLE    CLINICAL HISTORY:  Confirm ETT placement;, .    COMPARISON:  10/05/2022    FINDINGS:  An AP view or more reveals the heart to be mildly enlarged.  There is increasing patchy hazy opacity at the right mid  lung.  There is interim placement of an ET tube with the tip below the thoracic inlet and approximately 3 cm above the nallely.  There is interim placement of an NG tube with the tip not included on the exam coursing toward the upper abdomen.  Degenerative changes and curvature are noted to the thoracic spine.  The left hemidiaphragm is obscured.  Impression: 1. Interim increasing patchy hazy opacity right mid lung suspicious for progressing infiltrate and subsegmental atelectasis  2. Obscured left hemidiaphragm suspicious for infiltrate and small left basilar pleural reaction  3. Mildly elevated right hemidiaphragm  4. Mild cardiomegaly  5. Interim placement ET tube and NG tube  6. Atherosclerosis  7. Thoracic spondylosis and scoliosis    Electronically signed by: Campos Dobson  Date:    10/06/2022  Time:    08:47  X-Ray Abdomen AP 1 View  Narrative: EXAMINATION:  XR ABDOMEN AP 1 VIEW    CLINICAL HISTORY:  femoral line;, .    COMPARISON:  None available    FINDINGS:  Single AP view is limited secondary to patient body habitus and lack of beam penetration.  A line/catheter superimposed over the right medial upper thigh with the tip near the lesser trochanter.  A catheter is present within the midline pelvis suspicious for Anaya catheter.  Clinical correlation is indicated.  Iliac stents are evident.  Arthritic changes are evident at the hips bilaterally.  Impression: 1. Suspect right femoral line with the tip below the level of the right lesser trochanter  2. Suspect Anaya catheter  3. Iliac stents  4. Osteoarthritis  5. Atherosclerosis    Electronically signed by: Campos Dobson  Date:    10/06/2022  Time:    08:37  CT Head Without Contrast  Narrative: EXAMINATION:  CT HEAD WITHOUT CONTRAST    CLINICAL HISTORY:  Mental status change, unknown cause;    TECHNIQUE:  Axial scans were obtained from skull base to the vertex.    Coronal and sagittal reconstructions obtained from the axial data.    Automatic exposure control  was utilized to limit radiation dose.    Contrast: None    Radiation Dose:    Total DLP: 1048 mGy*cm    COMPARISON:  CT head dated 01/17/2022    FINDINGS:  There is no acute intracranial hemorrhage or edema.  There is encephalomalacia in the left cerebellum.  Patchy hypodensities in the subcortical and periventricular white matter likely represent chronic microvascular ischemic changes.    There is no mass effect or midline shift.  There is diffuse parenchymal volume loss.  The basal cisterns are patent. There is no abnormal extra-axial fluid collection.  Carotid and vertebral artery calcifications are noted.    The calvarium and skull base are intact. The visualized paranasal sinuses and the mastoid air cells are clear.  Impression: 1. No acute intracranial abnormality.  2. Chronic microvascular ischemic changes.  No significant change from the nighthawk interpretation    Electronically signed by: Tiffanie Keene  Date:    10/06/2022  Time:    06:59  X-Ray Chest 1 View  Narrative: EXAMINATION:  XR CHEST 1 VIEW    CLINICAL HISTORY:  intubated;    TECHNIQUE:  AP chest    COMPARISON:  Chest x-ray dated 10/05/2022    FINDINGS:  The endotracheal tube has its tip 4 cm above the nallely.  Nasogastric tube courses below the diaphragm.  The heart is stable in size.  There is a small right pleural effusion with basilar airspace opacity.  There is no definite visible pneumothorax.  Impression: Small right pleural effusion with basilar airspace opacity.    Electronically signed by: Tiffanie Keene  Date:    10/06/2022  Time:    06:04      Amada Perez MD   10/08/2022

## 2022-10-08 NOTE — PROGRESS NOTES
S:  No seizures reported  Pt at baseline    O:  Vitals:    10/08/22 1302   BP:    Pulse: (!) 59   Resp: 16   Temp:      Scheduled Meds:   albuterol-ipratropium  3 mL Nebulization Q4H WAKE    amiodarone  200 mg Oral Daily    apixaban  5 mg Per OG tube BID    atorvastatin  40 mg Oral QHS    clopidogreL  75 mg Oral Daily    furosemide  40 mg Oral Daily    levetiracetam  1,000 mg Per NG tube BID    levoFLOXacin  500 mg Intravenous Q24H    lisinopriL  5 mg Oral Daily    metoprolol succinate  25 mg Oral Daily    mupirocin   Nasal BID    pantoprazole  40 mg Per NG tube Daily    PHENobarbitaL  64.8 mg Per NG tube BID    phenytoin  200 mg Per NG tube BID    sodium chloride 0.9%  10 mL Intravenous Q6H    tamsulosin  0.4 mg Oral Daily    vancomycin (VANCOCIN) IVPB  1,500 mg Intravenous Q12H    venlafaxine  75 mg Oral BID     Continuous Infusions:  PRN Meds:.hydrALAZINE, labetaloL, lorazepam, sodium chloride 0.9%, Flushing PICC Protocol **AND** sodium chloride 0.9% **AND** sodium chloride 0.9%, Pharmacy to dose Vancomycin consult **AND** vancomycin - pharmacy to dose      Mental Status: Alert and oriented x3. Language is fluent with good comprehension.    Cranial Nerve: Pupils are equal, round, and reactive to light. Visual fields are intact to confrontation. Normal fundi. Ocular movements are intact. Face is symmetric at rest and with activation with intact sensation throughout. Hearing intact to finger rub bilaterally. Muscles of tongue and palate activate symmetrically. No dysarthria. Strength is full in sternocleidomastoid and trapezius bilaterally.    Motor: Muscle bulk and tone are normal. Strength is 5/5 in all four extremities both proximally and distally. Intact fine motor movements bilaterally. There is no pronator drift or satelliting on arm roll.    Sensory: Sensation is intact to light touch, pinprick, vibration, and proprioception throughout. Romberg is negative.    Reflexes: 2+ and symmetric at the biceps,  triceps, brachioradialis, patella, and Achilles bilaterally. Plantar response is flexor bilaterally.    Coordination: No dysmetria on finger-nose-finger or heel-knee-shin. Normal rapid alternating movements. Fast finger tapping with normal amplitude and speed.    Gait: Narrow based with normal stride length and good arm swing bilaterally. Able to walk on heels, toes, and in tandem.      A/p;  Epilepsy  Currently controlled  Levels good  Continue current AEDs  Signing off

## 2022-10-09 LAB
ABS NEUT (OLG): 9.75 X10(3)/MCL (ref 2.1–9.2)
ANION GAP SERPL CALC-SCNC: 8 MEQ/L
BACTERIA BLD CULT: ABNORMAL
BUN SERPL-MCNC: 7.3 MG/DL (ref 8.4–25.7)
CALCIUM SERPL-MCNC: 8.9 MG/DL (ref 8.8–10)
CHLORIDE SERPL-SCNC: 105 MMOL/L (ref 98–107)
CO2 SERPL-SCNC: 24 MMOL/L (ref 23–31)
CREAT SERPL-MCNC: 0.79 MG/DL (ref 0.73–1.18)
CREAT/UREA NIT SERPL: 9
EOSINOPHIL NFR BLD MANUAL: 0.3 X10(3)/MCL (ref 0–0.9)
EOSINOPHIL NFR BLD MANUAL: 2 %
ERYTHROCYTE [DISTWIDTH] IN BLOOD BY AUTOMATED COUNT: 14.6 % (ref 11.5–17)
GFR SERPLBLD CREATININE-BSD FMLA CKD-EPI: >60 MLS/MIN/1.73/M2
GLUCOSE SERPL-MCNC: 120 MG/DL (ref 82–115)
GRAM STN SPEC: ABNORMAL
HCT VFR BLD AUTO: 39.8 % (ref 42–52)
HGB BLD-MCNC: 13.2 GM/DL (ref 14–18)
IMM GRANULOCYTES # BLD AUTO: 0.06 X10(3)/MCL (ref 0–0.04)
IMM GRANULOCYTES NFR BLD AUTO: 0.4 %
INSTRUMENT WBC (OLG): 15 X10(3)/MCL
LYMPHOCYTES NFR BLD MANUAL: 16 %
LYMPHOCYTES NFR BLD MANUAL: 2.4 X10(3)/MCL
MAGNESIUM SERPL-MCNC: 1.6 MG/DL (ref 1.6–2.6)
MCH RBC QN AUTO: 29.5 PG (ref 27–31)
MCHC RBC AUTO-ENTMCNC: 33.2 MG/DL (ref 33–36)
MCV RBC AUTO: 88.8 FL (ref 80–94)
MONOCYTES NFR BLD MANUAL: 16 %
MONOCYTES NFR BLD MANUAL: 2.4 X10(3)/MCL (ref 0.1–1.3)
NEUTROPHILS NFR BLD MANUAL: 65 %
NRBC BLD AUTO-RTO: 0 %
PLATELET # BLD AUTO: 140 X10(3)/MCL (ref 130–400)
PLATELET # BLD EST: ADEQUATE 10*3/UL
PMV BLD AUTO: 10.4 FL (ref 7.4–10.4)
POIKILOCYTOSIS BLD QL SMEAR: ABNORMAL
POTASSIUM SERPL-SCNC: 3.3 MMOL/L (ref 3.5–5.1)
RBC # BLD AUTO: 4.48 X10(6)/MCL (ref 4.7–6.1)
RBC MORPH BLD: ABNORMAL
SODIUM SERPL-SCNC: 137 MMOL/L (ref 136–145)
VANCOMYCIN TROUGH SERPL-MCNC: 17.9 UG/ML (ref 15–20)
WBC # SPEC AUTO: 15.2 X10(3)/MCL (ref 4.5–11.5)

## 2022-10-09 PROCEDURE — 27000221 HC OXYGEN, UP TO 24 HOURS

## 2022-10-09 PROCEDURE — 94640 AIRWAY INHALATION TREATMENT: CPT

## 2022-10-09 PROCEDURE — 25000003 PHARM REV CODE 250: Performed by: INTERNAL MEDICINE

## 2022-10-09 PROCEDURE — 80202 ASSAY OF VANCOMYCIN: CPT | Performed by: NURSE PRACTITIONER

## 2022-10-09 PROCEDURE — 80048 BASIC METABOLIC PNL TOTAL CA: CPT | Performed by: INTERNAL MEDICINE

## 2022-10-09 PROCEDURE — 94761 N-INVAS EAR/PLS OXIMETRY MLT: CPT

## 2022-10-09 PROCEDURE — 25000003 PHARM REV CODE 250: Performed by: NURSE PRACTITIONER

## 2022-10-09 PROCEDURE — 25000003 PHARM REV CODE 250: Performed by: STUDENT IN AN ORGANIZED HEALTH CARE EDUCATION/TRAINING PROGRAM

## 2022-10-09 PROCEDURE — 20000000 HC ICU ROOM

## 2022-10-09 PROCEDURE — 63600175 PHARM REV CODE 636 W HCPCS: Performed by: NURSE PRACTITIONER

## 2022-10-09 PROCEDURE — 25000242 PHARM REV CODE 250 ALT 637 W/ HCPCS

## 2022-10-09 PROCEDURE — 85027 COMPLETE CBC AUTOMATED: CPT | Performed by: INTERNAL MEDICINE

## 2022-10-09 PROCEDURE — 99900035 HC TECH TIME PER 15 MIN (STAT)

## 2022-10-09 PROCEDURE — 36415 COLL VENOUS BLD VENIPUNCTURE: CPT | Performed by: NURSE PRACTITIONER

## 2022-10-09 PROCEDURE — 85025 COMPLETE CBC W/AUTO DIFF WBC: CPT | Performed by: INTERNAL MEDICINE

## 2022-10-09 PROCEDURE — 36415 COLL VENOUS BLD VENIPUNCTURE: CPT | Performed by: INTERNAL MEDICINE

## 2022-10-09 PROCEDURE — 25000242 PHARM REV CODE 250 ALT 637 W/ HCPCS: Performed by: STUDENT IN AN ORGANIZED HEALTH CARE EDUCATION/TRAINING PROGRAM

## 2022-10-09 PROCEDURE — 63600175 PHARM REV CODE 636 W HCPCS: Performed by: INTERNAL MEDICINE

## 2022-10-09 PROCEDURE — 92610 EVALUATE SWALLOWING FUNCTION: CPT

## 2022-10-09 PROCEDURE — A4216 STERILE WATER/SALINE, 10 ML: HCPCS | Performed by: STUDENT IN AN ORGANIZED HEALTH CARE EDUCATION/TRAINING PROGRAM

## 2022-10-09 PROCEDURE — 83735 ASSAY OF MAGNESIUM: CPT | Performed by: INTERNAL MEDICINE

## 2022-10-09 RX ORDER — MAGNESIUM SULFATE HEPTAHYDRATE 40 MG/ML
2 INJECTION, SOLUTION INTRAVENOUS ONCE
Status: COMPLETED | OUTPATIENT
Start: 2022-10-09 | End: 2022-10-09

## 2022-10-09 RX ORDER — POTASSIUM CHLORIDE 20 MEQ/1
40 TABLET, EXTENDED RELEASE ORAL
Status: COMPLETED | OUTPATIENT
Start: 2022-10-09 | End: 2022-10-09

## 2022-10-09 RX ADMIN — FUROSEMIDE 40 MG: 40 TABLET ORAL at 05:10

## 2022-10-09 RX ADMIN — LISINOPRIL 5 MG: 5 TABLET ORAL at 05:10

## 2022-10-09 RX ADMIN — CLOPIDOGREL 75 MG: 75 TABLET, FILM COATED ORAL at 05:10

## 2022-10-09 RX ADMIN — TAMSULOSIN HYDROCHLORIDE 0.4 MG: 0.4 CAPSULE ORAL at 05:10

## 2022-10-09 RX ADMIN — METOPROLOL SUCCINATE 25 MG: 25 TABLET, EXTENDED RELEASE ORAL at 05:10

## 2022-10-09 RX ADMIN — VENLAFAXINE 75 MG: 37.5 TABLET ORAL at 08:10

## 2022-10-09 RX ADMIN — LEVETIRACETAM 1000 MG: 100 SOLUTION ORAL at 08:10

## 2022-10-09 RX ADMIN — PANTOPRAZOLE SODIUM 40 MG: 40 GRANULE, DELAYED RELEASE ORAL at 08:10

## 2022-10-09 RX ADMIN — PHENYTOIN 200 MG: 125 SUSPENSION ORAL at 08:10

## 2022-10-09 RX ADMIN — MUPIROCIN: 20 OINTMENT TOPICAL at 08:10

## 2022-10-09 RX ADMIN — VANCOMYCIN HYDROCHLORIDE 1500 MG: 1.5 INJECTION, POWDER, LYOPHILIZED, FOR SOLUTION INTRAVENOUS at 12:10

## 2022-10-09 RX ADMIN — POTASSIUM CHLORIDE 40 MEQ: 1500 TABLET, EXTENDED RELEASE ORAL at 12:10

## 2022-10-09 RX ADMIN — MAGNESIUM SULFATE HEPTAHYDRATE 2 G: 40 INJECTION, SOLUTION INTRAVENOUS at 09:10

## 2022-10-09 RX ADMIN — PHENOBARBITAL 64.8 MG: 32.4 TABLET ORAL at 08:10

## 2022-10-09 RX ADMIN — ATORVASTATIN CALCIUM 40 MG: 40 TABLET, FILM COATED ORAL at 08:10

## 2022-10-09 RX ADMIN — SODIUM CHLORIDE, PRESERVATIVE FREE 10 ML: 5 INJECTION INTRAVENOUS at 12:10

## 2022-10-09 RX ADMIN — VANCOMYCIN HYDROCHLORIDE 1500 MG: 1.5 INJECTION, POWDER, LYOPHILIZED, FOR SOLUTION INTRAVENOUS at 11:10

## 2022-10-09 RX ADMIN — SODIUM CHLORIDE, PRESERVATIVE FREE 10 ML: 5 INJECTION INTRAVENOUS at 06:10

## 2022-10-09 RX ADMIN — IPRATROPIUM BROMIDE AND ALBUTEROL SULFATE 3 ML: 2.5; .5 SOLUTION RESPIRATORY (INHALATION) at 08:10

## 2022-10-09 RX ADMIN — POTASSIUM CHLORIDE 40 MEQ: 1500 TABLET, EXTENDED RELEASE ORAL at 09:10

## 2022-10-09 RX ADMIN — LEVOFLOXACIN 500 MG: 500 INJECTION, SOLUTION INTRAVENOUS at 08:10

## 2022-10-09 RX ADMIN — IPRATROPIUM BROMIDE AND ALBUTEROL SULFATE 3 ML: 2.5; .5 SOLUTION RESPIRATORY (INHALATION) at 11:10

## 2022-10-09 RX ADMIN — APIXABAN 5 MG: 5 TABLET, FILM COATED ORAL at 08:10

## 2022-10-09 RX ADMIN — AMIODARONE HYDROCHLORIDE 200 MG: 200 TABLET ORAL at 08:10

## 2022-10-09 NOTE — PT/OT/SLP EVAL
"Speech Language Pathology Department  Clinical Swallow Evaluation    Patient Name:  John Wayne   MRN:  [id number]  Admitting Diagnosis: Acute respiratory failure with hypercapnia    Recommendations:     General recommendations:  SLP intervention not indicated  Diet recommendations:  Soft & Bite Sized Diet - IDDSI Level 6, Liquid Diet Level: Thin liquids - IDDSI Level 0   Swallow strategies/precautions: slow rate and Supervision with meals  Precautions: Standard,      History:     Past Medical History:   Diagnosis Date    Anticoagulant long-term use     COPD (chronic obstructive pulmonary disease)     Coronary artery disease     Difficult intubation     GERD (gastroesophageal reflux disease)     Hypertension     Seizures        Past Surgical History:   Procedure Laterality Date    cardiac catheterisation  10/18/2016    PERCUTANEOUS BALLOON VALVULOPLASTY  04/04/2018     Pt admitted for 25 minutes of seizure activity at the nursing home and intubated upon admission due to respiratory distress.  According to medical note, this is not pt's first episode.  Pt reported to SLP that he was here because he choked on a hamburger, however his chart has no record of this.  He said the hamburger tasted funny and came from "[employer]," did not report that he lives in a nursing home.  RN reported that he was coughing on solids, that the MD downgraded the diet to soft-bitesized as pt is missing his upper dentures.  RN reported clear lung sounds.    Prior Intubation HX:  upon admission 10/6/22, extubated next day    Home Diet: Regular and thin liquids  Current Method of Nutrition: PO diet bite-sized, thin    Patient complaint: "I don't have my dentures here."    Subjective     Patient awake, alert, and calm.  Voice clear.    Patient goals: To eat.     Pain/Comfort:  0/10    Respiratory Status: nasal cannula, 96%    Objective:     Oral Musculature Evaluation  Oral Musculature: WFL  Dentition: teeth in poor condition (reports he wears " upper dentures at nursing home)  Secretion Management: adequate  Mandibular Strength and Mobility: WFL  Oral Labial Strength and Mobility: WFL  Lingual Strength and Mobility: WFL    Consistency Fed By Oral Symptoms Pharyngeal Symptoms   Thin liquid by straw Self None None   Soft/bite-sized solid Self None None     Assessment:     No overt s/sx of aspiration with PO trials of soft-bite sized foods (meat from lunch tray) and thin liquids by straw.  Recommend continue with this diet and supervise with meals.    Goals:   Multidisciplinary Problems       SLP Goals       Not on file                    Plan:     Continue soft-bized diet  Thin liquids  Meds per pt preference  Supervision with meals given hx of non-tolerance of solids  SLP to f/u w nsg x1 for diet tolerance    Time Tracking:     SLP Treatment Date:   10/09/22  Speech Start Time:  1340  Speech Stop Time:  1355     Speech Total Time (min):  15 min    Billable minutes:  Swallow and Oral Function Evaluation, 15min      10/09/2022

## 2022-10-09 NOTE — PLAN OF CARE
Problem: Adult Inpatient Plan of Care  Goal: Plan of Care Review  Outcome: Ongoing, Progressing  Goal: Patient-Specific Goal (Individualized)  Outcome: Ongoing, Progressing  Goal: Absence of Hospital-Acquired Illness or Injury  Outcome: Ongoing, Progressing  Goal: Optimal Comfort and Wellbeing  Outcome: Ongoing, Progressing  Goal: Readiness for Transition of Care  Outcome: Ongoing, Progressing     Problem: Bariatric Environmental Safety  Goal: Safety Maintained with Care  Outcome: Ongoing, Progressing     Problem: Infection  Goal: Absence of Infection Signs and Symptoms  Outcome: Ongoing, Progressing     Problem: Skin Injury Risk Increased  Goal: Skin Health and Integrity  Outcome: Ongoing, Progressing

## 2022-10-09 NOTE — PROGRESS NOTES
Pharmacokinetic Assessment Follow Up: IV Vancomycin    Vancomycin serum concentration assessment(s):    The trough level was drawn correctly and can be used to guide therapy at this time. The measurement is within the desired definitive target range of 15 to 20 mcg/mL.    Vancomycin Regimen Plan:    Continue current regimen of 1500mg q12h. The next trough level is scheduled for 10/11 at 1100 prior to the 8th dose of Vancomycin    Drug levels (last 3 results):  Recent Labs   Lab Result Units 10/09/22  1050   Vancomycin Trough ug/ml 17.9       Pharmacy will continue to follow and monitor vancomycin.    Please contact pharmacy at extension [phone] for questions regarding this assessment.    Thank you for the consult,   Gumaro Ingram       Patient brief summary:  John Wayne is a 76 y.o. male initiated on antimicrobial therapy with IV Vancomycin for treatment of bacteremia    The patient's current regimen is 1500mg q12h    Drug Allergies:   Review of patient's allergies indicates:   Allergen Reactions    Ancef in dextrose (iso-osm)     Codeine     Penicillins        Actual Body Weight:   118 kg    Renal Function:   Estimated Creatinine Clearance: 97.8 mL/min (based on SCr of 0.79 mg/dL).,     Dialysis Method (if applicable):  N/A    CBC (last 72 hours):  Recent Labs   Lab Result Units 10/07/22  0150 10/08/22  0124 10/09/22  0141   WBC x10(3)/mcL 19.2* 17.8* 15.2*   Hgb gm/dL 12.4* 12.1* 13.2*   Hct % 37.9* 37.1* 39.8*   Platelet x10(3)/mcL 170 174 140   Mono % %  --  7.9  --    Monocyte Man % 8  --  16   Eos % %  --  1.4  --    Eos Man %  --   --  2   Basophil % %  --  0.4  --        Metabolic Panel (last 72 hours):  Recent Labs   Lab Result Units 10/07/22  0150 10/08/22  0124 10/09/22  0141   Sodium Level mmol/L 139 139 137   Potassium Level mmol/L 3.6 4.1 3.3*   Chloride mmol/L 108* 108* 105   Carbon Dioxide mmol/L 24 25 24   Glucose Level mg/dL 84 110 120*   Blood Urea Nitrogen mg/dL 8.4 8.3* 7.3*   Creatinine mg/dL 0.78  0.83 0.79   Albumin Level gm/dL 2.8* 2.8*  --    Bilirubin Total mg/dL 0.4 0.3  --    Alkaline Phosphatase unit/L 82 83  --    Aspartate Aminotransferase unit/L 14 17  --    Alanine Aminotransferase unit/L 12 12  --    Magnesium Level mg/dL 1.70 1.70 1.60   Phosphorus Level mg/dL 2.6 2.8  --        Vancomycin Administrations:  vancomycin given in the last 96 hours                     vancomycin 1.5 g in dextrose 5 % 250 mL IVPB (ready to mix) (mg) 1,500 mg New Bag 10/09/22 0026     1,500 mg New Bag 10/08/22 1250     1,500 mg New Bag  0013                    Microbiologic Results:  Microbiology Results (last 7 days)       Procedure Component Value Units Date/Time    Blood Culture [[id number]]  (Normal) Collected: 10/07/22 2326    Order Status: Completed Specimen: Blood, Venous Updated: 10/09/22 1100     CULTURE, BLOOD ([facility]) No Growth At 24 Hours    Blood Culture [[id number]]  (Normal) Collected: 10/07/22 2326    Order Status: Completed Specimen: Blood, Venous Updated: 10/09/22 1100     CULTURE, BLOOD ([facility]) No Growth At 24 Hours    Blood Culture [[id number]]  (Abnormal)  (Susceptibility) Collected: 10/07/22 0150    Order Status: Completed Specimen: Blood from Arm, Left Updated: 10/09/22 0625     CULTURE, BLOOD ([facility]) Staphylococcus epidermidis     GRAM STAIN Gram Positive Cocci, probable Staphylococcus      1 of 1 Pediatric bottle positive      Seen in gram stain of broth only    Blood Culture [[id number]]  (Normal) Collected: 10/07/22 0150    Order Status: Completed Specimen: Blood from Arm, Right Updated: 10/09/22 0300     CULTURE, BLOOD ([facility]) No Growth At 48 Hours

## 2022-10-09 NOTE — PROGRESS NOTES
[facility]  Hospital Medicine Progress Note        Chief Complaint: Seizures (Intubated tx from [facility] w/ seizure)     Source of Information: Patient. Medical Records        HISTORY OF PRESENT ILLNESS:   John Wayne is a 76 y.o. male with a PMHx of  HTN, CAD s/p NSTEMI, seizure disorder, A. fib/a flutter on Eliquis, COPD, and chronic venous insufficiency who presented to [facility] on 10/6/2022 as a transfer for a higher level of care. He initially presented to [facility] via EMS from [facility] following a reported seizure lasting approximately 25 mins. CT Head negative for acute abnormalities. He was started on Cipro for acute cystitis. Upon ED arrival he was noted to be in significant respiratory distress and he was intubated for airway protection. He was loaded with Keppra and given IV Ativan for seizure-like activity. He was transferred to [facility] for neurology and ICU services. He was admitted to ICU. Neurology was consulted. Repeat CT Head negative for acute abnormality. EEG was abnormal, diffuse slowing consistent with moderate cerebral dysfunction that can be   found toxic metabolic anoxic brain injury or postictal event; presence of intermittent spike wave activity in both frontal temporal areas, which can be consistent but not conclusive with epileptiform discharges. Neurology continues to follow. Seizure medications resumed per neurology. He was extubated, now on NC. Downgraded to the floor on 10/7/22 under hospitalist services.      Patient currently admitted on account of uncontrolled seizures.  Hospital course complicated by 1/2 blood cultures growing Staph bacteremia.  Right community-acquired pneumonia with effusion.        Today's information   vitals stable on 2 L of oxygen  Leukocytosis is improving but still elevated.    Blood cultures grew Staph epidermidis not sure if it is a contaminant versus true infection await ID input   Continue antibiotics  Potassium is  low, magnesium is low will replete potassium and magnesium   Neurology has signed off  Trend CBC till normal   Consult  for placement         exam:  General appearance: Well-developed male in no apparent distress.  HENT: Atraumatic head. Moist mucous membranes of oral cavity.  Lungs: Clear to auscultation bilaterally.   Heart: Regular rate and rhythm. S1 and S2 present. BLE lymphedema  Abdomen: Soft, non-distended, non-tender. Morbidly obese  Extremities: BLE lymphedema   Neuro: Motor and sensory exams grossly intact.   Psych/mental status: Appropriate mood and affect. Responds appropriately to questions.         ASSESSMENT & PLAN:   1/2 Staph epidermidis bacteremia   Leukocytosis, improving  Hypomagnesemia   Hypokalemia   Right-sided pneumonia with effusion  Gram-negative joy UTI    Recurrent Seizures   Acute Hypoxemic Respiratory Failure requiring intubation and mechanical ventilation, now extubation  Urinary retention  Chronic HFrEF/ ICMO 25-30%  Afib/flutter on Eliquis  Morbid Obesity  Hx of HTN, CAD s/p NSTEMI, seizure disorder, COPD, and chronic venous insufficiency      Plan:  Leukocytosis is improving but still elevated.    Blood cultures grew Staph epidermidis not sure if it is a contaminant versus true infection await ID input   Continue antibiotics-Levaquin and vancomycin  replete potassium and magnesium   Trend CBC till normal   Consult  for placement   Check MRSA PCR respiratory panel   Continue seizure meds   Neurology has signed off  Continue Keppra, Phenobarbital, Phenytoin  Seizure Precautions  Supplemental o2 as needed  Follow Urine Cultures  UA is a poor sample   Reinsert Anaya Catheter due to urinary retention   Labs in AM        VTE Prophylaxis: Eliquis     Critical care diagnosis 1/2 Staph bacteremia on antibiotics   Critical care time greater than 35 minutes         VITAL SIGNS: 24 HRS MIN & MAX LAST   Temp  Min: 98 °F (36.7 °C)  Max: 98.9 °F (37.2 °C) 98 °F (36.7 °C)    BP  Min: 108/48  Max: 154/49 (!) 134/49     Pulse  Min: 57  Max: 73  63   Resp  Min: 1  Max: 24 18   SpO2  Min: 91 %  Max: 96 % (!) 94 %         Recent Labs   Lab 10/07/22  0150 10/08/22  0124 10/09/22  0141   WBC 19.2* 17.8* 15.2*   RBC 4.23* 4.15* 4.48*   HGB 12.4* 12.1* 13.2*   HCT 37.9* 37.1* 39.8*   MCV 89.6 89.4 88.8   MCH 29.3 29.2 29.5   MCHC 32.7* 32.6* 33.2   RDW 14.6 14.4 14.6    174 140   MPV 10.3 10.2 10.4       Recent Labs   Lab 10/05/22  1914 10/05/22  1925 10/06/22  0223 10/06/22  0238 10/06/22  0936 10/07/22  0150 10/08/22  0124 10/09/22  0141   NA  --    < >  --  136  --  139 139 137   K  --    < >  --  3.7  --  3.6 4.1 3.3*   CO2  --    < >  --  25  --  24 25 24   BUN  --    < >  --  10.2  --  8.4 8.3* 7.3*   CREATININE  --    < >  --  0.91  --  0.78 0.83 0.79   CALCIUM  --    < >  --  8.9  --  8.6* 8.6* 8.9   PH 7.358  --  7.39  --  7.43  --   --   --    MG  --    < >  --  1.80  --  1.70 1.70 1.60   ALBUMIN  --    < >  --  3.1*  --  2.8* 2.8*  --    ALKPHOS  --    < >  --  106  --  82 83  --    ALT  --    < >  --  16  --  12 12  --    AST  --    < >  --  16  --  14 17  --    BILITOT  --    < >  --  0.3  --  0.4 0.3  --     < > = values in this interval not displayed.          Microbiology Results (last 7 days)       Procedure Component Value Units Date/Time    Blood Culture [[id number]]  (Abnormal)  (Susceptibility) Collected: 10/07/22 0150    Order Status: Completed Specimen: Blood from Arm, Left Updated: 10/09/22 0625     CULTURE, BLOOD ([facility]) Staphylococcus epidermidis     GRAM STAIN Gram Positive Cocci, probable Staphylococcus      1 of 1 Pediatric bottle positive      Seen in gram stain of broth only    Blood Culture [[id number]]  (Normal) Collected: 10/07/22 0150    Order Status: Completed Specimen: Blood from Arm, Right Updated: 10/09/22 0300     CULTURE, BLOOD ([facility]) No Growth At 48 Hours    Blood Culture [[id number]] Collected: 10/07/22 2326    Order Status: Resulted Specimen: Blood,  Venous Updated: 10/07/22 2331    Blood Culture [[id number]] Collected: 10/07/22 2326    Order Status: Resulted Specimen: Blood, Venous Updated: 10/07/22 2331             See below for Radiology    Scheduled Med:   albuterol-ipratropium  3 mL Nebulization Q4H WAKE    amiodarone  200 mg Oral Daily    apixaban  5 mg Per OG tube BID    atorvastatin  40 mg Oral QHS    clopidogreL  75 mg Oral Daily    furosemide  40 mg Oral Daily    levetiracetam  1,000 mg Per NG tube BID    levoFLOXacin  500 mg Intravenous Q24H    lisinopriL  5 mg Oral Daily    magnesium sulfate IVPB  2 g Intravenous Once    metoprolol succinate  25 mg Oral Daily    mupirocin   Nasal BID    pantoprazole  40 mg Per NG tube Daily    PHENobarbitaL  64.8 mg Per NG tube BID    phenytoin  200 mg Per NG tube BID    potassium chloride  40 mEq Oral Q2H    sodium chloride 0.9%  10 mL Intravenous Q6H    tamsulosin  0.4 mg Oral Daily    vancomycin (VANCOCIN) IVPB  1,500 mg Intravenous Q12H    venlafaxine  75 mg Oral BID        Continuous Infusions:       PRN Meds:  hydrALAZINE, labetaloL, lorazepam, sodium chloride 0.9%, Flushing PICC Protocol **AND** sodium chloride 0.9% **AND** sodium chloride 0.9%, Pharmacy to dose Vancomycin consult **AND** vancomycin - pharmacy to dose       VTE prophylaxis:     Patient condition:  Stable/Fair/Guarded/ Serious/ Critical    Anticipated discharge and Disposition:         All diagnosis and differential diagnosis have been reviewed; assessment and plan has been documented; I have personally reviewed the labs and test results that are presently available; I have reviewed the patients medication list; I have reviewed the consulting providers response and recommendations. I have reviewed or attempted to review medical records based upon their availability    All of the patient's questions have been  addressed and answered. Patient's is agreeable to the above stated plan. I will continue to monitor closely and make adjustments to  medical management as needed.  _____________________________________________________________________    Nutrition Status:    Radiology:  X-Ray Chest AP Portable  Narrative: EXAMINATION:  XR CHEST AP PORTABLE    CLINICAL HISTORY:  Confirm ETT placement;, .    COMPARISON:  10/05/2022    FINDINGS:  An AP view or more reveals the heart to be mildly enlarged.  There is increasing patchy hazy opacity at the right mid lung.  There is interim placement of an ET tube with the tip below the thoracic inlet and approximately 3 cm above the nallely.  There is interim placement of an NG tube with the tip not included on the exam coursing toward the upper abdomen.  Degenerative changes and curvature are noted to the thoracic spine.  The left hemidiaphragm is obscured.  Impression: 1. Interim increasing patchy hazy opacity right mid lung suspicious for progressing infiltrate and subsegmental atelectasis  2. Obscured left hemidiaphragm suspicious for infiltrate and small left basilar pleural reaction  3. Mildly elevated right hemidiaphragm  4. Mild cardiomegaly  5. Interim placement ET tube and NG tube  6. Atherosclerosis  7. Thoracic spondylosis and scoliosis    Electronically signed by: Campos Dobson  Date:    10/06/2022  Time:    08:47  X-Ray Abdomen AP 1 View  Narrative: EXAMINATION:  XR ABDOMEN AP 1 VIEW    CLINICAL HISTORY:  femoral line;, .    COMPARISON:  None available    FINDINGS:  Single AP view is limited secondary to patient body habitus and lack of beam penetration.  A line/catheter superimposed over the right medial upper thigh with the tip near the lesser trochanter.  A catheter is present within the midline pelvis suspicious for Anaya catheter.  Clinical correlation is indicated.  Iliac stents are evident.  Arthritic changes are evident at the hips bilaterally.  Impression: 1. Suspect right femoral line with the tip below the level of the right lesser trochanter  2. Suspect Anaya catheter  3. Iliac stents  4.  Osteoarthritis  5. Atherosclerosis    Electronically signed by: Campos Dobson  Date:    10/06/2022  Time:    08:37  CT Head Without Contrast  Narrative: EXAMINATION:  CT HEAD WITHOUT CONTRAST    CLINICAL HISTORY:  Mental status change, unknown cause;    TECHNIQUE:  Axial scans were obtained from skull base to the vertex.    Coronal and sagittal reconstructions obtained from the axial data.    Automatic exposure control was utilized to limit radiation dose.    Contrast: None    Radiation Dose:    Total DLP: 1048 mGy*cm    COMPARISON:  CT head dated 01/17/2022    FINDINGS:  There is no acute intracranial hemorrhage or edema.  There is encephalomalacia in the left cerebellum.  Patchy hypodensities in the subcortical and periventricular white matter likely represent chronic microvascular ischemic changes.    There is no mass effect or midline shift.  There is diffuse parenchymal volume loss.  The basal cisterns are patent. There is no abnormal extra-axial fluid collection.  Carotid and vertebral artery calcifications are noted.    The calvarium and skull base are intact. The visualized paranasal sinuses and the mastoid air cells are clear.  Impression: 1. No acute intracranial abnormality.  2. Chronic microvascular ischemic changes.  No significant change from the nighthawk interpretation    Electronically signed by: Tiffanie Keene  Date:    10/06/2022  Time:    06:59  X-Ray Chest 1 View  Narrative: EXAMINATION:  XR CHEST 1 VIEW    CLINICAL HISTORY:  intubated;    TECHNIQUE:  AP chest    COMPARISON:  Chest x-ray dated 10/05/2022    FINDINGS:  The endotracheal tube has its tip 4 cm above the nallely.  Nasogastric tube courses below the diaphragm.  The heart is stable in size.  There is a small right pleural effusion with basilar airspace opacity.  There is no definite visible pneumothorax.  Impression: Small right pleural effusion with basilar airspace opacity.    Electronically signed by: Tiffanie  Jassi  Date:    10/06/2022  Time:    06:04      Amada Perez MD   10/09/2022

## 2022-10-10 LAB
ANION GAP SERPL CALC-SCNC: 5 MEQ/L
BASOPHILS # BLD AUTO: 0.07 X10(3)/MCL (ref 0–0.2)
BASOPHILS NFR BLD AUTO: 0.4 %
BUN SERPL-MCNC: 8.1 MG/DL (ref 8.4–25.7)
CALCIUM SERPL-MCNC: 9.1 MG/DL (ref 8.8–10)
CHLORIDE SERPL-SCNC: 105 MMOL/L (ref 98–107)
CO2 SERPL-SCNC: 28 MMOL/L (ref 23–31)
CREAT SERPL-MCNC: 0.77 MG/DL (ref 0.73–1.18)
CREAT/UREA NIT SERPL: 11
EOSINOPHIL # BLD AUTO: 0.38 X10(3)/MCL (ref 0–0.9)
EOSINOPHIL NFR BLD AUTO: 2.4 %
ERYTHROCYTE [DISTWIDTH] IN BLOOD BY AUTOMATED COUNT: 14.6 % (ref 11.5–17)
GFR SERPLBLD CREATININE-BSD FMLA CKD-EPI: >60 MLS/MIN/1.73/M2
GLUCOSE SERPL-MCNC: 98 MG/DL (ref 82–115)
HCT VFR BLD AUTO: 38.1 % (ref 42–52)
HGB BLD-MCNC: 12.6 GM/DL (ref 14–18)
IMM GRANULOCYTES # BLD AUTO: 0.03 X10(3)/MCL (ref 0–0.04)
IMM GRANULOCYTES NFR BLD AUTO: 0.2 %
LYMPHOCYTES # BLD AUTO: 9.23 X10(3)/MCL (ref 0.6–4.6)
LYMPHOCYTES NFR BLD AUTO: 58.6 %
MAGNESIUM SERPL-MCNC: 2 MG/DL (ref 1.6–2.6)
MCH RBC QN AUTO: 29.4 PG (ref 27–31)
MCHC RBC AUTO-ENTMCNC: 33.1 MG/DL (ref 33–36)
MCV RBC AUTO: 88.8 FL (ref 80–94)
MONOCYTES # BLD AUTO: 1.12 X10(3)/MCL (ref 0.1–1.3)
MONOCYTES NFR BLD AUTO: 7.1 %
NEUTROPHILS # BLD AUTO: 4.9 X10(3)/MCL (ref 2.1–9.2)
NEUTROPHILS NFR BLD AUTO: 31.3 %
NRBC BLD AUTO-RTO: 0 %
PLATELET # BLD AUTO: 184 X10(3)/MCL (ref 130–400)
PMV BLD AUTO: 10.2 FL (ref 7.4–10.4)
POTASSIUM SERPL-SCNC: 4 MMOL/L (ref 3.5–5.1)
RBC # BLD AUTO: 4.29 X10(6)/MCL (ref 4.7–6.1)
SODIUM SERPL-SCNC: 138 MMOL/L (ref 136–145)
WBC # SPEC AUTO: 15.8 X10(3)/MCL (ref 4.5–11.5)

## 2022-10-10 PROCEDURE — A4216 STERILE WATER/SALINE, 10 ML: HCPCS | Performed by: STUDENT IN AN ORGANIZED HEALTH CARE EDUCATION/TRAINING PROGRAM

## 2022-10-10 PROCEDURE — 27000221 HC OXYGEN, UP TO 24 HOURS

## 2022-10-10 PROCEDURE — 25000003 PHARM REV CODE 250: Performed by: INTERNAL MEDICINE

## 2022-10-10 PROCEDURE — 99223 1ST HOSP IP/OBS HIGH 75: CPT | Mod: ,,, | Performed by: GENERAL PRACTICE

## 2022-10-10 PROCEDURE — 25000003 PHARM REV CODE 250: Performed by: STUDENT IN AN ORGANIZED HEALTH CARE EDUCATION/TRAINING PROGRAM

## 2022-10-10 PROCEDURE — 63600175 PHARM REV CODE 636 W HCPCS: Performed by: NURSE PRACTITIONER

## 2022-10-10 PROCEDURE — 25000242 PHARM REV CODE 250 ALT 637 W/ HCPCS

## 2022-10-10 PROCEDURE — 25000003 PHARM REV CODE 250: Performed by: NURSE PRACTITIONER

## 2022-10-10 PROCEDURE — 36415 COLL VENOUS BLD VENIPUNCTURE: CPT | Performed by: INTERNAL MEDICINE

## 2022-10-10 PROCEDURE — 83735 ASSAY OF MAGNESIUM: CPT | Performed by: INTERNAL MEDICINE

## 2022-10-10 PROCEDURE — 20000000 HC ICU ROOM

## 2022-10-10 PROCEDURE — 85025 COMPLETE CBC W/AUTO DIFF WBC: CPT | Performed by: INTERNAL MEDICINE

## 2022-10-10 PROCEDURE — 63600175 PHARM REV CODE 636 W HCPCS: Performed by: INTERNAL MEDICINE

## 2022-10-10 PROCEDURE — 99223 PR INITIAL HOSPITAL CARE,LEVL III: ICD-10-PCS | Mod: ,,, | Performed by: GENERAL PRACTICE

## 2022-10-10 PROCEDURE — 94761 N-INVAS EAR/PLS OXIMETRY MLT: CPT

## 2022-10-10 PROCEDURE — 80048 BASIC METABOLIC PNL TOTAL CA: CPT | Performed by: INTERNAL MEDICINE

## 2022-10-10 RX ORDER — FUROSEMIDE 10 MG/ML
20 INJECTION INTRAMUSCULAR; INTRAVENOUS
Status: DISCONTINUED | OUTPATIENT
Start: 2022-10-10 | End: 2022-10-12

## 2022-10-10 RX ORDER — IPRATROPIUM BROMIDE AND ALBUTEROL SULFATE 2.5; .5 MG/3ML; MG/3ML
3 SOLUTION RESPIRATORY (INHALATION) EVERY 4 HOURS PRN
Status: DISCONTINUED | OUTPATIENT
Start: 2022-10-10 | End: 2022-10-14 | Stop reason: HOSPADM

## 2022-10-10 RX ADMIN — PANTOPRAZOLE SODIUM 40 MG: 40 GRANULE, DELAYED RELEASE ORAL at 08:10

## 2022-10-10 RX ADMIN — CLOPIDOGREL 75 MG: 75 TABLET, FILM COATED ORAL at 05:10

## 2022-10-10 RX ADMIN — METOPROLOL SUCCINATE 25 MG: 25 TABLET, EXTENDED RELEASE ORAL at 05:10

## 2022-10-10 RX ADMIN — SODIUM CHLORIDE, PRESERVATIVE FREE 10 ML: 5 INJECTION INTRAVENOUS at 12:10

## 2022-10-10 RX ADMIN — VANCOMYCIN HYDROCHLORIDE 1500 MG: 1.5 INJECTION, POWDER, LYOPHILIZED, FOR SOLUTION INTRAVENOUS at 12:10

## 2022-10-10 RX ADMIN — VENLAFAXINE 75 MG: 37.5 TABLET ORAL at 08:10

## 2022-10-10 RX ADMIN — APIXABAN 5 MG: 5 TABLET, FILM COATED ORAL at 08:10

## 2022-10-10 RX ADMIN — AMIODARONE HYDROCHLORIDE 200 MG: 200 TABLET ORAL at 08:10

## 2022-10-10 RX ADMIN — PHENOBARBITAL 64.8 MG: 32.4 TABLET ORAL at 08:10

## 2022-10-10 RX ADMIN — LEVOFLOXACIN 500 MG: 500 INJECTION, SOLUTION INTRAVENOUS at 08:10

## 2022-10-10 RX ADMIN — TAMSULOSIN HYDROCHLORIDE 0.4 MG: 0.4 CAPSULE ORAL at 05:10

## 2022-10-10 RX ADMIN — PHENYTOIN 200 MG: 125 SUSPENSION ORAL at 08:10

## 2022-10-10 RX ADMIN — LISINOPRIL 5 MG: 5 TABLET ORAL at 05:10

## 2022-10-10 RX ADMIN — FUROSEMIDE 20 MG: 10 INJECTION, SOLUTION INTRAMUSCULAR; INTRAVENOUS at 08:10

## 2022-10-10 RX ADMIN — SODIUM CHLORIDE, PRESERVATIVE FREE 10 ML: 5 INJECTION INTRAVENOUS at 05:10

## 2022-10-10 RX ADMIN — LEVETIRACETAM 1000 MG: 100 SOLUTION ORAL at 08:10

## 2022-10-10 RX ADMIN — FUROSEMIDE 20 MG: 10 INJECTION, SOLUTION INTRAMUSCULAR; INTRAVENOUS at 09:10

## 2022-10-10 RX ADMIN — MUPIROCIN: 20 OINTMENT TOPICAL at 08:10

## 2022-10-10 RX ADMIN — ATORVASTATIN CALCIUM 40 MG: 40 TABLET, FILM COATED ORAL at 08:10

## 2022-10-10 NOTE — PROGRESS NOTES
[facility]  Hospital Medicine Progress Note        Chief Complaint: Seizures (Intubated tx from [facility] w/ seizure)     Source of Information: Patient. Medical Records        HISTORY OF PRESENT ILLNESS:   John Wayne is a 76 y.o. male with a PMHx of  HTN, CAD s/p NSTEMI, seizure disorder, A. fib/a flutter on Eliquis, COPD, and chronic venous insufficiency who presented to [facility] on 10/6/2022 as a transfer for a higher level of care. He initially presented to [facility] via EMS from [facility] following a reported seizure lasting approximately 25 mins. CT Head negative for acute abnormalities. He was started on Cipro for acute cystitis. Upon ED arrival he was noted to be in significant respiratory distress and he was intubated for airway protection. He was loaded with Keppra and given IV Ativan for seizure-like activity. He was transferred to [facility] for neurology and ICU services. He was admitted to ICU. Neurology was consulted. Repeat CT Head negative for acute abnormality. EEG was abnormal, diffuse slowing consistent with moderate cerebral dysfunction that can be   found toxic metabolic anoxic brain injury or postictal event; presence of intermittent spike wave activity in both frontal temporal areas, which can be consistent but not conclusive with epileptiform discharges. Neurology continues to follow. Seizure medications resumed per neurology. He was extubated, now on NC. Downgraded to the floor on 10/7/22 under hospitalist services.      Patient currently admitted on account of uncontrolled seizures.  Hospital course complicated by 1/2 blood cultures growing Staph bacteremia.  Right community-acquired pneumonia with effusion.    Blood cultures grew Staph epidermidis not sure if it is a contaminant versus true infection await ID input   Neurology has signed off      Today's information   Patient now on 4 L of oxygen, suspend p.o. Lasix and begin IV Lasix 20 mg b.i.d., wean  off oxygen as tolerated   Continue antibiotics, await ID input   Stat labs        exam:  General appearance: Well-developed male in no apparent distress.  HENT: Atraumatic head. Moist mucous membranes of oral cavity.  Lungs: Clear to auscultation bilaterally.   Heart: Regular rate and rhythm. S1 and S2 present. BLE lymphedema  Abdomen: Soft, non-distended, non-tender. Morbidly obese  Extremities: BLE lymphedema   Neuro: Motor and sensory exams grossly intact.   Psych/mental status: Appropriate mood and affect. Responds appropriately to questions.         ASSESSMENT & PLAN:   Acute hypoxic respiratory failure on 4 L of oxygen   Possible pulmonary edema   1/2 Staph epidermidis bacteremia   Leukocytosis, improving  Hypomagnesemia   Hypokalemia   Right-sided pneumonia with effusion  Gram-negative joy UTI    Recurrent Seizures   Acute Hypoxemic Respiratory Failure requiring intubation and mechanical ventilation, now extubation  Urinary retention  Chronic HFrEF/ ICMO 25-30%  Afib/flutter on Eliquis  Morbid Obesity  Hx of HTN, CAD s/p NSTEMI, seizure disorder, COPD, and chronic venous insufficiency      Plan:  Patient now on 4 L of oxygen, suspend p.o. Lasix and begin IV Lasix 20 mg b.i.d., wean off oxygen as tolerated   Continue antibiotics, await ID input   Stat labs  Leukocytosis is improving but still elevated.    Blood cultures grew Staph epidermidis not sure if it is a contaminant versus true infection await ID input   Continue antibiotics-Levaquin and vancomycin  Trend CBC till normal   Consult  for placement   Check MRSA PCR respiratory panel   Continue seizure meds   Neurology has signed off  Continue Keppra, Phenobarbital, Phenytoin  Seizure Precautions  Supplemental o2 as needed  Follow Urine Cultures  UA is a poor sample   Reinsert Anaya Catheter due to urinary retention   Labs in AM        VTE Prophylaxis: Eliquis     Critical care diagnosis 1/2 Staph bacteremia on antibiotics , pulmonary edema on  IV Lasix  Critical care time greater than 35 minutes       VITAL SIGNS: 24 HRS MIN & MAX LAST   Temp  Min: 97.9 °F (36.6 °C)  Max: 99.2 °F (37.3 °C) 97.9 °F (36.6 °C)   BP  Min: 131/57  Max: 142/68 (!) 142/68     Pulse  Min: 58  Max: 72  65   Resp  Min: 14  Max: 26 (!) 26     SpO2  Min: 89 %  Max: 96 % (!) 93 %         Recent Labs   Lab 10/08/22  0124 10/09/22  0141 10/10/22  0831   WBC 17.8* 15.2* 15.8*   RBC 4.15* 4.48* 4.29*   HGB 12.1* 13.2* 12.6*   HCT 37.1* 39.8* 38.1*   MCV 89.4 88.8 88.8   MCH 29.2 29.5 29.4   MCHC 32.6* 33.2 33.1   RDW 14.4 14.6 14.6    140 184   MPV 10.2 10.4 10.2       Recent Labs   Lab 10/05/22  1914 10/05/22  1925 10/06/22  0223 10/06/22  0238 10/06/22  0936 10/07/22  0150 10/08/22  0124 10/09/22  0141 10/10/22  0831   NA  --    < >  --  136  --  139 139 137 138   K  --    < >  --  3.7  --  3.6 4.1 3.3* 4.0   CO2  --    < >  --  25  --  24 25 24 28   BUN  --    < >  --  10.2  --  8.4 8.3* 7.3* 8.1*   CREATININE  --    < >  --  0.91  --  0.78 0.83 0.79 0.77   CALCIUM  --    < >  --  8.9  --  8.6* 8.6* 8.9 9.1   PH 7.358  --  7.39  --  7.43  --   --   --   --    MG  --    < >  --  1.80  --  1.70 1.70 1.60 2.00   ALBUMIN  --    < >  --  3.1*  --  2.8* 2.8*  --   --    ALKPHOS  --    < >  --  106  --  82 83  --   --    ALT  --    < >  --  16  --  12 12  --   --    AST  --    < >  --  16  --  14 17  --   --    BILITOT  --    < >  --  0.3  --  0.4 0.3  --   --     < > = values in this interval not displayed.          Microbiology Results (last 7 days)       Procedure Component Value Units Date/Time    Blood Culture [[id number]]  (Normal) Collected: 10/07/22 0150    Order Status: Completed Specimen: Blood from Arm, Right Updated: 10/10/22 0301     CULTURE, BLOOD ([facility]) No Growth At 72 Hours    Blood Culture [[id number]]  (Normal) Collected: 10/07/22 2326    Order Status: Completed Specimen: Blood, Venous Updated: 10/09/22 1100     CULTURE, BLOOD ([facility]) No Growth At 24 Hours    Blood Culture  [[id number]]  (Normal) Collected: 10/07/22 2326    Order Status: Completed Specimen: Blood, Venous Updated: 10/09/22 1100     CULTURE, BLOOD ([facility]) No Growth At 24 Hours    Blood Culture [[id number]]  (Abnormal)  (Susceptibility) Collected: 10/07/22 0150    Order Status: Completed Specimen: Blood from Arm, Left Updated: 10/09/22 0625     CULTURE, BLOOD ([facility]) Staphylococcus epidermidis     GRAM STAIN Gram Positive Cocci, probable Staphylococcus      1 of 1 Pediatric bottle positive      Seen in gram stain of broth only             See below for Radiology    Scheduled Med:   albuterol-ipratropium  3 mL Nebulization Q4H WAKE    amiodarone  200 mg Oral Daily    apixaban  5 mg Per OG tube BID    atorvastatin  40 mg Oral QHS    clopidogreL  75 mg Oral Daily    furosemide (LASIX) injection  20 mg Intravenous Q12H    levetiracetam  1,000 mg Per NG tube BID    levoFLOXacin  500 mg Intravenous Q24H    lisinopriL  5 mg Oral Daily    metoprolol succinate  25 mg Oral Daily    mupirocin   Nasal BID    pantoprazole  40 mg Per NG tube Daily    PHENobarbitaL  64.8 mg Per NG tube BID    phenytoin  200 mg Per NG tube BID    sodium chloride 0.9%  10 mL Intravenous Q6H    tamsulosin  0.4 mg Oral Daily    vancomycin (VANCOCIN) IVPB  1,500 mg Intravenous Q12H    venlafaxine  75 mg Oral BID        Continuous Infusions:       PRN Meds:  hydrALAZINE, labetaloL, lorazepam, sodium chloride 0.9%, Flushing PICC Protocol **AND** sodium chloride 0.9% **AND** sodium chloride 0.9%, Pharmacy to dose Vancomycin consult **AND** vancomycin - pharmacy to dose         VTE prophylaxis:     Patient condition:  Stable/Fair/Guarded/ Serious/ Critical    Anticipated discharge and Disposition:         All diagnosis and differential diagnosis have been reviewed; assessment and plan has been documented; I have personally reviewed the labs and test results that are presently available; I have reviewed the patients medication list; I have reviewed the consulting  providers response and recommendations. I have reviewed or attempted to review medical records based upon their availability    All of the patient's questions have been  addressed and answered. Patient's is agreeable to the above stated plan. I will continue to monitor closely and make adjustments to medical management as needed.  _____________________________________________________________________    Nutrition Status:    Radiology:  X-Ray Chest AP Portable  Narrative: EXAMINATION:  XR CHEST AP PORTABLE    CLINICAL HISTORY:  Confirm ETT placement;, .    COMPARISON:  10/05/2022    FINDINGS:  An AP view or more reveals the heart to be mildly enlarged.  There is increasing patchy hazy opacity at the right mid lung.  There is interim placement of an ET tube with the tip below the thoracic inlet and approximately 3 cm above the nallely.  There is interim placement of an NG tube with the tip not included on the exam coursing toward the upper abdomen.  Degenerative changes and curvature are noted to the thoracic spine.  The left hemidiaphragm is obscured.  Impression: 1. Interim increasing patchy hazy opacity right mid lung suspicious for progressing infiltrate and subsegmental atelectasis  2. Obscured left hemidiaphragm suspicious for infiltrate and small left basilar pleural reaction  3. Mildly elevated right hemidiaphragm  4. Mild cardiomegaly  5. Interim placement ET tube and NG tube  6. Atherosclerosis  7. Thoracic spondylosis and scoliosis    Electronically signed by: Campos Dobson  Date:    10/06/2022  Time:    08:47  X-Ray Abdomen AP 1 View  Narrative: EXAMINATION:  XR ABDOMEN AP 1 VIEW    CLINICAL HISTORY:  femoral line;, .    COMPARISON:  None available    FINDINGS:  Single AP view is limited secondary to patient body habitus and lack of beam penetration.  A line/catheter superimposed over the right medial upper thigh with the tip near the lesser trochanter.  A catheter is present within the midline pelvis  suspicious for Anaya catheter.  Clinical correlation is indicated.  Iliac stents are evident.  Arthritic changes are evident at the hips bilaterally.  Impression: 1. Suspect right femoral line with the tip below the level of the right lesser trochanter  2. Suspect Anaya catheter  3. Iliac stents  4. Osteoarthritis  5. Atherosclerosis    Electronically signed by: Campos Dobson  Date:    10/06/2022  Time:    08:37  CT Head Without Contrast  Narrative: EXAMINATION:  CT HEAD WITHOUT CONTRAST    CLINICAL HISTORY:  Mental status change, unknown cause;    TECHNIQUE:  Axial scans were obtained from skull base to the vertex.    Coronal and sagittal reconstructions obtained from the axial data.    Automatic exposure control was utilized to limit radiation dose.    Contrast: None    Radiation Dose:    Total DLP: 1048 mGy*cm    COMPARISON:  CT head dated 01/17/2022    FINDINGS:  There is no acute intracranial hemorrhage or edema.  There is encephalomalacia in the left cerebellum.  Patchy hypodensities in the subcortical and periventricular white matter likely represent chronic microvascular ischemic changes.    There is no mass effect or midline shift.  There is diffuse parenchymal volume loss.  The basal cisterns are patent. There is no abnormal extra-axial fluid collection.  Carotid and vertebral artery calcifications are noted.    The calvarium and skull base are intact. The visualized paranasal sinuses and the mastoid air cells are clear.  Impression: 1. No acute intracranial abnormality.  2. Chronic microvascular ischemic changes.  No significant change from the nighthawk interpretation    Electronically signed by: Tiffanie Keene  Date:    10/06/2022  Time:    06:59  X-Ray Chest 1 View  Narrative: EXAMINATION:  XR CHEST 1 VIEW    CLINICAL HISTORY:  intubated;    TECHNIQUE:  AP chest    COMPARISON:  Chest x-ray dated 10/05/2022    FINDINGS:  The endotracheal tube has its tip 4 cm above the nallely.  Nasogastric tube  courses below the diaphragm.  The heart is stable in size.  There is a small right pleural effusion with basilar airspace opacity.  There is no definite visible pneumothorax.  Impression: Small right pleural effusion with basilar airspace opacity.    Electronically signed by: Tiffanie Keene  Date:    10/06/2022  Time:    06:04      Amada Perez MD   10/10/2022

## 2022-10-10 NOTE — CONSULTS
Infectious Disease  Consult Note    Patient Name: John Wayne   MRN: [id number]   Admission Date: 10/6/2022   Hospital Length of Stay: 4 days  Attending Physician: Maciel Layne MD   Primary Care Provider: SHAY Schmidt MD     Isolation Status: No active isolations         Subjective:     Principal Problem: Acute respiratory failure with hypercapnia     HPI:   76-year-old male patient known to have past medical history significant for hypertension, CAD, seizure disorder, AFib, COPD, presented on 10/06/2022 from a nursing home after having seizure, there were concern for urinary tract infection he was started on ciprofloxacin, in the emergency department, he needed to be intubated for airway protection and admitted to the ICU, initial blood cultures were positive 1/2 (pediatric bottles) for Staph epidermidis, and had right sided infiltrates on chest x-ray, ID is consulted for assistance in management.      On my evaluation, the patient appears to be much improved, he is able to maintain a conversation and give me a history of his presentation.  He reports that he has had seizures all his life.  He currently denies any concerns.  He having some cough however this is whitish and non thick or purulent, he denies any significant shortness of breath, no abdominal pain, does have worsening bilateral lower extremity edema.    Past Medical History:   Diagnosis Date    Anticoagulant long-term use     COPD (chronic obstructive pulmonary disease)     Coronary artery disease     Difficult intubation     GERD (gastroesophageal reflux disease)     Hypertension     Seizures         Past Surgical History:   Procedure Laterality Date    cardiac catheterisation  10/18/2016    PERCUTANEOUS BALLOON VALVULOPLASTY  04/04/2018       Review of patient's allergies indicates:   Allergen Reactions    Ancef in dextrose (iso-osm)     Codeine     Penicillins         Family History       Problem Relation (Age of Onset)    Coronary artery disease  Father    Heart attack Father            Social History     Socioeconomic History    Marital status:    Tobacco Use    Smoking status: Former    Smokeless tobacco: Never   Substance and Sexual Activity    Alcohol use: Not Currently    Drug use: Never        Lines/Drains/Airways       Drain  Duration                  Urethral Catheter 10/07/22 1700 3 days              Peripheral Intravenous Line  Duration                  Peripheral IV - Single Lumen 10/05/22 2130 18 G Anterior;Distal;Right Upper Arm 4 days         Midline Catheter Insertion/Assessment  - Single Lumen 10/07/22 0120 Left basilic vein (medial side of arm) 3 days                     Medication:  Medications Prior to Admission   Medication Sig    amiodarone (PACERONE) 200 MG Tab Take 200 mg by mouth once daily.    atorvastatin (LIPITOR) 40 MG tablet Take 40 mg by mouth every evening.    clopidogreL (PLAVIX) 75 mg tablet Take 1 tablet by mouth Daily.    ELIQUIS 5 mg Tab Take 1 tablet by mouth 2 (two) times a day.    furosemide (LASIX) 40 MG tablet Take 1 tablet by mouth Daily.    levETIRAcetam (KEPPRA) 1000 MG tablet Take 1 tablet by mouth 2 (two) times a day.    lisinopriL (PRINIVIL,ZESTRIL) 5 MG tablet Take 1 tablet by mouth Daily.    metoprolol succinate (TOPROL-XL) 25 MG 24 hr tablet Take 1 tablet by mouth Daily.    pantoprazole (PROTONIX) 40 MG tablet Take 40 mg by mouth Daily.    PHENobarbitaL (LUMINAL) 64.8 MG tablet Take 1 tablet by mouth 2 (two) times daily.    PHENobarbitaL 32.4 MG tablet Take 32.4 mg by mouth Daily.    phenytoin (DILANTIN) 100 MG ER capsule Take 1 capsule by mouth 2 (two) times a day.    phenytoin (DILANTIN) 50 mg chewable tablet Take 1 tablet by mouth once daily.    tamsulosin (FLOMAX) 0.4 mg Cap Take 1 tablet by mouth Daily.    venlafaxine (EFFEXOR) 75 MG tablet Take 75 mg by mouth 2 (two) times daily.          Antimicrobials:  Antibiotics (From admission, onward)      Start     Stop Route Frequency Ordered     10/08/22 0900  levoFLOXacin 500 mg/100 mL IVPB 500 mg         10/13 0859 IV Every 24 hours (non-standard times) 10/08/22 0752    10/08/22 0000  vancomycin 1.5 g in dextrose 5 % 250 mL IVPB (ready to mix)         -- IV Every 12 hours (non-standard times) 10/07/22 2307    10/07/22 2352  vancomycin - pharmacy to dose  (vancomycin IVPB)        See Irina for full Linked Orders Report.    -- IV pharmacy to manage frequency 10/07/22 2254    10/06/22 0900  mupirocin 2 % ointment         10/11 0859 Nasl 2 times daily 10/06/22 0105             Antifungals (From admission, onward)      None            Antivirals (From admission, onward)      None               Review of Systems   Review of Systems   All other systems reviewed and are negative.      Objective:     Vital Signs (Most Recent):  Temp: 98.4 °F (36.9 °C) (10/10/22 1600)  Pulse: 63 (10/10/22 1700)  Resp: 18 (10/10/22 1700)  BP: (!) 139/59 (10/10/22 1700)  SpO2: (!) 92 % (10/10/22 1700)    Vital Signs (24h Range):  Temp:  [97.9 °F (36.6 °C)-98.8 °F (37.1 °C)] 98.4 °F (36.9 °C)  Pulse:  [54-66] 63  Resp:  [14-26] 18  SpO2:  [90 %-95 %] 92 %  BP: (131-142)/(42-68) 139/59      Weight:   Wt Readings from Last 1 Encounters:   10/06/22 118 kg (260 lb 2.3 oz)      Body mass index is Body mass index is 40.74 kg/m².     Estimated Creatinine Clearance: Estimated Creatinine Clearance: 100.3 mL/min (based on SCr of 0.77 mg/dL).     Physical Exam  Physical Exam  Constitutional:       Appearance: Normal appearance.   HENT:      Head: Normocephalic and atraumatic.      Mouth/Throat:      Pharynx: No oropharyngeal exudate or posterior oropharyngeal erythema.   Eyes:      Extraocular Movements: Extraocular movements intact.      Pupils: Pupils are equal, round, and reactive to light.   Cardiovascular:      Rate and Rhythm: Normal rate and regular rhythm.      Heart sounds: No murmur heard.  Pulmonary:      Effort: No respiratory distress.      Breath sounds: Rhonchi present. No  wheezing or rales.   Abdominal:      General: Bowel sounds are normal. There is no distension.      Palpations: Abdomen is soft.      Tenderness: There is no abdominal tenderness. There is no right CVA tenderness or left CVA tenderness.   Musculoskeletal:         General: Swelling (Bilateral lower extremity swelling) present. No tenderness.      Cervical back: Neck supple. No rigidity or tenderness.   Lymphadenopathy:      Cervical: No cervical adenopathy.   Skin:     Findings: No lesion or rash.   Neurological:      General: No focal deficit present.      Mental Status: He is alert and oriented to person, place, and time. Mental status is at baseline.      Cranial Nerves: No cranial nerve deficit.      Motor: No weakness.   Psychiatric:         Mood and Affect: Mood normal.         Behavior: Behavior normal.         Significant Labs: CBC:   Recent Labs   Lab 10/09/22  0141 10/10/22  0831   WBC 15.2* 15.8*   HGB 13.2* 12.6*   HCT 39.8* 38.1*    184     CMP:   Recent Labs   Lab 10/09/22  0141 10/10/22  0831    138   K 3.3* 4.0   CO2 24 28   BUN 7.3* 8.1*   CREATININE 0.79 0.77   CALCIUM 8.9 9.1         Microbiology Results (last 7 days)       Procedure Component Value Units Date/Time    Blood Culture [[id number]]  (Normal) Collected: 10/07/22 2326    Order Status: Completed Specimen: Blood, Venous Updated: 10/10/22 1100     CULTURE, BLOOD ([facility]) No Growth At 48 Hours    Blood Culture [[id number]]  (Normal) Collected: 10/07/22 2326    Order Status: Completed Specimen: Blood, Venous Updated: 10/10/22 1100     CULTURE, BLOOD ([facility]) No Growth At 48 Hours    Blood Culture [[id number]]  (Normal) Collected: 10/07/22 0150    Order Status: Completed Specimen: Blood from Arm, Right Updated: 10/10/22 0301     CULTURE, BLOOD ([facility]) No Growth At 72 Hours    Blood Culture [[id number]]  (Abnormal)  (Susceptibility) Collected: 10/07/22 0150    Order Status: Completed Specimen: Blood from Arm, Left Updated: 10/09/22 0625      CULTURE, BLOOD ([facility]) Staphylococcus epidermidis     GRAM STAIN Gram Positive Cocci, probable Staphylococcus      1 of 1 Pediatric bottle positive      Seen in gram stain of broth only             Significant Imaging: I have reviewed all pertinent imaging results/findings within the past 24 hours.    Assessment/Plan:       76-year-old male patient known to have past medical history significant for hypertension, CAD, seizure disorder, AFib, COPD, presented on 10/06/2022 from a nursing home after having seizure, there were concern for urinary tract infection he was started on ciprofloxacin, in the emergency department, he needed to be intubated for airway protection and admitted to the ICU, initial blood cultures were positive 1/2 (pediatric bottles) for Staph epidermidis, and had right sided infiltrates on chest x-ray, ID is consulted for assistance in management.      Right-sided pneumonia (likely aspiration)  Staph epidermidis in blood cultures  Respiratory failure requiring intubation  Seizures  COPD  CAD  Afib     -appears to be back to baseline, some additional oxygen requirement responding to diuretics  -received 7 days of antibiotic therapy   -would prefer to avoid quinolones in elderly patients especially with seizures due to neurotoxicity  -Staph epidermidis in blood cultures likely contaminant    Plan:   -monitor off antibiotic  -volume management per primary team     Thank you for your consult. ID will continue following. Please contact us with any questions or concerns.    Britton Rome MD  Infectious Disease  [facility]

## 2022-10-10 NOTE — PLAN OF CARE
Problem: Adult Inpatient Plan of Care  Goal: Absence of Hospital-Acquired Illness or Injury  Outcome: Ongoing, Progressing  Goal: Optimal Comfort and Wellbeing  Outcome: Ongoing, Progressing  Goal: Readiness for Transition of Care  Outcome: Ongoing, Progressing     Problem: Bariatric Environmental Safety  Goal: Safety Maintained with Care  Outcome: Ongoing, Progressing     Problem: Infection  Goal: Absence of Infection Signs and Symptoms  Outcome: Ongoing, Progressing     Problem: Skin Injury Risk Increased  Goal: Skin Health and Integrity  Outcome: Ongoing, Progressing

## 2022-10-11 LAB
B PARAP IS1001 DNA CT SPEC QN NAA+PROBE: NOT DETECTED
B PERT+PARAP PTXS1 CT SPEC QN NAA+PROBE: NOT DETECTED
BACTERIA BLD CULT: NORMAL
BACTERIA BLD CULT: NORMAL
CHLAMYDIA SP IGG+IGM PNL TITR SER IF: NOT DETECTED
FLUAV AG UPPER RESP QL IA.RAPID: NOT DETECTED
FLUAV H1 2009 RNA SPEC NAA+PROBE-IMP: NORMAL
FLUAV H3 HA GENE NPH QL NAA+PROBE: NORMAL
FLUBV AG UPPER RESP QL IA.RAPID: NOT DETECTED
HADV DNA NPH QL NAA+NON-PROBE: NOT DETECTED
HCOV 229E+OC43 RNA NPH QL NAA+PROBE: NOT DETECTED
HCOV HKU1 RNA SPEC QL NAA+PROBE: NOT DETECTED
HCOV NL63 RNA SPEC QL NAA+PROBE: NOT DETECTED
HCOV OC43 RNA SPEC QL NAA+PROBE: NOT DETECTED
HMPV RNA SPEC QL NAA+PROBE: NOT DETECTED
HPIV1 F GENE NPH QL NAA+PROBE: NOT DETECTED
HPIV2 L GENE NPH QL NAA+PROBE: NOT DETECTED
HPIV3 NP GENE NPH QL NAA+PROBE: NOT DETECTED
HPIV4 P GENE NPH QL NAA+PROBE: NOT DETECTED
M PNEUMO IGA SER-ACNC: NOT DETECTED
RHINOVIRUS RNA SPEC NAA+PROBE: NOT DETECTED
RSV A 5' UTR RNA NPH QL NAA+PROBE: NOT DETECTED

## 2022-10-11 PROCEDURE — 25000003 PHARM REV CODE 250: Performed by: INTERNAL MEDICINE

## 2022-10-11 PROCEDURE — 25000003 PHARM REV CODE 250: Performed by: NURSE PRACTITIONER

## 2022-10-11 PROCEDURE — 25000003 PHARM REV CODE 250: Performed by: STUDENT IN AN ORGANIZED HEALTH CARE EDUCATION/TRAINING PROGRAM

## 2022-10-11 PROCEDURE — 63600175 PHARM REV CODE 636 W HCPCS: Performed by: INTERNAL MEDICINE

## 2022-10-11 PROCEDURE — A4216 STERILE WATER/SALINE, 10 ML: HCPCS | Performed by: STUDENT IN AN ORGANIZED HEALTH CARE EDUCATION/TRAINING PROGRAM

## 2022-10-11 PROCEDURE — 25000242 PHARM REV CODE 250 ALT 637 W/ HCPCS

## 2022-10-11 PROCEDURE — 20000000 HC ICU ROOM

## 2022-10-11 RX ORDER — POLYETHYLENE GLYCOL 3350 17 G/17G
17 POWDER, FOR SOLUTION ORAL EVERY 6 HOURS
Status: DISCONTINUED | OUTPATIENT
Start: 2022-10-11 | End: 2022-10-12

## 2022-10-11 RX ADMIN — PHENOBARBITAL 64.8 MG: 32.4 TABLET ORAL at 08:10

## 2022-10-11 RX ADMIN — LEVETIRACETAM 1000 MG: 100 SOLUTION ORAL at 08:10

## 2022-10-11 RX ADMIN — FUROSEMIDE 20 MG: 10 INJECTION, SOLUTION INTRAMUSCULAR; INTRAVENOUS at 08:10

## 2022-10-11 RX ADMIN — VENLAFAXINE 75 MG: 37.5 TABLET ORAL at 08:10

## 2022-10-11 RX ADMIN — CLOPIDOGREL 75 MG: 75 TABLET, FILM COATED ORAL at 05:10

## 2022-10-11 RX ADMIN — APIXABAN 5 MG: 5 TABLET, FILM COATED ORAL at 08:10

## 2022-10-11 RX ADMIN — SODIUM CHLORIDE, PRESERVATIVE FREE 10 ML: 5 INJECTION INTRAVENOUS at 06:10

## 2022-10-11 RX ADMIN — PHENYTOIN 200 MG: 125 SUSPENSION ORAL at 08:10

## 2022-10-11 RX ADMIN — TAMSULOSIN HYDROCHLORIDE 0.4 MG: 0.4 CAPSULE ORAL at 05:10

## 2022-10-11 RX ADMIN — ATORVASTATIN CALCIUM 40 MG: 40 TABLET, FILM COATED ORAL at 08:10

## 2022-10-11 RX ADMIN — POLYETHYLENE GLYCOL 3350 17 G: 17 POWDER, FOR SOLUTION ORAL at 05:10

## 2022-10-11 RX ADMIN — SODIUM CHLORIDE, PRESERVATIVE FREE 10 ML: 5 INJECTION INTRAVENOUS at 12:10

## 2022-10-11 RX ADMIN — PANTOPRAZOLE SODIUM 40 MG: 40 GRANULE, DELAYED RELEASE ORAL at 08:10

## 2022-10-11 RX ADMIN — POLYETHYLENE GLYCOL 3350 17 G: 17 POWDER, FOR SOLUTION ORAL at 11:10

## 2022-10-11 RX ADMIN — LISINOPRIL 5 MG: 5 TABLET ORAL at 05:10

## 2022-10-11 NOTE — PT/OT/SLP DISCHARGE
Speech Language Pathology Discharge Summary    John Wayne  MRN: [id number]   Acute respiratory failure with hypercapnia    Pt tolerating diet without sigs/sx of aspiration. No skilled SLP intervention is warranted at this time.

## 2022-10-11 NOTE — NURSING
Patient's family updated with plan of care. Questions were encouraged and answered. Spoke to pt's brother Fredrick.

## 2022-10-11 NOTE — PROGRESS NOTES
[facility]  Hospital Medicine Progress Note        Chief Complaint: Inpatient Follow-up for     HPI:   John Wayne is a 76 y.o. male with a PMHx of  HTN, CAD s/p NSTEMI, seizure disorder, A. fib/a flutter on Eliquis, COPD, and chronic venous insufficiency who presented to [facility] on 10/6/2022 as a transfer for a higher level of care. He initially presented to [facility] via EMS from [facility] following a reported seizure lasting approximately 25 mins. CT Head negative for acute abnormalities. He was started on Cipro for acute cystitis. Upon ED arrival he was noted to be in significant respiratory distress and he was intubated for airway protection. He was loaded with Keppra and given IV Ativan for seizure-like activity. He was transferred to [facility] for neurology and ICU services. He was admitted to ICU. Neurology was consulted. Repeat CT Head negative for acute abnormality. EEG was abnormal, diffuse slowing consistent with moderate cerebral dysfunction that can be     found toxic metabolic anoxic brain injury or postictal event; presence of intermittent spike wave activity in both frontal temporal areas, which can be consistent but not conclusive with epileptiform discharges. Neurology continues to follow. Seizure medications resumed per neurology. He was extubated, now on NC. Downgraded to the floor on 10/7/22 under hospitalist services.          Patient currently admitted on account of uncontrolled seizures.  Hospital course complicated by 1/2 blood cultures growing Staph bacteremia.  Right community-acquired pneumonia with effusion.    Blood cultures grew Staph epidermidis not sure if it is a contaminant versus true infection await ID input   Neurology has signed off      October 10, 2022, Patient now on 4 L of oxygen, suspend p.o. Lasix and begin IV Lasix 20 mg b.i.d., wean off oxygen as tolerated   Continue antibiotics, await ID input     October 11, 2022, Infectious  Disease recommended stop IV antibiotic and monitor it, no fever, no shortness of breath  Interval Hx:       Objective/physical exam:  General: In no acute distress, afebrile  Chest:  A bilateral wheezing  Heart: RRR, +S1, S2, no appreciable murmur  Abdomen: Soft, nontender, BS +  MSK: Warm, no lower extremity edema, no clubbing or cyanosis  Neurologic: Alert and oriented x4, Cranial nerve II-XII intact, Strength 5/5 in all 4 extremities    VITAL SIGNS: 24 HRS MIN & MAX LAST   Temp  Min: 97.7 °F (36.5 °C)  Max: 99 °F (37.2 °C) 97.7 °F (36.5 °C)   BP  Min: 117/56  Max: 140/49 (!) 122/57     Pulse  Min: 50  Max: 65  (!) 52     Resp  Min: 17  Max: 22 17   SpO2  Min: 90 %  Max: 95 % (!) 93 %         Recent Labs   Lab 10/08/22  0124 10/09/22  0141 10/10/22  0831   WBC 17.8* 15.2* 15.8*   RBC 4.15* 4.48* 4.29*   HGB 12.1* 13.2* 12.6*   HCT 37.1* 39.8* 38.1*   MCV 89.4 88.8 88.8   MCH 29.2 29.5 29.4   MCHC 32.6* 33.2 33.1   RDW 14.4 14.6 14.6    140 184   MPV 10.2 10.4 10.2       Recent Labs   Lab 10/05/22  1914 10/05/22  1925 10/06/22  0223 10/06/22  0238 10/06/22  0936 10/07/22  0150 10/08/22  0124 10/09/22  0141 10/10/22  0831   NA  --    < >  --  136  --  139 139 137 138   K  --    < >  --  3.7  --  3.6 4.1 3.3* 4.0   CO2  --    < >  --  25  --  24 25 24 28   BUN  --    < >  --  10.2  --  8.4 8.3* 7.3* 8.1*   CREATININE  --    < >  --  0.91  --  0.78 0.83 0.79 0.77   CALCIUM  --    < >  --  8.9  --  8.6* 8.6* 8.9 9.1   PH 7.358  --  7.39  --  7.43  --   --   --   --    MG  --    < >  --  1.80  --  1.70 1.70 1.60 2.00   ALBUMIN  --    < >  --  3.1*  --  2.8* 2.8*  --   --    ALKPHOS  --    < >  --  106  --  82 83  --   --    ALT  --    < >  --  16  --  12 12  --   --    AST  --    < >  --  16  --  14 17  --   --    BILITOT  --    < >  --  0.3  --  0.4 0.3  --   --     < > = values in this interval not displayed.          Microbiology Results (last 7 days)       Procedure Component Value Units Date/Time    Blood  Culture [[id number]]  (Normal) Collected: 10/07/22 2326    Order Status: Completed Specimen: Blood, Venous Updated: 10/11/22 1100     CULTURE, BLOOD ([facility]) No Growth At 72 Hours    Blood Culture [[id number]]  (Normal) Collected: 10/07/22 2326    Order Status: Completed Specimen: Blood, Venous Updated: 10/11/22 1100     CULTURE, BLOOD ([facility]) No Growth At 72 Hours    Blood Culture [[id number]]  (Normal) Collected: 10/07/22 0150    Order Status: Completed Specimen: Blood from Arm, Right Updated: 10/11/22 0301     CULTURE, BLOOD ([facility]) No Growth At 96 Hours    Blood Culture [[id number]]  (Abnormal)  (Susceptibility) Collected: 10/07/22 0150    Order Status: Completed Specimen: Blood from Arm, Left Updated: 10/09/22 0625     CULTURE, BLOOD ([facility]) Staphylococcus epidermidis     GRAM STAIN Gram Positive Cocci, probable Staphylococcus      1 of 1 Pediatric bottle positive      Seen in gram stain of broth only             See below for Radiology    Scheduled Med:   amiodarone  200 mg Oral Daily    apixaban  5 mg Per OG tube BID    atorvastatin  40 mg Oral QHS    clopidogreL  75 mg Oral Daily    furosemide (LASIX) injection  20 mg Intravenous Q12H    levetiracetam  1,000 mg Per NG tube BID    lisinopriL  5 mg Oral Daily    metoprolol succinate  25 mg Oral Daily    pantoprazole  40 mg Per NG tube Daily    PHENobarbitaL  64.8 mg Per NG tube BID    phenytoin  200 mg Per NG tube BID    polyethylene glycol  17 g Oral Q6H    sodium chloride 0.9%  10 mL Intravenous Q6H    tamsulosin  0.4 mg Oral Daily    venlafaxine  75 mg Oral BID        Continuous Infusions:       PRN Meds:  albuterol-ipratropium, hydrALAZINE, labetaloL, lorazepam, sodium chloride 0.9%, Flushing PICC Protocol **AND** sodium chloride 0.9% **AND** sodium chloride 0.9%       Assessment/Plan:  Acute hypoxic respiratory failure on 4 L of oxygen   Possible pulmonary edema   1/2 Staph epidermidis bacteremia   Leukocytosis, improving  Hypomagnesemia   Hypokalemia   Right-sided  pneumonia with effusion  Gram-negative joy UTI    Recurrent Seizures     Acute Hypoxemic Respiratory Failure requiring intubation and mechanical ventilation, now extubation    Urinary retention    Chronic HFrEF/ ICMO 25-30%    Afib/flutter on Eliquis    Morbid Obesity    Hx of HTN, CAD s/p NSTEMI, seizure disorder, COPD, and chronic venous insufficiency     Continue Lasix 20 mg IV b.i.d.  Continue Eliquis and amiodarone, Lopressor  Consult Infectious Disease   check CBC BMP in a.m.   Discharge destination-back to nursing home    VTE prophylaxis:     Patient condition:  Stable/Fair/Guarded/ Serious/ Critical    Anticipated discharge and Disposition:         All diagnosis and differential diagnosis have been reviewed; assessment and plan has been documented; I have personally reviewed the labs and test results that are presently available; I have reviewed the patients medication list; I have reviewed the consulting providers response and recommendations. I have reviewed or attempted to review medical records based upon their availability    All of the patient's questions have been  addressed and answered. Patient's is agreeable to the above stated plan. I will continue to monitor closely and make adjustments to medical management as needed.  _____________________________________________________________________    Nutrition Status:    Radiology:  X-Ray Chest AP Portable  Narrative: EXAMINATION:  XR CHEST AP PORTABLE    CLINICAL HISTORY:  Confirm ETT placement;, .    COMPARISON:  10/05/2022    FINDINGS:  An AP view or more reveals the heart to be mildly enlarged.  There is increasing patchy hazy opacity at the right mid lung.  There is interim placement of an ET tube with the tip below the thoracic inlet and approximately 3 cm above the nallely.  There is interim placement of an NG tube with the tip not included on the exam coursing toward the upper abdomen.  Degenerative changes and curvature are noted to the thoracic  spine.  The left hemidiaphragm is obscured.  Impression: 1. Interim increasing patchy hazy opacity right mid lung suspicious for progressing infiltrate and subsegmental atelectasis  2. Obscured left hemidiaphragm suspicious for infiltrate and small left basilar pleural reaction  3. Mildly elevated right hemidiaphragm  4. Mild cardiomegaly  5. Interim placement ET tube and NG tube  6. Atherosclerosis  7. Thoracic spondylosis and scoliosis    Electronically signed by: Campos Dobson  Date:    10/06/2022  Time:    08:47  X-Ray Abdomen AP 1 View  Narrative: EXAMINATION:  XR ABDOMEN AP 1 VIEW    CLINICAL HISTORY:  femoral line;, .    COMPARISON:  None available    FINDINGS:  Single AP view is limited secondary to patient body habitus and lack of beam penetration.  A line/catheter superimposed over the right medial upper thigh with the tip near the lesser trochanter.  A catheter is present within the midline pelvis suspicious for Anaya catheter.  Clinical correlation is indicated.  Iliac stents are evident.  Arthritic changes are evident at the hips bilaterally.  Impression: 1. Suspect right femoral line with the tip below the level of the right lesser trochanter  2. Suspect Anaya catheter  3. Iliac stents  4. Osteoarthritis  5. Atherosclerosis    Electronically signed by: Campos Dobson  Date:    10/06/2022  Time:    08:37  CT Head Without Contrast  Narrative: EXAMINATION:  CT HEAD WITHOUT CONTRAST    CLINICAL HISTORY:  Mental status change, unknown cause;    TECHNIQUE:  Axial scans were obtained from skull base to the vertex.    Coronal and sagittal reconstructions obtained from the axial data.    Automatic exposure control was utilized to limit radiation dose.    Contrast: None    Radiation Dose:    Total DLP: 1048 mGy*cm    COMPARISON:  CT head dated 01/17/2022    FINDINGS:  There is no acute intracranial hemorrhage or edema.  There is encephalomalacia in the left cerebellum.  Patchy hypodensities in the subcortical and  periventricular white matter likely represent chronic microvascular ischemic changes.    There is no mass effect or midline shift.  There is diffuse parenchymal volume loss.  The basal cisterns are patent. There is no abnormal extra-axial fluid collection.  Carotid and vertebral artery calcifications are noted.    The calvarium and skull base are intact. The visualized paranasal sinuses and the mastoid air cells are clear.  Impression: 1. No acute intracranial abnormality.  2. Chronic microvascular ischemic changes.  No significant change from the [facility]hawk interpretation    Electronically signed by: Tiffanie Keene  Date:    10/06/2022  Time:    06:59  X-Ray Chest 1 View  Narrative: EXAMINATION:  XR CHEST 1 VIEW    CLINICAL HISTORY:  intubated;    TECHNIQUE:  AP chest    COMPARISON:  Chest x-ray dated 10/05/2022    FINDINGS:  The endotracheal tube has its tip 4 cm above the nallely.  Nasogastric tube courses below the diaphragm.  The heart is stable in size.  There is a small right pleural effusion with basilar airspace opacity.  There is no definite visible pneumothorax.  Impression: Small right pleural effusion with basilar airspace opacity.    Electronically signed by: Tiffanie Keene  Date:    10/06/2022  Time:    06:04      Emily Brennan MD   10/11/2022

## 2022-10-12 LAB
ABS NEUT (OLG): 7.82 X10(3)/MCL (ref 2.1–9.2)
ANION GAP SERPL CALC-SCNC: 8 MEQ/L
BACTERIA BLD CULT: NORMAL
BASOPHILS NFR BLD MANUAL: 0.17 X10(3)/MCL (ref 0–0.2)
BASOPHILS NFR BLD MANUAL: 1 %
BUN SERPL-MCNC: 9.7 MG/DL (ref 8.4–25.7)
CALCIUM SERPL-MCNC: 9 MG/DL (ref 8.8–10)
CHLORIDE SERPL-SCNC: 100 MMOL/L (ref 98–107)
CO2 SERPL-SCNC: 28 MMOL/L (ref 23–31)
CREAT SERPL-MCNC: 0.93 MG/DL (ref 0.73–1.18)
CREAT/UREA NIT SERPL: 10
EOSINOPHIL NFR BLD MANUAL: 0.17 X10(3)/MCL (ref 0–0.9)
EOSINOPHIL NFR BLD MANUAL: 1 %
ERYTHROCYTE [DISTWIDTH] IN BLOOD BY AUTOMATED COUNT: 14 % (ref 11.5–17)
GFR SERPLBLD CREATININE-BSD FMLA CKD-EPI: >60 MLS/MIN/1.73/M2
GLUCOSE SERPL-MCNC: 90 MG/DL (ref 82–115)
HCT VFR BLD AUTO: 39.1 % (ref 42–52)
HGB BLD-MCNC: 12.8 GM/DL (ref 14–18)
IMM GRANULOCYTES # BLD AUTO: 0.08 X10(3)/MCL (ref 0–0.04)
IMM GRANULOCYTES NFR BLD AUTO: 0.5 %
INSTRUMENT WBC (OLG): 17 X10(3)/MCL
LYMPHOCYTES NFR BLD MANUAL: 46 %
LYMPHOCYTES NFR BLD MANUAL: 7.82 X10(3)/MCL
MCH RBC QN AUTO: 29.2 PG (ref 27–31)
MCHC RBC AUTO-ENTMCNC: 32.7 MG/DL (ref 33–36)
MCV RBC AUTO: 89.1 FL (ref 80–94)
MONOCYTES NFR BLD MANUAL: 1.19 X10(3)/MCL (ref 0.1–1.3)
MONOCYTES NFR BLD MANUAL: 7 %
NEUTROPHILS NFR BLD MANUAL: 46 %
NRBC BLD AUTO-RTO: 0 %
PLATELET # BLD AUTO: 189 X10(3)/MCL (ref 130–400)
PLATELET # BLD EST: ADEQUATE 10*3/UL
PMV BLD AUTO: 10 FL (ref 7.4–10.4)
POTASSIUM SERPL-SCNC: 3.5 MMOL/L (ref 3.5–5.1)
RBC # BLD AUTO: 4.39 X10(6)/MCL (ref 4.7–6.1)
RBC MORPH BLD: NORMAL
SODIUM SERPL-SCNC: 136 MMOL/L (ref 136–145)
WBC # SPEC AUTO: 17.5 X10(3)/MCL (ref 4.5–11.5)

## 2022-10-12 PROCEDURE — 25000003 PHARM REV CODE 250: Performed by: INTERNAL MEDICINE

## 2022-10-12 PROCEDURE — 20000000 HC ICU ROOM

## 2022-10-12 PROCEDURE — A4216 STERILE WATER/SALINE, 10 ML: HCPCS | Performed by: STUDENT IN AN ORGANIZED HEALTH CARE EDUCATION/TRAINING PROGRAM

## 2022-10-12 PROCEDURE — 25000003 PHARM REV CODE 250: Performed by: STUDENT IN AN ORGANIZED HEALTH CARE EDUCATION/TRAINING PROGRAM

## 2022-10-12 PROCEDURE — 85025 COMPLETE CBC W/AUTO DIFF WBC: CPT | Performed by: INTERNAL MEDICINE

## 2022-10-12 PROCEDURE — 63600175 PHARM REV CODE 636 W HCPCS: Performed by: INTERNAL MEDICINE

## 2022-10-12 PROCEDURE — 80048 BASIC METABOLIC PNL TOTAL CA: CPT | Performed by: INTERNAL MEDICINE

## 2022-10-12 PROCEDURE — 99900035 HC TECH TIME PER 15 MIN (STAT)

## 2022-10-12 PROCEDURE — 25000003 PHARM REV CODE 250: Performed by: NURSE PRACTITIONER

## 2022-10-12 PROCEDURE — 99233 PR SUBSEQUENT HOSPITAL CARE,LEVL III: ICD-10-PCS | Mod: ,,, | Performed by: GENERAL PRACTICE

## 2022-10-12 PROCEDURE — 85027 COMPLETE CBC AUTOMATED: CPT | Performed by: INTERNAL MEDICINE

## 2022-10-12 PROCEDURE — 25000242 PHARM REV CODE 250 ALT 637 W/ HCPCS

## 2022-10-12 PROCEDURE — 36415 COLL VENOUS BLD VENIPUNCTURE: CPT | Performed by: INTERNAL MEDICINE

## 2022-10-12 PROCEDURE — 94761 N-INVAS EAR/PLS OXIMETRY MLT: CPT

## 2022-10-12 PROCEDURE — 27000221 HC OXYGEN, UP TO 24 HOURS

## 2022-10-12 PROCEDURE — 99233 SBSQ HOSP IP/OBS HIGH 50: CPT | Mod: ,,, | Performed by: GENERAL PRACTICE

## 2022-10-12 RX ORDER — FUROSEMIDE 20 MG/1
20 TABLET ORAL 2 TIMES DAILY
Status: DISCONTINUED | OUTPATIENT
Start: 2022-10-12 | End: 2022-10-14 | Stop reason: HOSPADM

## 2022-10-12 RX ORDER — POLYETHYLENE GLYCOL 3350 17 G/17G
17 POWDER, FOR SOLUTION ORAL 3 TIMES DAILY PRN
Status: DISCONTINUED | OUTPATIENT
Start: 2022-10-12 | End: 2022-10-14 | Stop reason: HOSPADM

## 2022-10-12 RX ORDER — LEVETIRACETAM 500 MG/1
1000 TABLET ORAL 2 TIMES DAILY
Status: DISCONTINUED | OUTPATIENT
Start: 2022-10-12 | End: 2022-10-14 | Stop reason: HOSPADM

## 2022-10-12 RX ADMIN — PHENYTOIN 200 MG: 125 SUSPENSION ORAL at 09:10

## 2022-10-12 RX ADMIN — LEVETIRACETAM 1000 MG: 500 TABLET, FILM COATED ORAL at 09:10

## 2022-10-12 RX ADMIN — PHENOBARBITAL 64.8 MG: 32.4 TABLET ORAL at 08:10

## 2022-10-12 RX ADMIN — LEVETIRACETAM 1000 MG: 100 SOLUTION ORAL at 08:10

## 2022-10-12 RX ADMIN — LISINOPRIL 5 MG: 5 TABLET ORAL at 05:10

## 2022-10-12 RX ADMIN — FUROSEMIDE 20 MG: 10 INJECTION, SOLUTION INTRAMUSCULAR; INTRAVENOUS at 08:10

## 2022-10-12 RX ADMIN — FUROSEMIDE 20 MG: 20 TABLET ORAL at 05:10

## 2022-10-12 RX ADMIN — PHENYTOIN 200 MG: 125 SUSPENSION ORAL at 08:10

## 2022-10-12 RX ADMIN — SODIUM CHLORIDE, PRESERVATIVE FREE 10 ML: 5 INJECTION INTRAVENOUS at 12:10

## 2022-10-12 RX ADMIN — VENLAFAXINE 75 MG: 37.5 TABLET ORAL at 09:10

## 2022-10-12 RX ADMIN — SODIUM CHLORIDE, PRESERVATIVE FREE 10 ML: 5 INJECTION INTRAVENOUS at 11:10

## 2022-10-12 RX ADMIN — SODIUM CHLORIDE, PRESERVATIVE FREE 10 ML: 5 INJECTION INTRAVENOUS at 06:10

## 2022-10-12 RX ADMIN — ATORVASTATIN CALCIUM 40 MG: 40 TABLET, FILM COATED ORAL at 09:10

## 2022-10-12 RX ADMIN — VENLAFAXINE 75 MG: 37.5 TABLET ORAL at 08:10

## 2022-10-12 RX ADMIN — PHENOBARBITAL 64.8 MG: 32.4 TABLET ORAL at 10:10

## 2022-10-12 RX ADMIN — CLOPIDOGREL 75 MG: 75 TABLET, FILM COATED ORAL at 05:10

## 2022-10-12 RX ADMIN — APIXABAN 5 MG: 5 TABLET, FILM COATED ORAL at 09:10

## 2022-10-12 RX ADMIN — METOPROLOL SUCCINATE 25 MG: 25 TABLET, EXTENDED RELEASE ORAL at 05:10

## 2022-10-12 RX ADMIN — AMIODARONE HYDROCHLORIDE 200 MG: 200 TABLET ORAL at 08:10

## 2022-10-12 RX ADMIN — APIXABAN 5 MG: 5 TABLET, FILM COATED ORAL at 08:10

## 2022-10-12 RX ADMIN — TAMSULOSIN HYDROCHLORIDE 0.4 MG: 0.4 CAPSULE ORAL at 05:10

## 2022-10-12 RX ADMIN — PANTOPRAZOLE SODIUM 40 MG: 40 GRANULE, DELAYED RELEASE ORAL at 08:10

## 2022-10-12 RX ADMIN — SODIUM CHLORIDE, PRESERVATIVE FREE 10 ML: 5 INJECTION INTRAVENOUS at 05:10

## 2022-10-12 NOTE — PROGRESS NOTES
[facility]  Hospital Medicine Progress Note        Chief Complaint: Inpatient Follow-up for UTI    HPI:   John Wayne is a 76 y.o. male with a PMHx of  HTN, CAD s/p NSTEMI, seizure disorder, A. fib/a flutter on Eliquis, COPD, and chronic venous insufficiency who presented to [facility] on 10/6/2022 as a transfer for a higher level of care. He initially presented to [facility] via EMS from [facility] following a reported seizure lasting approximately 25 mins. CT Head negative for acute abnormalities. He was started on Cipro for acute cystitis. Upon ED arrival he was noted to be in significant respiratory distress and he was intubated for airway protection. He was loaded with Keppra and given IV Ativan for seizure-like activity. He was transferred to [facility] for neurology and ICU services. He was admitted to ICU. Neurology was consulted. Repeat CT Head negative for acute abnormality. EEG was abnormal, diffuse slowing consistent with moderate cerebral dysfunction that can be found in toxic metabolic anoxic brain injury or postictal event; presence of intermittent spike wave activity in both frontal temporal areas, which can be consistent but not conclusive with epileptiform discharges. Neurology continues to follow. Seizure medications resumed per neurology. He was extubated, now on NC. Downgraded to the floor on 10/7/22 under hospitalist services.      Interval Hx:   Patient today awake and comfortable. Oriented to self and following all verbal commands. Has been afebrile. Tolerating po diet.     Objective/physical exam:  General: In no acute distress, afebrile  Chest: Clear to auscultation bilaterally  Heart: RRR, +S1, S2, no appreciable murmur  Abdomen: Soft, nontender, BS +  MSK: Warm, no lower extremity edema, no clubbing or cyanosis  Neurologic: Alert and oriented x4, Cranial nerve II-XII intact, Strength 5/5 in all 4 extremities    VITAL SIGNS: 24 HRS MIN & MAX LAST   Temp   Min: 97.5 °F (36.4 °C)  Max: 99 °F (37.2 °C) 97.9 °F (36.6 °C)   BP  Min: 84/34  Max: 137/58 131/60   Pulse  Min: 53  Max: 60  (!) 57     Resp  Min: 16  Max: 22 (!) 22     SpO2  Min: 89 %  Max: 95 % (!) 94 %         Recent Labs   Lab 10/09/22  0141 10/10/22  0831 10/12/22  0202   WBC 15.2* 15.8* 17.5*   RBC 4.48* 4.29* 4.39*   HGB 13.2* 12.6* 12.8*   HCT 39.8* 38.1* 39.1*   MCV 88.8 88.8 89.1   MCH 29.5 29.4 29.2   MCHC 33.2 33.1 32.7*   RDW 14.6 14.6 14.0    184 189   MPV 10.4 10.2 10.0       Recent Labs   Lab 10/05/22  1914 10/05/22  1925 10/06/22  0223 10/06/22  0238 10/06/22  0936 10/07/22  0150 10/08/22  0124 10/09/22  0141 10/10/22  0831 10/12/22  0202   NA  --    < >  --  136  --  139 139 137 138 136   K  --    < >  --  3.7  --  3.6 4.1 3.3* 4.0 3.5   CO2  --    < >  --  25  --  24 25 24 28 28   BUN  --    < >  --  10.2  --  8.4 8.3* 7.3* 8.1* 9.7   CREATININE  --    < >  --  0.91  --  0.78 0.83 0.79 0.77 0.93   CALCIUM  --    < >  --  8.9  --  8.6* 8.6* 8.9 9.1 9.0   PH 7.358  --  7.39  --  7.43  --   --   --   --   --    MG  --    < >  --  1.80  --  1.70 1.70 1.60 2.00  --    ALBUMIN  --    < >  --  3.1*  --  2.8* 2.8*  --   --   --    ALKPHOS  --    < >  --  106  --  82 83  --   --   --    ALT  --    < >  --  16  --  12 12  --   --   --    AST  --    < >  --  16  --  14 17  --   --   --    BILITOT  --    < >  --  0.3  --  0.4 0.3  --   --   --     < > = values in this interval not displayed.          Microbiology Results (last 7 days)       Procedure Component Value Units Date/Time    Blood Culture [[id number]]     Order Status: Sent Specimen: Blood     Blood Culture [[id number]]     Order Status: Sent Specimen: Blood     Respiratory Culture [[id number]]     Order Status: Sent Specimen: Sputum     Blood Culture [[id number]]  (Normal) Collected: 10/07/22 3776    Order Status: Completed Specimen: Blood, Venous Updated: 10/12/22 1100     CULTURE, BLOOD ([facility]) No Growth At 96 Hours    Blood Culture  [[id number]]  (Normal) Collected: 10/07/22 2326    Order Status: Completed Specimen: Blood, Venous Updated: 10/12/22 1100     CULTURE, BLOOD ([facility]) No Growth At 96 Hours    Blood Culture [[id number]]  (Normal) Collected: 10/07/22 0150    Order Status: Completed Specimen: Blood from Arm, Right Updated: 10/12/22 0301     CULTURE, BLOOD ([facility]) No Growth at 5 days    Blood Culture [[id number]]  (Abnormal)  (Susceptibility) Collected: 10/07/22 0150    Order Status: Completed Specimen: Blood from Arm, Left Updated: 10/09/22 0625     CULTURE, BLOOD ([facility]) Staphylococcus epidermidis     GRAM STAIN Gram Positive Cocci, probable Staphylococcus      1 of 1 Pediatric bottle positive      Seen in gram stain of broth only             See below for Radiology    Scheduled Med:   amiodarone  200 mg Oral Daily    apixaban  5 mg Per OG tube BID    atorvastatin  40 mg Oral QHS    clopidogreL  75 mg Oral Daily    furosemide (LASIX) injection  20 mg Intravenous Q12H    levETIRAcetam  1,000 mg Oral BID    lisinopriL  5 mg Oral Daily    metoprolol succinate  25 mg Oral Daily    pantoprazole  40 mg Per NG tube Daily    PHENobarbitaL  64.8 mg Per NG tube BID    phenytoin  200 mg Per NG tube BID    sodium chloride 0.9%  10 mL Intravenous Q6H    tamsulosin  0.4 mg Oral Daily    venlafaxine  75 mg Oral BID        Continuous Infusions:       PRN Meds:  albuterol-ipratropium, hydrALAZINE, labetaloL, lorazepam, polyethylene glycol, sodium chloride 0.9%, Flushing PICC Protocol **AND** sodium chloride 0.9% **AND** sodium chloride 0.9%       Assessment/Plan:  Acute hypoxic respiratory failure on 4 L of oxygen : resolved   Gram-negative joy UTI   Recurrent Seizures   Urinary retention  ? Aspiration from seizures      Acute on Chronic HFrEF/ ICMO 25-30%     Afib/flutter on Eliquis     Morbid Obesity     Hx of HTN, CAD s/p NSTEMI, seizure disorder, COPD, and chronic venous insufficiency     Plan:  Patient making a good clinical recovery   Now tolerating po  diet  No seizures. Cont po keppra   Switched to po lasix 20 mg BID  Will closely monitor patients daily weight, urine out put, renal parameters and volume status    Will start bladder training and dc fletcher in am   No signs of sepsis and seen by ID team and recommend no antibiotics     VTE prophylaxis: Eliquis     Patient condition:  Fair    Anticipated discharge and Disposition:         All diagnosis and differential diagnosis have been reviewed; assessment and plan has been documented; I have personally reviewed the labs and test results that are presently available; I have reviewed the patients medication list; I have reviewed the consulting providers response and recommendations. I have reviewed or attempted to review medical records based upon their availability    All of the patient's questions have been  addressed and answered. Patient's is agreeable to the above stated plan. I will continue to monitor closely and make adjustments to medical management as needed.  _____________________________________________________________________    Nutrition Status:    Radiology:  X-Ray Chest 1 View  Narrative: EXAMINATION:  XR CHEST 1 VIEW    CLINICAL HISTORY:  PNA;, .    TECHNIQUE:  October 6, 2022    FINDINGS:  No alveolar consolidation, effusion, or pneumothorax is seen.   The thoracic aorta is normal  cardiac silhouette, central pulmonary vessels and mediastinum are normal in size and are grossly unremarkable.   visualized osseous structures are grossly unremarkable.  Impression: No acute chest disease is identified.    Electronically signed by: Ahsan Kim  Date:    10/12/2022  Time:    15:02      Per Cano MD   10/12/2022

## 2022-10-12 NOTE — PROGRESS NOTES
"Inpatient Nutrition Evaluation    Admit Date: 10/6/2022   Total duration of encounter: 6 days    Nutrition Recommendation/Prescription     Continue oral diet as tolerated, consistency per SLP.    Nutrition Assessment     Chart Review    Reason Seen: length of stay    Diagnosis:  Acute hypoxic respiratory failure  Possible pulmonary edema  Staph epidermidis bacteremia   Leukocytosis, improving  Hypomagnesemia   Hypokalemia   Right-sided pneumonia with effusion  Gram-negative joy UTI   Recurrent Seizures     Relevant Medical History: HTN, CAD s/p NSTEMI, seizure disorder, A. fib/a flutter on Eliquis, COPD, chronic venous insufficiency    Nutrition-Related Medications: furosemide, pantoprazole, phenobarbital, phenytoin, Miralax    Nutrition-Related Labs:  10/12/22 BMP WNL    Diet Order: Diet Soft & Bite Sized Supervision with Meals  Oral Supplement Order: none at this time  Appetite/Oral Intake: good/% of meals  Factors Affecting Nutritional Intake: none identified at this time  Food/Islam/Cultural Preferences: none reported    Skin Integrity: intact  Wound(s):   N/A    Comments    10/12/22 Patient reports a good appetite, does not suspect weight loss, he is unsure of usual weight.    Anthropometrics    Height: 5' 7" (170.2 cm)    Last Weight: 118 kg (260 lb 2.3 oz) (10/06/22 0200) Weight Method: Bed Scale  BMI (Calculated): 40.7  BMI Classification: obese grade III (BMI >/=40)        Ideal Body Weight (IBW), Male: 148 lb     % Ideal Body Weight, Male (lb): 175.78 %                          Usual Weight Provided By: unable to obtain usual weight at this time and patient denies unintentional weight loss    Wt Readings from Last 5 Encounters:   10/06/22 118 kg (260 lb 2.3 oz)   10/05/22 120 kg (264 lb 8.8 oz)   07/20/22 99.8 kg (220 lb)     Weight Change(s) Since Admission:  Admit Weight: 120 kg (264 lb 8.8 oz) (10/06/22 0101)  118 kg (10/6/22), noted patient on furosemide, fluid weight loss expected    Patient " Education    Not applicable.    Monitoring & Evaluation     Dietitian will monitor food and beverage intake.  Nutrition Risk/Follow-Up: low (follow-up in 5-7 days)  Patients assigned 'low nutrition risk' status do not qualify for a full nutritional assessment but will be monitored and re-evaluated in a 5-7 day time period.

## 2022-10-13 LAB
ABS NEUT (OLG): 12.08 X10(3)/MCL (ref 2.1–9.2)
ANION GAP SERPL CALC-SCNC: 7 MEQ/L
BACTERIA BLD CULT: NORMAL
BACTERIA BLD CULT: NORMAL
BUN SERPL-MCNC: 10.5 MG/DL (ref 8.4–25.7)
CALCIUM SERPL-MCNC: 9.1 MG/DL (ref 8.8–10)
CHLORIDE SERPL-SCNC: 103 MMOL/L (ref 98–107)
CO2 SERPL-SCNC: 25 MMOL/L (ref 23–31)
CREAT SERPL-MCNC: 0.92 MG/DL (ref 0.73–1.18)
CREAT/UREA NIT SERPL: 11
EOSINOPHIL NFR BLD MANUAL: 0.18 X10(3)/MCL (ref 0–0.9)
EOSINOPHIL NFR BLD MANUAL: 1 %
ERYTHROCYTE [DISTWIDTH] IN BLOOD BY AUTOMATED COUNT: 13.7 % (ref 11.5–17)
GFR SERPLBLD CREATININE-BSD FMLA CKD-EPI: >60 MLS/MIN/1.73/M2
GLUCOSE SERPL-MCNC: 80 MG/DL (ref 82–115)
HCT VFR BLD AUTO: 40.6 % (ref 42–52)
HGB BLD-MCNC: 13.5 GM/DL (ref 14–18)
IMM GRANULOCYTES # BLD AUTO: 0.07 X10(3)/MCL (ref 0–0.04)
IMM GRANULOCYTES NFR BLD AUTO: 0.4 %
INSTRUMENT WBC (OLG): 18.3 X10(3)/MCL
LYMPHOCYTES NFR BLD MANUAL: 27 %
LYMPHOCYTES NFR BLD MANUAL: 4.94 X10(3)/MCL
MCH RBC QN AUTO: 29.3 PG (ref 27–31)
MCHC RBC AUTO-ENTMCNC: 33.3 MG/DL (ref 33–36)
MCV RBC AUTO: 88.1 FL (ref 80–94)
MONOCYTES NFR BLD MANUAL: 1.28 X10(3)/MCL (ref 0.1–1.3)
MONOCYTES NFR BLD MANUAL: 7 %
NEUTROPHILS NFR BLD MANUAL: 66 %
NRBC BLD AUTO-RTO: 0 %
PLATELET # BLD AUTO: 196 X10(3)/MCL (ref 130–400)
PLATELET # BLD EST: NORMAL 10*3/UL
PMV BLD AUTO: 10 FL (ref 7.4–10.4)
POTASSIUM SERPL-SCNC: 4.7 MMOL/L (ref 3.5–5.1)
RBC # BLD AUTO: 4.61 X10(6)/MCL (ref 4.7–6.1)
RBC MORPH BLD: NORMAL
SODIUM SERPL-SCNC: 135 MMOL/L (ref 136–145)
WBC # SPEC AUTO: 18.3 X10(3)/MCL (ref 4.5–11.5)

## 2022-10-13 PROCEDURE — 25000003 PHARM REV CODE 250: Performed by: INTERNAL MEDICINE

## 2022-10-13 PROCEDURE — 99232 SBSQ HOSP IP/OBS MODERATE 35: CPT | Mod: FS,,, | Performed by: GENERAL PRACTICE

## 2022-10-13 PROCEDURE — 25000003 PHARM REV CODE 250: Performed by: NURSE PRACTITIONER

## 2022-10-13 PROCEDURE — 25000003 PHARM REV CODE 250: Performed by: STUDENT IN AN ORGANIZED HEALTH CARE EDUCATION/TRAINING PROGRAM

## 2022-10-13 PROCEDURE — 99232 PR SUBSEQUENT HOSPITAL CARE,LEVL II: ICD-10-PCS | Mod: FS,,, | Performed by: GENERAL PRACTICE

## 2022-10-13 PROCEDURE — 85027 COMPLETE CBC AUTOMATED: CPT | Performed by: INTERNAL MEDICINE

## 2022-10-13 PROCEDURE — 36415 COLL VENOUS BLD VENIPUNCTURE: CPT | Performed by: GENERAL PRACTICE

## 2022-10-13 PROCEDURE — 80048 BASIC METABOLIC PNL TOTAL CA: CPT | Performed by: INTERNAL MEDICINE

## 2022-10-13 PROCEDURE — 85025 COMPLETE CBC W/AUTO DIFF WBC: CPT | Performed by: INTERNAL MEDICINE

## 2022-10-13 PROCEDURE — 87040 BLOOD CULTURE FOR BACTERIA: CPT | Performed by: GENERAL PRACTICE

## 2022-10-13 PROCEDURE — 27000221 HC OXYGEN, UP TO 24 HOURS

## 2022-10-13 PROCEDURE — 25000242 PHARM REV CODE 250 ALT 637 W/ HCPCS

## 2022-10-13 PROCEDURE — 97166 OT EVAL MOD COMPLEX 45 MIN: CPT

## 2022-10-13 PROCEDURE — 20000000 HC ICU ROOM

## 2022-10-13 PROCEDURE — A4216 STERILE WATER/SALINE, 10 ML: HCPCS | Performed by: STUDENT IN AN ORGANIZED HEALTH CARE EDUCATION/TRAINING PROGRAM

## 2022-10-13 RX ORDER — FINASTERIDE 5 MG/1
5 TABLET, FILM COATED ORAL DAILY
Status: DISCONTINUED | OUTPATIENT
Start: 2022-10-13 | End: 2022-10-14 | Stop reason: HOSPADM

## 2022-10-13 RX ADMIN — APIXABAN 5 MG: 5 TABLET, FILM COATED ORAL at 08:10

## 2022-10-13 RX ADMIN — PHENYTOIN 200 MG: 125 SUSPENSION ORAL at 08:10

## 2022-10-13 RX ADMIN — CLOPIDOGREL 75 MG: 75 TABLET, FILM COATED ORAL at 06:10

## 2022-10-13 RX ADMIN — SODIUM CHLORIDE, PRESERVATIVE FREE 10 ML: 5 INJECTION INTRAVENOUS at 12:10

## 2022-10-13 RX ADMIN — PHENYTOIN 200 MG: 125 SUSPENSION ORAL at 09:10

## 2022-10-13 RX ADMIN — FINASTERIDE 5 MG: 5 TABLET, FILM COATED ORAL at 03:10

## 2022-10-13 RX ADMIN — SODIUM CHLORIDE, PRESERVATIVE FREE 10 ML: 5 INJECTION INTRAVENOUS at 06:10

## 2022-10-13 RX ADMIN — PANTOPRAZOLE SODIUM 40 MG: 40 GRANULE, DELAYED RELEASE ORAL at 08:10

## 2022-10-13 RX ADMIN — VENLAFAXINE 75 MG: 37.5 TABLET ORAL at 08:10

## 2022-10-13 RX ADMIN — AMIODARONE HYDROCHLORIDE 200 MG: 200 TABLET ORAL at 08:10

## 2022-10-13 RX ADMIN — LEVETIRACETAM 1000 MG: 500 TABLET, FILM COATED ORAL at 08:10

## 2022-10-13 RX ADMIN — FUROSEMIDE 20 MG: 20 TABLET ORAL at 08:10

## 2022-10-13 RX ADMIN — PHENOBARBITAL 64.8 MG: 32.4 TABLET ORAL at 08:10

## 2022-10-13 RX ADMIN — TAMSULOSIN HYDROCHLORIDE 0.4 MG: 0.4 CAPSULE ORAL at 06:10

## 2022-10-13 RX ADMIN — FUROSEMIDE 20 MG: 20 TABLET ORAL at 06:10

## 2022-10-13 RX ADMIN — LISINOPRIL 5 MG: 5 TABLET ORAL at 06:10

## 2022-10-13 RX ADMIN — ATORVASTATIN CALCIUM 40 MG: 40 TABLET, FILM COATED ORAL at 08:10

## 2022-10-13 NOTE — PROGRESS NOTES
Infectious Disease  Progress Note    Patient Name: John Wayne   MRN: [id number]   Admission Date: 10/6/2022   Hospital Length of Stay: 7 days  Attending Physician: Maciel Layne MD   Primary Care Provider: SHAY Schmidt MD     Isolation Status: No active isolations       Subjective:     Principal Problem: Acute respiratory failure with hypercapnia     Interval History:   Bps better overall, although home meds held today.  AF.  No complaints at present.  No issues overnight.     Review of Systems   Review of Systems   All other systems reviewed and are negative.     Objective:     Vital Signs (Most Recent):  Temp: 97.4 °F (36.3 °C) (10/13/22 1115)  Pulse: (!) 53 (10/13/22 1115)  Resp: 19 (10/13/22 1115)  BP: (!) 144/63 (10/13/22 1115)  SpO2: 97 % (10/13/22 1115)    Vital Signs (24h Range):  Temp:  [97.4 °F (36.3 °C)-98.1 °F (36.7 °C)] 97.4 °F (36.3 °C)  Pulse:  [51-64] 53  Resp:  [16-23] 19  SpO2:  [83 %-97 %] 97 %  BP: ()/(34-64) 144/63      Weight:   Wt Readings from Last 1 Encounters:   10/06/22 118 kg (260 lb 2.3 oz)      Body mass index is Body mass index is 40.74 kg/m².     Estimated Creatinine Clearance: Estimated Creatinine Clearance: 84 mL/min (based on SCr of 0.92 mg/dL).     Lines/Drains/Airways       Drain  Duration                  Urethral Catheter 10/07/22 1700 6 days              Peripheral Intravenous Line  Duration                  Peripheral IV - Single Lumen 10/05/22 2130 18 G Anterior;Distal;Right Upper Arm 7 days         Midline Catheter Insertion/Assessment  - Single Lumen 10/07/22 0120 Left basilic vein (medial side of arm) 6 days                     Physical Exam  Physical Exam  Constitutional:       Appearance: Normal appearance.   HENT:      Head: Normocephalic and atraumatic.      Mouth/Throat:      Pharynx: No oropharyngeal exudate or posterior oropharyngeal erythema.   Eyes:      Extraocular Movements: Extraocular movements intact.      Pupils: Pupils are equal, round, and  reactive to light.   Cardiovascular:      Rate and Rhythm: Normal rate and regular rhythm.      Heart sounds: No murmur heard.  Pulmonary:      Effort: No respiratory distress.      Breath sounds: Rhonchi present. No wheezing or rales.   Abdominal:      General: Bowel sounds are normal. There is no distension.      Palpations: Abdomen is soft.      Tenderness: There is no abdominal tenderness. There is no right CVA tenderness or left CVA tenderness.   Musculoskeletal:         General: No swelling or tenderness.      Cervical back: Neck supple. No rigidity or tenderness.      Comments: Bilateral lymphedema stable   Lymphadenopathy:      Cervical: No cervical adenopathy.   Skin:     Findings: No lesion or rash.   Neurological:      General: No focal deficit present.      Mental Status: He is alert and oriented to person, place, and time. Mental status is at baseline.      Cranial Nerves: No cranial nerve deficit.      Motor: No weakness.   Psychiatric:         Mood and Affect: Mood normal.         Behavior: Behavior normal.        Significant Labs: CBC:   Recent Labs   Lab 10/12/22  0202 10/13/22  0146   WBC 17.5* 18.3*   HGB 12.8* 13.5*   HCT 39.1* 40.6*    196       CMP:   Recent Labs   Lab 10/12/22  0202 10/13/22  0146    135*   K 3.5 4.7   CO2 28 25   BUN 9.7 10.5   CREATININE 0.93 0.92   CALCIUM 9.0 9.1         Microbiology Results (last 7 days)       Procedure Component Value Units Date/Time    Blood Culture [[id number]]  (Normal) Collected: 10/07/22 2326    Order Status: Completed Specimen: Blood, Venous Updated: 10/13/22 1007     CULTURE, BLOOD ([facility]) Final Report: At 5 days. No growth    Blood Culture [[id number]]  (Normal) Collected: 10/07/22 2326    Order Status: Completed Specimen: Blood, Venous Updated: 10/13/22 1007     CULTURE, BLOOD ([facility]) Final Report: At 5 days. No growth    Blood Culture [[id number]] Collected: 10/13/22 0441    Order Status: Resulted Specimen: Blood Updated: 10/13/22 0447     Blood Culture [[id number]] Collected: 10/13/22 0146    Order Status: Resulted Specimen: Blood from Antecubital, Right Updated: 10/13/22 0200    Respiratory Culture [[id number]]     Order Status: Sent Specimen: Sputum     Blood Culture [[id number]]  (Normal) Collected: 10/07/22 0150    Order Status: Completed Specimen: Blood from Arm, Right Updated: 10/12/22 0301     CULTURE, BLOOD ([facility]) No Growth at 5 days    Blood Culture [[id number]]  (Abnormal)  (Susceptibility) Collected: 10/07/22 0150    Order Status: Completed Specimen: Blood from Arm, Left Updated: 10/09/22 0625     CULTURE, BLOOD ([facility]) Staphylococcus epidermidis     GRAM STAIN Gram Positive Cocci, probable Staphylococcus      1 of 1 Pediatric bottle positive      Seen in gram stain of broth only             Significant Imaging: I have reviewed all pertinent imaging results/findings within the past 24 hours.    Assessment/Plan:        76-year-old male patient known to have past medical history significant for hypertension, CAD, seizure disorder, AFib, COPD, presented on 10/06/2022 from a nursing home after having seizure, there were concern for urinary tract infection he was started on ciprofloxacin, in the emergency department, he needed to be intubated for airway protection and admitted to the ICU, initial blood cultures were positive 1/2 (pediatric bottles) for Staph epidermidis, and had right sided infiltrates on chest x-ray, ID is consulted for assistance in management.      Right-sided pneumonia (likely aspiration)  Staph epidermidis in blood cultures  Respiratory failure requiring intubation  Seizures  COPD  CAD  Afib  Hypotension  Leukocytosis       Plan:   BCx negative thus far.   CXR negative.  Monitor clinically and repeat labs in am.   Maintain off abx.  Discussed with patient and nursing.

## 2022-10-13 NOTE — NURSING
[facility] called at 7:45am and asked for case management to update careport   I spoke to   Sugar [phone]

## 2022-10-13 NOTE — PROGRESS NOTES
Infectious Disease  Progress Note    Patient Name: John Wayne   MRN: [id number]   Admission Date: 10/6/2022   Hospital Length of Stay: 6 days  Attending Physician: Maciel Layne MD   Primary Care Provider: SHAY Schmidt MD     Isolation Status: No active isolations       Subjective:     Principal Problem: Acute respiratory failure with hypercapnia     Interval History:   Had hypotension this morning however clinically reports being back to baseline. No fevers, no chills, no diarrhea, breathing at baseline and he reports a mild cough with no significant sputum production    Review of Systems   Review of Systems   All other systems reviewed and are negative.     Objective:     Vital Signs (Most Recent):  Temp: 97.9 °F (36.6 °C) (10/12/22 1200)  Pulse: 62 (10/12/22 1700)  Resp: 18 (10/12/22 1700)  BP: (!) 128/55 (10/12/22 1600)  SpO2: (!) 94 % (10/12/22 1700)    Vital Signs (24h Range):  Temp:  [97.5 °F (36.4 °C)-99 °F (37.2 °C)] 97.9 °F (36.6 °C)  Pulse:  [53-62] 62  Resp:  [15-22] 18  SpO2:  [89 %-95 %] 94 %  BP: ()/(34-60) 128/55      Weight:   Wt Readings from Last 1 Encounters:   10/06/22 118 kg (260 lb 2.3 oz)      Body mass index is Body mass index is 40.74 kg/m².     Estimated Creatinine Clearance: Estimated Creatinine Clearance: 83.1 mL/min (based on SCr of 0.93 mg/dL).     Lines/Drains/Airways       Drain  Duration                  Urethral Catheter 10/07/22 1700 5 days              Peripheral Intravenous Line  Duration                  Peripheral IV - Single Lumen 10/05/22 2130 18 G Anterior;Distal;Right Upper Arm 6 days         Midline Catheter Insertion/Assessment  - Single Lumen 10/07/22 0120 Left basilic vein (medial side of arm) 5 days                     Physical Exam  Physical Exam  Constitutional:       Appearance: Normal appearance.   HENT:      Head: Normocephalic and atraumatic.      Mouth/Throat:      Pharynx: No oropharyngeal exudate or posterior oropharyngeal erythema.   Eyes:       Extraocular Movements: Extraocular movements intact.      Pupils: Pupils are equal, round, and reactive to light.   Cardiovascular:      Rate and Rhythm: Normal rate and regular rhythm.      Heart sounds: No murmur heard.  Pulmonary:      Effort: No respiratory distress.      Breath sounds: Rhonchi present. No wheezing or rales.   Abdominal:      General: Bowel sounds are normal. There is no distension.      Palpations: Abdomen is soft.      Tenderness: There is no abdominal tenderness. There is no right CVA tenderness or left CVA tenderness.   Musculoskeletal:         General: No swelling or tenderness.      Cervical back: Neck supple. No rigidity or tenderness.      Comments: Bilateral lymphedema stable   Lymphadenopathy:      Cervical: No cervical adenopathy.   Skin:     Findings: No lesion or rash.   Neurological:      General: No focal deficit present.      Mental Status: He is alert and oriented to person, place, and time. Mental status is at baseline.      Cranial Nerves: No cranial nerve deficit.      Motor: No weakness.   Psychiatric:         Mood and Affect: Mood normal.         Behavior: Behavior normal.        Significant Labs: CBC:   Recent Labs   Lab 10/12/22  0202   WBC 17.5*   HGB 12.8*   HCT 39.1*        CMP:   Recent Labs   Lab 10/12/22  0202      K 3.5   CO2 28   BUN 9.7   CREATININE 0.93   CALCIUM 9.0       Microbiology Results (last 7 days)       Procedure Component Value Units Date/Time    Blood Culture [[id number]]     Order Status: Sent Specimen: Blood     Blood Culture [[id number]]     Order Status: Sent Specimen: Blood     Respiratory Culture [[id number]]     Order Status: Sent Specimen: Sputum     Blood Culture [[id number]]  (Normal) Collected: 10/07/22 2326    Order Status: Completed Specimen: Blood, Venous Updated: 10/12/22 1100     CULTURE, BLOOD ([facility]) No Growth At 96 Hours    Blood Culture [[id number]]  (Normal) Collected: 10/07/22 2326    Order Status: Completed Specimen:  Blood, Venous Updated: 10/12/22 1100     CULTURE, BLOOD ([facility]) No Growth At 96 Hours    Blood Culture [[id number]]  (Normal) Collected: 10/07/22 0150    Order Status: Completed Specimen: Blood from Arm, Right Updated: 10/12/22 0301     CULTURE, BLOOD ([facility]) No Growth at 5 days    Blood Culture [[id number]]  (Abnormal)  (Susceptibility) Collected: 10/07/22 0150    Order Status: Completed Specimen: Blood from Arm, Left Updated: 10/09/22 0625     CULTURE, BLOOD ([facility]) Staphylococcus epidermidis     GRAM STAIN Gram Positive Cocci, probable Staphylococcus      1 of 1 Pediatric bottle positive      Seen in gram stain of broth only             Significant Imaging: I have reviewed all pertinent imaging results/findings within the past 24 hours.    Assessment/Plan:        76-year-old male patient known to have past medical history significant for hypertension, CAD, seizure disorder, AFib, COPD, presented on 10/06/2022 from a nursing home after having seizure, there were concern for urinary tract infection he was started on ciprofloxacin, in the emergency department, he needed to be intubated for airway protection and admitted to the ICU, initial blood cultures were positive 1/2 (pediatric bottles) for Staph epidermidis, and had right sided infiltrates on chest x-ray, ID is consulted for assistance in management.      Right-sided pneumonia (likely aspiration)  Staph epidermidis in blood cultures  Respiratory failure requiring intubation  Seizures  COPD  CAD  Afib  Hypotension  Leukocytosis     -appears to be back to baseline, some additional oxygen requirement responding to diuretics  -received 7 days of antibiotic therapy   -Staph epidermidis in blood cultures likely contaminant    Plan:   -Continue monitoring off antibiotics for now however given increase in leukocytosis and episode of hypotension noted this am, repeat CXR, blood cultures, if sputum production, would obtain respiratory cultures as well    ID will continue  following. Please contact us with any questions or concerns.    Britton Rome MD  Infectious Disease  [facility]

## 2022-10-13 NOTE — PLAN OF CARE
Pt is a current resident of [facility]. Spoke with Sugar from the facility and informed her that per MD pt is ready to return tomorrow. All clinical updates have been submitted to the facility via careport.

## 2022-10-13 NOTE — PROGRESS NOTES
[facility]  Hospital Medicine Progress Note        Chief Complaint: Inpatient follow-up on status epilepticus    HPI:   John Wayne is a 76 y.o. male with a PMHx of  HTN, CAD s/p NSTEMI, seizure disorder, paroxysmal atrial fibrillation on Eliquis, COPD, and chronic venous insufficiency who presented to [facility] on 10/6/2022 as a transfer for a higher level of care. He initially presented to [facility] via EMS from [facility] following a reported seizure lasting approximately 25 mins. CT Head negative for acute abnormalities. He was started on Cipro for suspected acute cystitis. Upon ED arrival he was noted to be in significant respiratory distress and he was intubated for airway protection. He was loaded with Keppra and given IV Ativan for seizure-like activity. He was transferred to [facility] for neurology and ICU services. He was admitted to ICU. Neurology was consulted. Repeat CT Head negative for acute abnormality. EEG was abnormal, diffuse slowing consistent with moderate cerebral dysfunction that can be found in toxic metabolic anoxic brain injury or postictal event; presence of intermittent spike wave activity in both frontal temporal areas, which can be consistent but not conclusive with epileptiform discharges. Neurology continues to follow. Seizure medications resumed per neurology. He was extubated, now on NC. Downgraded to the hospital medicine service on 10/7/22.     Interval Hx:  Patient continues to boarding the intensive care unit.  He is awake and alert and oriented to self and following commands.  No further seizures have been reported.  Anaya catheter remains in place.  He is afebrile, on supplemental oxygen at 3 liters/minute via nasal cannula, and hemodynamically stable.    Objective/physical exam:  General: in no acute distress  HENT: normocephalic, atraumatic  Eye: PERRL, EOMI, clear conjunctiva  Neck: full ROM, no thyromegaly, no JVD  Respiratory:  clear to auscultation bilaterally  Cardiovascular: regular rate and rhythm  Gastrointestinal: non-distended, positive bowel sounds, non-tender  Genitourinary:  Anaya catheter in place  Musculoskeletal: chronic bilateral lower extremity lymphedema  Neurological: cranial nerves grossly intact, no focal neurological deficit  Psychiatric: cooperative, flat affect      VITAL SIGNS: 24 HRS MIN & MAX LAST   Temp  Min: 97.4 °F (36.3 °C)  Max: 98.1 °F (36.7 °C) 97.4 °F (36.3 °C)   BP  Min: 97/34  Max: 144/63 (!) 144/63   Pulse  Min: 51  Max: 70  (!) 53     Resp  Min: 0  Max: 23 19     SpO2  Min: 83 %  Max: 97 % 97 %         Recent Labs   Lab 10/10/22  0831 10/12/22  0202 10/13/22  0146   WBC 15.8* 17.5* 18.3*   RBC 4.29* 4.39* 4.61*   HGB 12.6* 12.8* 13.5*   HCT 38.1* 39.1* 40.6*   MCV 88.8 89.1 88.1   MCH 29.4 29.2 29.3   MCHC 33.1 32.7* 33.3   RDW 14.6 14.0 13.7    189 196   MPV 10.2 10.0 10.0       Recent Labs   Lab 10/07/22  0150 10/08/22  0124 10/09/22  0141 10/10/22  0831 10/12/22  0202 10/13/22  0146    139 137 138 136 135*   K 3.6 4.1 3.3* 4.0 3.5 4.7   CO2 24 25 24 28 28 25   BUN 8.4 8.3* 7.3* 8.1* 9.7 10.5   CREATININE 0.78 0.83 0.79 0.77 0.93 0.92   CALCIUM 8.6* 8.6* 8.9 9.1 9.0 9.1   MG 1.70 1.70 1.60 2.00  --   --    ALBUMIN 2.8* 2.8*  --   --   --   --    ALKPHOS 82 83  --   --   --   --    ALT 12 12  --   --   --   --    AST 14 17  --   --   --   --    BILITOT 0.4 0.3  --   --   --   --           Microbiology Results (last 7 days)       Procedure Component Value Units Date/Time    Blood Culture [[id number]]  (Normal) Collected: 10/07/22 2326    Order Status: Completed Specimen: Blood, Venous Updated: 10/13/22 1007     CULTURE, BLOOD ([facility]) Final Report: At 5 days. No growth    Blood Culture [[id number]]  (Normal) Collected: 10/07/22 2326    Order Status: Completed Specimen: Blood, Venous Updated: 10/13/22 1007     CULTURE, BLOOD ([facility]) Final Report: At 5 days. No growth    Blood Culture [[id number]]  Collected: 10/13/22 0441    Order Status: Resulted Specimen: Blood Updated: 10/13/22 0447    Blood Culture [[id number]] Collected: 10/13/22 0146    Order Status: Resulted Specimen: Blood from Antecubital, Right Updated: 10/13/22 0200    Respiratory Culture [[id number]]     Order Status: Sent Specimen: Sputum     Blood Culture [[id number]]  (Normal) Collected: 10/07/22 0150    Order Status: Completed Specimen: Blood from Arm, Right Updated: 10/12/22 0301     CULTURE, BLOOD ([facility]) No Growth at 5 days    Blood Culture [[id number]]  (Abnormal)  (Susceptibility) Collected: 10/07/22 0150    Order Status: Completed Specimen: Blood from Arm, Left Updated: 10/09/22 0625     CULTURE, BLOOD ([facility]) Staphylococcus epidermidis     GRAM STAIN Gram Positive Cocci, probable Staphylococcus      1 of 1 Pediatric bottle positive      Seen in gram stain of broth only             See below for Radiology    Scheduled Med:   amiodarone  200 mg Oral Daily    apixaban  5 mg Per OG tube BID    atorvastatin  40 mg Oral QHS    clopidogreL  75 mg Oral Daily    finasteride  5 mg Oral Daily    furosemide  20 mg Oral BID    levETIRAcetam  1,000 mg Oral BID    lisinopriL  5 mg Oral Daily    metoprolol succinate  25 mg Oral Daily    pantoprazole  40 mg Per NG tube Daily    PHENobarbitaL  64.8 mg Per NG tube BID    phenytoin  200 mg Per NG tube BID    sodium chloride 0.9%  10 mL Intravenous Q6H    tamsulosin  0.4 mg Oral Daily    venlafaxine  75 mg Oral BID        Continuous Infusions:  None.    PRN Meds:  albuterol-ipratropium, hydrALAZINE, labetaloL, lorazepam, polyethylene glycol, sodium chloride 0.9%, Flushing PICC Protocol **AND** sodium chloride 0.9% **AND** sodium chloride 0.9%       Assessment/Plan:  Status epilepticus, resolved  E. coli bacterial cystitis  Urinary retention  Possible aspiration from seizures   Hypoxia  Acute on chronic HFrEF/ ICMO 25-30%  Paroxysmal atrial fibrillation on Eliquis  Morbid obesity  Positive blood culture,  contaminant     Hx of HTN, CAD, COPD, and chronic venous insufficiency       Plan:  Continue antiepileptic drugs   Continue oral Lasix   Continue to monitor patient's daily weight, urine output, renal parameters and volume status    Resume Flomax and add Proscar and discontinue Anaya catheter tomorrow  Patient was evaluated by Infectious Disease who recommended no antibiotic therapy  Case discussed with nursing staff    VTE prophylaxis: Eliquis     Patient condition:  Stable    Anticipated discharge and Disposition:     Hopefully return to [facility] tomorrow    All diagnosis and differential diagnosis have been reviewed; assessment and plan has been documented; I have personally reviewed the labs and test results that are presently available; I have reviewed the patients medication list; I have reviewed the consulting providers response and recommendations. I have reviewed or attempted to review medical records based upon their availability    All of the patient's questions have been  addressed and answered. Patient's is agreeable to the above stated plan. I will continue to monitor closely and make adjustments to medical management as needed.  _____________________________________________________________________      Radiology:  X-Ray Chest 1 View  Narrative: EXAMINATION:  XR CHEST 1 VIEW    CLINICAL HISTORY:  PNA;, .    TECHNIQUE:  October 6, 2022    FINDINGS:  No alveolar consolidation, effusion, or pneumothorax is seen.   The thoracic aorta is normal  cardiac silhouette, central pulmonary vessels and mediastinum are normal in size and are grossly unremarkable.   visualized osseous structures are grossly unremarkable.  Impression: No acute chest disease is identified.    Electronically signed by: Ahsan Kim  Date:    10/12/2022  Time:    15:02      Alfa Kumar MD   10/13/2022

## 2022-10-13 NOTE — PT/OT/SLP EVAL
Occupational Therapy   Evaluation    Name: John Wayne  MRN: [id number]  Admitting Diagnosis:  Acute respiratory failure with hypercapnia  Recent Surgery: * No surgery found *      Recommendations:     Discharge Recommendations: nursing facility, skilled  Discharge Equipment Recommendations:  none  Barriers to discharge:  None    Assessment:     John Wayne is a 76 y.o. male with a medical diagnosis of Acute respiratory failure with hypercapnia, pna with staph. Performance deficits affecting function: weakness, impaired endurance, impaired self care skills, impaired functional mobility, decreased safety awareness, impaired balance.      Rehab Prognosis: Fair; patient would benefit from acute skilled OT services to address these deficits and reach maximum level of function.       Plan:     Patient to be seen 3 x/week, 4 x/week to address the above listed problems via self-care/home management, therapeutic activities, therapeutic exercises  Plan of Care Expires: 11/03/22  Plan of Care Reviewed with: patient    Subjective     Chief Complaint: None   Patient/Family Comments/goals: Get back home     Occupational Profile:  Living Environment: at nursing home   Previous level of function: MI-min A   Equipment Used at Home:  wheelchair, walker, rolling  Assistance upon Discharge: Nsg home     Pain/Comfort:  Pain Rating 1: 0/10    Patients cultural, spiritual, Yazidism conflicts given the current situation:      Objective:     Communicated with: RN prior to session.  Patient found right sidelying with blood pressure cuff, fletcher catheter, oxygen upon OT entry to room.    General Precautions: Standard,     Orthopedic Precautions:    Braces:    Respiratory Status: Nasal cannula, flow 3 L/min    Occupational Performance:    Bed Mobility:    Patient completed Supine to Sit with minimum assistance  Patient completed Sit to Supine with minimum assistance    Functional Mobility/Transfers:  Patient completed Sit <> Stand Transfer  with minimum assistance  with  rolling walker   Functional Mobility: Side steps alongside EOB with RW     Activities of Daily Living:  Grooming: stand by assistance seated EOB   Upper Body Dressing: minimum assistance    Lower Body Dressing: maximal assistance    Toileting: minimum assistance        Physical Exam:  Upper Extremity Range of Motion:     -       Right Upper Extremity: WNL  -       Left Upper Extremity: WNL  Upper Extremity Strength:    -       Right Upper Extremity: WNL  -       Left Upper Extremity: WNL    Patient left supine with all lines intact, call button in reach, and RN notified    GOALS:   Multidisciplinary Problems       Occupational Therapy Goals          Problem: Occupational Therapy    Goal Priority Disciplines Outcome Interventions   Occupational Therapy Goal     OT, PT/OT     Description: Patient will increase functional independence with ADLs by performing:    LE Dressing with Minimal Assistance.  Grooming while seated at sink with Set-up Assistance.  Toilet transfer to bedside commode with MI                        History:     Past Medical History:   Diagnosis Date    Anticoagulant long-term use     COPD (chronic obstructive pulmonary disease)     Coronary artery disease     Difficult intubation     GERD (gastroesophageal reflux disease)     Hypertension     Seizures          Past Surgical History:   Procedure Laterality Date    cardiac catheterisation  10/18/2016    PERCUTANEOUS BALLOON VALVULOPLASTY  04/04/2018       Time Tracking:     OT Date of Treatment: 10/13/22  OT Start Time: 1420  OT Stop Time: 1450  OT Total Time (min): 30 min    Billable Minutes:Evaluation 30 minutes     10/13/2022

## 2022-10-14 VITALS
BODY MASS INDEX: 40.83 KG/M2 | RESPIRATION RATE: 13 BRPM | HEIGHT: 67 IN | DIASTOLIC BLOOD PRESSURE: 60 MMHG | HEART RATE: 62 BPM | OXYGEN SATURATION: 98 % | TEMPERATURE: 98 F | SYSTOLIC BLOOD PRESSURE: 121 MMHG | WEIGHT: 260.13 LBS

## 2022-10-14 LAB — SARS-COV-2 RDRP RESP QL NAA+PROBE: NEGATIVE

## 2022-10-14 PROCEDURE — 25000003 PHARM REV CODE 250: Performed by: STUDENT IN AN ORGANIZED HEALTH CARE EDUCATION/TRAINING PROGRAM

## 2022-10-14 PROCEDURE — 97162 PT EVAL MOD COMPLEX 30 MIN: CPT

## 2022-10-14 PROCEDURE — 25000003 PHARM REV CODE 250: Performed by: INTERNAL MEDICINE

## 2022-10-14 PROCEDURE — 87635 SARS-COV-2 COVID-19 AMP PRB: CPT | Performed by: STUDENT IN AN ORGANIZED HEALTH CARE EDUCATION/TRAINING PROGRAM

## 2022-10-14 PROCEDURE — 25000242 PHARM REV CODE 250 ALT 637 W/ HCPCS

## 2022-10-14 PROCEDURE — A4216 STERILE WATER/SALINE, 10 ML: HCPCS | Performed by: STUDENT IN AN ORGANIZED HEALTH CARE EDUCATION/TRAINING PROGRAM

## 2022-10-14 RX ORDER — VENLAFAXINE 75 MG/1
75 TABLET ORAL 2 TIMES DAILY
Qty: 60 TABLET | Refills: 0 | Status: SHIPPED | OUTPATIENT
Start: 2022-10-14

## 2022-10-14 RX ORDER — PHENOBARBITAL 32.4 MG/1
64.8 TABLET ORAL 2 TIMES DAILY
Qty: 60 TABLET | Refills: 0 | Status: ON HOLD | OUTPATIENT
Start: 2022-10-14 | End: 2022-12-11 | Stop reason: SDUPTHER

## 2022-10-14 RX ORDER — PHENYTOIN 50 MG/1
200 TABLET, CHEWABLE ORAL 2 TIMES DAILY
Qty: 240 TABLET | Refills: 0 | Status: SHIPPED | OUTPATIENT
Start: 2022-10-14 | End: 2022-11-27

## 2022-10-14 RX ORDER — FINASTERIDE 5 MG/1
5 TABLET, FILM COATED ORAL DAILY
Qty: 30 TABLET | Refills: 0 | Status: SHIPPED | OUTPATIENT
Start: 2022-10-15

## 2022-10-14 RX ADMIN — PHENOBARBITAL 64.8 MG: 32.4 TABLET ORAL at 09:10

## 2022-10-14 RX ADMIN — LEVETIRACETAM 1000 MG: 500 TABLET, FILM COATED ORAL at 09:10

## 2022-10-14 RX ADMIN — FINASTERIDE 5 MG: 5 TABLET, FILM COATED ORAL at 09:10

## 2022-10-14 RX ADMIN — APIXABAN 5 MG: 5 TABLET, FILM COATED ORAL at 09:10

## 2022-10-14 RX ADMIN — AMIODARONE HYDROCHLORIDE 200 MG: 200 TABLET ORAL at 09:10

## 2022-10-14 RX ADMIN — SODIUM CHLORIDE, PRESERVATIVE FREE 10 ML: 5 INJECTION INTRAVENOUS at 12:10

## 2022-10-14 RX ADMIN — PHENYTOIN 200 MG: 125 SUSPENSION ORAL at 09:10

## 2022-10-14 RX ADMIN — PANTOPRAZOLE SODIUM 40 MG: 40 GRANULE, DELAYED RELEASE ORAL at 09:10

## 2022-10-14 RX ADMIN — FUROSEMIDE 20 MG: 20 TABLET ORAL at 09:10

## 2022-10-14 RX ADMIN — VENLAFAXINE 75 MG: 37.5 TABLET ORAL at 09:10

## 2022-10-14 NOTE — PLAN OF CARE
All discharge information submitted to [facility] and spoke with Arcelia who states she will schedule [facility] for transportation. Packet given to pt's nurse along with contact info for report.

## 2022-10-14 NOTE — PT/OT/SLP EVAL
Physical Therapy Evaluation    Patient Name:  John Wayne   MRN:  [id number]    Recommendations:     Discharge Recommendations:  nursing facility, skilled   Discharge Equipment Recommendations: none   Barriers to discharge: None    Assessment:     John Wayne is a 76 y.o. male admitted with a medical diagnosis of seizures requiring intubation, R PNA, UTI, Acute respiratory failure with hypercapnia.  He presents with the following impairments/functional limitations:  weakness, impaired endurance, impaired self care skills, impaired functional mobility, gait instability, impaired balance. At baseline, pt lives at NH and mostly uses a w/c for mobility. He is able to stand and ambulate with RW and assistance. Today, pt is nearing his baseline and required CGA for all mobility with RW. Recommending pt return back to his NH.     Rehab Prognosis: Good; patient would benefit from acute skilled PT services to address these deficits and reach maximum level of function.    Recent Surgery: * No surgery found *      Plan:     During this hospitalization, patient to be seen 5 x/week to address the identified rehab impairments via gait training, therapeutic activities, therapeutic exercises and progress toward the following goals:    Plan of Care Expires:  11/14/22    Subjective     Chief Complaint: none  Patient/Family Comments/goals: to go home  Pain/Comfort:  Pain Rating 1: 0/10    Patients cultural, spiritual, Gnosticism conflicts given the current situation: no    Living Environment:  NH  Prior to admission, patients level of function was A needed w ambulation, independent at w/c level.  Equipment used at home: wheelchair, walker, rolling.  Upon discharge, patient will have assistance from NH staff.    Objective:     Communicated with nurse prior to session.  Patient found supine with blood pressure cuff, fletcher catheter, oxygen, telemetry, pulse ox (continuous)  upon PT entry to room.    General Precautions: Standard,      Orthopedic Precautions:    Braces:    Respiratory Status: Nasal cannula, flow 3 L/min  91%, 61 HR, 14 RR, 141/63      Exams:  Sensation:    -       Impaired  light/touch B lower legs and feet  RLE ROM: WNL  RLE Strength: Deficits: grossly 4/5  LLE ROM: WNL  LLE Strength: Deficits: grossly 4/5    Functional Mobility:  Bed Mobility:     Scooting: stand by assistance  Supine to Sit: stand by assistance  Transfers:     Sit to Stand:  contact guard assistance with rolling walker  Gait: 40ft with RW and CGA, slow pace, no LOB.       AM-PAC 6 CLICK MOBILITY  Total Score:16     Patient left up in chair with all lines intact and call button in reach.    GOALS:   Multidisciplinary Problems       Physical Therapy Goals          Problem: Physical Therapy    Goal Priority Disciplines Outcome Goal Variances Interventions   Physical Therapy Goal     PT, PT/OT Ongoing, Progressing     Description: Goals to be met by: 2022     Patient will increase functional independence with mobility by performin. Sit to stand transfer with Modified [place]  2. Gait  x 100 feet with Stand-by Assistance using RW.   3. Wheelchair propulsion x150 feet with Supervision using bilateral uppper extremities                         History:     Past Medical History:   Diagnosis Date    Anticoagulant long-term use     COPD (chronic obstructive pulmonary disease)     Coronary artery disease     Difficult intubation     GERD (gastroesophageal reflux disease)     Hypertension     Seizures        Past Surgical History:   Procedure Laterality Date    cardiac catheterisation  10/18/2016    PERCUTANEOUS BALLOON VALVULOPLASTY  2018       Time Tracking:     PT Received On: 10/14/22  PT Start Time: 1032     PT Stop Time: 1056  PT Total Time (min): 24 min     Billable Minutes: Evaluation 24      10/14/2022

## 2022-10-14 NOTE — PLAN OF CARE
Problem: Physical Therapy  Goal: Physical Therapy Goal  Description: Goals to be met by: 2022     Patient will increase functional independence with mobility by performin. Sit to stand transfer with Modified [place]  2. Gait  x 100 feet with Stand-by Assistance using RW.   3. Wheelchair propulsion x150 feet with Supervision using bilateral uppper extremities    Outcome: Ongoing, Progressing

## 2022-10-14 NOTE — NURSING
[facility] set up by the nursing home. Pt had belongings and upper dentures and a comforter.     I called brother Fredrick to let him know.    [phone]

## 2022-10-14 NOTE — DISCHARGE SUMMARY
[facility]  Hospital Medicine Discharge Summary    Admit Date: 10/6/2022  Discharge Date and Time: 10/14/2022 1:12 PM  Admitting Physician: Ivon Frances MD  Discharging Physician: Alfa Kumar MD.  Primary Care Physician: SHAY Schmidt MD  Consults: Neurology    Discharge Diagnoses:  Status epilepticus, resolved  E. coli bacterial cystitis  Possible aspiration from seizures   Hypoxia  Ischemic cardiomyopathy with an ejection fraction of 25-30%  Paroxysmal atrial fibrillation on Eliquis  Morbid obesity  Positive blood culture, contaminant  Essential hypertension  Coronary artery disease  Chronic obstructive pulmonary disease   Chronic venous insufficiency       Hospital Course:   John Wayne is a 76 y.o. male with a PMHx of  HTN, CAD s/p NSTEMI, seizure disorder, paroxysmal atrial fibrillation on Eliquis, COPD, and chronic venous insufficiency who presented to [facility] on 10/6/2022 as a transfer for a higher level of care. He initially presented to [facility] via EMS from [facility] [place] following a reported seizure lasting approximately 25 mins. CT Head negative for acute abnormalities. He was started on Cipro for suspected acute cystitis. Upon ED arrival he was noted to be in significant respiratory distress and he was intubated for airway protection. He was loaded with Keppra and given IV Ativan for seizure-like activity. He was transferred to [facility] for neurology and ICU services. He was admitted to ICU. Neurology was consulted. Repeat CT Head negative for acute abnormality. EEG was abnormal, diffuse slowing consistent with moderate cerebral dysfunction that can be found in toxic metabolic anoxic brain injury or postictal event; presence of intermittent spike wave activity in both frontal temporal areas, which can be consistent but not conclusive with epileptiform discharges. Neurology continues to follow. Seizure medications resumed per neurology. He was  extubated. Downgraded to the hospital medicine service on 10/7/22.  He is awake and alert and oriented to self and following commands.  No further seizures have been reported.  Anaya catheter has been discontinued. Patient was evaluated by Infectious Disease who recommended no antibiotic therapy.  He is afebrile, on room air, and hemodynamically stable for return to his nursing home.  Patient was seen and examined on the day of discharge.    Vitals:  VITAL SIGNS: 24 HRS MIN & MAX LAST   Temp  Min: 97.8 °F (36.6 °C)  Max: 98.3 °F (36.8 °C) 97.8 °F (36.6 °C)   BP  Min: 116/52  Max: 154/57 121/60   Pulse  Min: 52  Max: 63  62   Resp  Min: 2  Max: 21 13   SpO2  Min: 92 %  Max: 99 % 98 %       Physical Exam:  General:  Elderly white male in no acute distress  HENT: normocephalic, atraumatic  Eye: PERRL, EOMI, clear conjunctiva  Neck: full ROM, no thyromegaly, no JVD  Respiratory: clear to auscultation bilaterally  Cardiovascular: regular rate and rhythm  Gastrointestinal: non-distended, positive bowel sounds, non-tender  Musculoskeletal: chronic bilateral lower extremity lymphedema  Neurological: cranial nerves grossly intact, no focal neurological deficit  Psychiatric: cooperative, flat affect    Procedures Performed: No admission procedures for hospital encounter.     Significant Diagnostic Studies: See Full reports for all details    Recent Labs   Lab 10/10/22  0831 10/12/22  0202 10/13/22  0146   WBC 15.8* 17.5* 18.3*   RBC 4.29* 4.39* 4.61*   HGB 12.6* 12.8* 13.5*   HCT 38.1* 39.1* 40.6*   MCV 88.8 89.1 88.1   MCH 29.4 29.2 29.3   MCHC 33.1 32.7* 33.3   RDW 14.6 14.0 13.7    189 196   MPV 10.2 10.0 10.0       Recent Labs   Lab 10/08/22  0124 10/09/22  0141 10/10/22  0831 10/12/22  0202 10/13/22  0146    137 138 136 135*   K 4.1 3.3* 4.0 3.5 4.7   CO2 25 24 28 28 25   BUN 8.3* 7.3* 8.1* 9.7 10.5   CREATININE 0.83 0.79 0.77 0.93 0.92   CALCIUM 8.6* 8.9 9.1 9.0 9.1   MG 1.70 1.60 2.00  --   --    ALBUMIN  2.8*  --   --   --   --    ALKPHOS 83  --   --   --   --    ALT 12  --   --   --   --    AST 17  --   --   --   --    BILITOT 0.3  --   --   --   --         Microbiology Results (last 7 days)       Procedure Component Value Units Date/Time    Blood Culture [[id number]]  (Normal) Collected: 10/13/22 0441    Order Status: Completed Specimen: Blood Updated: 10/14/22 1100     CULTURE, BLOOD ([facility]) No Growth At 24 Hours    Blood Culture [[id number]]  (Normal) Collected: 10/13/22 0146    Order Status: Completed Specimen: Blood from Antecubital, Right Updated: 10/14/22 0300     CULTURE, BLOOD ([facility]) No Growth At 24 Hours    Blood Culture [[id number]]  (Normal) Collected: 10/07/22 2326    Order Status: Completed Specimen: Blood, Venous Updated: 10/13/22 1007     CULTURE, BLOOD ([facility]) Final Report: At 5 days. No growth    Blood Culture [[id number]]  (Normal) Collected: 10/07/22 2326    Order Status: Completed Specimen: Blood, Venous Updated: 10/13/22 1007     CULTURE, BLOOD ([facility]) Final Report: At 5 days. No growth    Respiratory Culture [[id number]]     Order Status: Sent Specimen: Sputum     Blood Culture [[id number]]  (Normal) Collected: 10/07/22 0150    Order Status: Completed Specimen: Blood from Arm, Right Updated: 10/12/22 0301     CULTURE, BLOOD ([facility]) No Growth at 5 days    Blood Culture [[id number]]  (Abnormal)  (Susceptibility) Collected: 10/07/22 0150    Order Status: Completed Specimen: Blood from Arm, Left Updated: 10/09/22 0625     CULTURE, BLOOD ([facility]) Staphylococcus epidermidis     GRAM STAIN Gram Positive Cocci, probable Staphylococcus      1 of 1 Pediatric bottle positive      Seen in gram stain of broth only             X-Ray Chest 1 View  Narrative: EXAMINATION:  XR CHEST 1 VIEW    CLINICAL HISTORY:  PNA;, .    TECHNIQUE:  October 6, 2022    FINDINGS:  No alveolar consolidation, effusion, or pneumothorax is seen.   The thoracic aorta is normal  cardiac silhouette, central pulmonary vessels and mediastinum are normal  in size and are grossly unremarkable.   visualized osseous structures are grossly unremarkable.  Impression: No acute chest disease is identified.    Electronically signed by: Ahsan Kim  Date:    10/12/2022  Time:    15:02         Medication List        START taking these medications      finasteride 5 mg tablet  Commonly known as: PROSCAR  Take 1 tablet (5 mg total) by mouth once daily.  Start taking on: October 15, 2022            CHANGE how you take these medications      PHENobarbitaL 32.4 MG tablet  Take 2 tablets (64.8 mg total) by mouth 2 (two) times daily.  What changed:   how much to take  when to take this  Another medication with the same name was removed. Continue taking this medication, and follow the directions you see here.     phenytoin 50 mg chewable tablet  Commonly known as: DILANTIN  Take 4 tablets (200 mg total) by mouth 2 (two) times daily.  What changed:   how much to take  when to take this            CONTINUE taking these medications      amiodarone 200 MG Tab  Commonly known as: PACERONE     atorvastatin 40 MG tablet  Commonly known as: LIPITOR     clopidogreL 75 mg tablet  Commonly known as: PLAVIX     ELIQUIS 5 mg Tab  Generic drug: apixaban     furosemide 40 MG tablet  Commonly known as: LASIX     levETIRAcetam 1000 MG tablet  Commonly known as: KEPPRA     lisinopriL 5 MG tablet  Commonly known as: PRINIVIL,ZESTRIL     metoprolol succinate 25 MG 24 hr tablet  Commonly known as: TOPROL-XL     pantoprazole 40 MG tablet  Commonly known as: PROTONIX     tamsulosin 0.4 mg Cap  Commonly known as: FLOMAX     venlafaxine 75 MG tablet  Commonly known as: EFFEXOR  Take 1 tablet (75 mg total) by mouth 2 (two) times daily.            STOP taking these medications      phenytoin 100 MG ER capsule  Commonly known as: DILANTIN               Where to Get Your Medications        You can get these medications from any pharmacy    Bring a paper prescription for each of these  medications  finasteride 5 mg tablet  PHENobarbitaL 32.4 MG tablet  phenytoin 50 mg chewable tablet  venlafaxine 75 MG tablet          Explained in detail to the patient about the discharge plan, medications, and follow-up visits.  Patient understands and agrees with the treatment plan.  Discharge Disposition: Skilled Nursing Facility   Discharged Condition: stable  Diet-   Dietary Orders (From admission, onward)       Start     Ordered    10/12/22 1758  Diet Soft & Bite Sized  Diet effective now         10/12/22 1757                       Discharge time 35 minutes          Alfa Price M.D on 10/14/2022. at 3:08 PM.

## 2022-10-18 LAB
BACTERIA BLD CULT: NORMAL
BACTERIA BLD CULT: NORMAL

## 2022-10-24 PROBLEM — Z13.9 ENCOUNTER FOR MEDICAL SCREENING EXAMINATION: Status: RESOLVED | Noted: 2022-07-20 | Resolved: 2022-10-24

## 2022-11-08 DIAGNOSIS — C91.10 CLL (CHRONIC LYMPHOCYTIC LEUKEMIA): Primary | ICD-10-CM

## 2022-11-14 NOTE — PROGRESS NOTES
Subjective:       Patient ID: John Wayne is a 77 y.o. male.      Chief Complaint: Leukemia (F/U with labs. Increased confusion and falls noted. Swelling to lower legs noted. )    Patient is a 77 y.o. year old male with oncology and medical history as below.     Interval history: (11/15/2022)  Patient returns for F/U today for his CLL.   Currently staying at a nursing home.  The caregiver reports increasing confusion.  He was recently hospitalized for seizure-like activity as well as bacteremia.  There has been no symptoms such as a voluntary weight loss, fever.  No fevers or recurring infections. Denies any abdominal pain, diarrhea or constipation or reflux. He denies abnormal bruising/bleeding, abnormal lumps or masses.       Oncology History    No history exists.     Past Medical History:   Diagnosis Date    Anticoagulant long-term use     COPD (chronic obstructive pulmonary disease)     Coronary artery disease     Difficult intubation     GERD (gastroesophageal reflux disease)     Hypertension     Seizures       Review of patient's allergies indicates:   Allergen Reactions    Ancef in dextrose (iso-osm)     Codeine     Penicillins       Current Outpatient Medications on File Prior to Visit   Medication Sig Dispense Refill    amiodarone (PACERONE) 200 MG Tab Take 200 mg by mouth once daily.      atorvastatin (LIPITOR) 40 MG tablet Take 40 mg by mouth every evening.      clopidogreL (PLAVIX) 75 mg tablet Take 1 tablet by mouth Daily.      ELIQUIS 5 mg Tab Take 1 tablet by mouth 2 (two) times a day.      finasteride (PROSCAR) 5 mg tablet Take 1 tablet (5 mg total) by mouth once daily. 30 tablet 0    furosemide (LASIX) 40 MG tablet Take 1 tablet by mouth Daily.      levETIRAcetam (KEPPRA) 1000 MG tablet Take 1 tablet by mouth 2 (two) times a day.      lisinopriL (PRINIVIL,ZESTRIL) 5 MG tablet Take 1 tablet by mouth Daily.      metoprolol succinate (TOPROL-XL) 25 MG 24 hr tablet Take 1 tablet by mouth Daily.       pantoprazole (PROTONIX) 40 MG tablet Take 40 mg by mouth Daily.      PHENobarbitaL 32.4 MG tablet Take 2 tablets (64.8 mg total) by mouth 2 (two) times daily. 60 tablet 0    tamsulosin (FLOMAX) 0.4 mg Cap Take 1 tablet by mouth Daily.      venlafaxine (EFFEXOR) 75 MG tablet Take 1 tablet (75 mg total) by mouth 2 (two) times daily. 60 tablet 0    phenytoin (DILANTIN) 50 mg chewable tablet Take 4 tablets (200 mg total) by mouth 2 (two) times daily. 240 tablet 0     No current facility-administered medications on file prior to visit.                 ECOG: 3    Review of Systems  All 12 review of systems negative except as mentioned in HPI.     Physical Exam  Gen: Well appearing, appears stated age, in no acute distress  CV: RRR, no murmurs / thrills auscultated  Pulm: CTA bilaterally, normal respiratory effort  GI: Nondistended, nontender to palpation  Skin: No rashes noted, warm to touch  Extremities: Bilateral lower extremity edema.   MSK: Normal ROM.   Psych: Normal affect, normal speech and thought content  Neuro: AAOx2, confused, unable to hold an extended conversation. .       No visits with results within 2 Week(s) from this visit.   Latest known visit with results is:   No results displayed because visit has over 200 results.             Problem list:  1. CLL (chronic lymphocytic leukemia)         Assessment:       CLL   He has borderline cytopenias with mild leukocytosis and anemia and no lymphadenopathy or B symptoms.   Given his other comoridities such as increasing neurological impairment, seizures, COPD, risks outweight benefits of starting any therapy at this time.   Will continue monitoring unless CLL is creating significant tumor burden    Plan:       Monthly CBCs  RTC in 2 months                        I attempted to call brother who's the MPOA with above results and recommendations but he did not , VM left for call back.     Trell Torres M.D.   Hematology / Oncology

## 2022-11-15 ENCOUNTER — OFFICE VISIT (OUTPATIENT)
Dept: HEMATOLOGY/ONCOLOGY | Facility: CLINIC | Age: 77
End: 2022-11-15
Payer: MEDICARE

## 2022-11-15 VITALS
BODY MASS INDEX: 39.21 KG/M2 | OXYGEN SATURATION: 95 % | HEART RATE: 55 BPM | SYSTOLIC BLOOD PRESSURE: 144 MMHG | WEIGHT: 249.81 LBS | HEIGHT: 67 IN | RESPIRATION RATE: 18 BRPM | DIASTOLIC BLOOD PRESSURE: 68 MMHG | TEMPERATURE: 98 F

## 2022-11-15 DIAGNOSIS — C91.10 CLL (CHRONIC LYMPHOCYTIC LEUKEMIA): Primary | ICD-10-CM

## 2022-11-15 PROCEDURE — 99999 PR PBB SHADOW E&M-EST. PATIENT-LVL IV: ICD-10-PCS | Mod: PBBFAC,,, | Performed by: INTERNAL MEDICINE

## 2022-11-15 PROCEDURE — 99214 OFFICE O/P EST MOD 30 MIN: CPT | Mod: PBBFAC | Performed by: INTERNAL MEDICINE

## 2022-11-15 PROCEDURE — 99214 PR OFFICE/OUTPT VISIT, EST, LEVL IV, 30-39 MIN: ICD-10-PCS | Mod: S$PBB,,, | Performed by: INTERNAL MEDICINE

## 2022-11-15 PROCEDURE — 99999 PR PBB SHADOW E&M-EST. PATIENT-LVL IV: CPT | Mod: PBBFAC,,, | Performed by: INTERNAL MEDICINE

## 2022-11-15 PROCEDURE — 99214 OFFICE O/P EST MOD 30 MIN: CPT | Mod: S$PBB,,, | Performed by: INTERNAL MEDICINE

## 2022-11-27 ENCOUNTER — HOSPITAL ENCOUNTER (INPATIENT)
Facility: HOSPITAL | Age: 77
LOS: 1 days | Discharge: HOME | DRG: 918 | End: 2022-11-30
Attending: EMERGENCY MEDICINE | Admitting: EMERGENCY MEDICINE
Payer: MEDICARE

## 2022-11-27 DIAGNOSIS — T42.0X1A ACCIDENTAL PHENYTOIN POISONING, INITIAL ENCOUNTER: Primary | ICD-10-CM

## 2022-11-27 DIAGNOSIS — G40.909 SEIZURE DISORDER: ICD-10-CM

## 2022-11-27 DIAGNOSIS — R41.82 MENTAL STATUS ALTERATION: ICD-10-CM

## 2022-11-27 LAB
ALBUMIN SERPL-MCNC: 3.7 GM/DL (ref 3.4–4.8)
ALBUMIN/GLOB SERPL: 1 RATIO (ref 1.1–2)
ALP SERPL-CCNC: 98 UNIT/L (ref 40–150)
ALT SERPL-CCNC: 14 UNIT/L (ref 0–55)
AMPHET UR QL SCN: NEGATIVE
APPEARANCE UR: ABNORMAL
AST SERPL-CCNC: 15 UNIT/L (ref 5–34)
BACTERIA #/AREA URNS AUTO: ABNORMAL /HPF
BARBITURATE SCN PRESENT UR: POSITIVE
BASOPHILS # BLD AUTO: 0.09 X10(3)/MCL (ref 0–0.2)
BASOPHILS NFR BLD AUTO: 0.4 %
BENZODIAZ UR QL SCN: NEGATIVE
BILIRUB UR QL STRIP.AUTO: NEGATIVE MG/DL
BILIRUBIN DIRECT+TOT PNL SERPL-MCNC: 0.3 MG/DL
BUN SERPL-MCNC: 12 MG/DL (ref 8.4–25.7)
CALCIUM SERPL-MCNC: 9.7 MG/DL (ref 8.8–10)
CANNABINOIDS UR QL SCN: NEGATIVE
CAOX CRY URNS QL MICRO: ABNORMAL /HPF
CHLORIDE SERPL-SCNC: 107 MMOL/L (ref 98–107)
CO2 SERPL-SCNC: 27 MMOL/L (ref 23–31)
COCAINE UR QL SCN: NEGATIVE
COLOR UR AUTO: ABNORMAL
CREAT SERPL-MCNC: 0.92 MG/DL (ref 0.73–1.18)
EOSINOPHIL # BLD AUTO: 0.08 X10(3)/MCL (ref 0–0.9)
EOSINOPHIL NFR BLD AUTO: 0.3 %
ERYTHROCYTE [DISTWIDTH] IN BLOOD BY AUTOMATED COUNT: 13.6 % (ref 11.5–17)
ETHANOL SERPL-MCNC: <10 MG/DL
FENTANYL UR QL SCN: NEGATIVE
FLUAV AG UPPER RESP QL IA.RAPID: NOT DETECTED
FLUBV AG UPPER RESP QL IA.RAPID: NOT DETECTED
GFR SERPLBLD CREATININE-BSD FMLA CKD-EPI: >60 MLS/MIN/1.73/M2
GLOBULIN SER-MCNC: 3.7 GM/DL (ref 2.4–3.5)
GLUCOSE SERPL-MCNC: 122 MG/DL (ref 82–115)
GLUCOSE UR QL STRIP.AUTO: NEGATIVE MG/DL
HCT VFR BLD AUTO: 46.3 % (ref 42–52)
HGB BLD-MCNC: 15.1 GM/DL (ref 14–18)
IMM GRANULOCYTES # BLD AUTO: 0.07 X10(3)/MCL (ref 0–0.04)
IMM GRANULOCYTES NFR BLD AUTO: 0.3 %
KETONES UR QL STRIP.AUTO: 15 MG/DL
LACTATE SERPL-SCNC: 0.6 MMOL/L (ref 0.5–2.2)
LEUKOCYTE ESTERASE UR QL STRIP.AUTO: ABNORMAL UNIT/L
LYMPHOCYTES # BLD AUTO: 16.36 X10(3)/MCL (ref 0.6–4.6)
LYMPHOCYTES NFR BLD AUTO: 70.3 %
MAGNESIUM SERPL-MCNC: 1.7 MG/DL (ref 1.6–2.6)
MCH RBC QN AUTO: 29.4 PG (ref 27–31)
MCHC RBC AUTO-ENTMCNC: 32.6 MG/DL (ref 33–36)
MCV RBC AUTO: 90.1 FL (ref 80–94)
MDMA UR QL SCN: NEGATIVE
MONOCYTES # BLD AUTO: 1.5 X10(3)/MCL (ref 0.1–1.3)
MONOCYTES NFR BLD AUTO: 6.4 %
MUCOUS THREADS URNS QL MICRO: ABNORMAL /LPF
NEUTROPHILS # BLD AUTO: 5.2 X10(3)/MCL (ref 2.1–9.2)
NEUTROPHILS NFR BLD AUTO: 22.3 %
NITRITE UR QL STRIP.AUTO: POSITIVE
OPIATES UR QL SCN: NEGATIVE
PCP UR QL: NEGATIVE
PH UR STRIP.AUTO: 5.5 [PH]
PH UR: 5.5 [PH] (ref 3–11)
PHENOBARB SERPL-MCNC: 24 UG/ML (ref 15–40)
PHENYTOIN SERPL-MCNC: 44.8 UG/ML (ref 10–20)
PLATELET # BLD AUTO: 245 X10(3)/MCL (ref 130–400)
PMV BLD AUTO: 9.8 FL (ref 7.4–10.4)
POTASSIUM SERPL-SCNC: 3.4 MMOL/L (ref 3.5–5.1)
PROT SERPL-MCNC: 7.4 GM/DL (ref 5.8–7.6)
PROT UR QL STRIP.AUTO: NEGATIVE MG/DL
RBC # BLD AUTO: 5.14 X10(6)/MCL (ref 4.7–6.1)
RBC #/AREA URNS AUTO: ABNORMAL /HPF
RBC UR QL AUTO: ABNORMAL UNIT/L
SARS-COV-2 RNA RESP QL NAA+PROBE: NOT DETECTED
SODIUM SERPL-SCNC: 144 MMOL/L (ref 136–145)
SP GR UR STRIP.AUTO: 1.02
SPECIFIC GRAVITY, URINE AUTO (.000) (OHS): 1.02 (ref 1–1.03)
SQUAMOUS #/AREA URNS AUTO: ABNORMAL /HPF
TROPONIN I SERPL-MCNC: 0.04 NG/ML (ref 0–0.04)
TSH SERPL-ACNC: 1.62 UIU/ML (ref 0.35–4.94)
UROBILINOGEN UR STRIP-ACNC: 0.2 MG/DL
WBC # SPEC AUTO: 23.3 X10(3)/MCL (ref 4.5–11.5)
WBC #/AREA URNS AUTO: ABNORMAL /HPF

## 2022-11-27 PROCEDURE — G0378 HOSPITAL OBSERVATION PER HR: HCPCS

## 2022-11-27 PROCEDURE — 63600175 PHARM REV CODE 636 W HCPCS: Performed by: EMERGENCY MEDICINE

## 2022-11-27 PROCEDURE — 85025 COMPLETE CBC W/AUTO DIFF WBC: CPT | Performed by: EMERGENCY MEDICINE

## 2022-11-27 PROCEDURE — 81001 URINALYSIS AUTO W/SCOPE: CPT | Performed by: EMERGENCY MEDICINE

## 2022-11-27 PROCEDURE — 99285 EMERGENCY DEPT VISIT HI MDM: CPT | Mod: 25

## 2022-11-27 PROCEDURE — 80307 DRUG TEST PRSMV CHEM ANLYZR: CPT | Performed by: EMERGENCY MEDICINE

## 2022-11-27 PROCEDURE — 51701 INSERT BLADDER CATHETER: CPT

## 2022-11-27 PROCEDURE — 96360 HYDRATION IV INFUSION INIT: CPT

## 2022-11-27 PROCEDURE — 84443 ASSAY THYROID STIM HORMONE: CPT | Performed by: EMERGENCY MEDICINE

## 2022-11-27 PROCEDURE — 84484 ASSAY OF TROPONIN QUANT: CPT | Performed by: EMERGENCY MEDICINE

## 2022-11-27 PROCEDURE — 83735 ASSAY OF MAGNESIUM: CPT | Performed by: EMERGENCY MEDICINE

## 2022-11-27 PROCEDURE — 83605 ASSAY OF LACTIC ACID: CPT | Performed by: EMERGENCY MEDICINE

## 2022-11-27 PROCEDURE — 93010 ELECTROCARDIOGRAM REPORT: CPT | Mod: ,,, | Performed by: INTERNAL MEDICINE

## 2022-11-27 PROCEDURE — 36415 COLL VENOUS BLD VENIPUNCTURE: CPT | Performed by: EMERGENCY MEDICINE

## 2022-11-27 PROCEDURE — 80053 COMPREHEN METABOLIC PANEL: CPT | Performed by: EMERGENCY MEDICINE

## 2022-11-27 PROCEDURE — 94761 N-INVAS EAR/PLS OXIMETRY MLT: CPT

## 2022-11-27 PROCEDURE — 82077 ASSAY SPEC XCP UR&BREATH IA: CPT | Performed by: EMERGENCY MEDICINE

## 2022-11-27 PROCEDURE — 81003 URINALYSIS AUTO W/O SCOPE: CPT | Performed by: EMERGENCY MEDICINE

## 2022-11-27 PROCEDURE — 96372 THER/PROPH/DIAG INJ SC/IM: CPT | Performed by: EMERGENCY MEDICINE

## 2022-11-27 PROCEDURE — 0240U COVID/FLU A&B PCR: CPT | Performed by: EMERGENCY MEDICINE

## 2022-11-27 PROCEDURE — 80184 ASSAY OF PHENOBARBITAL: CPT | Performed by: EMERGENCY MEDICINE

## 2022-11-27 PROCEDURE — 93010 EKG 12-LEAD: ICD-10-PCS | Mod: ,,, | Performed by: INTERNAL MEDICINE

## 2022-11-27 PROCEDURE — 80177 DRUG SCRN QUAN LEVETIRACETAM: CPT | Performed by: EMERGENCY MEDICINE

## 2022-11-27 PROCEDURE — 93005 ELECTROCARDIOGRAM TRACING: CPT

## 2022-11-27 PROCEDURE — 80185 ASSAY OF PHENYTOIN TOTAL: CPT | Performed by: EMERGENCY MEDICINE

## 2022-11-27 RX ORDER — ENOXAPARIN SODIUM 100 MG/ML
40 INJECTION SUBCUTANEOUS EVERY 24 HOURS
Status: DISCONTINUED | OUTPATIENT
Start: 2022-11-27 | End: 2022-11-28

## 2022-11-27 RX ORDER — DEXTROSE, SODIUM CHLORIDE, SODIUM LACTATE, POTASSIUM CHLORIDE, AND CALCIUM CHLORIDE 5; .6; .31; .03; .02 G/100ML; G/100ML; G/100ML; G/100ML; G/100ML
INJECTION, SOLUTION INTRAVENOUS CONTINUOUS
Status: DISCONTINUED | OUTPATIENT
Start: 2022-11-27 | End: 2022-11-29

## 2022-11-27 RX ORDER — ONDANSETRON 2 MG/ML
4 INJECTION INTRAMUSCULAR; INTRAVENOUS EVERY 8 HOURS PRN
Status: DISCONTINUED | OUTPATIENT
Start: 2022-11-27 | End: 2022-11-30 | Stop reason: HOSPADM

## 2022-11-27 RX ORDER — TALC
6 POWDER (GRAM) TOPICAL NIGHTLY PRN
Status: DISCONTINUED | OUTPATIENT
Start: 2022-11-27 | End: 2022-11-30 | Stop reason: HOSPADM

## 2022-11-27 RX ORDER — SODIUM CHLORIDE 0.9 % (FLUSH) 0.9 %
10 SYRINGE (ML) INJECTION
Status: DISCONTINUED | OUTPATIENT
Start: 2022-11-27 | End: 2022-11-30 | Stop reason: HOSPADM

## 2022-11-27 RX ADMIN — SODIUM CHLORIDE, POTASSIUM CHLORIDE, SODIUM LACTATE AND CALCIUM CHLORIDE 1000 ML: 600; 310; 30; 20 INJECTION, SOLUTION INTRAVENOUS at 01:11

## 2022-11-27 RX ADMIN — SODIUM CHLORIDE, SODIUM LACTATE, POTASSIUM CHLORIDE, CALCIUM CHLORIDE AND DEXTROSE MONOHYDRATE: 5; 600; 310; 30; 20 INJECTION, SOLUTION INTRAVENOUS at 11:11

## 2022-11-27 RX ADMIN — ENOXAPARIN SODIUM 40 MG: 40 INJECTION SUBCUTANEOUS at 11:11

## 2022-11-27 NOTE — ED PROVIDER NOTES
Encounter Date: 11/27/2022       History     Chief Complaint   Patient presents with    Altered Mental Status     Patient less responsive than normal, lethargic. Unknown last known well time.      The history is provided by the EMS personnel. The history is limited by the condition of the patient. No  was used.   Altered Mental Status  This is a new problem. The current episode started 12 to 24 hours ago. The problem occurs constantly. The problem has not changed since onset.Nothing aggravates the symptoms. Nothing relieves the symptoms.   Pt lives in NH - found today to be lethargic.    Review of patient's allergies indicates:   Allergen Reactions    Ancef in dextrose (iso-osm)     Codeine     Penicillins      Past Medical History:   Diagnosis Date    Anticoagulant long-term use     COPD (chronic obstructive pulmonary disease)     Coronary artery disease     Difficult intubation     GERD (gastroesophageal reflux disease)     Hypertension     Seizures      Past Surgical History:   Procedure Laterality Date    cardiac catheterisation  10/18/2016    PERCUTANEOUS BALLOON VALVULOPLASTY  04/04/2018     Family History   Problem Relation Age of Onset    Coronary artery disease Father     Heart attack Father      Social History     Tobacco Use    Smoking status: Former    Smokeless tobacco: Never   Substance Use Topics    Alcohol use: Not Currently    Drug use: Never     Review of Systems   Unable to perform ROS: Mental status change     Physical Exam     Initial Vitals [11/27/22 1231]   BP Pulse Resp Temp SpO2   (!) 131/98 85 18 98.8 °F (37.1 °C) 97 %      MAP       --         Physical Exam    Nursing note and vitals reviewed.  Constitutional: He appears well-developed and well-nourished.   HENT:   Head: Normocephalic and atraumatic.   Right Ear: External ear normal.   Left Ear: External ear normal.   Nose: Nose normal.   Eyes: Conjunctivae and EOM are normal. Pupils are equal, round, and reactive to  light.   Neck: Neck supple.   Normal range of motion.  Cardiovascular:  Normal rate, regular rhythm, normal heart sounds and intact distal pulses.           Pulmonary/Chest: Breath sounds normal.   Abdominal: Abdomen is soft. Bowel sounds are normal.   Musculoskeletal:         General: Normal range of motion.      Cervical back: Normal range of motion and neck supple.     Neurological: He is alert and oriented to person, place, and time. He has normal strength. GCS score is 15. GCS eye subscore is 4. GCS verbal subscore is 5. GCS motor subscore is 6.   Skin: Skin is warm and dry. Capillary refill takes less than 2 seconds. There is pallor.   Psychiatric: He has a normal mood and affect. His behavior is normal. Judgment and thought content normal.       ED Course   Procedures  Labs Reviewed   COMPREHENSIVE METABOLIC PANEL - Abnormal; Notable for the following components:       Result Value    Potassium Level 3.4 (*)     Glucose Level 122 (*)     Globulin 3.7 (*)     Albumin/Globulin Ratio 1.0 (*)     All other components within normal limits   CBC WITH DIFFERENTIAL - Abnormal; Notable for the following components:    WBC 23.3 (*)     MCHC 32.6 (*)     Lymph # 16.36 (*)     Mono # 1.50 (*)     IG# 0.07 (*)     All other components within normal limits   PHENYTOIN LEVEL, TOTAL - Abnormal; Notable for the following components:    Phenytoin Level Total 44.8 (*)     All other components within normal limits   COVID/FLU A&B PCR - Normal    Narrative:     The Xpert Xpress SARS-CoV-2/FLU/RSV plus is a rapid, multiplexed real-time PCR test intended for the simultaneous qualitative detection and differentiation of SARS-CoV-2, Influenza A, Influenza B, and respiratory syncytial virus (RSV) viral RNA in either nasopharyngeal swab or nasal swab specimens.         ALCOHOL,MEDICAL (ETHANOL) - Normal   LACTIC ACID, PLASMA - Normal   MAGNESIUM - Normal   TROPONIN I - Normal   TSH - Normal   PHENOBARBITAL LEVEL - Normal   CBC W/ AUTO  DIFFERENTIAL    Narrative:     The following orders were created for panel order CBC auto differential.  Procedure                               Abnormality         Status                     ---------                               -----------         ------                     CBC with Differential[[id number]]        Abnormal            Final result                 Please view results for these tests on the individual orders.   DRUG SCREEN, URINE (BEAKER)   URINALYSIS, REFLEX TO URINE CULTURE   LEVETIRACETAM  (KEPPRA) LEVEL     EKG Readings: (Independently Interpreted)   Initial Reading: No STEMI. Rhythm: Sinus Bradycardia. Heart Rate: 57. Ectopy: No Ectopy. Conduction: Normal. ST Segments: Normal ST Segments. T Waves: Normal. Axis: Normal. Clinical Impression: Sinus Bradycardia   ECG Results              EKG 12-lead (In process)  Result time 11/27/22 16:42:34      In process by Interface, Lab In [facility] (11/27/22 16:42:34)                   Narrative:    Test Reason : R41.82,    Vent. Rate : 057 BPM     Atrial Rate : 057 BPM     P-R Int : 168 ms          QRS Dur : 092 ms      QT Int : 436 ms       P-R-T Axes : 021 002 063 degrees     QTc Int : 424 ms    Sinus bradycardia  Cannot rule out Anteroseptal infarct (cited on or before 27-NOV-2022)  Abnormal ECG  When compared with ECG of 05-OCT-2022 19:43,  ME interval has decreased  Questionable change in initial forces of Septal leads    Referred By: [email or web address]   SELF           Confirmed By:                                   Imaging Results              CT Head Without Contrast (Final result)  Result time 11/27/22 13:55:32      Final result by Chace Guardado MD (11/27/22 13:55:32)                   Impression:    Impression:  1. No acute intracranial process is identified.  2. Scattered white matter microvascular ischemic changes.  3. Diffuse involutional changes most prominent centrally with resultant mild ventriculomegaly not changed in degree from the prior  exam.    4.  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      Electronically signed by: Chace Guardado MD  Date:    11/27/2022  Time:    13:55               Narrative:      CT HEAD WITHOUT CONTRAST:    CPT 33201    Total DLP:   mGy-cm  Automatic exposure control was utilized to limit the radiation dose to the patient.    History: Altered Mental status.      Comparison: 10/06/2022.      Technique: Multiple contiguous axial images were acquired from the base of the skull the vertex without contrast administration.    Findings:  There are diffuse cerebral and cerebellar parenchymal involutional changes with resultant prominence of the ventricles and basal cisterns. There is extensive there is unchanged focal encephalomalacia in the left cerebellum.  Asymmetric low density without obvious mass-effect throughout the bilateral periventricular, centrum semiovale, subcortical, and deep white matter. The gray-white junctions are maintained. No other cerebral or cerebellar parenchymal abnormality is identified. There is no hemorrhage, midline shift, significant mass-effect, or extra-axial fluid collection. There are calcifications of the distal internal carotid and vertebrobasilar arteries. The partially visualized paranasal sinuses and mastoid air cells are clear. The calvarium is intact.                                       Medications   lactated ringers bolus 1,000 mL (0 mLs Intravenous Stopped 11/27/22 2189)         I spoke with Dr. Armstrong (on call for Dr. Schmidt) - will admit for observation, maintenance IVF and hold phenytoin.                     Clinical Impression:   Final diagnoses:  [R41.82] Mental status alteration  [T42.0X1A] Accidental phenytoin poisoning, initial encounter (Primary)  [G40.909] Seizure disorder      ED Disposition Condition    Observation Stable                John Gallegos MD  11/27/22 8111

## 2022-11-28 PROBLEM — T42.0X1A PHENYTOIN TOXICITY, ACCIDENTAL OR UNINTENTIONAL, INITIAL ENCOUNTER: Status: ACTIVE | Noted: 2022-11-28

## 2022-11-28 PROCEDURE — 25000003 PHARM REV CODE 250: Performed by: FAMILY MEDICINE

## 2022-11-28 PROCEDURE — 63600175 PHARM REV CODE 636 W HCPCS: Performed by: EMERGENCY MEDICINE

## 2022-11-28 PROCEDURE — 94761 N-INVAS EAR/PLS OXIMETRY MLT: CPT

## 2022-11-28 PROCEDURE — G0378 HOSPITAL OBSERVATION PER HR: HCPCS

## 2022-11-28 RX ORDER — METOPROLOL SUCCINATE 25 MG/1
25 TABLET, EXTENDED RELEASE ORAL DAILY
Status: DISCONTINUED | OUTPATIENT
Start: 2022-11-29 | End: 2022-11-30 | Stop reason: HOSPADM

## 2022-11-28 RX ORDER — AMIODARONE HYDROCHLORIDE 200 MG/1
200 TABLET ORAL DAILY
Status: DISCONTINUED | OUTPATIENT
Start: 2022-11-29 | End: 2022-11-30 | Stop reason: HOSPADM

## 2022-11-28 RX ORDER — LISINOPRIL 5 MG/1
5 TABLET ORAL DAILY
Status: DISCONTINUED | OUTPATIENT
Start: 2022-11-29 | End: 2022-11-30 | Stop reason: HOSPADM

## 2022-11-28 RX ORDER — PANTOPRAZOLE SODIUM 40 MG/1
40 TABLET, DELAYED RELEASE ORAL DAILY
Status: DISCONTINUED | OUTPATIENT
Start: 2022-11-29 | End: 2022-11-28

## 2022-11-28 RX ORDER — PHENOBARBITAL 32.4 MG/1
64.8 TABLET ORAL 2 TIMES DAILY
Status: DISCONTINUED | OUTPATIENT
Start: 2022-11-28 | End: 2022-11-28

## 2022-11-28 RX ORDER — LISINOPRIL 5 MG/1
5 TABLET ORAL DAILY
Status: DISCONTINUED | OUTPATIENT
Start: 2022-11-28 | End: 2022-11-28

## 2022-11-28 RX ORDER — METOPROLOL SUCCINATE 25 MG/1
25 TABLET, EXTENDED RELEASE ORAL DAILY
Status: DISCONTINUED | OUTPATIENT
Start: 2022-11-28 | End: 2022-11-28

## 2022-11-28 RX ORDER — FUROSEMIDE 40 MG/1
40 TABLET ORAL DAILY
Status: DISCONTINUED | OUTPATIENT
Start: 2022-11-29 | End: 2022-11-30 | Stop reason: HOSPADM

## 2022-11-28 RX ORDER — CLOPIDOGREL BISULFATE 75 MG/1
75 TABLET ORAL DAILY
Status: DISCONTINUED | OUTPATIENT
Start: 2022-11-29 | End: 2022-11-30 | Stop reason: HOSPADM

## 2022-11-28 RX ORDER — CLOPIDOGREL BISULFATE 75 MG/1
75 TABLET ORAL DAILY
Status: DISCONTINUED | OUTPATIENT
Start: 2022-11-28 | End: 2022-11-28

## 2022-11-28 RX ORDER — TAMSULOSIN HYDROCHLORIDE 0.4 MG/1
0.4 CAPSULE ORAL DAILY
Status: DISCONTINUED | OUTPATIENT
Start: 2022-11-28 | End: 2022-11-28

## 2022-11-28 RX ORDER — FINASTERIDE 5 MG/1
5 TABLET, FILM COATED ORAL DAILY
Status: DISCONTINUED | OUTPATIENT
Start: 2022-11-29 | End: 2022-11-30 | Stop reason: HOSPADM

## 2022-11-28 RX ORDER — PANTOPRAZOLE SODIUM 40 MG/1
40 TABLET, DELAYED RELEASE ORAL DAILY
Status: DISCONTINUED | OUTPATIENT
Start: 2022-11-29 | End: 2022-11-30 | Stop reason: HOSPADM

## 2022-11-28 RX ORDER — PHENOBARBITAL 32.4 MG/1
64.8 TABLET ORAL 2 TIMES DAILY
Status: DISCONTINUED | OUTPATIENT
Start: 2022-11-28 | End: 2022-11-30 | Stop reason: HOSPADM

## 2022-11-28 RX ORDER — VENLAFAXINE HYDROCHLORIDE 37.5 MG/1
75 CAPSULE, EXTENDED RELEASE ORAL 2 TIMES DAILY
Status: DISCONTINUED | OUTPATIENT
Start: 2022-11-28 | End: 2022-11-30 | Stop reason: HOSPADM

## 2022-11-28 RX ORDER — ATORVASTATIN CALCIUM 40 MG/1
40 TABLET, FILM COATED ORAL NIGHTLY
Status: DISCONTINUED | OUTPATIENT
Start: 2022-11-28 | End: 2022-11-30 | Stop reason: HOSPADM

## 2022-11-28 RX ORDER — TAMSULOSIN HYDROCHLORIDE 0.4 MG/1
0.4 CAPSULE ORAL DAILY
Status: DISCONTINUED | OUTPATIENT
Start: 2022-11-29 | End: 2022-11-30 | Stop reason: HOSPADM

## 2022-11-28 RX ORDER — AMIODARONE HYDROCHLORIDE 200 MG/1
200 TABLET ORAL DAILY
Status: DISCONTINUED | OUTPATIENT
Start: 2022-11-28 | End: 2022-11-28

## 2022-11-28 RX ORDER — LEVETIRACETAM 500 MG/1
1000 TABLET ORAL 2 TIMES DAILY
Status: DISCONTINUED | OUTPATIENT
Start: 2022-11-28 | End: 2022-11-28

## 2022-11-28 RX ORDER — ATORVASTATIN CALCIUM 40 MG/1
40 TABLET, FILM COATED ORAL NIGHTLY
Status: DISCONTINUED | OUTPATIENT
Start: 2022-11-28 | End: 2022-11-28

## 2022-11-28 RX ORDER — VENLAFAXINE HYDROCHLORIDE 37.5 MG/1
37.5 CAPSULE, EXTENDED RELEASE ORAL DAILY
Status: DISCONTINUED | OUTPATIENT
Start: 2022-11-29 | End: 2022-11-28

## 2022-11-28 RX ORDER — FUROSEMIDE 40 MG/1
40 TABLET ORAL DAILY
Status: DISCONTINUED | OUTPATIENT
Start: 2022-11-28 | End: 2022-11-28

## 2022-11-28 RX ORDER — LEVETIRACETAM 500 MG/1
1000 TABLET ORAL 2 TIMES DAILY
Status: DISCONTINUED | OUTPATIENT
Start: 2022-11-28 | End: 2022-11-30 | Stop reason: HOSPADM

## 2022-11-28 RX ORDER — FINASTERIDE 5 MG/1
5 TABLET, FILM COATED ORAL DAILY
Status: DISCONTINUED | OUTPATIENT
Start: 2022-11-28 | End: 2022-11-28

## 2022-11-28 RX ADMIN — METOPROLOL SUCCINATE ER TABLETS 25 MG: 25 TABLET, FILM COATED, EXTENDED RELEASE ORAL at 04:11

## 2022-11-28 RX ADMIN — SODIUM CHLORIDE, SODIUM LACTATE, POTASSIUM CHLORIDE, CALCIUM CHLORIDE AND DEXTROSE MONOHYDRATE: 5; 600; 310; 30; 20 INJECTION, SOLUTION INTRAVENOUS at 04:11

## 2022-11-28 RX ADMIN — PHENOBARBITAL 64.8 MG: 32.4 TABLET ORAL at 09:11

## 2022-11-28 RX ADMIN — LISINOPRIL 5 MG: 5 TABLET ORAL at 04:11

## 2022-11-28 RX ADMIN — SODIUM CHLORIDE, SODIUM LACTATE, POTASSIUM CHLORIDE, CALCIUM CHLORIDE AND DEXTROSE MONOHYDRATE: 5; 600; 310; 30; 20 INJECTION, SOLUTION INTRAVENOUS at 07:11

## 2022-11-28 RX ADMIN — PHENOBARBITAL 64.8 MG: 32.4 TABLET ORAL at 04:11

## 2022-11-28 RX ADMIN — TAMSULOSIN HYDROCHLORIDE 0.4 MG: 0.4 CAPSULE ORAL at 04:11

## 2022-11-28 RX ADMIN — FUROSEMIDE 40 MG: 40 TABLET ORAL at 04:11

## 2022-11-28 RX ADMIN — FINASTERIDE 5 MG: 5 TABLET, FILM COATED ORAL at 04:11

## 2022-11-28 RX ADMIN — LEVETIRACETAM 1000 MG: 500 TABLET, FILM COATED ORAL at 09:11

## 2022-11-28 RX ADMIN — CLOPIDOGREL BISULFATE 75 MG: 75 TABLET ORAL at 04:11

## 2022-11-28 RX ADMIN — APIXABAN 5 MG: 5 TABLET, FILM COATED ORAL at 09:11

## 2022-11-28 RX ADMIN — ATORVASTATIN CALCIUM 40 MG: 40 TABLET, FILM COATED ORAL at 09:11

## 2022-11-28 RX ADMIN — LEVETIRACETAM 1000 MG: 500 TABLET, FILM COATED ORAL at 04:11

## 2022-11-28 RX ADMIN — AMIODARONE HYDROCHLORIDE 200 MG: 200 TABLET ORAL at 04:11

## 2022-11-28 RX ADMIN — VENLAFAXINE HYDROCHLORIDE 75 MG: 37.5 CAPSULE, EXTENDED RELEASE ORAL at 09:11

## 2022-11-28 NOTE — HPI
77-year-old male with seizure disorder transferred from the nursing home because of lethargy progressing over the last 24 hours.  Emergency room evaluation revealed supra-therapeutic levels of phenytoin.  This medication has been held since admission.  The nurse called to notify me of this admission later in the day as he was not listed on my patient list this morning.  No new events during the day.  He is tolerating his diet but having some difficulty feeding himself.  I asked him if he usually ambulates and he does not answer.  He does get up in the wheelchair at the nursing facility.  He denies any pain or shortness of breath.  He is oriented to place and person.

## 2022-11-28 NOTE — PLAN OF CARE
Problem: Neurologic Impairment (Overdose)  Goal: Optimal Neurologic Function  Outcome: Ongoing, Progressing     Problem: Adult Inpatient Plan of Care  Goal: Absence of Hospital-Acquired Illness or Injury  Outcome: Ongoing, Progressing  Goal: Optimal Comfort and Wellbeing  Outcome: Ongoing, Progressing     Problem: Skin Injury Risk Increased  Goal: Skin Health and Integrity  Outcome: Ongoing, Progressing  Intervention: Optimize Skin Protection  Flowsheets (Taken 11/28/2022 4931)  Pressure Reduction Techniques: frequent weight shift encouraged  Head of Bed (HOB) Positioning: HOB at 30-45 degrees     Problem: Fall Injury Risk  Goal: Absence of Fall and Fall-Related Injury  Outcome: Ongoing, Progressing
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
## 2022-11-29 LAB
LEVETIRACETAM SERPL-MCNC: 33.2 MCG/ML (ref 10–40)
PHENYTOIN SERPL-MCNC: 38.8 UG/ML (ref 10–20)

## 2022-11-29 PROCEDURE — 94761 N-INVAS EAR/PLS OXIMETRY MLT: CPT

## 2022-11-29 PROCEDURE — 11000001 HC ACUTE MED/SURG PRIVATE ROOM

## 2022-11-29 PROCEDURE — 36415 COLL VENOUS BLD VENIPUNCTURE: CPT | Performed by: FAMILY MEDICINE

## 2022-11-29 PROCEDURE — 25000003 PHARM REV CODE 250: Performed by: FAMILY MEDICINE

## 2022-11-29 PROCEDURE — 80185 ASSAY OF PHENYTOIN TOTAL: CPT | Performed by: FAMILY MEDICINE

## 2022-11-29 RX ORDER — PHENYTOIN 50 MG/1
150 TABLET, CHEWABLE ORAL 2 TIMES DAILY
Qty: 180 TABLET | Refills: 11 | OUTPATIENT
Start: 2022-11-29 | End: 2023-11-29

## 2022-11-29 RX ADMIN — CLOPIDOGREL BISULFATE 75 MG: 75 TABLET ORAL at 04:11

## 2022-11-29 RX ADMIN — PHENOBARBITAL 64.8 MG: 32.4 TABLET ORAL at 08:11

## 2022-11-29 RX ADMIN — TAMSULOSIN HYDROCHLORIDE 0.4 MG: 0.4 CAPSULE ORAL at 04:11

## 2022-11-29 RX ADMIN — VENLAFAXINE HYDROCHLORIDE 75 MG: 37.5 CAPSULE, EXTENDED RELEASE ORAL at 08:11

## 2022-11-29 RX ADMIN — FINASTERIDE 5 MG: 5 TABLET, FILM COATED ORAL at 08:11

## 2022-11-29 RX ADMIN — ATORVASTATIN CALCIUM 40 MG: 40 TABLET, FILM COATED ORAL at 08:11

## 2022-11-29 RX ADMIN — METOPROLOL SUCCINATE ER TABLETS 25 MG: 25 TABLET, FILM COATED, EXTENDED RELEASE ORAL at 04:11

## 2022-11-29 RX ADMIN — LEVETIRACETAM 1000 MG: 500 TABLET, FILM COATED ORAL at 08:11

## 2022-11-29 RX ADMIN — FUROSEMIDE 40 MG: 40 TABLET ORAL at 04:11

## 2022-11-29 RX ADMIN — APIXABAN 5 MG: 5 TABLET, FILM COATED ORAL at 08:11

## 2022-11-29 RX ADMIN — LISINOPRIL 5 MG: 5 TABLET ORAL at 04:11

## 2022-11-29 RX ADMIN — AMIODARONE HYDROCHLORIDE 200 MG: 200 TABLET ORAL at 08:11

## 2022-11-29 RX ADMIN — PANTOPRAZOLE SODIUM 40 MG: 40 TABLET, DELAYED RELEASE ORAL at 04:11

## 2022-11-29 NOTE — PLAN OF CARE
Pt is alert only to self at this time. Pt is able to follow directions. Pt denies any pain , weakness, or nausea at this time. Pt is able to swallow medications whole. Emergency equipment is at bedside. Will continue to monitor

## 2022-11-29 NOTE — HOSPITAL COURSE
77-year-old male with history of seizure disorder transferred from the nursing home due to lethargy.  Hospital evaluation revealed Dilantin level of 44.  This drug was held since admission.  Repeat level prior to discharge was 38 on 11/29/22. The patient has been able to tolerate his diet, is alert he had remains somewhat dysarthric.  His Dilantin dosage will be resumed at a lower dose when level < 20.  Will repeat phenytoin level in 2 days.  He will be followed at the nursing home.
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
## 2022-11-30 VITALS
WEIGHT: 230 LBS | HEIGHT: 67 IN | SYSTOLIC BLOOD PRESSURE: 165 MMHG | HEART RATE: 54 BPM | RESPIRATION RATE: 20 BRPM | BODY MASS INDEX: 36.1 KG/M2 | OXYGEN SATURATION: 90 % | DIASTOLIC BLOOD PRESSURE: 77 MMHG | TEMPERATURE: 98 F

## 2022-11-30 PROCEDURE — 97161 PT EVAL LOW COMPLEX 20 MIN: CPT

## 2022-11-30 PROCEDURE — 94761 N-INVAS EAR/PLS OXIMETRY MLT: CPT

## 2022-11-30 PROCEDURE — 25000003 PHARM REV CODE 250: Performed by: FAMILY MEDICINE

## 2022-11-30 RX ADMIN — FINASTERIDE 5 MG: 5 TABLET, FILM COATED ORAL at 09:11

## 2022-11-30 RX ADMIN — PHENOBARBITAL 64.8 MG: 32.4 TABLET ORAL at 09:11

## 2022-11-30 RX ADMIN — LEVETIRACETAM 1000 MG: 500 TABLET, FILM COATED ORAL at 09:11

## 2022-11-30 RX ADMIN — AMIODARONE HYDROCHLORIDE 200 MG: 200 TABLET ORAL at 09:11

## 2022-11-30 RX ADMIN — APIXABAN 5 MG: 5 TABLET, FILM COATED ORAL at 09:11

## 2022-11-30 RX ADMIN — VENLAFAXINE HYDROCHLORIDE 75 MG: 37.5 CAPSULE, EXTENDED RELEASE ORAL at 09:11

## 2022-11-30 NOTE — PROGRESS NOTES
[facility]  Department of Hospital Medicine  Progress Note    PATIENT NAME: John Wayne  MRN: [id number]  TODAY'S DATE: 11/29/22  ADMIT DATE: 11/27/2022    SUBJECTIVE     PRINCIPLE PROBLEM: Phenytoin toxicity    INTERVAL HISTORY:    11/29/22  No new events overnight. Eating well. Continues to have difficulty expressing self verbally.      Review of patient's allergies indicates:   Allergen Reactions    Ancef in dextrose (iso-osm)     Codeine     Penicillins        Review of Systems   Respiratory:  Negative for shortness of breath.    Cardiovascular:  Negative for chest pain.   Gastrointestinal:  Negative for abdominal pain, nausea and vomiting.   Neurological:  Positive for weakness.     OBJECTIVE     VITAL SIGNS (Most Recent)  Temp: 98 °F (36.7 °C) (11/30/22 0524)  Pulse: (!) 54 (11/30/22 0524)  Resp: 20 (11/29/22 1941)  BP: (!) 149/66 (11/30/22 0524)  SpO2: 96 % (11/30/22 0524)    VENTILATION STATUS  Resp: 20 (11/29/22 1941)  SpO2: 96 % (11/30/22 0524)       I & O (Last 24H):  Intake/Output Summary (Last 24 hours) at 11/30/2022 0646  Last data filed at 11/30/2022 0053  Gross per 24 hour   Intake 320 ml   Output 700 ml   Net -380 ml       WEIGHTS  Wt Readings from Last 3 Encounters:   11/28/22 0449 104.3 kg (230 lb)   11/27/22 1231 104.3 kg (230 lb)   11/15/22 1048 113.3 kg (249 lb 12.8 oz)   10/06/22 0200 118 kg (260 lb 2.3 oz)   10/06/22 0101 120 kg (264 lb 8.8 oz)       Physical Exam  Constitutional:       General: He is not in acute distress.     Appearance: He is not toxic-appearing.   Eyes:      Conjunctiva/sclera: Conjunctivae normal.      Comments: No nystagmus   Cardiovascular:      Rate and Rhythm: Normal rate and regular rhythm.   Pulmonary:      Effort: Pulmonary effort is normal.      Breath sounds: Normal breath sounds.   Abdominal:      Tenderness: There is no abdominal tenderness. There is no guarding.   Neurological:      Mental Status: He is alert.      Motor: Weakness present.       Comments: Mild dysmetria left > right upper extremity   Psychiatric:         Mood and Affect: Mood normal.       SCHEDULED MEDS:   amiodarone  200 mg Oral Daily    apixaban  5 mg Oral BID    atorvastatin  40 mg Oral QHS    clopidogreL  75 mg Oral Daily    finasteride  5 mg Oral Daily    furosemide  40 mg Oral Daily    levETIRAcetam  1,000 mg Oral BID    lisinopriL  5 mg Oral Daily    metoprolol succinate  25 mg Oral Daily    pantoprazole  40 mg Oral Daily    PHENobarbitaL  64.8 mg Oral BID    tamsulosin  0.4 mg Oral Daily    venlafaxine  75 mg Oral BID       CONTINUOUS INFUSIONS:    PRN MEDS:melatonin, ondansetron, sodium chloride 0.9%    LABS AND DIAGNOSTICS     CBC LAST 3 DAYS  Recent Labs   Lab 11/27/22  1329   WBC 23.3*   RBC 5.14   HGB 15.1   HCT 46.3   MCV 90.1   MCH 29.4   MCHC 32.6*   RDW 13.6      MPV 9.8       COAGULATION LAST 3 DAYS  No results for input(s): LABPT, INR, APTT in the last 168 hours.    CHEMISTRY LAST 3 DAYS  Recent Labs   Lab 11/27/22  1329      K 3.4*   CO2 27   BUN 12.0   CREATININE 0.92   CALCIUM 9.7   MG 1.70   ALBUMIN 3.7   ALKPHOS 98   ALT 14   AST 15   BILITOT 0.3       CARDIAC PROFILE LAST 3 DAYS  Recent Labs   Lab 11/27/22  1329   TROPONINI 0.040       ENDOCRINE LAST 3 DAYS  Recent Labs   Lab 11/27/22  1329   TSH 1.6234         ASSESSMENT/PLAN:     Active Hospital Problems    Diagnosis    *Phenytoin toxicity, accidental or unintentional, initial encounter       ASSESSMENT & PLAN:   Repeat phenytoin level. DC IVF. Mobilize as tolerated.        Apolinar Schmidt MD    Date of Service: 11/30/2022  6:44 AM

## 2022-11-30 NOTE — PT/OT/SLP EVAL
Physical Therapy Evaluation    Patient Name:  John Wayne   MRN:  [id number]    Recommendations:     Discharge Recommendations:  nursing facility, skilled   Discharge Equipment Recommendations: none   Barriers to discharge: None    Assessment:     John Wayne is a 77 y.o. male admitted with a medical diagnosis of Phenytoin toxicity, accidental or unintentional, initial encounter.  He presents with the following impairments/functional limitations:  weakness, impaired endurance, decreased safety awareness, impaired balance, impaired functional mobility, impaired self care skills, impaired cognition.    Pt states he has not walked in a very long time. He was a poor historian. He was able to transfer from bed to chair with Mod A, poor safety awareness and trunk control/balance while sitting EOB.    Rehab Prognosis: Fair; patient would benefit from acute skilled PT services to address these deficits and reach maximum level of function.    Recent Surgery: * No surgery found *      Plan:     During this hospitalization, patient to be seen daily to address the identified rehab impairments via therapeutic activities, therapeutic exercises and progress toward the following goals:    Plan of Care Expires:  12/28/22    Subjective     Chief Complaint: weakness  Patient/Family Comments/goals: to return to NH  Pain/Comfort:  Pain Rating 1: 0/10    Patients cultural, spiritual, [employer] conflicts given the current situation:      Living Environment:  Pt from NH  Prior to admission, patients level of function was w/c bound.  Equipment used at home: wheelchair.      Objective:     Communicated with nurse prior to session.  Patient found HOB elevated with    upon PT entry to room.    General Precautions: Standard, fall   Orthopedic Precautions:    Braces:    Respiratory Status: Room air    Exams:  RLE Strength: Deficits: 3/5  LLE Strength: Deficits: 3/5    Functional Mobility:  Bed Mobility:     Supine to Sit: moderate  assistance  Transfers:     Sit to Stand:  moderate assistance with hand-held assist  Bed to Chair: moderate assistance with  hand-held assist  using  Stand Pivot  Balance: Mod A static standing, Min A static sitting EOB      AM-PAC 6 CLICK MOBILITY  Total Score:        Treatment & Education:  See above    Patient left up in chair with all lines intact, call button in reach, and chair alarm on.    GOALS:   Multidisciplinary Problems       Physical Therapy Goals          Problem: Physical Therapy    Goal Priority Disciplines Outcome Goal Variances Interventions   Physical Therapy Goal     PT, PT/OT Ongoing, Progressing     Description: Goals to be met by: 22     Patient will increase functional independence with mobility by performin. Supine to sit with Contact Guard Assistance  2. Sit to stand transfer with Contact Guard Assistance  3. Sitting at edge of bed x15 minutes with Stand-by Assistance                         History:     Past Medical History:   Diagnosis Date    Anticoagulant long-term use     COPD (chronic obstructive pulmonary disease)     Coronary artery disease     Difficult intubation     GERD (gastroesophageal reflux disease)     Hypertension     Seizures        Past Surgical History:   Procedure Laterality Date    cardiac catheterisation  10/18/2016    PERCUTANEOUS BALLOON VALVULOPLASTY  2018       Time Tracking:     PT Received On: 22  PT Start Time: 921     PT Stop Time: 941  PT Total Time (min): 20 min     Billable Minutes: Evaluation 2022

## 2022-11-30 NOTE — PLAN OF CARE
Pt is alert to only self at the time of assessment. Pt denies any pain at this time. Pt denies any nausea or vomiting. Perwick is in place. Will continue to monitor

## 2022-11-30 NOTE — DISCHARGE SUMMARY
[facility]  Hospital Medicine  Discharge Summary      Patient Name: John Wayne  MRN: [id number]  ELINOR: [id number]  Patient Class: IP- Inpatient  Admission Date: 11/27/2022  Hospital Length of Stay: 1 days  Discharge Date and Time:  11/30/2022 7:40 AM  Attending Physician: ISAC Schmidt MD   Discharging Provider: Apolinar Schmidt MD  Primary Care Provider: SHAY Schmidt MD    Primary Care Team: Networked reference to record PCT     HPI:   77-year-old male with seizure disorder transferred from the nursing home because of lethargy progressing over the last 24 hours.  Emergency room evaluation revealed supra-therapeutic levels of phenytoin.  This medication has been held since admission.  The nurse called to notify me of this admission later in the day as he was not listed on my patient list this morning.  No new events during the day.  He is tolerating his diet but having some difficulty feeding himself.  I asked him if he usually ambulates and he does not answer.  He does get up in the wheelchair at the nursing facility.  He denies any pain or shortness of breath.  He is oriented to place and person.      * No surgery found *      Hospital Course:   77-year-old male with history of seizure disorder transferred from the nursing home due to lethargy.  Hospital evaluation revealed Dilantin level of 44.  This drug was held since admission.  Repeat level prior to discharge was 38 on 11/29/22. The patient has been able to tolerate his diet, is alert he had remains somewhat dysarthric.  His Dilantin dosage will be resumed at a lower dose when level < 20.  Will repeat phenytoin level in 2 days.  He will be followed at the nursing home.       Goals of Care Treatment Preferences:  Code Status: Full Code       [place]: Yes           Consults:     * Phenytoin toxicity, accidental or unintentional, initial encounter  Hold the drug and re-adjust dose        Final Active Diagnoses:     Diagnosis Date Noted POA    PRINCIPAL PROBLEM:  Phenytoin toxicity, accidental or unintentional, initial encounter [T42.0X1A] 11/28/2022 Yes      Problems Resolved During this Admission:       Discharged Condition: good    Disposition: [facility]    Follow Up:   Follow-up Information     D Philipp Schmidt MD Follow up.    Specialty: Family Medicine  Why: Patient will be seen at the nursing facility  Contact information:  Shadi BRITO [id number] [phone]                       Patient Instructions:   No discharge procedures on file.    Significant Diagnostic Studies: Labs: All labs within the past 24 hours have been reviewed    Pending Diagnostic Studies:     None         Medications:  Reconciled Home Medications:      Medication List      CONTINUE taking these medications    amiodarone 200 MG Tab  Commonly known as: PACERONE  Take 200 mg by mouth once daily.     atorvastatin 40 MG tablet  Commonly known as: LIPITOR  Take 40 mg by mouth every evening.     clopidogreL 75 mg tablet  Commonly known as: PLAVIX  Take 1 tablet by mouth Daily.     ELIQUIS 5 mg Tab  Generic drug: apixaban  Take 1 tablet by mouth 2 (two) times a day.     finasteride 5 mg tablet  Commonly known as: PROSCAR  Take 1 tablet (5 mg total) by mouth once daily.     furosemide 40 MG tablet  Commonly known as: LASIX  Take 1 tablet by mouth Daily.     levETIRAcetam 1000 MG tablet  Commonly known as: KEPPRA  Take 1 tablet by mouth 2 (two) times a day.     lisinopriL 5 MG tablet  Commonly known as: PRINIVIL,ZESTRIL  Take 1 tablet by mouth Daily.     metoprolol succinate 25 MG 24 hr tablet  Commonly known as: TOPROL-XL  Take 1 tablet by mouth Daily.     pantoprazole 40 MG tablet  Commonly known as: PROTONIX  Take 40 mg by mouth Daily.     PHENobarbitaL 32.4 MG tablet  Take 2 tablets (64.8 mg total) by mouth 2 (two) times daily.     tamsulosin 0.4 mg Cap  Commonly known as: FLOMAX  Take 1 tablet by mouth Daily.     venlafaxine 75 MG  tablet  Commonly known as: EFFEXOR  Take 1 tablet (75 mg total) by mouth 2 (two) times daily.        STOP taking these medications    phenytoin 50 mg chewable tablet  Commonly known as: DILANTIN            Indwelling Lines/Drains at time of discharge:   Lines/Drains/Airways     None                 Time spent on the discharge of patient: 30 minutes         Apolinar Schmidt MD  Department of Hospital Medicine  [facility]

## 2022-12-05 ENCOUNTER — HOSPITAL ENCOUNTER (EMERGENCY)
Facility: HOSPITAL | Age: 77
Discharge: ANOTHER HOSPITAL | End: 2022-12-07
Attending: INTERNAL MEDICINE
Payer: MEDICARE

## 2022-12-05 DIAGNOSIS — T17.908A ASPIRATION INTO AIRWAY, INITIAL ENCOUNTER: Primary | ICD-10-CM

## 2022-12-05 DIAGNOSIS — R79.89 ELEVATED TROPONIN: ICD-10-CM

## 2022-12-05 DIAGNOSIS — R00.0 TACHYCARDIA: ICD-10-CM

## 2022-12-05 DIAGNOSIS — J18.9 PNEUMONIA OF BOTH LOWER LOBES DUE TO INFECTIOUS ORGANISM: ICD-10-CM

## 2022-12-05 DIAGNOSIS — G40.909 SEIZURE DISORDER: ICD-10-CM

## 2022-12-05 DIAGNOSIS — E87.6 ACUTE HYPOKALEMIA: ICD-10-CM

## 2022-12-05 DIAGNOSIS — R09.02 HYPOXIA: ICD-10-CM

## 2022-12-05 LAB
ABS NEUT CALC (OHS): 11.04 X10(3)/MCL (ref 2.1–9.2)
ALBUMIN SERPL-MCNC: 2.9 GM/DL (ref 3.4–4.8)
ALBUMIN/GLOB SERPL: 0.8 RATIO (ref 1.1–2)
ALLENS TEST: ABNORMAL
ALP SERPL-CCNC: 90 UNIT/L (ref 40–150)
ALT SERPL-CCNC: 12 UNIT/L (ref 0–55)
APPEARANCE UR: CLEAR
AST SERPL-CCNC: 18 UNIT/L (ref 5–34)
BACTERIA #/AREA URNS AUTO: ABNORMAL /HPF
BILIRUB UR QL STRIP.AUTO: NEGATIVE MG/DL
BILIRUBIN DIRECT+TOT PNL SERPL-MCNC: 0.3 MG/DL
BNP BLD-MCNC: 28.9 PG/ML
BUN SERPL-MCNC: 11 MG/DL (ref 8.4–25.7)
BURR CELLS (OLG): SLIGHT
CALCIUM SERPL-MCNC: 9.1 MG/DL (ref 8.8–10)
CHLORIDE SERPL-SCNC: 100 MMOL/L (ref 98–107)
CK SERPL-CCNC: 46 U/L (ref 30–200)
CO2 SERPL-SCNC: 25 MMOL/L (ref 23–31)
COLOR UR AUTO: YELLOW
CREAT SERPL-MCNC: 0.81 MG/DL (ref 0.73–1.18)
ERYTHROCYTE [DISTWIDTH] IN BLOOD BY AUTOMATED COUNT: 13.5 % (ref 11.5–17)
GFR SERPLBLD CREATININE-BSD FMLA CKD-EPI: >60 MLS/MIN/1.73/M2
GLOBULIN SER-MCNC: 3.8 GM/DL (ref 2.4–3.5)
GLUCOSE SERPL-MCNC: 155 MG/DL (ref 82–115)
GLUCOSE UR QL STRIP.AUTO: NEGATIVE MG/DL
HCO3 UR-SCNC: 26.5 MMOL/L (ref 24–28)
HCT VFR BLD AUTO: 45.2 % (ref 42–52)
HGB BLD-MCNC: 14.4 GM/DL (ref 14–18)
IMM GRANULOCYTES # BLD AUTO: 0.14 X10(3)/MCL (ref 0–0.04)
IMM GRANULOCYTES NFR BLD AUTO: 0.6 %
KETONES UR QL STRIP.AUTO: NEGATIVE MG/DL
LACTATE SERPL-SCNC: 1.1 MMOL/L (ref 0.5–2.2)
LEUKOCYTE ESTERASE UR QL STRIP.AUTO: NEGATIVE UNIT/L
LYMPHOCYTES NFR BLD MANUAL: 12.3 X10(3)/MCL
LYMPHOCYTES NFR BLD MANUAL: 49 % (ref 13–40)
MAGNESIUM SERPL-MCNC: 1.5 MG/DL (ref 1.6–2.6)
MCH RBC QN AUTO: 28.9 PG (ref 27–31)
MCHC RBC AUTO-ENTMCNC: 31.9 MG/DL (ref 33–36)
MCV RBC AUTO: 90.6 FL (ref 80–94)
MONOCYTES NFR BLD MANUAL: 1.76 X10(3)/MCL (ref 0.1–1.3)
MONOCYTES NFR BLD MANUAL: 7 % (ref 2–11)
NEUTROPHILS NFR BLD MANUAL: 44 % (ref 47–80)
NITRITE UR QL STRIP.AUTO: POSITIVE
PCO2 BLDA: 39.8 MMHG (ref 35–45)
PH SMN: 7.43 [PH] (ref 7.35–7.45)
PH UR STRIP.AUTO: 5.5 [PH]
PHENYTOIN SERPL-MCNC: 23.6 UG/ML (ref 10–20)
PLATELET # BLD AUTO: 196 X10(3)/MCL (ref 130–400)
PLATELET # BLD EST: ADEQUATE 10*3/UL
PMV BLD AUTO: 10.7 FL (ref 7.4–10.4)
PO2 BLDA: 48 MMHG (ref 80–100)
POC BE: 2 MMOL/L
POC SATURATED O2: 85 % (ref 95–100)
POC TCO2: 28 MMOL/L (ref 23–27)
POIKILOCYTOSIS BLD QL SMEAR: SLIGHT
POTASSIUM SERPL-SCNC: 3.1 MMOL/L (ref 3.5–5.1)
PROT SERPL-MCNC: 6.7 GM/DL (ref 5.8–7.6)
PROT UR QL STRIP.AUTO: >=300 MG/DL
RBC # BLD AUTO: 4.99 X10(6)/MCL (ref 4.7–6.1)
RBC #/AREA URNS AUTO: ABNORMAL /HPF
RBC MORPH BLD: ABNORMAL
RBC UR QL AUTO: ABNORMAL UNIT/L
SAMPLE: ABNORMAL
SITE: ABNORMAL
SODIUM SERPL-SCNC: 136 MMOL/L (ref 136–145)
SP GR UR STRIP.AUTO: 1.02
SQUAMOUS #/AREA URNS AUTO: ABNORMAL /HPF
TROPONIN I SERPL-MCNC: 1.87 NG/ML (ref 0–0.04)
UROBILINOGEN UR STRIP-ACNC: 0.2 MG/DL
WBC # SPEC AUTO: 25.1 X10(3)/MCL (ref 4.5–11.5)
WBC #/AREA URNS AUTO: ABNORMAL /HPF

## 2022-12-05 PROCEDURE — 87040 BLOOD CULTURE FOR BACTERIA: CPT | Mod: 59 | Performed by: INTERNAL MEDICINE

## 2022-12-05 PROCEDURE — 25000003 PHARM REV CODE 250: Performed by: INTERNAL MEDICINE

## 2022-12-05 PROCEDURE — 82962 GLUCOSE BLOOD TEST: CPT

## 2022-12-05 PROCEDURE — 96376 TX/PRO/DX INJ SAME DRUG ADON: CPT

## 2022-12-05 PROCEDURE — 99900035 HC TECH TIME PER 15 MIN (STAT)

## 2022-12-05 PROCEDURE — 81001 URINALYSIS AUTO W/SCOPE: CPT | Performed by: INTERNAL MEDICINE

## 2022-12-05 PROCEDURE — 99285 EMERGENCY DEPT VISIT HI MDM: CPT | Mod: 25

## 2022-12-05 PROCEDURE — 80184 ASSAY OF PHENOBARBITAL: CPT | Performed by: EMERGENCY MEDICINE

## 2022-12-05 PROCEDURE — 81003 URINALYSIS AUTO W/O SCOPE: CPT | Performed by: INTERNAL MEDICINE

## 2022-12-05 PROCEDURE — 80185 ASSAY OF PHENYTOIN TOTAL: CPT | Performed by: INTERNAL MEDICINE

## 2022-12-05 PROCEDURE — 96361 HYDRATE IV INFUSION ADD-ON: CPT

## 2022-12-05 PROCEDURE — 85027 COMPLETE CBC AUTOMATED: CPT | Performed by: INTERNAL MEDICINE

## 2022-12-05 PROCEDURE — 96365 THER/PROPH/DIAG IV INF INIT: CPT

## 2022-12-05 PROCEDURE — 96367 TX/PROPH/DG ADDL SEQ IV INF: CPT | Mod: 59

## 2022-12-05 PROCEDURE — 83605 ASSAY OF LACTIC ACID: CPT | Performed by: INTERNAL MEDICINE

## 2022-12-05 PROCEDURE — 36600 WITHDRAWAL OF ARTERIAL BLOOD: CPT

## 2022-12-05 PROCEDURE — 27100092 HC HIGH FLOW DELIVERY CANNULA

## 2022-12-05 PROCEDURE — 83880 ASSAY OF NATRIURETIC PEPTIDE: CPT | Performed by: INTERNAL MEDICINE

## 2022-12-05 PROCEDURE — 80053 COMPREHEN METABOLIC PANEL: CPT | Performed by: INTERNAL MEDICINE

## 2022-12-05 PROCEDURE — 82550 ASSAY OF CK (CPK): CPT | Performed by: INTERNAL MEDICINE

## 2022-12-05 PROCEDURE — 96375 TX/PRO/DX INJ NEW DRUG ADDON: CPT | Mod: 59

## 2022-12-05 PROCEDURE — 80161 ASY CARBAMAZEPIN 10,11-EPXID: CPT | Performed by: INTERNAL MEDICINE

## 2022-12-05 PROCEDURE — 27100171 HC OXYGEN HIGH FLOW UP TO 24 HOURS

## 2022-12-05 PROCEDURE — 94799 UNLISTED PULMONARY SVC/PX: CPT

## 2022-12-05 PROCEDURE — 83735 ASSAY OF MAGNESIUM: CPT | Performed by: INTERNAL MEDICINE

## 2022-12-05 PROCEDURE — 84484 ASSAY OF TROPONIN QUANT: CPT | Performed by: INTERNAL MEDICINE

## 2022-12-05 PROCEDURE — 82803 BLOOD GASES ANY COMBINATION: CPT

## 2022-12-05 PROCEDURE — 96368 THER/DIAG CONCURRENT INF: CPT

## 2022-12-05 PROCEDURE — 96366 THER/PROPH/DIAG IV INF ADDON: CPT | Mod: 59

## 2022-12-05 PROCEDURE — 36556 INSERT NON-TUNNEL CV CATH: CPT

## 2022-12-05 PROCEDURE — 27000249 HC VAPOTHERM CIRCUIT

## 2022-12-05 RX ORDER — LEVOFLOXACIN 5 MG/ML
750 INJECTION, SOLUTION INTRAVENOUS ONCE
Status: COMPLETED | OUTPATIENT
Start: 2022-12-06 | End: 2022-12-06

## 2022-12-05 RX ORDER — SODIUM CHLORIDE 9 MG/ML
125 INJECTION, SOLUTION INTRAVENOUS ONCE
Status: COMPLETED | OUTPATIENT
Start: 2022-12-05 | End: 2022-12-06

## 2022-12-05 RX ORDER — MAGNESIUM SULFATE HEPTAHYDRATE 40 MG/ML
2 INJECTION, SOLUTION INTRAVENOUS
Status: COMPLETED | OUTPATIENT
Start: 2022-12-05 | End: 2022-12-06

## 2022-12-05 RX ORDER — CLINDAMYCIN PHOSPHATE 600 MG/50ML
600 INJECTION, SOLUTION INTRAVENOUS
Status: COMPLETED | OUTPATIENT
Start: 2022-12-05 | End: 2022-12-06

## 2022-12-05 RX ORDER — POTASSIUM CHLORIDE 14.9 MG/ML
20 INJECTION INTRAVENOUS
Status: COMPLETED | OUTPATIENT
Start: 2022-12-05 | End: 2022-12-06

## 2022-12-05 RX ADMIN — SODIUM CHLORIDE 1000 ML: 9 INJECTION, SOLUTION INTRAVENOUS at 10:12

## 2022-12-05 RX ADMIN — SODIUM CHLORIDE 125 ML/HR: 9 INJECTION, SOLUTION INTRAVENOUS at 11:12

## 2022-12-06 LAB
CARBAMAZEPINE FREE SERPL-MCNC: <0.4 UG/ML (ref 4–12)
PHENOBARB SERPL-MCNC: 22.1 UG/ML (ref 15–40)
TROPONIN I SERPL-MCNC: 10.14 NG/ML (ref 0–0.04)
TROPONIN I SERPL-MCNC: 3.61 NG/ML (ref 0–0.04)
TROPONIN I SERPL-MCNC: 6.96 NG/ML (ref 0–0.04)

## 2022-12-06 PROCEDURE — 84484 ASSAY OF TROPONIN QUANT: CPT | Mod: 91 | Performed by: EMERGENCY MEDICINE

## 2022-12-06 PROCEDURE — 25000003 PHARM REV CODE 250: Performed by: INTERNAL MEDICINE

## 2022-12-06 PROCEDURE — 99900035 HC TECH TIME PER 15 MIN (STAT)

## 2022-12-06 PROCEDURE — 27000221 HC OXYGEN, UP TO 24 HOURS

## 2022-12-06 PROCEDURE — 63600175 PHARM REV CODE 636 W HCPCS: Performed by: EMERGENCY MEDICINE

## 2022-12-06 PROCEDURE — 25000003 PHARM REV CODE 250: Performed by: EMERGENCY MEDICINE

## 2022-12-06 PROCEDURE — 27000249 HC VAPOTHERM CIRCUIT

## 2022-12-06 PROCEDURE — 94799 UNLISTED PULMONARY SVC/PX: CPT

## 2022-12-06 PROCEDURE — 94761 N-INVAS EAR/PLS OXIMETRY MLT: CPT

## 2022-12-06 PROCEDURE — 36415 COLL VENOUS BLD VENIPUNCTURE: CPT | Performed by: EMERGENCY MEDICINE

## 2022-12-06 PROCEDURE — 63600175 PHARM REV CODE 636 W HCPCS: Performed by: INTERNAL MEDICINE

## 2022-12-06 PROCEDURE — 27100171 HC OXYGEN HIGH FLOW UP TO 24 HOURS

## 2022-12-06 PROCEDURE — 84484 ASSAY OF TROPONIN QUANT: CPT | Performed by: INTERNAL MEDICINE

## 2022-12-06 RX ORDER — LEVETIRACETAM 500 MG/5ML
1000 INJECTION, SOLUTION, CONCENTRATE INTRAVENOUS EVERY 12 HOURS
Status: DISCONTINUED | OUTPATIENT
Start: 2022-12-06 | End: 2022-12-07 | Stop reason: HOSPADM

## 2022-12-06 RX ORDER — ATORVASTATIN CALCIUM 40 MG/1
80 TABLET, FILM COATED ORAL ONCE
Status: COMPLETED | OUTPATIENT
Start: 2022-12-06 | End: 2022-12-06

## 2022-12-06 RX ORDER — CLINDAMYCIN PHOSPHATE 600 MG/50ML
600 INJECTION, SOLUTION INTRAVENOUS
Status: DISCONTINUED | OUTPATIENT
Start: 2022-12-06 | End: 2022-12-07 | Stop reason: HOSPADM

## 2022-12-06 RX ORDER — ASPIRIN 325 MG
325 TABLET ORAL
Status: COMPLETED | OUTPATIENT
Start: 2022-12-06 | End: 2022-12-06

## 2022-12-06 RX ORDER — METOPROLOL TARTRATE 25 MG/1
25 TABLET, FILM COATED ORAL ONCE
Status: COMPLETED | OUTPATIENT
Start: 2022-12-06 | End: 2022-12-06

## 2022-12-06 RX ORDER — LEVOFLOXACIN 5 MG/ML
750 INJECTION, SOLUTION INTRAVENOUS
Status: DISCONTINUED | OUTPATIENT
Start: 2022-12-06 | End: 2022-12-07 | Stop reason: HOSPADM

## 2022-12-06 RX ORDER — LEVETIRACETAM 500 MG/5ML
1000 INJECTION, SOLUTION, CONCENTRATE INTRAVENOUS
Status: DISCONTINUED | OUTPATIENT
Start: 2022-12-06 | End: 2022-12-06

## 2022-12-06 RX ORDER — SODIUM CHLORIDE 9 MG/ML
1000 INJECTION, SOLUTION INTRAVENOUS
Status: COMPLETED | OUTPATIENT
Start: 2022-12-06 | End: 2022-12-06

## 2022-12-06 RX ORDER — LEVETIRACETAM 10 MG/ML
1000 INJECTION INTRAVASCULAR
Status: COMPLETED | OUTPATIENT
Start: 2022-12-06 | End: 2022-12-06

## 2022-12-06 RX ADMIN — VANCOMYCIN HYDROCHLORIDE 1000 MG: 1 INJECTION, POWDER, LYOPHILIZED, FOR SOLUTION INTRAVENOUS at 12:12

## 2022-12-06 RX ADMIN — ATORVASTATIN CALCIUM 80 MG: 40 TABLET, FILM COATED ORAL at 02:12

## 2022-12-06 RX ADMIN — MAGNESIUM SULFATE HEPTAHYDRATE 2 G: 2 INJECTION, SOLUTION INTRAVENOUS at 12:12

## 2022-12-06 RX ADMIN — LEVETIRACETAM 1000 MG: 10 INJECTION INTRAVENOUS at 10:12

## 2022-12-06 RX ADMIN — ASPIRIN 325 MG ORAL TABLET 325 MG: 325 PILL ORAL at 02:12

## 2022-12-06 RX ADMIN — CLINDAMYCIN PHOSPHATE 600 MG: 600 INJECTION, SOLUTION INTRAVENOUS at 12:12

## 2022-12-06 RX ADMIN — SODIUM CHLORIDE 1000 ML: 9 INJECTION, SOLUTION INTRAVENOUS at 07:12

## 2022-12-06 RX ADMIN — CLINDAMYCIN PHOSPHATE 600 MG: 600 INJECTION, SOLUTION INTRAVENOUS at 06:12

## 2022-12-06 RX ADMIN — POTASSIUM CHLORIDE 20 MEQ: 14.9 INJECTION, SOLUTION INTRAVENOUS at 12:12

## 2022-12-06 RX ADMIN — LEVOFLOXACIN 750 MG: 750 INJECTION, SOLUTION INTRAVENOUS at 01:12

## 2022-12-06 RX ADMIN — METOPROLOL TARTRATE 25 MG: 25 TABLET, FILM COATED ORAL at 02:12

## 2022-12-06 RX ADMIN — LEVETIRACETAM 1000 MG: 100 INJECTION, SOLUTION INTRAVENOUS at 09:12

## 2022-12-06 RX ADMIN — VANCOMYCIN HYDROCHLORIDE 1250 MG: 1 INJECTION, POWDER, LYOPHILIZED, FOR SOLUTION INTRAVENOUS at 06:12
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
## 2022-12-06 NOTE — ED PROVIDER NOTES
I spoke with Dr. Gardner at [facility], who accepts transfer.  Recommends repeat troponin now and start weaning FiO2 on VapoTherm.     John Gallegos MD  12/06/22 0752      1111:  pt remains stable; vitals WNL;  neurology from [facility] called and recommended giving Keppra 1 gm IV while awaiting transfer.  Review of medication list shows that he was put on phenobarbital 2 months ago by the hospitalist group at [facility].  Phenobarbital level added to blood work and is within therapeutic range.       1730:  still awaiting bed assignment; scheduled antibiotics ordered along with repeat troponin and scheduled Keppra     John Gallegos MD  12/06/22 1730
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
## 2022-12-07 ENCOUNTER — HOSPITAL ENCOUNTER (INPATIENT)
Facility: HOSPITAL | Age: 77
LOS: 5 days | DRG: 100 | End: 2022-12-12
Attending: INTERNAL MEDICINE | Admitting: HOSPITALIST
Payer: MEDICARE

## 2022-12-07 VITALS
DIASTOLIC BLOOD PRESSURE: 69 MMHG | SYSTOLIC BLOOD PRESSURE: 134 MMHG | RESPIRATION RATE: 21 BRPM | HEIGHT: 67 IN | BODY MASS INDEX: 36.1 KG/M2 | HEART RATE: 80 BPM | OXYGEN SATURATION: 93 % | TEMPERATURE: 98 F | WEIGHT: 230 LBS

## 2022-12-07 DIAGNOSIS — R56.9 SEIZURES: ICD-10-CM

## 2022-12-07 DIAGNOSIS — J96.02 ACUTE RESPIRATORY FAILURE WITH HYPERCAPNIA: ICD-10-CM

## 2022-12-07 DIAGNOSIS — I50.9 CONGESTIVE HEART FAILURE, UNSPECIFIED HF CHRONICITY, UNSPECIFIED HEART FAILURE TYPE: ICD-10-CM

## 2022-12-07 DIAGNOSIS — E78.49 OTHER HYPERLIPIDEMIA: ICD-10-CM

## 2022-12-07 DIAGNOSIS — I48.0 PAROXYSMAL ATRIAL FIBRILLATION: Primary | ICD-10-CM

## 2022-12-07 DIAGNOSIS — R09.02 HYPOXIA: ICD-10-CM

## 2022-12-07 DIAGNOSIS — I21.4 NSTEMI (NON-ST ELEVATED MYOCARDIAL INFARCTION): ICD-10-CM

## 2022-12-07 DIAGNOSIS — I10 PRIMARY HYPERTENSION: ICD-10-CM

## 2022-12-07 LAB
ABS NEUT CALC (OHS): 11.38 X10(3)/MCL (ref 2.1–9.2)
ALBUMIN SERPL-MCNC: 2.1 GM/DL (ref 3.4–4.8)
ALBUMIN/GLOB SERPL: 0.6 RATIO (ref 1.1–2)
ALP SERPL-CCNC: 62 UNIT/L (ref 40–150)
ALT SERPL-CCNC: 6 UNIT/L (ref 0–55)
AST SERPL-CCNC: 23 UNIT/L (ref 5–34)
BILIRUBIN DIRECT+TOT PNL SERPL-MCNC: 0.5 MG/DL
BUN SERPL-MCNC: 10 MG/DL (ref 8.4–25.7)
BURR CELLS (OLG): SLIGHT
CALCIUM SERPL-MCNC: 9 MG/DL (ref 8.8–10)
CHLORIDE SERPL-SCNC: 104 MMOL/L (ref 98–107)
CO2 SERPL-SCNC: 27 MMOL/L (ref 23–31)
CREAT SERPL-MCNC: 0.72 MG/DL (ref 0.73–1.18)
ERYTHROCYTE [DISTWIDTH] IN BLOOD BY AUTOMATED COUNT: 13.4 % (ref 11.5–17)
GFR SERPLBLD CREATININE-BSD FMLA CKD-EPI: >60 MLS/MIN/1.73/M2
GLOBULIN SER-MCNC: 3.7 GM/DL (ref 2.4–3.5)
GLUCOSE SERPL-MCNC: 114 MG/DL (ref 82–115)
HCT VFR BLD AUTO: 37.2 % (ref 42–52)
HGB BLD-MCNC: 12 GM/DL (ref 14–18)
IMM GRANULOCYTES # BLD AUTO: 0.09 X10(3)/MCL (ref 0–0.04)
IMM GRANULOCYTES NFR BLD AUTO: 0.4 %
LYMPHOCYTES NFR BLD MANUAL: 40 % (ref 13–40)
LYMPHOCYTES NFR BLD MANUAL: 8.28 X10(3)/MCL
MAGNESIUM SERPL-MCNC: 1.8 MG/DL (ref 1.6–2.6)
MCH RBC QN AUTO: 29 PG (ref 27–31)
MCHC RBC AUTO-ENTMCNC: 32.3 MG/DL (ref 33–36)
MCV RBC AUTO: 89.9 FL (ref 80–94)
MONOCYTES NFR BLD MANUAL: 1.03 X10(3)/MCL (ref 0.1–1.3)
MONOCYTES NFR BLD MANUAL: 5 % (ref 2–11)
NEUTROPHILS NFR BLD MANUAL: 54 % (ref 47–80)
NEUTS BAND NFR BLD MANUAL: 1 % (ref 0–11)
OVALOCYTES (OLG): SLIGHT
PHENYTOIN SERPL-MCNC: 17 UG/ML (ref 10–20)
PLATELET # BLD AUTO: 148 X10(3)/MCL (ref 130–400)
PLATELET # BLD EST: ADEQUATE 10*3/UL
PMV BLD AUTO: 10.3 FL (ref 7.4–10.4)
POCT GLUCOSE: 125 MG/DL (ref 70–110)
POIKILOCYTOSIS BLD QL SMEAR: SLIGHT
POTASSIUM SERPL-SCNC: 3.3 MMOL/L (ref 3.5–5.1)
PROT SERPL-MCNC: 5.8 GM/DL (ref 5.8–7.6)
RBC # BLD AUTO: 4.14 X10(6)/MCL (ref 4.7–6.1)
RBC MORPH BLD: ABNORMAL
SODIUM SERPL-SCNC: 137 MMOL/L (ref 136–145)
TROPONIN I SERPL-MCNC: 1.46 NG/ML (ref 0–0.04)
VANCOMYCIN TROUGH SERPL-MCNC: 13.1 UG/ML (ref 15–20)
WBC # SPEC AUTO: 20.7 X10(3)/MCL (ref 4.5–11.5)

## 2022-12-07 PROCEDURE — 80053 COMPREHEN METABOLIC PANEL: CPT | Performed by: INTERNAL MEDICINE

## 2022-12-07 PROCEDURE — 27000221 HC OXYGEN, UP TO 24 HOURS

## 2022-12-07 PROCEDURE — 63600175 PHARM REV CODE 636 W HCPCS: Performed by: EMERGENCY MEDICINE

## 2022-12-07 PROCEDURE — 94761 N-INVAS EAR/PLS OXIMETRY MLT: CPT

## 2022-12-07 PROCEDURE — 25000003 PHARM REV CODE 250: Performed by: EMERGENCY MEDICINE

## 2022-12-07 PROCEDURE — 63600175 PHARM REV CODE 636 W HCPCS: Performed by: INTERNAL MEDICINE

## 2022-12-07 PROCEDURE — 84484 ASSAY OF TROPONIN QUANT: CPT | Performed by: INTERNAL MEDICINE

## 2022-12-07 PROCEDURE — 85007 BL SMEAR W/DIFF WBC COUNT: CPT | Performed by: INTERNAL MEDICINE

## 2022-12-07 PROCEDURE — 80202 ASSAY OF VANCOMYCIN: CPT | Performed by: EMERGENCY MEDICINE

## 2022-12-07 PROCEDURE — 83735 ASSAY OF MAGNESIUM: CPT | Performed by: INTERNAL MEDICINE

## 2022-12-07 PROCEDURE — 21400001 HC TELEMETRY ROOM

## 2022-12-07 PROCEDURE — 80185 ASSAY OF PHENYTOIN TOTAL: CPT | Performed by: INTERNAL MEDICINE

## 2022-12-07 PROCEDURE — 63600175 PHARM REV CODE 636 W HCPCS

## 2022-12-07 RX ORDER — IBUPROFEN 200 MG
16 TABLET ORAL
Status: DISCONTINUED | OUTPATIENT
Start: 2022-12-08 | End: 2022-12-12 | Stop reason: HOSPADM

## 2022-12-07 RX ORDER — MIDAZOLAM HYDROCHLORIDE 1 MG/ML
2 INJECTION INTRAMUSCULAR; INTRAVENOUS
Status: COMPLETED | OUTPATIENT
Start: 2022-12-07 | End: 2022-12-07

## 2022-12-07 RX ORDER — ONDANSETRON 2 MG/ML
4 INJECTION INTRAMUSCULAR; INTRAVENOUS EVERY 8 HOURS PRN
Status: DISCONTINUED | OUTPATIENT
Start: 2022-12-08 | End: 2022-12-12 | Stop reason: HOSPADM

## 2022-12-07 RX ORDER — NALOXONE HCL 0.4 MG/ML
0.02 VIAL (ML) INJECTION
Status: DISCONTINUED | OUTPATIENT
Start: 2022-12-08 | End: 2022-12-12 | Stop reason: HOSPADM

## 2022-12-07 RX ORDER — PROMETHAZINE HYDROCHLORIDE 25 MG/1
25 TABLET ORAL EVERY 6 HOURS PRN
Status: DISCONTINUED | OUTPATIENT
Start: 2022-12-08 | End: 2022-12-12 | Stop reason: HOSPADM

## 2022-12-07 RX ORDER — MAG HYDROX/ALUMINUM HYD/SIMETH 200-200-20
30 SUSPENSION, ORAL (FINAL DOSE FORM) ORAL 4 TIMES DAILY PRN
Status: DISCONTINUED | OUTPATIENT
Start: 2022-12-08 | End: 2022-12-12 | Stop reason: HOSPADM

## 2022-12-07 RX ORDER — ACETAMINOPHEN 650 MG/1
650 SUPPOSITORY RECTAL EVERY 6 HOURS PRN
Status: DISCONTINUED | OUTPATIENT
Start: 2022-12-08 | End: 2022-12-12 | Stop reason: HOSPADM

## 2022-12-07 RX ORDER — ENOXAPARIN SODIUM 100 MG/ML
40 INJECTION SUBCUTANEOUS EVERY 12 HOURS
Status: DISCONTINUED | OUTPATIENT
Start: 2022-12-08 | End: 2022-12-08

## 2022-12-07 RX ORDER — ACETAMINOPHEN 325 MG/1
650 TABLET ORAL EVERY 8 HOURS PRN
Status: DISCONTINUED | OUTPATIENT
Start: 2022-12-08 | End: 2022-12-12 | Stop reason: HOSPADM

## 2022-12-07 RX ORDER — TALC
6 POWDER (GRAM) TOPICAL NIGHTLY PRN
Status: DISCONTINUED | OUTPATIENT
Start: 2022-12-08 | End: 2022-12-12 | Stop reason: HOSPADM

## 2022-12-07 RX ORDER — POTASSIUM CHLORIDE 14.9 MG/ML
10 INJECTION INTRAVENOUS
Status: COMPLETED | OUTPATIENT
Start: 2022-12-07 | End: 2022-12-07

## 2022-12-07 RX ORDER — GLUCAGON 1 MG
1 KIT INJECTION
Status: DISCONTINUED | OUTPATIENT
Start: 2022-12-08 | End: 2022-12-12 | Stop reason: HOSPADM

## 2022-12-07 RX ORDER — AMOXICILLIN 250 MG
1 CAPSULE ORAL 2 TIMES DAILY PRN
Status: DISCONTINUED | OUTPATIENT
Start: 2022-12-08 | End: 2022-12-12 | Stop reason: HOSPADM

## 2022-12-07 RX ORDER — SODIUM CHLORIDE 0.9 % (FLUSH) 0.9 %
3 SYRINGE (ML) INJECTION EVERY 12 HOURS PRN
Status: DISCONTINUED | OUTPATIENT
Start: 2022-12-08 | End: 2022-12-12 | Stop reason: HOSPADM

## 2022-12-07 RX ORDER — IBUPROFEN 200 MG
24 TABLET ORAL
Status: DISCONTINUED | OUTPATIENT
Start: 2022-12-08 | End: 2022-12-12 | Stop reason: HOSPADM

## 2022-12-07 RX ORDER — IPRATROPIUM BROMIDE AND ALBUTEROL SULFATE 2.5; .5 MG/3ML; MG/3ML
3 SOLUTION RESPIRATORY (INHALATION) EVERY 6 HOURS PRN
Status: DISCONTINUED | OUTPATIENT
Start: 2022-12-08 | End: 2022-12-12 | Stop reason: HOSPADM

## 2022-12-07 RX ORDER — LORAZEPAM 2 MG/ML
INJECTION INTRAMUSCULAR
Status: COMPLETED
Start: 2022-12-07 | End: 2022-12-07

## 2022-12-07 RX ORDER — SODIUM CHLORIDE, SODIUM LACTATE, POTASSIUM CHLORIDE, CALCIUM CHLORIDE 600; 310; 30; 20 MG/100ML; MG/100ML; MG/100ML; MG/100ML
INJECTION, SOLUTION INTRAVENOUS CONTINUOUS
Status: DISCONTINUED | OUTPATIENT
Start: 2022-12-08 | End: 2022-12-09

## 2022-12-07 RX ADMIN — POTASSIUM CHLORIDE 10 MEQ: 14.9 INJECTION, SOLUTION INTRAVENOUS at 08:12

## 2022-12-07 RX ADMIN — CLINDAMYCIN PHOSPHATE 600 MG: 600 INJECTION, SOLUTION INTRAVENOUS at 06:12

## 2022-12-07 RX ADMIN — CLINDAMYCIN PHOSPHATE 600 MG: 600 INJECTION, SOLUTION INTRAVENOUS at 12:12

## 2022-12-07 RX ADMIN — POTASSIUM CHLORIDE 10 MEQ: 14.9 INJECTION, SOLUTION INTRAVENOUS at 11:12

## 2022-12-07 RX ADMIN — POTASSIUM CHLORIDE 10 MEQ: 14.9 INJECTION, SOLUTION INTRAVENOUS at 10:12

## 2022-12-07 RX ADMIN — MIDAZOLAM HYDROCHLORIDE 2 MG: 1 INJECTION, SOLUTION INTRAMUSCULAR; INTRAVENOUS at 03:12

## 2022-12-07 RX ADMIN — SODIUM CHLORIDE, POTASSIUM CHLORIDE, SODIUM LACTATE AND CALCIUM CHLORIDE: 600; 310; 30; 20 INJECTION, SOLUTION INTRAVENOUS at 11:12

## 2022-12-07 RX ADMIN — LEVETIRACETAM 1000 MG: 100 INJECTION, SOLUTION INTRAVENOUS at 08:12

## 2022-12-07 RX ADMIN — VANCOMYCIN HYDROCHLORIDE 1250 MG: 1 INJECTION, POWDER, LYOPHILIZED, FOR SOLUTION INTRAVENOUS at 06:12

## 2022-12-07 RX ADMIN — POTASSIUM CHLORIDE 10 MEQ: 14.9 INJECTION, SOLUTION INTRAVENOUS at 09:12

## 2022-12-07 RX ADMIN — LORAZEPAM 2 MG: 2 INJECTION, SOLUTION INTRAMUSCULAR; INTRAVENOUS at 06:12

## 2022-12-07 RX ADMIN — LEVOFLOXACIN 750 MG: 750 INJECTION, SOLUTION INTRAVENOUS at 02:12

## 2022-12-07 NOTE — ED PROVIDER NOTES
"Progress Note: Pt. In ER for 36 hrs now.    John Wayne 77 y.o.  has a past medical history of Anticoagulant long-term use, CHF (congestive heart failure) (10/2021), COPD (chronic obstructive pulmonary disease), Coronary artery disease, Depression, Difficult intubation, GERD (gastroesophageal reflux disease), Hypertension, Mixed hyperlipidemia, PVD (peripheral vascular disease), Seizures, Sleep apnea, unspecified, and TIA (transient ischemic attack).     Seizures (Pt brought in by [employer] [facility] [place] with focal facial seizures for approx 15 min.)    Patient had a couple of seizures in the emergency room in the last 36 hours, has been loaded up with Keppra  Also patient was noticed to have bilateral lower lobe pneumonia, he is being treated for pneumonia and at the same time it was found that patient's troponin is elevated, repeat troponin actually went up to 10 +and then is gradually coming down      Patient is sleeping at this time, does not offer any complaints,    Temp:  [98.4 °F (36.9 °C)] 98.4 °F (36.9 °C)  Pulse:  [58-93] 72  Resp:  [15-26] 22  SpO2:  [93 %-97 %] 93 %  BP: ()/(43-69) 120/44    BP (!) 120/44   Pulse 72   Temp 98.4 °F (36.9 °C)   Resp (!) 22   Ht 5' 7" (1.702 m)   Wt 104.3 kg (230 lb)   SpO2 (!) 93%   BMI 36.02 kg/m²     Patient is comfortably sleeping not in any distress  S1-S2 is audible no murmurs  Chest good bilateral air entry, no particular rales or rhonchi audible   Abdomen soft nondistended nontender   Extremities no pedal edema, peripheral pulses are palpable bilaterally, capillary refill is less than 2 seconds      Current status  Epilepsy:    Patient is essentially waiting for placement where he can be seen by Cardiology and Neurology, patient initially came in with the seizure in the nursing home, he has history of seizure disorder, he was on phenobarb, he has been loaded up with the Keppra,    Pneumonia:    Patient also was found to have bilateral lower lobe " infiltrates, and was assume that patient has bilateral aspiration pneumonia and is being treated for that    NSTEMI   Patient is also having non STEMI, his troponin max was 10+ it is trending down now,    Placement problem:   We have been waiting for them to take the patient to [facility] as soon as the bed is available, he is on nasal cannula maintaining his oxygen saturation, heart rate is maintained in 60s, he is comfortable not in any distress         Glenn Sagastume MD  12/07/22 3514

## 2022-12-07 NOTE — ED NOTES
Pt reports being comfortable in bed; asked if there was anything I could do at the time for him and pt said no. Pt reports just being sleepy. Pt was educated on medications being given.

## 2022-12-08 PROBLEM — J44.9 COPD (CHRONIC OBSTRUCTIVE PULMONARY DISEASE): Status: ACTIVE | Noted: 2022-12-08

## 2022-12-08 PROBLEM — E66.01 CLASS 3 SEVERE OBESITY DUE TO EXCESS CALORIES WITH SERIOUS COMORBIDITY AND BODY MASS INDEX (BMI) OF 40.0 TO 44.9 IN ADULT: Status: ACTIVE | Noted: 2022-12-08

## 2022-12-08 PROBLEM — J69.0 ASPIRATION PNEUMONIA: Status: ACTIVE | Noted: 2022-12-08

## 2022-12-08 PROBLEM — I10 HTN (HYPERTENSION): Status: ACTIVE | Noted: 2022-12-08

## 2022-12-08 PROBLEM — E78.49 OTHER HYPERLIPIDEMIA: Status: ACTIVE | Noted: 2022-12-08

## 2022-12-08 PROBLEM — E66.813 CLASS 3 SEVERE OBESITY DUE TO EXCESS CALORIES WITH SERIOUS COMORBIDITY AND BODY MASS INDEX (BMI) OF 40.0 TO 44.9 IN ADULT: Status: ACTIVE | Noted: 2022-12-08

## 2022-12-08 PROBLEM — K21.9 GASTROESOPHAGEAL REFLUX DISEASE WITHOUT ESOPHAGITIS: Status: ACTIVE | Noted: 2022-12-08

## 2022-12-08 PROBLEM — I48.91 ATRIAL FIBRILLATION: Status: ACTIVE | Noted: 2022-12-08

## 2022-12-08 PROBLEM — I50.9 CHF (CONGESTIVE HEART FAILURE): Status: ACTIVE | Noted: 2022-12-08

## 2022-12-08 PROBLEM — F32.A DEPRESSION: Status: ACTIVE | Noted: 2022-12-08

## 2022-12-08 PROBLEM — I21.4 NSTEMI (NON-ST ELEVATED MYOCARDIAL INFARCTION): Status: ACTIVE | Noted: 2022-12-08

## 2022-12-08 PROBLEM — E44.0 MALNUTRITION OF MODERATE DEGREE: Status: ACTIVE | Noted: 2022-12-08

## 2022-12-08 LAB
ALBUMIN SERPL BCP-MCNC: 2.3 G/DL (ref 3.5–5.2)
ALP SERPL-CCNC: 74 U/L (ref 55–135)
ALT SERPL W/O P-5'-P-CCNC: 13 U/L (ref 10–44)
ANION GAP SERPL CALC-SCNC: 11 MMOL/L (ref 8–16)
ANISOCYTOSIS BLD QL SMEAR: SLIGHT
AORTIC ROOT ANNULUS: 3.5 CM
ASCENDING AORTA: 3.26 CM
AST SERPL-CCNC: 23 U/L (ref 10–40)
AV INDEX (PROSTH): 0.79
AV MEAN GRADIENT: 5 MMHG
AV PEAK GRADIENT: 8 MMHG
AV REGURGITATION PRESSURE HALF TIME: 843.4 MS
AV VALVE AREA: 2.48 CM2
AV VELOCITY RATIO: 0.66
BASOPHILS NFR BLD: 0 % (ref 0–1.9)
BILIRUB SERPL-MCNC: 0.5 MG/DL (ref 0.1–1)
BSA FOR ECHO PROCEDURE: 2.39 M2
BUN SERPL-MCNC: 8 MG/DL (ref 8–23)
CALCIUM SERPL-MCNC: 8.8 MG/DL (ref 8.7–10.5)
CHLORIDE SERPL-SCNC: 106 MMOL/L (ref 95–110)
CO2 SERPL-SCNC: 22 MMOL/L (ref 23–29)
CREAT SERPL-MCNC: 0.7 MG/DL (ref 0.5–1.4)
CV ECHO LV RWT: 0.45 CM
DIFFERENTIAL METHOD: ABNORMAL
DOP CALC AO PEAK VEL: 1.4 M/S
DOP CALC AO VTI: 26.7 CM
DOP CALC LVOT AREA: 3.1 CM2
DOP CALC LVOT DIAMETER: 2 CM
DOP CALC LVOT PEAK VEL: 0.93 M/S
DOP CALC LVOT STROKE VOLUME: 66.25 CM3
DOP CALC RVOT PEAK VEL: 0.84 M/S
DOP CALC RVOT VTI: 17 CM
DOP CALCLVOT PEAK VEL VTI: 21.1 CM
E WAVE DECELERATION TIME: 205.76 MSEC
E/A RATIO: 1.1
E/E' RATIO: 7.24 M/S
ECHO LV POSTERIOR WALL: 1.11 CM (ref 0.6–1.1)
EJECTION FRACTION: 45 %
EOSINOPHIL NFR BLD: 0 % (ref 0–8)
ERYTHROCYTE [DISTWIDTH] IN BLOOD BY AUTOMATED COUNT: 13.3 % (ref 11.5–14.5)
EST. GFR  (NO RACE VARIABLE): >60 ML/MIN/1.73 M^2
FRACTIONAL SHORTENING: 38 % (ref 28–44)
GLUCOSE SERPL-MCNC: 80 MG/DL (ref 70–110)
HCT VFR BLD AUTO: 35.2 % (ref 40–54)
HGB BLD-MCNC: 11.6 G/DL (ref 14–18)
IMM GRANULOCYTES # BLD AUTO: ABNORMAL K/UL (ref 0–0.04)
IMM GRANULOCYTES NFR BLD AUTO: ABNORMAL % (ref 0–0.5)
INTERVENTRICULAR SEPTUM: 1.25 CM (ref 0.6–1.1)
IVC DIAMETER: 2.12 CM
IVRT: 49.48 MSEC
LA MAJOR: 5.21 CM
LA MINOR: 5.11 CM
LA WIDTH: 4.4 CM
LEFT ATRIUM SIZE: 3.84 CM
LEFT ATRIUM VOLUME INDEX: 32.4 ML/M2
LEFT ATRIUM VOLUME: 74.1 CM3
LEFT INTERNAL DIMENSION IN SYSTOLE: 3.02 CM (ref 2.1–4)
LEFT VENTRICLE DIASTOLIC VOLUME INDEX: 49.05 ML/M2
LEFT VENTRICLE DIASTOLIC VOLUME: 112.32 ML
LEFT VENTRICLE MASS INDEX: 96 G/M2
LEFT VENTRICLE SYSTOLIC VOLUME INDEX: 15.5 ML/M2
LEFT VENTRICLE SYSTOLIC VOLUME: 35.5 ML
LEFT VENTRICULAR INTERNAL DIMENSION IN DIASTOLE: 4.89 CM (ref 3.5–6)
LEFT VENTRICULAR MASS: 220.37 G
LV LATERAL E/E' RATIO: 5.85 M/S
LV SEPTAL E/E' RATIO: 9.5 M/S
LVOT MG: 2.62 MMHG
LVOT MV: 0.79 CM/S
LYMPHOCYTES NFR BLD: 40 % (ref 18–48)
MCH RBC QN AUTO: 28.9 PG (ref 27–31)
MCHC RBC AUTO-ENTMCNC: 33 G/DL (ref 32–36)
MCV RBC AUTO: 88 FL (ref 82–98)
MONOCYTES NFR BLD: 5 % (ref 4–15)
MV PEAK A VEL: 0.69 M/S
MV PEAK E VEL: 0.76 M/S
NEUTROPHILS NFR BLD: 55 % (ref 38–73)
NRBC BLD-RTO: 0 /100 WBC
PATH REV BLD -IMP: NORMAL
PISA AR MAX VEL: 3.23 M/S
PISA MRMAX VEL: 3.96 M/S
PISA TR MAX VEL: 2.56 M/S
PLATELET # BLD AUTO: 162 K/UL (ref 150–450)
PLATELET BLD QL SMEAR: ABNORMAL
PMV BLD AUTO: 10.8 FL (ref 9.2–12.9)
POIKILOCYTOSIS BLD QL SMEAR: SLIGHT
POLYCHROMASIA BLD QL SMEAR: ABNORMAL
POTASSIUM SERPL-SCNC: 3.4 MMOL/L (ref 3.5–5.1)
PROT SERPL-MCNC: 5.8 G/DL (ref 6–8.4)
PV MEAN GRADIENT: 1.84 MMHG
RA MAJOR: 4.48 CM
RA PRESSURE: 15 MMHG
RA WIDTH: 2.95 CM
RBC # BLD AUTO: 4.02 M/UL (ref 4.6–6.2)
SINUS: 3.34 CM
SMUDGE CELLS BLD QL SMEAR: PRESENT
SODIUM SERPL-SCNC: 139 MMOL/L (ref 136–145)
STJ: 3.34 CM
TDI LATERAL: 0.13 M/S
TDI SEPTAL: 0.08 M/S
TDI: 0.11 M/S
TR MAX PG: 26 MMHG
TR MEAN GRADIENT: 27 MMHG
TRICUSPID ANNULAR PLANE SYSTOLIC EXCURSION: 1.66 CM
TV REST PULMONARY ARTERY PRESSURE: 41 MMHG
WBC # BLD AUTO: 16.6 K/UL (ref 3.9–12.7)

## 2022-12-08 PROCEDURE — 85007 BL SMEAR W/DIFF WBC COUNT: CPT | Performed by: HOSPITALIST

## 2022-12-08 PROCEDURE — 97530 THERAPEUTIC ACTIVITIES: CPT

## 2022-12-08 PROCEDURE — 97162 PT EVAL MOD COMPLEX 30 MIN: CPT

## 2022-12-08 PROCEDURE — 85060 BLOOD SMEAR INTERPRETATION: CPT | Mod: ,,, | Performed by: STUDENT IN AN ORGANIZED HEALTH CARE EDUCATION/TRAINING PROGRAM

## 2022-12-08 PROCEDURE — 36415 COLL VENOUS BLD VENIPUNCTURE: CPT | Performed by: HOSPITALIST

## 2022-12-08 PROCEDURE — 63600175 PHARM REV CODE 636 W HCPCS: Performed by: HOSPITALIST

## 2022-12-08 PROCEDURE — 94760 N-INVAS EAR/PLS OXIMETRY 1: CPT

## 2022-12-08 PROCEDURE — 63600175 PHARM REV CODE 636 W HCPCS: Performed by: INTERNAL MEDICINE

## 2022-12-08 PROCEDURE — 92610 EVALUATE SWALLOWING FUNCTION: CPT

## 2022-12-08 PROCEDURE — 80053 COMPREHEN METABOLIC PANEL: CPT | Performed by: HOSPITALIST

## 2022-12-08 PROCEDURE — 99223 PR INITIAL HOSPITAL CARE,LEVL III: ICD-10-PCS | Mod: 25,,, | Performed by: PHYSICIAN ASSISTANT

## 2022-12-08 PROCEDURE — 94640 AIRWAY INHALATION TREATMENT: CPT

## 2022-12-08 PROCEDURE — 25000003 PHARM REV CODE 250: Performed by: INTERNAL MEDICINE

## 2022-12-08 PROCEDURE — 99222 1ST HOSP IP/OBS MODERATE 55: CPT | Mod: 95,,, | Performed by: PSYCHIATRY & NEUROLOGY

## 2022-12-08 PROCEDURE — 99222 PR INITIAL HOSPITAL CARE,LEVL II: ICD-10-PCS | Mod: 95,,, | Performed by: PSYCHIATRY & NEUROLOGY

## 2022-12-08 PROCEDURE — 25000242 PHARM REV CODE 250 ALT 637 W/ HCPCS: Performed by: HOSPITALIST

## 2022-12-08 PROCEDURE — 25000003 PHARM REV CODE 250: Performed by: HOSPITALIST

## 2022-12-08 PROCEDURE — 97166 OT EVAL MOD COMPLEX 45 MIN: CPT

## 2022-12-08 PROCEDURE — 94761 N-INVAS EAR/PLS OXIMETRY MLT: CPT

## 2022-12-08 PROCEDURE — 21400001 HC TELEMETRY ROOM

## 2022-12-08 PROCEDURE — 85060 PATHOLOGIST REVIEW: ICD-10-PCS | Mod: ,,, | Performed by: STUDENT IN AN ORGANIZED HEALTH CARE EDUCATION/TRAINING PROGRAM

## 2022-12-08 PROCEDURE — 97535 SELF CARE MNGMENT TRAINING: CPT

## 2022-12-08 PROCEDURE — 85027 COMPLETE CBC AUTOMATED: CPT | Performed by: HOSPITALIST

## 2022-12-08 PROCEDURE — 99223 1ST HOSP IP/OBS HIGH 75: CPT | Mod: 25,,, | Performed by: PHYSICIAN ASSISTANT

## 2022-12-08 PROCEDURE — 27000221 HC OXYGEN, UP TO 24 HOURS

## 2022-12-08 PROCEDURE — 94799 UNLISTED PULMONARY SVC/PX: CPT

## 2022-12-08 RX ORDER — METOPROLOL SUCCINATE 25 MG/1
25 TABLET, EXTENDED RELEASE ORAL DAILY
Status: DISCONTINUED | OUTPATIENT
Start: 2022-12-08 | End: 2022-12-12 | Stop reason: HOSPADM

## 2022-12-08 RX ORDER — POTASSIUM CHLORIDE 7.45 MG/ML
30 INJECTION INTRAVENOUS ONCE
Status: COMPLETED | OUTPATIENT
Start: 2022-12-08 | End: 2022-12-08

## 2022-12-08 RX ORDER — TRIAMCINOLONE ACETONIDE 0.25 MG/G
CREAM TOPICAL 2 TIMES DAILY
Status: DISCONTINUED | OUTPATIENT
Start: 2022-12-08 | End: 2022-12-12 | Stop reason: HOSPADM

## 2022-12-08 RX ORDER — LEVETIRACETAM 500 MG/1
1000 TABLET ORAL 2 TIMES DAILY
Status: DISCONTINUED | OUTPATIENT
Start: 2022-12-08 | End: 2022-12-12 | Stop reason: HOSPADM

## 2022-12-08 RX ORDER — MEROPENEM AND SODIUM CHLORIDE 500 MG/50ML
500 INJECTION, SOLUTION INTRAVENOUS
Status: DISCONTINUED | OUTPATIENT
Start: 2022-12-08 | End: 2022-12-10

## 2022-12-08 RX ORDER — SODIUM CHLORIDE 9 MG/ML
INJECTION, SOLUTION INTRAVENOUS
Status: DISCONTINUED | OUTPATIENT
Start: 2022-12-08 | End: 2022-12-12 | Stop reason: HOSPADM

## 2022-12-08 RX ORDER — ENOXAPARIN SODIUM 150 MG/ML
1 INJECTION SUBCUTANEOUS EVERY 12 HOURS
Status: DISCONTINUED | OUTPATIENT
Start: 2022-12-08 | End: 2022-12-11

## 2022-12-08 RX ORDER — PANTOPRAZOLE SODIUM 40 MG/1
40 TABLET, DELAYED RELEASE ORAL DAILY
Status: DISCONTINUED | OUTPATIENT
Start: 2022-12-08 | End: 2022-12-12 | Stop reason: HOSPADM

## 2022-12-08 RX ORDER — AMIODARONE HYDROCHLORIDE 200 MG/1
200 TABLET ORAL DAILY
Status: DISCONTINUED | OUTPATIENT
Start: 2022-12-08 | End: 2022-12-12 | Stop reason: HOSPADM

## 2022-12-08 RX ORDER — CLOPIDOGREL BISULFATE 75 MG/1
75 TABLET ORAL DAILY
Status: DISCONTINUED | OUTPATIENT
Start: 2022-12-08 | End: 2022-12-12 | Stop reason: HOSPADM

## 2022-12-08 RX ORDER — FUROSEMIDE 40 MG/1
40 TABLET ORAL DAILY
Status: DISCONTINUED | OUTPATIENT
Start: 2022-12-08 | End: 2022-12-12 | Stop reason: HOSPADM

## 2022-12-08 RX ORDER — VENLAFAXINE HYDROCHLORIDE 75 MG/1
75 CAPSULE, EXTENDED RELEASE ORAL 2 TIMES DAILY
Status: DISCONTINUED | OUTPATIENT
Start: 2022-12-08 | End: 2022-12-12 | Stop reason: HOSPADM

## 2022-12-08 RX ORDER — LISINOPRIL 5 MG/1
5 TABLET ORAL DAILY
Status: DISCONTINUED | OUTPATIENT
Start: 2022-12-08 | End: 2022-12-12 | Stop reason: HOSPADM

## 2022-12-08 RX ORDER — ATORVASTATIN CALCIUM 40 MG/1
40 TABLET, FILM COATED ORAL NIGHTLY
Status: DISCONTINUED | OUTPATIENT
Start: 2022-12-08 | End: 2022-12-12 | Stop reason: HOSPADM

## 2022-12-08 RX ORDER — MUPIROCIN 20 MG/G
OINTMENT TOPICAL 2 TIMES DAILY
Status: DISCONTINUED | OUTPATIENT
Start: 2022-12-08 | End: 2022-12-12 | Stop reason: HOSPADM

## 2022-12-08 RX ORDER — PHENOBARBITAL 32.4 MG/1
64.8 TABLET ORAL DAILY
Status: DISCONTINUED | OUTPATIENT
Start: 2022-12-08 | End: 2022-12-12 | Stop reason: HOSPADM

## 2022-12-08 RX ORDER — VANCOMYCIN HCL IN 5 % DEXTROSE 1.5G/250ML
1500 PLASTIC BAG, INJECTION (ML) INTRAVENOUS
Status: DISCONTINUED | OUTPATIENT
Start: 2022-12-08 | End: 2022-12-08

## 2022-12-08 RX ORDER — TAMSULOSIN HYDROCHLORIDE 0.4 MG/1
0.4 CAPSULE ORAL DAILY
Status: DISCONTINUED | OUTPATIENT
Start: 2022-12-08 | End: 2022-12-12 | Stop reason: HOSPADM

## 2022-12-08 RX ORDER — FINASTERIDE 5 MG/1
5 TABLET, FILM COATED ORAL NIGHTLY
Status: DISCONTINUED | OUTPATIENT
Start: 2022-12-08 | End: 2022-12-12 | Stop reason: HOSPADM

## 2022-12-08 RX ADMIN — PHENOBARBITAL 64.8 MG: 32.4 TABLET ORAL at 10:12

## 2022-12-08 RX ADMIN — SODIUM CHLORIDE, POTASSIUM CHLORIDE, SODIUM LACTATE AND CALCIUM CHLORIDE: 600; 310; 30; 20 INJECTION, SOLUTION INTRAVENOUS at 03:12

## 2022-12-08 RX ADMIN — IPRATROPIUM BROMIDE AND ALBUTEROL SULFATE 3 ML: 2.5; .5 SOLUTION RESPIRATORY (INHALATION) at 04:12

## 2022-12-08 RX ADMIN — HYPROMELLOSE 2910 2 DROP: 5 SOLUTION/ DROPS OPHTHALMIC at 03:12

## 2022-12-08 RX ADMIN — LEVETIRACETAM 1000 MG: 500 TABLET, FILM COATED ORAL at 09:12

## 2022-12-08 RX ADMIN — POTASSIUM CHLORIDE 10 MEQ: 7.46 INJECTION, SOLUTION INTRAVENOUS at 01:12

## 2022-12-08 RX ADMIN — FINASTERIDE 5 MG: 5 TABLET, FILM COATED ORAL at 09:12

## 2022-12-08 RX ADMIN — MUPIROCIN: 20 OINTMENT TOPICAL at 01:12

## 2022-12-08 RX ADMIN — VENLAFAXINE HYDROCHLORIDE 75 MG: 75 CAPSULE, EXTENDED RELEASE ORAL at 09:12

## 2022-12-08 RX ADMIN — VANCOMYCIN HYDROCHLORIDE 1500 MG: 1.5 INJECTION, POWDER, LYOPHILIZED, FOR SOLUTION INTRAVENOUS at 07:12

## 2022-12-08 RX ADMIN — MEROPENEM AND SODIUM CHLORIDE 500 MG: 500 INJECTION, SOLUTION INTRAVENOUS at 11:12

## 2022-12-08 RX ADMIN — ENOXAPARIN SODIUM 120 MG: 150 INJECTION SUBCUTANEOUS at 09:12

## 2022-12-08 RX ADMIN — MEROPENEM AND SODIUM CHLORIDE 500 MG: 500 INJECTION, SOLUTION INTRAVENOUS at 09:12

## 2022-12-08 RX ADMIN — SODIUM CHLORIDE: 0.9 INJECTION, SOLUTION INTRAVENOUS at 07:12

## 2022-12-08 RX ADMIN — METOPROLOL SUCCINATE 25 MG: 25 TABLET, EXTENDED RELEASE ORAL at 05:12

## 2022-12-08 RX ADMIN — HYPROMELLOSE 2910 2 DROP: 5 SOLUTION/ DROPS OPHTHALMIC at 09:12

## 2022-12-08 RX ADMIN — CLOPIDOGREL BISULFATE 75 MG: 75 TABLET ORAL at 05:12

## 2022-12-08 RX ADMIN — SODIUM CHLORIDE: 0.9 INJECTION, SOLUTION INTRAVENOUS at 11:12

## 2022-12-08 RX ADMIN — TAMSULOSIN HYDROCHLORIDE 0.4 MG: 0.4 CAPSULE ORAL at 10:12

## 2022-12-08 RX ADMIN — TRIAMCINOLONE ACETONIDE: 0.25 CREAM TOPICAL at 09:12

## 2022-12-08 RX ADMIN — VANCOMYCIN HYDROCHLORIDE 1500 MG: 1.5 INJECTION, POWDER, LYOPHILIZED, FOR SOLUTION INTRAVENOUS at 05:12

## 2022-12-08 RX ADMIN — MUPIROCIN: 20 OINTMENT TOPICAL at 09:12

## 2022-12-08 RX ADMIN — ENOXAPARIN SODIUM 40 MG: 40 INJECTION SUBCUTANEOUS at 10:12

## 2022-12-08 RX ADMIN — TRIAMCINOLONE ACETONIDE: 0.25 CREAM TOPICAL at 01:12

## 2022-12-08 RX ADMIN — LISINOPRIL 5 MG: 5 TABLET ORAL at 05:12

## 2022-12-08 RX ADMIN — ATORVASTATIN CALCIUM 40 MG: 40 TABLET, FILM COATED ORAL at 09:12

## 2022-12-08 RX ADMIN — VENLAFAXINE HYDROCHLORIDE 75 MG: 75 CAPSULE, EXTENDED RELEASE ORAL at 10:12

## 2022-12-08 RX ADMIN — AMIODARONE HYDROCHLORIDE 200 MG: 200 TABLET ORAL at 10:12

## 2022-12-08 RX ADMIN — PANTOPRAZOLE SODIUM 40 MG: 40 TABLET, DELAYED RELEASE ORAL at 05:12

## 2022-12-08 RX ADMIN — LEVETIRACETAM 1000 MG: 500 TABLET, FILM COATED ORAL at 10:12

## 2022-12-08 RX ADMIN — POTASSIUM CHLORIDE 30 MEQ: 7.46 INJECTION, SOLUTION INTRAVENOUS at 02:12

## 2022-12-08 RX ADMIN — FUROSEMIDE 40 MG: 40 TABLET ORAL at 05:12

## 2022-12-08 NOTE — ASSESSMENT & PLAN NOTE
Patient is identified as having unknown heart failure that is Chronic. CHF is currently controlled. Latest ECHO performed and demonstrates- No results found for this or any previous visit.  . Continue Beta Blocker, ACE/ARB and Furosemide and monitor clinical status closely. Monitor on telemetry. Patient is off CHF pathway.  Monitor strict Is&Os and daily weights.  Place on fluid restriction of 1.5 L. Continue to stress to patient importance of self efficacy and  on diet for CHF. Last BNP reviewed- and noted below   Recent Labs   Lab 12/05/22  2445   BNP 28.9   .

## 2022-12-08 NOTE — ASSESSMENT & PLAN NOTE
Currently normotensive.   Plan:  -Optimize pain control   -Continue home medications, titrate as needed   -Monitor BP  -Low salt/cardiac diet when not NPO  -IV hydralazine prn for SBP>160 or DBP>90     -continue treatment of pneumonia and seizures
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
## 2022-12-08 NOTE — ASSESSMENT & PLAN NOTE
Body mass index is 41.88 kg/m². Elevation likely secondary to increased calorie intake and sedentary lifestyle. Patient educated on morbidity and mortality in regards to elevated BMI and stressed importance of diet and exercise.   Plan:  -low fat/low calorie diet

## 2022-12-08 NOTE — PROGRESS NOTES
Pharmacist Renal Dose Adjustment Note    John Wayne is a 77 y.o. male being treated with the medication enoxaparin    Patient Data:    Vital Signs (Most Recent):  Temp: 98.2 °F (36.8 °C) (12/07/22 2204)  Pulse: 78 (12/07/22 2204)  Resp: 20 (12/07/22 2204)  BP: (!) 129/59 (12/07/22 2204)  SpO2: (!) 92 % (12/07/22 2204)   Vital Signs (72h Range):  Temp:  [98.2 °F (36.8 °C)-99.4 °F (37.4 °C)]   Pulse:  []   Resp:  [9-31]   BP: ()/(43-79)   SpO2:  [72 %-100 %]      Recent Labs   Lab 12/05/22 2215 12/07/22 0546   CREATININE 0.81 0.72*     Serum creatinine: 0.72 mg/dL (L) 12/07/22 0546  Estimated creatinine clearance: 108 mL/min (A)    Enoxaparin 40 mg q24h will be changed to enoxaparin 40 mg q12h for CrCL >30 mL/min and BMI >40.     Pharmacist's Name: Darlene Baca  Pharmacist's Extension: [phone]

## 2022-12-08 NOTE — ASSESSMENT & PLAN NOTE
Patient presented to outside facility following seizures in concerns for aspiration pneumonia and was treated with Vapotherm with improvement in O2 saturation and weaned to 4 L via nasal cannula.  Chest x-ray obtained showed bilateral lobe infiltrates concerning for bilateral aspiration pneumonia and was being treated with vancomycin, Levaquin, and clindamycin given antibiotic allergies.  Patient remains afebrile with improvement of leukocytosis from 20.7 now to 16.6.  Patient currently saturating 90% on 4 L in no acute distress without use of accessory muscles noted.  Pharmacy consulted given patient's allergy and current antibiotics known to lower seizure threshold and will be switched to vancomycin and Meropenem given patient's previous tolerance.  Plan:  -continue antibiotics  -titrate oxygen therapy as needed  -incentive spirometry  -Buster p.r.nBelia  -aspiration precautions
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
## 2022-12-08 NOTE — ASSESSMENT & PLAN NOTE
Patient with known history of epilepsy presented to outside facility following seizure resulting in aspiration pneumonia and acute hypoxic respiratory failure requiring Vapotherm.  Patient continues to have breakthrough seizures requiring p.r.n. Ativan.  Currently on Keppra and phenobarbital with initial levels measuring carbamazepine <0.4, phenobarbital 22.1, phenytoin 17.  Patient was initiated on vancomycin, clindamycin, and levofloxacin with quinolones known to lower seizure threshold.  Pharmacy consulted with antibiotics pain switch to vancomycin and meropenem.  Neurology consulted and awaiting further evaluation/recommendations.  At time of bedside assessment, patient is still postictal from his previous seizure noted approximately 6:00 p.m. prior to transfer.  Plan:  -seizure precautions  -continue antiepileptics  -continue treatment of pneumonia  -f/u neurology

## 2022-12-08 NOTE — HOSPITAL COURSE
Patient admitted with diagnosis of aspiration pneumonia, NSTEMI, seizure disorder, COPD.  Since admission patient has been stable.  He is awake and alert and oriented to person and situation.  He is able to give an adequate history.  Denies any shortness of breath, chest pain, nausea, vomiting.  In speaking with his nurse at the nursing home he is had frequent admissions [facility] [place] over the past year occurring 1 to 2 times a month.  These are usually related to either seizure activity or COPD exacerbation.  He was recently, 2 weeks ago admitted to [facility] with Dilantin toxicity.  Consultations were obtained with Cardiology who felt that optimal medical management was most appropriate course, neurology who will adjust his antiepileptics.  Seen in consultation by speech therapy with recommendations for a pureed thin liquid diet to advance as patient's mental status and ability improves.  By PT and OT who felt that he was at his baseline functional status.  CPT ordered with improvement in patient's cough and chest x-ray.  Acapella device was ordered and was sent back to nursing home with patient.  Oxygen is being weaned.  Discussed with Teleneurology recommendations to continue Keppra and consider weaning the phenobarbital.  Will refer to outpatient Neurology for further evaluation.  Has tolerated phenobarbital and Eliquis in the past will plan on discharging with both.    Pt was seen and examined at bedside . He was determined to be suitable for d/c .  Cardiology consulted  due to NSTEMI and Chronic afib , rec OMT and F/U outpt .  Neurology  consulted , Rec  cont keppra and wean off phenobarbital outpt . Plan  to do  Phenobarbital 60 mg po daily x 1 week , then after  30 mg po daily  x 1 week , then after 15 mg po daily  x 1 week , then after 7.5 mg po daily  for 1 week . PT/OT  consulted and rec basic NH . He is s/p IV  meropenem ,  IV vanc  x 3 days and po Levaquin 750 x 3 days   .  Will d/c on po Levaquin 750 mg  qd x 7 days . Pt qualify for home o2 with a resting o2 sat 86 %  . He will be d/c on 4 lt by nasal canula .
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
## 2022-12-08 NOTE — SUBJECTIVE & OBJECTIVE
Interval History:  Patient seen and examined at bedside.  With nurse.  He is awake and alert.  He has no complaints except being hungry.  In speaking with his nurse at the nursing home, linda Nolna, his usual functional status is primarily wheelchair-bound.  He does go to therapy and transfers from chair to wheelchair and bed and back.  Does occasionally forget and walk and has had several episodes of falls.  Usually participates in activities with the nursing home including bingo.  He does not have any difficulty with aspiration as far as they are aware.    Review of Systems   Respiratory:  Negative for cough and shortness of breath.    Cardiovascular:  Negative for chest pain and palpitations.   Gastrointestinal:  Negative for abdominal pain.   Neurological:  Negative for syncope.   All other systems reviewed and are negative.  Objective:     Vital Signs (Most Recent):  Temp: 97.1 °F (36.2 °C) (12/08/22 1509)  Pulse: 82 (12/08/22 1633)  Resp: 18 (12/08/22 1633)  BP: 138/63 (12/08/22 1509)  SpO2: (!) 93 % (12/08/22 1633)   Vital Signs (24h Range):  Temp:  [97.1 °F (36.2 °C)-99.5 °F (37.5 °C)] 97.1 °F (36.2 °C)  Pulse:  [65-91] 82  Resp:  [16-21] 18  SpO2:  [72 %-96 %] 93 %  BP: (123-151)/(59-73) 138/63     Weight: 121.1 kg (267 lb)  Body mass index is 41.82 kg/m².    Intake/Output Summary (Last 24 hours) at 12/8/2022 1718  Last data filed at 12/8/2022 0800  Gross per 24 hour   Intake 0 ml   Output 900 ml   Net -900 ml      Physical Exam  Vitals reviewed. Exam conducted with a chaperone present.   Constitutional:       Appearance: He is obese. He is ill-appearing (chronically).   HENT:      Head: Normocephalic and atraumatic.      Mouth/Throat:      Mouth: Mucous membranes are moist.      Pharynx: Oropharynx is clear.   Eyes:      Extraocular Movements: Extraocular movements intact.      Conjunctiva/sclera: Conjunctivae normal.   Cardiovascular:      Rate and Rhythm: Normal rate. Rhythm irregular.      Pulses:  Normal pulses.      Heart sounds: Normal heart sounds.   Pulmonary:      Effort: Pulmonary effort is normal.      Comments: Coarse breath sounds throughout no wheeze  Abdominal:      General: Bowel sounds are normal.      Palpations: Abdomen is soft.   Musculoskeletal:      Cervical back: Normal range of motion and neck supple.   Skin:     General: Skin is warm and dry.   Neurological:      General: No focal deficit present.      Mental Status: He is alert and oriented to person, place, and time. Mental status is at baseline.   Psychiatric:         Mood and Affect: Mood normal.         Behavior: Behavior normal.         Thought Content: Thought content normal.       Significant Labs: All pertinent labs within the past 24 hours have been reviewed.      CBC:   Recent Labs   Lab 12/07/22  0546 12/08/22  0454   WBC 20.7* 16.60*   HGB 12.0* 11.6*   HCT 37.2* 35.2*    162     CMP:   Recent Labs   Lab 12/07/22  0546 12/08/22  0454    139   K 3.3* 3.4*   CL  --  106   CO2 27 22*   GLU  --  80   BUN 10.0 8   CREATININE 0.72* 0.7   CALCIUM 9.0 8.8   PROT  --  5.8*   ALBUMIN 2.1* 2.3*   BILITOT 0.5 0.5   ALKPHOS 62 74   AST 23 23   ALT 6 13   ANIONGAP  --  11         Significant Imaging: I have reviewed all pertinent imaging results/findings within the past 24 hours.

## 2022-12-08 NOTE — ASSESSMENT & PLAN NOTE
-Troponin 1.873>6.957>10.143>3.611>1.457  -Suspect type II NSTEMI in setting of hypoxia/aspiration PNA and recurrent seizure activity  -Patient denies any chest pain/anginal symptoms   -EKG reviewed, SR cannot rule out anteroseptal infarct  -Echo pending  -Continue OMT-Plavix, BB, statin, full dose Lovenox  -Given patient's overall debility and co-morbidities (essentailly wheel chair bound), seems best to manage conservatively/medically

## 2022-12-08 NOTE — PT/OT/SLP EVAL
Speech Language Pathology Evaluation  Bedside Swallow    Patient Name:  John Wayne   MRN:  [id number]  Admitting Diagnosis: <principal problem not specified>    Recommendations:                 General Recommendations:  Dysphagia therapy  Diet recommendations:  Puree Diet - IDDSI Level 4, Thin liquids - IDDSI Level 0   Aspiration Precautions: Assistance with meals, Feed only when awake/alert, Frequent oral care, HOB to 90 degrees, Remain upright 30 minutes post meal, Small bites/sips, and Standard aspiration precautions   General Precautions: Standard, aspiration, pureed diet  Communication strategies:  provide increased time to answer and +cognitive-linguistic (anomia) deficits present     History:     Past Medical History:   Diagnosis Date    Anticoagulant long-term use     CHF (congestive heart failure) 10/2021    EF of 25-30% on ECHO    COPD (chronic obstructive pulmonary disease)     Coronary artery disease     Depression     Difficult intubation     GERD (gastroesophageal reflux disease)     Hypertension     Mixed hyperlipidemia     PVD (peripheral vascular disease)     Seizures     Sleep apnea, unspecified     TIA (transient ischemic attack)        Past Surgical History:   Procedure Laterality Date    CORONARY ANGIOPLASTY WITH STENT PLACEMENT  10/18/2016    pLAD, pLAD, dLAD, mCX,    INSERTION OF BARE METAL STENT INTO PERIPHERAL ARTERY  2018    B/L Illiac Vessels    PERCUTANEOUS BALLOON VALVULOPLASTY  04/04/2018       Social History: Patient lives at [facility] in [place], [place]. WC bound and requires assist with ADL's.    Prior Intubation HX:  N/A    Modified Barium Swallow: N/A    Chest X-Rays: See medical chart.    Prior diet: Pt consumes a regular diet per pt report.    Subjective     Pt seen bedside for ST swallowing evaluation.  Awake/alert and communicative to meet basic needs.  Watching TV.  No c/o pain.  Respiratory in room for sputum sample and incentive spirometer training.  No family present.  Pt  "transported from outside facility (NH in [place], [place]).  Patient goals: "Can I have water?"    Pain/Comfort:  Pain Rating 1: 0/10  Pain Rating Post-Intervention 1: 0/10  Pain Rating 2: 0/10  Pain Rating Post-Intervention 2: 0/10    Respiratory Status:  nasal cannula    Objective:     Oral Musculature Evaluation  Oral Musculature: general weakness  Dentition: scattered dentition, teeth in poor condition  Secretion Management: adequate  Mucosal Quality: good  Mandibular Strength and Mobility: impaired  Oral Labial Strength and Mobility: WFL  Lingual Strength and Mobility: impaired strength  Velar Elevation: WFL  Buccal Strength and Mobility: WFL  Volitional Cough: present, productive  Volitional Swallow: present  Voice Prior to PO Intake: reduced vocal intensity with slight hoarseness to quality    Bedside Swallow Eval:   Consistencies Assessed:  Pt consumed thin liquids (cup/straw), pureed solids and soft/chopped solids during bedside CSE.    Oral Phase:   Slow oral transit time with solids  Reduced breath support/fatigue with mastication of soft solids    Pharyngeal Phase:   delayed swallow initation  no overt clinical signs/symptoms of aspiration  "Silent aspiration"  cannot be excluded during bedside CSE.    Compensatory Strategies  None    Treatment: Pt recommended for IDDSI 4 (pureed solids) with IDDSI 0 (thin liquids), following aspiration precautions and daily oral care practices.  ST to f/u diet and potential for advancement with improved respiratory function and efficiency.    Assessment:     John Wayne is a 77 y.o. male with an SLP diagnosis of Dysphagia with dx seizure, PNA/NSTEMI and respiratory impairment .  He presents with cognitive-linguistic impairment (memory, anomia) but awake/alert for swallowing assessment.  Pt exhibited weakness and reduced breath support/coordination during solid mastication and recommended for IDDSI 4 (pureed solids) with IDDSI 0 (thin liquids), following aspiration precautions " & daily oral care.  ST to f/u diet and potential for advancement with improved respiratory function and efficiency.    Goals:   Multidisciplinary Problems       SLP Goals          Problem: SLP    Goal Priority Disciplines Outcome   SLP Goal     SLP    Description: 1.  Pt will consume IDDSI 4 (pureed solids) and IDDSI 0 (thin liquids) without incident.  2.  Tx feeds of soft solids for IDDSI 5 advancement with improved efficiency.                       Plan:     Patient to be seen:  2 x/week, 3 x/week   Plan of Care expires:  12/15/22  Plan of Care reviewed with:  patient   SLP Follow-Up:  Yes       Discharge recommendations:  nursing facility, skilled (Pt resides at [facility] in [place], [place])   Barriers to Discharge:  None    Time Tracking:     SLP Treatment Date:   12/08/22  Speech Start Time:  1230  Speech Stop Time:  1300     Speech Total Time (min):  30 min    Billable Minutes: Eval Swallow and Oral Function 15 minutes and Self Care/Home Management Training 15 minutes    12/08/2022

## 2022-12-08 NOTE — ASSESSMENT & PLAN NOTE
Nutrition consulted. Most recent weight and BMI monitored-                         Measurements:  Wt Readings from Last 1 Encounters:   12/08/22 121.1 kg (267 lb)   Body mass index is 41.82 kg/m².    Recommendations:      Patient has been screened and assessed by RD. RD will follow patient.

## 2022-12-08 NOTE — ASSESSMENT & PLAN NOTE
Body mass index is 41.82 kg/m². Elevation likely secondary to increased calorie intake and sedentary lifestyle. Patient educated on morbidity and mortality in regards to elevated BMI and stressed importance of diet and exercise.   Plan:  -low fat/low calorie diet

## 2022-12-08 NOTE — ASSESSMENT & PLAN NOTE
Patient found to have evidence of NSTEMI at outside facility with troponin which and peak of 10.143 in his since downtrended with most recent level measuring 1.457.  EKG was noted to have negative evidence of acute ischemia and likely secondary to demand ischemia from seizures.  Patient does report for chest pain upon deep inspiration but denied chest pain throughout bedside assessment.  Cardiology consulted and awaiting further evaluation/recommendations.  Patient currently on Lovenox and Plavix.  Plan:  -telemetry  -continue home medications    -f/u cardiology

## 2022-12-08 NOTE — ASSESSMENT & PLAN NOTE
Patient with known history of epilepsy presented to outside facility following seizure resulting in aspiration pneumonia and acute hypoxic respiratory failure requiring Vapotherm.  Patient continues to have breakthrough seizures requiring p.r.n. Ativan.  Currently on Keppra and phenobarbital with initial levels measuring carbamazepine <0.4, phenobarbital 22.1, phenytoin 17.  Patient was initiated on vancomycin, clindamycin, and levofloxacin with quinolones known to lower seizure threshold.  Pharmacy consulted with antibiotics pain switch to vancomycin and meropenem.  Neurology consulted and awaiting further evaluation/recommendations.    Plan:  -seizure precautions  -continue antiepileptics  -continue treatment of pneumonia  -f/u neurology consult

## 2022-12-08 NOTE — ASSESSMENT & PLAN NOTE
Patient with Permanent atrial fibrillation which is controlled currently with Beta Blocker and Amiodarone. Patient is currently in atrial fibrillation.[id number] Score: 2.. Anticoagulation indicated. Anticoagulation done with eliquis.    3

## 2022-12-08 NOTE — ASSESSMENT & PLAN NOTE
Patient with history of COPD currently on inhalers not presently in acute exacerbation and is without signs/symptoms of distress but is currently being treated for aspiration pneumonia. Patient currently saturating  90% on 4 L/min without use of accessory muscles noted and speaking in complete sentences.  Unknown if patient is on chronic O2 outpatient.  Plan:  -Continue home medications, titrate as needed  -Titrate oxygen requirements as needed  -Incentive spirometry   -Monitor pulse oximetry  -Amarjit prn  -continue treatment of pneumonia
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
## 2022-12-08 NOTE — PLAN OF CARE
[facility] - Telemetry (Hospital)  Initial Discharge Assessment       Primary Care Provider: SHAY Schmidt MD    Admission Diagnosis: Seizures [R56.9]  Hypoxia [R09.02]    Admission Date: 12/7/2022  Expected Discharge Date:     Discharge Barriers Identified: None    Payor: MEDICARE / Plan: MEDICARE PART A & B / Product Type: Government /     Extended Emergency Contact Information  Primary Emergency Contact: Fredrick Wayne  Mobile Phone: [phone]  Relation: Brother  Secondary Emergency Contact: Clary Wayne  Mobile Phone: [phone]  Relation: Sister  Preferred language: English   needed? No    Discharge Plan A: Return to nursing home         [facility] - [place] - [id number]  [facility] [id number]  Phone: [phone] Fax: [phone]      Initial Assessment (most recent)       Adult Discharge Assessment - 12/08/22 1105          Discharge Assessment    Assessment Type Discharge Planning Assessment     Confirmed/corrected address, phone number and insurance Yes     Source of Information patient;health care advocate     Communicated SHARRON with patient/caregiver Date not available/Unable to determine     Reason For Admission Transferred from another ER; Seizures needs cardiology and Neurology consults     People in Home facility resident     Facility Arrived From: [facility] in [place], [place]     Do you expect to return to your current living situation? Yes     Do you have help at home or someone to help you manage your care at home? Yes     Who are your caregiver(s) and their phone number(s)? Facility care givers     Prior to hospitilization cognitive status: Alert/Oriented     Current cognitive status: Alert/Oriented     Walking or Climbing Stairs ambulation difficulty, assistance 1 person     Dressing/Bathing bathing difficulty, assistance 1 person;dressing difficulty, assistance 1 person     Home Accessibility wheelchair accessible     Home Layout Able  to live on 1st floor     Equipment Currently Used at Home wheelchair     Readmission within 30 days? Yes     Patient currently being followed by outpatient case management? No     Do you currently have service(s) that help you manage your care at home? --   n/a facility resident    Do you take prescription medications? Yes     Do you have prescription coverage? Yes     Coverage medicaid     Do you have any problems affording any of your prescribed medications? No     Is the patient taking medications as prescribed? yes     Who is going to help you get home at discharge? facility transport     How do you get to doctors appointments? other (see comments)   facility transport    Are you on dialysis? No     Do you take coumadin? No     Discharge Plan A Return to nursing home     DME Needed Upon Discharge  none     Discharge Plan discussed with: Patient     Discharge Barriers Identified None                     Readmission Assessment (most recent)       Readmission Assessment - 12/08/22 1120          Readmission    Why were you hospitalized in the last 30 days? Patient is a resident of [facility].  He continues to have uncontrolled seizure and has been admitted serval times in the last month.     Why were you readmitted? Related to previous admission;Alarmed about signs/symptoms                    Met with patient for discharge assessment.  He lives at [facility] in [place], [place].  He essentially uses a wheelchair for getting around.  He states he does walk short distances without assitance.  Spoke to Gabby nurse at [place].  She verified above information.  Patient has had several falls recently and mainly uses a wheelchair for locomotion. Discharge plan is based on hospital progress. He will return to his residential Nursing home.

## 2022-12-08 NOTE — ASSESSMENT & PLAN NOTE
-Currently in SR  -Continue amiodarone, BB  -Continue therapeutic Lovenox for now, on Eliquis as OP

## 2022-12-08 NOTE — PT/OT/SLP EVAL
Occupational Therapy   Evaluation and Discharge Note    Name: John Wayne  MRN: [id number]  Admitting Diagnosis: <principal problem not specified>  Recent Surgery: * No surgery found *      Recommendations:     Discharge Recommendations: nursing facility, basic  Discharge Equipment Recommendations: none  Barriers to discharge:  None    Assessment:     John Wayne is a 77 y.o. male with a medical diagnosis of <principal problem not specified>. At this time, patient is functioning at their prior level of function and does not require further acute OT services.     Plan:     During this hospitalization, patient does not require further acute OT services.  Please re-consult if situation changes.    Plan of Care Reviewed with: patient    Subjective     Chief Complaint: seizures  Patient/Family Comments/goals: return home    Occupational Profile:  Living Environment: resident at [facility], room on 1st floor.  Previous level of function: Pt reports he requires assist to t/f to and from wheelchair, which he uses for mobility around the facility. Pt reports ambulating with RW only during therapy at [facility]. Pt requires assist for ADLs.   Roles and Routines: unknown  Equipment Used at home: wheelchair, walker, rolling (NH equipment)  Assistance upon Discharge: NH staff    Pain/Comfort:  Pain Rating 1: 0/10    Objective:     Communicated with: nurse and epic chart review prior to session.  Patient found supine with blood pressure cuff, central line, bed alarm, pressure relief boots, oxygen, telemetry, fletcher catheter upon OT entry to room.    General Precautions: Standard, fall, aspiration, seizure  Orthopedic Precautions: N/A  Braces: N/A  Respiratory Status: Nasal cannula, flow 6 L/min     Occupational Performance:    Bed Mobility:    Patient completed Rolling/Turning to Right with stand by assistance  Patient completed Supine to Sit with stand by assistance  Patient completed Sit to Supine with stand by  assistance  Supine scoot to HOB with SBA.    Functional Mobility/Transfers:  Patient completed Sit <> Stand Transfer with contact guard assistance  with  rolling walker   Functional Mobility: Pt performed ~3 side steps to R side to HOB with Min A and RW.    Activities of Daily Living:  Lower Body Dressing: total assistance Doff/kaila socks    Cognitive/Visual Perceptual:  Cognitive/Psychosocial Skills:     -       Oriented to: Person, Place, Time, and Situation   -       Follows Commands/attention:Follows two-step commands  -       Safety awareness/insight to disability: impaired     Physical Exam:  Edema:  BLE  Sensation:    -       Intact  Upper Extremity Range of Motion:     -       Right Upper Extremity: WFL  -       Left Upper Extremity: WFL  Upper Extremity Strength:    -       Right Upper Extremity: 4/5 grossly  -       Left Upper Extremity: 4/5 grossly   Strength:    -       Right Upper Extremity: WFL  -       Left Upper Extremity: WFL    AMPAC 6 Click ADL:  AMPAC Total Score: 9    Treatment & Education:  Patient educated on role of OT in acute setting and benefits of participation. Educated on techniques to use to increase independence and decrease fall risk with functional transfers. Educated on importance of OOB activity and calling for A to meet needs. Encouraged completion of B UE AROM therex throughout the day to tolerance to increase functional strength and activity tolerance. Patient stated understanding and in agreement with POC.     Pt currently at [facility] and does not require skilled acute OT services at this time. Discharge from OT. Recommends return to nursing home.    Patient left HOB elevated with all lines intact, call button in reach, bed alarm on, and nurse notified    GOALS:   Multidisciplinary Problems       Occupational Therapy Goals       Not on file                    History:     Past Medical History:   Diagnosis Date    Anticoagulant long-term use     CHF (congestive heart failure)  10/2021    EF of 25-30% on ECHO    COPD (chronic obstructive pulmonary disease)     Coronary artery disease     Depression     Difficult intubation     GERD (gastroesophageal reflux disease)     Hypertension     Mixed hyperlipidemia     PVD (peripheral vascular disease)     Seizures     Sleep apnea, unspecified     TIA (transient ischemic attack)          Past Surgical History:   Procedure Laterality Date    CORONARY ANGIOPLASTY WITH STENT PLACEMENT  10/18/2016    pLAD, pLAD, dLAD, mCX,    INSERTION OF BARE METAL STENT INTO PERIPHERAL ARTERY  2018    B/L Illiac Vessels    PERCUTANEOUS BALLOON VALVULOPLASTY  04/04/2018       Time Tracking:     OT Date of Treatment: 12/08/22  OT Start Time: 1150  OT Stop Time: 1215  OT Total Time (min): 25 min    Billable Minutes:Evaluation 15  Therapeutic Activity 10    12/8/2022  Apryl Aly, OT

## 2022-12-08 NOTE — ASSESSMENT & PLAN NOTE
Patient found to have evidence of NSTEMI at outside facility with troponin which and peak of 10.143 in his since downtrended with most recent level measuring 1.457.  EKG was noted to have negative evidence of acute ischemia and likely secondary to demand ischemia from seizures.  Patient does report for chest pain upon deep inspiration but denied chest pain throughout bedside assessment.  Repeat EKG ordered and pending.  Cardiology consulted and awaiting further evaluation/recommendations.  Patient currently on Lovenox and Plavix.  Plan:  -telemetry  -continue home medications  -f/u EKG  -f/u cardiology

## 2022-12-08 NOTE — ASSESSMENT & PLAN NOTE
Patient is identified as having unknown heart failure that is Chronic. CHF is currently controlled. Latest ECHO performed and demonstrates- No results found for this or any previous visit.  . Continue Beta Blocker, ACE/ARB and Furosemide and monitor clinical status closely. Monitor on telemetry. Patient is off CHF pathway.  Monitor strict Is&Os and daily weights.  Place on fluid restriction of 1.5 L. Continue to stress to patient importance of self efficacy and  on diet for CHF. Last BNP reviewed- and noted below   Recent Labs   Lab 12/05/22  9405   BNP 28.9   .

## 2022-12-08 NOTE — CONSULTS
[facility] - Telemetry ([facility])  Neurology  Consult Note    Patient Name: John Wayne  MRN: [id number]  Admission Date: 12/7/2022  Hospital Length of Stay: 1 days  Code Status: Full Code   Attending Provider: Veena Chavarria MD   Consulting Provider: Jasen Dunn MD  Primary Care Physician: SHAY Schmidt MD  Principal Problem:<principal problem not specified>    Inpatient consult to Telemedicine-General Neurology  Consult performed by: Jasen Dunn MD  Consult ordered by: Veena Chavarria MD      Inpatient consult to Telemedicine-General Neurology  Consult performed by: Jasen Dunn MD  Consult ordered by: Nilay Fish MD      Subjective:     Chief Complaint:  Seizures    HPI: 78 y/o male with medical Hx of CHF, COPD, CAD, depression, GERD hypertension, hyperlipidemia, TIA  presented initially to ED at another facility after seizures. He reportedly has low O2 sats as well. Workup was revealing for a troponin of 6.9. Pt is not able to provide reliable Hx. No other seizures reported while in hospital.      Past Medical History:   Diagnosis Date    Anticoagulant long-term use     CHF (congestive heart failure) 10/2021    EF of 25-30% on ECHO    COPD (chronic obstructive pulmonary disease)     Coronary artery disease     Depression     Difficult intubation     GERD (gastroesophageal reflux disease)     Hypertension     Mixed hyperlipidemia     PVD (peripheral vascular disease)     Seizures     Sleep apnea, unspecified     TIA (transient ischemic attack)        Past Surgical History:   Procedure Laterality Date    CORONARY ANGIOPLASTY WITH STENT PLACEMENT  10/18/2016    pLAD, pLAD, dLAD, mCX,    INSERTION OF BARE METAL STENT INTO PERIPHERAL ARTERY  2018    B/L Illiac Vessels    PERCUTANEOUS BALLOON VALVULOPLASTY  04/04/2018       Review of patient's allergies indicates:   Allergen Reactions    Ancef in dextrose (iso-osm)     Codeine     Penicillins        Current Neurological Medications:      Current Facility-Administered Medications on File Prior to Encounter   Medication    [COMPLETED] LORazepam (ATIVAN) 2 mg/mL injection    [COMPLETED] potassium chloride 20 mEq in 100 mL IVPB (FOR CENTRAL LINE ADMINISTRATION ONLY)    [DISCONTINUED] clindamycin in D5W 600 mg/50 mL IVPB 600 mg    [DISCONTINUED] levETIRAcetam injection 1,000 mg    [DISCONTINUED] levoFLOXacin 750 mg/150 mL IVPB 750 mg    [DISCONTINUED] vancomycin (VANCOCIN) 1,250 mg in dextrose 5 % 250 mL IVPB    [DISCONTINUED] vancomycin - pharmacy to dose     Current Outpatient Medications on File Prior to Encounter   Medication Sig    amiodarone (PACERONE) 200 MG Tab Take 200 mg by mouth once daily.    atorvastatin (LIPITOR) 40 MG tablet Take 40 mg by mouth every evening.    clopidogreL (PLAVIX) 75 mg tablet Take 1 tablet by mouth Daily.    ELIQUIS 5 mg Tab Take 1 tablet by mouth 2 (two) times a day.    finasteride (PROSCAR) 5 mg tablet Take 1 tablet (5 mg total) by mouth once daily. (Patient taking differently: Take 5 mg by mouth nightly.)    furosemide (LASIX) 40 MG tablet Take 1 tablet by mouth Daily.    levETIRAcetam (KEPPRA) 1000 MG tablet Take 1 tablet by mouth 2 (two) times a day.    lisinopriL (PRINIVIL,ZESTRIL) 5 MG tablet Take 1 tablet by mouth Daily.    metoprolol succinate (TOPROL-XL) 25 MG 24 hr tablet Take 1 tablet by mouth Daily.    pantoprazole (PROTONIX) 40 MG tablet Take 40 mg by mouth Daily.    PHENobarbitaL 32.4 MG tablet Take 2 tablets (64.8 mg total) by mouth 2 (two) times daily. (Patient taking differently: Take 64.8 mg by mouth once daily.)    tamsulosin (FLOMAX) 0.4 mg Cap Take 1 tablet by mouth nightly.    venlafaxine (EFFEXOR) 75 MG tablet Take 1 tablet (75 mg total) by mouth 2 (two) times daily.      Family History       Problem Relation (Age of Onset)    Coronary artery disease Father    Heart attack Father    Hypertension Mother, Father          Tobacco Use    Smoking status: Former    Smokeless tobacco: Never    Substance and Sexual Activity    Alcohol use: Not Currently    Drug use: Never    Sexual activity: Not on file     Review of Systems   Constitutional:  Negative for fever.   HENT:  Negative for sore throat.    Eyes:  Negative for photophobia.   Respiratory:  Negative for shortness of breath.    Cardiovascular:  Negative for chest pain.   Gastrointestinal:  Negative for abdominal pain.   Genitourinary:  Negative for frequency.   Musculoskeletal:  Negative for neck stiffness.   Neurological:  Negative for headaches.   Objective:     Vital Signs (Most Recent):  Temp: 98.2 °F (36.8 °C) (12/08/22 0814)  Pulse: 71 (12/08/22 0814)  Resp: 20 (12/08/22 0814)  BP: 132/63 (12/08/22 0814)  SpO2: (!) 92 % (12/08/22 0814)   Vital Signs (24h Range):  Temp:  [98 °F (36.7 °C)-98.2 °F (36.8 °C)] 98.2 °F (36.8 °C)  Pulse:  [58-91] 71  Resp:  [10-30] 20  SpO2:  [72 %-97 %] 92 %  BP: (115-151)/(43-74) 132/63     Weight: 121.1 kg (267 lb)  Body mass index is 41.82 kg/m².    Physical Exam  Constitutional:       General: He is not in acute distress.  Eyes:      General:         Right eye: No discharge.         Left eye: No discharge.   Pulmonary:      Effort: No respiratory distress.       NEUROLOGICAL EXAMINATION:     MENTAL STATUS   Level of consciousness: alert       Oriented to person, place     CRANIAL NERVES     CN III, IV, VI   Conjugate gaze: present    CN VII   Right facial weakness: none  Left facial weakness: none    CN XII   Tongue deviation: none    Significant Labs: CBC:   Recent Labs   Lab 12/07/22  0546 12/08/22 0454   WBC 20.7* 16.60*   HGB 12.0* 11.6*   HCT 37.2* 35.2*    162     CMP:   Recent Labs   Lab 12/07/22  0546 12/08/22 0454   GLU  --  80    139   K 3.3* 3.4*   CL  --  106   CO2 27 22*   BUN 10.0 8   CREATININE 0.72* 0.7   CALCIUM 9.0 8.8   MG 1.80  --    PROT  --  5.8*   ALBUMIN 2.1* 2.3*   BILITOT 0.5 0.5   ALKPHOS 62 74   AST 23 23   ALT 6 13   ANIONGAP  --  11       Significant Imaging: I have  reviewed all pertinent imaging results/findings within the past 24 hours.    Head CT      Assessment and Plan:     76 y/o male consulted for seizures    Seizure disorder: no more secure for now. Pt seems to be at his baseline. Head CT with no acute abnormalities. Aspiration pneumonia being treated with meropenem.  Levetiracetam 100 mg BID  Phenobarbital 64.8 mg daily.         This is a consult performed through audio-visual using [employer] rom[employer]. Pt and provider are in different locations. History and physical exam are limited due to the nature of this encounter.       Active Diagnoses:    Diagnosis Date Noted POA    NSTEMI (non-ST elevated myocardial infarction) [I21.4] 12/08/2022 Unknown    Aspiration pneumonia [J69.0] 12/08/2022 Unknown    Class 3 severe obesity due to excess calories with serious comorbidity and body mass index (BMI) of 40.0 to 44.9 in adult [E66.01, Z68.41] 12/08/2022 Not Applicable    Gastroesophageal reflux disease without esophagitis [K21.9] 12/08/2022 Unknown    HTN (hypertension) [I10] 12/08/2022 Unknown    Other hyperlipidemia [E78.49] 12/08/2022 Unknown    COPD (chronic obstructive pulmonary disease) [J44.9] 12/08/2022 Unknown    CHF (congestive heart failure) [I50.9] 12/08/2022 Unknown    Depression [F32.A] 12/08/2022 Unknown    Atrial fibrillation [I48.91] 12/08/2022 Unknown    Seizures [R56.9] 10/06/2022 Yes      Problems Resolved During this Admission:       VTE Risk Mitigation (From admission, onward)           Ordered     enoxaparin injection 40 mg  Every 12 hours         12/07/22 2336     IP VTE HIGH RISK PATIENT  Once         12/07/22 2336     Place sequential compression device  Until discontinued         12/07/22 2336                    Thank you for your consult. I will follow-up with patient. Please contact us if you have any additional questions.    Jasen Dunn MD  Neurology  [facility] - Telemetry ([facility])
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
## 2022-12-08 NOTE — PLAN OF CARE
OT lenny completed. Pt currently at [facility] and does not require skilled acute OT services at this time. SBA for bed mobility. CGA for sit>stand with RW. Min A for side steps with RW. Discharge from OT. Recommends return to nursing home.

## 2022-12-08 NOTE — ASSESSMENT & PLAN NOTE
-Clinically compensated  -Reportedly has low EF by notation in chart, echo pending  -Continue Toprol XL, po Lasix     Recent Labs   Lab 12/05/22  2215   BNP 28.9   .

## 2022-12-08 NOTE — SUBJECTIVE & OBJECTIVE
Past Medical History:   Diagnosis Date    Anticoagulant long-term use     CHF (congestive heart failure) 10/2021    EF of 25-30% on ECHO    COPD (chronic obstructive pulmonary disease)     Coronary artery disease     Depression     Difficult intubation     GERD (gastroesophageal reflux disease)     Hypertension     Mixed hyperlipidemia     PVD (peripheral vascular disease)     Seizures     Sleep apnea, unspecified     TIA (transient ischemic attack)        Past Surgical History:   Procedure Laterality Date    CORONARY ANGIOPLASTY WITH STENT PLACEMENT  10/18/2016    pLAD, pLAD, dLAD, mCX,    INSERTION OF BARE METAL STENT INTO PERIPHERAL ARTERY  2018    B/L [place] [facility]    PERCUTANEOUS BALLOON VALVULOPLASTY  04/04/2018       Review of patient's allergies indicates:   Allergen Reactions    Ancef in dextrose (iso-osm)     Codeine     Penicillins        Current Facility-Administered Medications on File Prior to Encounter   Medication    [COMPLETED] LORazepam (ATIVAN) 2 mg/mL injection    [COMPLETED] potassium chloride 20 mEq in 100 mL IVPB (FOR CENTRAL LINE ADMINISTRATION ONLY)    [DISCONTINUED] clindamycin in D5W 600 mg/50 mL IVPB 600 mg    [DISCONTINUED] levETIRAcetam injection 1,000 mg    [DISCONTINUED] levoFLOXacin 750 mg/150 mL IVPB 750 mg    [DISCONTINUED] vancomycin (VANCOCIN) 1,250 mg in dextrose 5 % 250 mL IVPB    [DISCONTINUED] vancomycin - pharmacy to dose     Current Outpatient Medications on File Prior to Encounter   Medication Sig    amiodarone (PACERONE) 200 MG Tab Take 200 mg by mouth once daily.    atorvastatin (LIPITOR) 40 MG tablet Take 40 mg by mouth every evening.    clopidogreL (PLAVIX) 75 mg tablet Take 1 tablet by mouth Daily.    ELIQUIS 5 mg Tab Take 1 tablet by mouth 2 (two) times a day.    finasteride (PROSCAR) 5 mg tablet Take 1 tablet (5 mg total) by mouth once daily. (Patient taking differently: Take 5 mg by mouth nightly.)    furosemide (LASIX) 40 MG tablet Take 1 tablet by mouth Daily.     levETIRAcetam (KEPPRA) 1000 MG tablet Take 1 tablet by mouth 2 (two) times a day.    lisinopriL (PRINIVIL,ZESTRIL) 5 MG tablet Take 1 tablet by mouth Daily.    metoprolol succinate (TOPROL-XL) 25 MG 24 hr tablet Take 1 tablet by mouth Daily.    pantoprazole (PROTONIX) 40 MG tablet Take 40 mg by mouth Daily.    PHENobarbitaL 32.4 MG tablet Take 2 tablets (64.8 mg total) by mouth 2 (two) times daily. (Patient taking differently: Take 64.8 mg by mouth once daily.)    tamsulosin (FLOMAX) 0.4 mg Cap Take 1 tablet by mouth nightly.    venlafaxine (EFFEXOR) 75 MG tablet Take 1 tablet (75 mg total) by mouth 2 (two) times daily.     Family History       Problem Relation (Age of Onset)    Coronary artery disease Father    Heart attack Father    Hypertension Mother, Father          Tobacco Use    Smoking status: Former    Smokeless tobacco: Never   Substance and Sexual Activity    Alcohol use: Not Currently    Drug use: Never    Sexual activity: Not on file     Review of Systems   Unable to perform ROS: Acuity of condition   Objective:     Vital Signs (Most Recent):  Temp: 98 °F (36.7 °C) (12/08/22 0403)  Pulse: 79 (12/08/22 0540)  Resp: 16 (12/08/22 0403)  BP: 135/64 (12/08/22 0403)  SpO2: (!) 90 % (12/08/22 0403)   Vital Signs (24h Range):  Temp:  [98 °F (36.7 °C)-98.4 °F (36.9 °C)] 98 °F (36.7 °C)  Pulse:  [58-91] 79  Resp:  [10-30] 16  SpO2:  [72 %-97 %] 90 %  BP: (114-151)/(43-74) 135/64     Weight: 121.3 kg (267 lb 6.7 oz)  Body mass index is 41.88 kg/m².    Physical Exam  Vitals reviewed.   Constitutional:       General: He is not in acute distress.     Appearance: Normal appearance. He is obese. He is not ill-appearing, toxic-appearing or diaphoretic.   HENT:      Head: Normocephalic and atraumatic.      Right Ear: External ear normal.      Left Ear: External ear normal.      Nose: Nose normal. No congestion or rhinorrhea.      Mouth/Throat:      Mouth: Mucous membranes are moist.      Pharynx: Oropharynx is clear.  No oropharyngeal exudate or posterior oropharyngeal erythema.      Comments: Poor dentition   Eyes:      General: No scleral icterus.     Extraocular Movements: Extraocular movements intact.      Conjunctiva/sclera: Conjunctivae normal.      Pupils: Pupils are equal, round, and reactive to light.   Neck:      Vascular: No carotid bruit.   Cardiovascular:      Rate and Rhythm: Normal rate and regular rhythm.      Pulses: Normal pulses.      Heart sounds: Normal heart sounds. No murmur heard.    No friction rub. No gallop.   Pulmonary:      Effort: Pulmonary effort is normal. No respiratory distress.      Breath sounds: Normal breath sounds. No stridor. No wheezing, rhonchi or rales.      Comments: Coarse breath sounds noted throughout greatest in bilateral bases but without evidence of wheezes, rales, rhonchi noted.  Chest:      Chest wall: No tenderness.   Abdominal:      General: Abdomen is flat. Bowel sounds are normal. There is no distension.      Palpations: Abdomen is soft. There is no mass.      Tenderness: There is no abdominal tenderness. There is no guarding or rebound.      Hernia: No hernia is present.   Musculoskeletal:         General: No swelling, tenderness, deformity or signs of injury. Normal range of motion.      Cervical back: Normal range of motion and neck supple. No rigidity or tenderness.      Right lower leg: Edema present.      Left lower leg: Edema present.   Lymphadenopathy:      Cervical: No cervical adenopathy.   Skin:     General: Skin is warm and dry.      Capillary Refill: Capillary refill takes less than 2 seconds.      Coloration: Skin is not jaundiced or pale.      Findings: No bruising, erythema, lesion or rash.      Comments: Abrasions noted on bilateral lower extremities   Neurological:      General: No focal deficit present.      Mental Status: He is alert.      Cranial Nerves: No cranial nerve deficit.      Sensory: No sensory deficit.      Motor: No weakness.       Coordination: Coordination normal.      Comments: Patient awake, alert, and oriented to self but not place or time.  Patient able to follow commands but unable to provide adequate history due to continued postictal state.  Patient with 5/5 strength throughout all extremities with sensation to light touch grossly intact throughout.  Patient negative for evidence of facial drooping or drifting.   Psychiatric:         Mood and Affect: Mood normal.         Behavior: Behavior normal.         Thought Content: Thought content normal.         Judgment: Judgment normal.         CRANIAL NERVES     CN III, IV, VI   Pupils are equal, round, and reactive to light.     Significant Labs: All pertinent labs within the past 24 hours have been reviewed.    Significant Imaging: I have reviewed all pertinent imaging results/findings within the past 24 hours.    LABS:  Recent Results (from the past 24 hour(s))   Phenytoin Level, Total    Collection Time: 12/07/22 10:58 AM   Result Value Ref Range    Phenytoin Level Total 17.0 10.0 - 20.0 ug/ml   Troponin I    Collection Time: 12/07/22 12:13 PM   Result Value Ref Range    Troponin-I 1.457 (H) 0.000 - 0.045 ng/mL   POCT glucose    Collection Time: 12/07/22  3:48 PM   Result Value Ref Range    POCT Glucose 125 (H) 70 - 110 mg/dL   VANCOMYCIN, TROUGH    Collection Time: 12/07/22  5:26 PM   Result Value Ref Range    Vancomycin Trough 13.1 (L) 15.0 - 20.0 ug/ml       RADIOLOGY  CT Head Without Contrast    Result Date: 11/27/2022  CT HEAD WITHOUT CONTRAST: CPT 47529 Total DLP:   mGy-cm Automatic exposure control was utilized to limit the radiation dose to the patient. History: Altered Mental status. Comparison: 10/06/2022. Technique: Multiple contiguous axial images were acquired from the base of the skull the vertex without contrast administration. Findings: There are diffuse cerebral and cerebellar parenchymal involutional changes with resultant prominence of the ventricles and basal  cisterns. There is extensive there is unchanged focal encephalomalacia in the left cerebellum.  Asymmetric low density without obvious mass-effect throughout the bilateral periventricular, centrum semiovale, subcortical, and deep white matter. The gray-white junctions are maintained. No other cerebral or cerebellar parenchymal abnormality is identified. There is no hemorrhage, midline shift, significant mass-effect, or extra-axial fluid collection. There are calcifications of the distal internal carotid and vertebrobasilar arteries. The partially visualized paranasal sinuses and mastoid air cells are clear. The calvarium is intact.     Impression: 1. No acute intracranial process is identified. 2. Scattered white matter microvascular ischemic changes. 3. Diffuse involutional changes most prominent centrally with resultant mild ventriculomegaly not changed in degree from the prior exam. 4.  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 Electronically signed by: Chace Guardado MD Date:    11/27/2022 Time:    13:55    X-Ray Chest AP Portable    Result Date: 12/6/2022  EXAMINATION: XR CHEST AP PORTABLE CLINICAL HISTORY: hypoxia;, . COMPARISON: 10/12/2022 FINDINGS: An AP view or more reveals the heart to be borderline enlarged.  The trachea is midline.  Atherosclerosis is seen within the aorta.  Mild hazy interstitial opacities are scattered throughout the lungs bilaterally.  A suspect small right basilar pleural reaction is present.  There is interim placement of a right central line with the tip superimposed over the SVC.  Degenerative changes and curvature are noted to the thoracic spine.     1. Suspect small right basilar pleural reaction 2. Mild hazy interstitial opacities at the lungs bilaterally which may represent a chronic process.  A acute component cannot be excluded 3. Atherosclerosis 4. Borderline cardiomegaly 5. Interim placement right central line Electronically signed by: Campos Dobson  Date:    12/06/2022 Time:    08:47      EKG    MICROBIOLOGY    MDM

## 2022-12-08 NOTE — ASSESSMENT & PLAN NOTE
Patient is chronically on statin.will continue for now. Last Lipid Panel:   Lab Results   Component Value Date    CHOL 123 11/11/2021    HDL 41 11/11/2021    TRIG 85 11/11/2021   Plan:  -Continue home medication  -low fat/low calorie diet

## 2022-12-08 NOTE — ASSESSMENT & PLAN NOTE
Patient has history of  depression which is unknown and is currently controlled. Will Continue anti-depressant medications. We will not consult psychiatry at this time. Patient does not display psychosis at this time.   Plan:  -Continue to monitor closely and adjust plan of care as needed.

## 2022-12-08 NOTE — PROGRESS NOTES
Pharmacokinetic Initial Assessment: IV Vancomycin    Assessment/Plan:    Prior to transfer, patient was initiated on intravenous vancomycin with maintenance dose of vancomycin 1250 mg IV every 12 hours that resulted on a trough of 13.1 (~11 hours post dose). Regimen changed to vancomycin 10887 mg IV every 12 hours.   Desired empiric serum trough concentration is 15 to 20 mcg/mL  Draw vancomycin trough level 60 min prior to fourth dose on 12/09 at approximately 1700  Pharmacy will continue to follow and monitor vancomycin.      Please contact pharmacy at extension [phone] with any questions regarding this assessment.     Thank you for the consult,   Darlene Baca       Patient brief summary:  John Wayne is a 77 y.o. male initiated on antimicrobial therapy with IV Vancomycin for treatment of suspected lower respiratory infection    Drug Allergies:   Review of patient's allergies indicates:   Allergen Reactions    Ancef in dextrose (iso-osm)     Codeine     Penicillins        Actual Body Weight:   121.3 kg    Renal Function:   Estimated Creatinine Clearance: 107.2 mL/min (A) (based on SCr of 0.72 mg/dL (L)).,     Dialysis Method (if applicable):  N/A    CBC (last 72 hours):  Recent Labs   Lab Result Units 12/05/22 2215 12/07/22  0546 12/08/22  0454   WBC K/uL 25.1* 20.7* 16.60*   Hemoglobin g/dL  --   --  11.6*   Hgb gm/dL 14.4 12.0*  --    Hematocrit %  --   --  35.2*   Hct % 45.2 37.2*  --    Platelets K/uL  --   --  162   Platelet x10(3)/mcL 196 148  --    Monocyte Man % 7 5  --        Metabolic Panel (last 72 hours):  Recent Labs   Lab Result Units 12/05/22 2215 12/05/22 2222 12/07/22  0546   Sodium Level mmol/L 136  --  137   Potassium Level mmol/L 3.1*  --  3.3*   Chloride mmol/L 100  --  104   Carbon Dioxide mmol/L 25  --  27   Glucose Level mg/dL 155*  --  114   Glucose, UA mg/dL  --  Negative  --    Blood Urea Nitrogen mg/dL 11.0  --  10.0   Creatinine mg/dL 0.81  --  0.72*   Albumin Level gm/dL 2.9*  --   2.1*   Bilirubin Total mg/dL 0.3  --  0.5   Alkaline Phosphatase unit/L 90  --  62   Aspartate Aminotransferase unit/L 18  --  23   Alanine Aminotransferase unit/L 12  --  6   Magnesium Level mg/dL 1.50*  --  1.80       Drug levels (last 3 results):  Recent Labs   Lab Result Units 12/07/22  1726   Vancomycin Trough ug/ml 13.1*       Microbiologic Results:  Microbiology Results (last 7 days)       ** No results found for the last 168 hours. **
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
## 2022-12-09 LAB
ALBUMIN SERPL BCP-MCNC: 2.3 G/DL (ref 3.5–5.2)
ALBUMIN SERPL BCP-MCNC: 2.3 G/DL (ref 3.5–5.2)
ALP SERPL-CCNC: 79 U/L (ref 55–135)
ALT SERPL W/O P-5'-P-CCNC: 17 U/L (ref 10–44)
ANION GAP SERPL CALC-SCNC: 10 MMOL/L (ref 8–16)
ANION GAP SERPL CALC-SCNC: 10 MMOL/L (ref 8–16)
AST SERPL-CCNC: 23 U/L (ref 10–40)
BASOPHILS NFR BLD: 0 % (ref 0–1.9)
BILIRUB SERPL-MCNC: 0.5 MG/DL (ref 0.1–1)
BUN SERPL-MCNC: 5 MG/DL (ref 8–23)
BUN SERPL-MCNC: 5 MG/DL (ref 8–23)
BURR CELLS BLD QL SMEAR: ABNORMAL
CALCIUM SERPL-MCNC: 8.7 MG/DL (ref 8.7–10.5)
CALCIUM SERPL-MCNC: 8.7 MG/DL (ref 8.7–10.5)
CHLORIDE SERPL-SCNC: 106 MMOL/L (ref 95–110)
CHLORIDE SERPL-SCNC: 106 MMOL/L (ref 95–110)
CO2 SERPL-SCNC: 23 MMOL/L (ref 23–29)
CO2 SERPL-SCNC: 23 MMOL/L (ref 23–29)
CREAT SERPL-MCNC: 0.7 MG/DL (ref 0.5–1.4)
CREAT SERPL-MCNC: 0.7 MG/DL (ref 0.5–1.4)
DIFFERENTIAL METHOD: ABNORMAL
EOSINOPHIL NFR BLD: 0 % (ref 0–8)
ERYTHROCYTE [DISTWIDTH] IN BLOOD BY AUTOMATED COUNT: 13 % (ref 11.5–14.5)
EST. GFR  (NO RACE VARIABLE): >60 ML/MIN/1.73 M^2
EST. GFR  (NO RACE VARIABLE): >60 ML/MIN/1.73 M^2
GLUCOSE SERPL-MCNC: 87 MG/DL (ref 70–110)
GLUCOSE SERPL-MCNC: 87 MG/DL (ref 70–110)
HCT VFR BLD AUTO: 35.8 % (ref 40–54)
HGB BLD-MCNC: 11.7 G/DL (ref 14–18)
IMM GRANULOCYTES # BLD AUTO: ABNORMAL K/UL (ref 0–0.04)
IMM GRANULOCYTES NFR BLD AUTO: ABNORMAL % (ref 0–0.5)
LYMPHOCYTES NFR BLD: 43 % (ref 18–48)
MCH RBC QN AUTO: 28.5 PG (ref 27–31)
MCHC RBC AUTO-ENTMCNC: 32.7 G/DL (ref 32–36)
MCV RBC AUTO: 87 FL (ref 82–98)
MONOCYTES NFR BLD: 9 % (ref 4–15)
NEUTROPHILS NFR BLD: 47 % (ref 38–73)
NEUTS BAND NFR BLD MANUAL: 1 %
NRBC BLD-RTO: 0 /100 WBC
PHOSPHATE SERPL-MCNC: 1.7 MG/DL (ref 2.7–4.5)
PLATELET # BLD AUTO: 211 K/UL (ref 150–450)
PLATELET BLD QL SMEAR: ABNORMAL
PMV BLD AUTO: 10.5 FL (ref 9.2–12.9)
POLYCHROMASIA BLD QL SMEAR: ABNORMAL
POTASSIUM SERPL-SCNC: 3.4 MMOL/L (ref 3.5–5.1)
POTASSIUM SERPL-SCNC: 3.4 MMOL/L (ref 3.5–5.1)
PROT SERPL-MCNC: 6.2 G/DL (ref 6–8.4)
RBC # BLD AUTO: 4.11 M/UL (ref 4.6–6.2)
SMUDGE CELLS BLD QL SMEAR: PRESENT
SODIUM SERPL-SCNC: 139 MMOL/L (ref 136–145)
SODIUM SERPL-SCNC: 139 MMOL/L (ref 136–145)
VANCOMYCIN TROUGH SERPL-MCNC: 14.9 UG/ML (ref 10–22)
WBC # BLD AUTO: 16.41 K/UL (ref 3.9–12.7)

## 2022-12-09 PROCEDURE — 21400001 HC TELEMETRY ROOM

## 2022-12-09 PROCEDURE — 80069 RENAL FUNCTION PANEL: CPT | Performed by: INTERNAL MEDICINE

## 2022-12-09 PROCEDURE — 25000003 PHARM REV CODE 250: Performed by: INTERNAL MEDICINE

## 2022-12-09 PROCEDURE — 27000221 HC OXYGEN, UP TO 24 HOURS

## 2022-12-09 PROCEDURE — 85007 BL SMEAR W/DIFF WBC COUNT: CPT | Performed by: HOSPITALIST

## 2022-12-09 PROCEDURE — 99232 PR SUBSEQUENT HOSPITAL CARE,LEVL II: ICD-10-PCS | Mod: ,,,

## 2022-12-09 PROCEDURE — 99232 SBSQ HOSP IP/OBS MODERATE 35: CPT | Mod: ,,,

## 2022-12-09 PROCEDURE — 94667 MNPJ CHEST WALL 1ST: CPT

## 2022-12-09 PROCEDURE — 85027 COMPLETE CBC AUTOMATED: CPT | Performed by: HOSPITALIST

## 2022-12-09 PROCEDURE — 63600175 PHARM REV CODE 636 W HCPCS: Performed by: HOSPITALIST

## 2022-12-09 PROCEDURE — 94761 N-INVAS EAR/PLS OXIMETRY MLT: CPT

## 2022-12-09 PROCEDURE — 36415 COLL VENOUS BLD VENIPUNCTURE: CPT | Performed by: HOSPITALIST

## 2022-12-09 PROCEDURE — 94799 UNLISTED PULMONARY SVC/PX: CPT

## 2022-12-09 PROCEDURE — 80053 COMPREHEN METABOLIC PANEL: CPT | Performed by: HOSPITALIST

## 2022-12-09 PROCEDURE — 92526 ORAL FUNCTION THERAPY: CPT

## 2022-12-09 PROCEDURE — 25000003 PHARM REV CODE 250: Performed by: HOSPITALIST

## 2022-12-09 PROCEDURE — 63600175 PHARM REV CODE 636 W HCPCS: Performed by: INTERNAL MEDICINE

## 2022-12-09 PROCEDURE — 36415 COLL VENOUS BLD VENIPUNCTURE: CPT | Performed by: INTERNAL MEDICINE

## 2022-12-09 PROCEDURE — 80202 ASSAY OF VANCOMYCIN: CPT | Performed by: INTERNAL MEDICINE

## 2022-12-09 RX ADMIN — MEROPENEM AND SODIUM CHLORIDE 500 MG: 500 INJECTION, SOLUTION INTRAVENOUS at 09:12

## 2022-12-09 RX ADMIN — AMIODARONE HYDROCHLORIDE 200 MG: 200 TABLET ORAL at 09:12

## 2022-12-09 RX ADMIN — PHENOBARBITAL 64.8 MG: 32.4 TABLET ORAL at 10:12

## 2022-12-09 RX ADMIN — TRIAMCINOLONE ACETONIDE: 0.25 CREAM TOPICAL at 08:12

## 2022-12-09 RX ADMIN — FUROSEMIDE 40 MG: 40 TABLET ORAL at 04:12

## 2022-12-09 RX ADMIN — TAMSULOSIN HYDROCHLORIDE 0.4 MG: 0.4 CAPSULE ORAL at 09:12

## 2022-12-09 RX ADMIN — LEVETIRACETAM 1000 MG: 500 TABLET, FILM COATED ORAL at 08:12

## 2022-12-09 RX ADMIN — LISINOPRIL 5 MG: 5 TABLET ORAL at 04:12

## 2022-12-09 RX ADMIN — MUPIROCIN: 20 OINTMENT TOPICAL at 09:12

## 2022-12-09 RX ADMIN — HYPROMELLOSE 2910 2 DROP: 5 SOLUTION/ DROPS OPHTHALMIC at 08:12

## 2022-12-09 RX ADMIN — MEROPENEM AND SODIUM CHLORIDE 500 MG: 500 INJECTION, SOLUTION INTRAVENOUS at 08:12

## 2022-12-09 RX ADMIN — HYPROMELLOSE 2910 2 DROP: 5 SOLUTION/ DROPS OPHTHALMIC at 04:12

## 2022-12-09 RX ADMIN — ENOXAPARIN SODIUM 120 MG: 150 INJECTION SUBCUTANEOUS at 08:12

## 2022-12-09 RX ADMIN — VENLAFAXINE HYDROCHLORIDE 75 MG: 75 CAPSULE, EXTENDED RELEASE ORAL at 08:12

## 2022-12-09 RX ADMIN — MEROPENEM AND SODIUM CHLORIDE 500 MG: 500 INJECTION, SOLUTION INTRAVENOUS at 03:12

## 2022-12-09 RX ADMIN — MEROPENEM AND SODIUM CHLORIDE 500 MG: 500 INJECTION, SOLUTION INTRAVENOUS at 02:12

## 2022-12-09 RX ADMIN — VENLAFAXINE HYDROCHLORIDE 75 MG: 75 CAPSULE, EXTENDED RELEASE ORAL at 09:12

## 2022-12-09 RX ADMIN — LEVETIRACETAM 1000 MG: 500 TABLET, FILM COATED ORAL at 09:12

## 2022-12-09 RX ADMIN — SODIUM CHLORIDE, POTASSIUM CHLORIDE, SODIUM LACTATE AND CALCIUM CHLORIDE: 600; 310; 30; 20 INJECTION, SOLUTION INTRAVENOUS at 04:12

## 2022-12-09 RX ADMIN — FINASTERIDE 5 MG: 5 TABLET, FILM COATED ORAL at 08:12

## 2022-12-09 RX ADMIN — MUPIROCIN: 20 OINTMENT TOPICAL at 08:12

## 2022-12-09 RX ADMIN — ATORVASTATIN CALCIUM 40 MG: 40 TABLET, FILM COATED ORAL at 08:12

## 2022-12-09 RX ADMIN — VANCOMYCIN HYDROCHLORIDE 1500 MG: 1.5 INJECTION, POWDER, LYOPHILIZED, FOR SOLUTION INTRAVENOUS at 10:12

## 2022-12-09 RX ADMIN — CLOPIDOGREL BISULFATE 75 MG: 75 TABLET ORAL at 04:12

## 2022-12-09 RX ADMIN — PANTOPRAZOLE SODIUM 40 MG: 40 TABLET, DELAYED RELEASE ORAL at 04:12

## 2022-12-09 RX ADMIN — METOPROLOL SUCCINATE 25 MG: 25 TABLET, EXTENDED RELEASE ORAL at 04:12

## 2022-12-09 RX ADMIN — HYPROMELLOSE 2910 2 DROP: 5 SOLUTION/ DROPS OPHTHALMIC at 09:12

## 2022-12-09 RX ADMIN — ENOXAPARIN SODIUM 120 MG: 150 INJECTION SUBCUTANEOUS at 09:12

## 2022-12-09 RX ADMIN — TRIAMCINOLONE ACETONIDE: 0.25 CREAM TOPICAL at 09:12

## 2022-12-09 NOTE — PT/OT/SLP EVAL
Physical Therapy Evaluation and Discharge Note    Patient Name:  John Wayne   MRN:  [id number]    Recommendations:     Discharge Recommendations:  basic nursing home  Discharge Equipment Recommendations: none   Barriers to discharge: None    Assessment:     John Wayne is a 77 y.o. male admitted with a medical diagnosis of <principal problem not specified>. At this time, patient reports he is functioning at his prior level of function and does not require further acute PT services.     Recent Surgery: * No surgery found *      Plan:     During this hospitalization, patient does not require further acute PT services.  Please re-consult if situation changes.      Subjective     Chief Complaint: seizures   Patient/Family Comments/goals: to go home  Pain/Comfort:  Pain Rating 1: 0/10    Patients cultural, spiritual, Islam conflicts given the current situation: no    Living Environment:  Pt is a NH resident  Prior to admission, patients required assistance for PLOF.  Equipment used at home: wheelchair, hospital bed.  DME owned (not currently used): none.  Upon discharge, patient will have assistance from NH staff.    Objective:     Communicated with nursing prior to session.  Patient found supine with fletcher catheter, pressure relief boots, peripheral IV, telemetry upon PT entry to room.    General Precautions: Standard, fall    Orthopedic Precautions:N/A   Braces: N/A  Respiratory Status: Room air    Exams:  Questionable historian; pt reports baseline ability to bath/dress himself  Gross Motor Coordination:  impaired  RLE ROM: impaired  RLE Strength: impaired  LLE ROM: impaired  LLE Strength: impaired    Functional Mobility:  Bed Mobility:     Scooting: contact guard assistance  Supine to Sit: stand by assistance  Sit to Supine: stand by assistance  Transfers:     Sit to Stand:  contact guard assistance with rolling walker  Gait: gait training 3 ft min A with RW along EOB, increased fatigue and increased buckling  of bilateral knees; returned to sitting. Pt able to tolerate sitting EOB 5-7 mins with no s/s of distress.  Balance: CGA/SBA sitting balance    AM-PAC 6 CLICK MOBILITY  Total Score:13       Treatment and Education:  Educated pt on call don't fall policy and use of call button to alert nursing staff of needs.      AM-PAC 6 CLICK MOBILITY  Total Score:13     Patient left supine with all lines intact, call button in reach, bed alarm on, and nursing notified.    GOALS:   Multidisciplinary Problems       Physical Therapy Goals          Problem: Physical Therapy    Goal Priority Disciplines Outcome Goal Variances Interventions   Physical Therapy Goal     PT, PT/OT      Description: No further PT at this time. Pt appears to be a functional baseline.                        History:     Past Medical History:   Diagnosis Date    Anticoagulant long-term use     CHF (congestive heart failure) 10/2021    EF of 25-30% on ECHO    COPD (chronic obstructive pulmonary disease)     Coronary artery disease     Depression     Difficult intubation     GERD (gastroesophageal reflux disease)     Hypertension     Mixed hyperlipidemia     PVD (peripheral vascular disease)     Seizures     Sleep apnea, unspecified     TIA (transient ischemic attack)        Past Surgical History:   Procedure Laterality Date    CORONARY ANGIOPLASTY WITH STENT PLACEMENT  10/18/2016    pLAD, pLAD, dLAD, mCX,    INSERTION OF BARE METAL STENT INTO PERIPHERAL ARTERY  2018    B/L Illiac Vessels    PERCUTANEOUS BALLOON VALVULOPLASTY  04/04/2018       Time Tracking:     PT Received On: 12/08/22  PT Start Time: 1150     PT Stop Time: 1215  PT Total Time (min): 25 min     Billable Minutes: Evaluation 10 and Therapeutic Activity 15      12/08/2022

## 2022-12-09 NOTE — ASSESSMENT & PLAN NOTE
Patient with Permanent atrial fibrillation which is controlled currently with Beta Blocker and Amiodarone. Patient is currently in atrial fibrillation.[id number] Score: 2.. Anticoagulation indicated. Anticoagulation done with eliquis.    3
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
## 2022-12-09 NOTE — HOSPITAL COURSE
12/9/22- Pt seen and examined today, more altered than yesterday unable to answer questions. Labs reviewed K 3.5, WBC 16.41. Echo EF 45% WMA

## 2022-12-09 NOTE — PLAN OF CARE
EVAL AND TX COMPLETED: facilitated bed mobility with SBA, transfers with CGA and RW. Ambulated 3 ft with min A and RW . Recommend return to NH. Pt appears to be at functional baseline.

## 2022-12-09 NOTE — ASSESSMENT & PLAN NOTE
Body mass index is 40.68 kg/m². Elevation likely secondary to increased calorie intake and sedentary lifestyle. Patient educated on morbidity and mortality in regards to elevated BMI and stressed importance of diet and exercise.   Plan:  -low fat/low calorie diet

## 2022-12-09 NOTE — ASSESSMENT & PLAN NOTE
Nutrition consulted. Most recent weight and BMI monitored-                         Measurements:  Wt Readings from Last 1 Encounters:   12/08/22 117.8 kg (259 lb 11.2 oz)   Body mass index is 40.68 kg/m².    Recommendations:      Patient has been screened and assessed by RD. RD will follow patient.

## 2022-12-09 NOTE — ASSESSMENT & PLAN NOTE
-Troponin 1.873>6.957>10.143>3.611>1.457  -Suspect type II NSTEMI in setting of hypoxia/aspiration PNA and recurrent seizure activity  -Patient denies any chest pain/anginal symptoms   -EKG reviewed, SR cannot rule out anteroseptal infarct  -Echo pending  -Continue OMT-Plavix, BB, statin, full dose Lovenox  -Given patient's overall debility and co-morbidities (essentailly wheel chair bound), seems best to manage conservatively/medically    12/9/22  Echo EF 45%WMA  Cont OMT- plavix, BB, statin, lovenox  Cont conservative/medical management

## 2022-12-09 NOTE — CARE UPDATE
MAR from NH:  Phenobarb 32.4mg - 2 tabs q day  Keppra 1000mg bid  Had been on Dilantin with recent toxicity and it was discontinued with level on 12/5 --23.6    Eliquis 5 mg bid  Flomax 0.4 qhs  Effexor 75mg bid  Plavix 75mg daily  Amiodarone 200mg daily  Lisinopril 5mg daily  Atrovastatin 40mg daily  Finasteride 5mg q hs  Metoprolol ER 25mg daily  Protonix 40mg daily  Lasix 40mg daily    Regular/thin liquid diet with HOLLIE
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
## 2022-12-09 NOTE — ASSESSMENT & PLAN NOTE
Patient found to have evidence of NSTEMI at outside facility with troponin which and peak of 10.143 in his since downtrended with most recent level measuring 1.457.  EKG was noted to have negative evidence of acute ischemia and likely secondary to demand ischemia from seizures.  Patient does report for chest pain upon deep inspiration but denied chest pain throughout bedside assessment.  Cardiology consulted and awaiting further evaluation/recommendations.  Patient currently on Lovenox and Plavix.  Plan:  -telemetry  -continue home medications  Echo with wall motion abnormalities EF 45% pulmonary hypertension    -f/u cardiology

## 2022-12-09 NOTE — NURSING
Received lying in bed awake and alert, resp even and unlabored with cough noted. Assessment per flowsheet. Denies any pain or discomfort at this time. Bed low, call light within reach. Will continue to monitor.

## 2022-12-09 NOTE — PT/OT/SLP PROGRESS
Speech Language Pathology Treatment    Patient Name:  John Wayne   MRN:  [id number]  Admitting Diagnosis: <principal problem not specified>    Recommendations:                 General Recommendations:  Dysphagia therapy, diet f/u  Diet recommendations:  Minced & Moist Diet - IDDSI Level 5, Liquid Diet Level: Thin liquids - IDDSI Level 0   Aspiration Precautions: Assistance with meals, Feed only when awake/alert, Frequent oral care, HOB to 90 degrees, Remain upright 30 minutes post meal, Small bites/sips, and Standard aspiration precautions   General Precautions: Standard, aspiration  Communication strategies:  provide increased time to answer and hearing loss    Subjective     Pt seen bedside for ST.  No c/o pain.  Awake/alert. Confusion noted but able to answer basic questions with increased intensity of speaker volume ([employer]).  No family present.  Patient goals: Unable to state    Pain/Comfort:  Pain Rating 1: 0/10  Pain Rating Post-Intervention 1: 0/10  Pain Rating 2: 0/10  Pain Rating Post-Intervention 2: 0/10    Respiratory Status: Nasal cannula, flow 5 L/min    Objective:     Has the patient been evaluated by SLP for swallowing?   Yes  Keep patient NPO? No   Current Respiratory Status: NC 5 L 02    Pt consumed thin liquids and pureed solids without incident.    Tx feeds of soft/chopped solids (viji crackers) with improving efficiency for diet advancement to IDDSI 5 (minced/moist solids)/IDDSI 0 (thin liquids).    Assessment:     John Wayne is a 77 y.o. male with an SLP diagnosis of Dysphagia s/p witnessed seizures and dx PNA.  He presents with improving mentation and efficiency with recommendations to advance diet to IDDSI 5 (minced/moist solids) and IDDSI 0 (thin liquids), following aspiration precautions and oral care protocol.  ST to f/u diet.    Goals:   Multidisciplinary Problems       SLP Goals          Problem: SLP    Goal Priority Disciplines Outcome   SLP Goal     SLP Ongoing, Progressing    Description: 1.  Pt will consume IDDSI 4 (pureed solids) and IDDSI 0 (thin liquids) without incident.  2.  Tx feeds of soft solids for IDDSI 5 advancement with improved efficiency.                       Plan:     Patient to be seen:  2 x/week, 3 x/week   Plan of Care expires:  12/15/22  Plan of Care reviewed with:  patient   SLP Follow-Up:  Yes       Discharge recommendations:  nursing facility, skilled   Barriers to Discharge:  None    Time Tracking:     SLP Treatment Date:   12/09/22  Speech Start Time:  1030  Speech Stop Time:  1100     Speech Total Time (min):  30 min    Billable Minutes: Treatment Swallowing Dysfunction 30 minutes     12/09/2022

## 2022-12-10 LAB
ALBUMIN SERPL BCP-MCNC: 2.4 G/DL (ref 3.5–5.2)
ALBUMIN SERPL BCP-MCNC: 2.4 G/DL (ref 3.5–5.2)
ALP SERPL-CCNC: 76 U/L (ref 55–135)
ALT SERPL W/O P-5'-P-CCNC: 17 U/L (ref 10–44)
ANION GAP SERPL CALC-SCNC: 10 MMOL/L (ref 8–16)
ANION GAP SERPL CALC-SCNC: 10 MMOL/L (ref 8–16)
ANISOCYTOSIS BLD QL SMEAR: SLIGHT
AST SERPL-CCNC: 24 U/L (ref 10–40)
BASOPHILS # BLD AUTO: ABNORMAL K/UL (ref 0–0.2)
BASOPHILS NFR BLD: 0 % (ref 0–1.9)
BILIRUB SERPL-MCNC: 0.4 MG/DL (ref 0.1–1)
BUN SERPL-MCNC: 4 MG/DL (ref 8–23)
BUN SERPL-MCNC: 4 MG/DL (ref 8–23)
CALCIUM SERPL-MCNC: 8.8 MG/DL (ref 8.7–10.5)
CALCIUM SERPL-MCNC: 8.8 MG/DL (ref 8.7–10.5)
CHLORIDE SERPL-SCNC: 103 MMOL/L (ref 95–110)
CHLORIDE SERPL-SCNC: 103 MMOL/L (ref 95–110)
CO2 SERPL-SCNC: 23 MMOL/L (ref 23–29)
CO2 SERPL-SCNC: 23 MMOL/L (ref 23–29)
CREAT SERPL-MCNC: 0.7 MG/DL (ref 0.5–1.4)
CREAT SERPL-MCNC: 0.7 MG/DL (ref 0.5–1.4)
DIFFERENTIAL METHOD: ABNORMAL
EOSINOPHIL # BLD AUTO: ABNORMAL K/UL (ref 0–0.5)
EOSINOPHIL NFR BLD: 3 % (ref 0–8)
ERYTHROCYTE [DISTWIDTH] IN BLOOD BY AUTOMATED COUNT: 12.8 % (ref 11.5–14.5)
EST. GFR  (NO RACE VARIABLE): >60 ML/MIN/1.73 M^2
EST. GFR  (NO RACE VARIABLE): >60 ML/MIN/1.73 M^2
GLUCOSE SERPL-MCNC: 92 MG/DL (ref 70–110)
GLUCOSE SERPL-MCNC: 92 MG/DL (ref 70–110)
HCT VFR BLD AUTO: 37 % (ref 40–54)
HGB BLD-MCNC: 12.5 G/DL (ref 14–18)
IMM GRANULOCYTES # BLD AUTO: ABNORMAL K/UL (ref 0–0.04)
IMM GRANULOCYTES NFR BLD AUTO: ABNORMAL % (ref 0–0.5)
LYMPHOCYTES # BLD AUTO: ABNORMAL K/UL (ref 1–4.8)
LYMPHOCYTES NFR BLD: 48 % (ref 18–48)
MAGNESIUM SERPL-MCNC: 1.6 MG/DL (ref 1.6–2.6)
MCH RBC QN AUTO: 28.4 PG (ref 27–31)
MCHC RBC AUTO-ENTMCNC: 33.8 G/DL (ref 32–36)
MCV RBC AUTO: 84 FL (ref 82–98)
MONOCYTES # BLD AUTO: ABNORMAL K/UL (ref 0.3–1)
MONOCYTES NFR BLD: 8 % (ref 4–15)
NEUTROPHILS NFR BLD: 41 % (ref 38–73)
NRBC BLD-RTO: 0 /100 WBC
OVALOCYTES BLD QL SMEAR: ABNORMAL
PHOSPHATE SERPL-MCNC: 2.2 MG/DL (ref 2.7–4.5)
PLATELET # BLD AUTO: 241 K/UL (ref 150–450)
PLATELET BLD QL SMEAR: ABNORMAL
PMV BLD AUTO: 9.5 FL (ref 9.2–12.9)
POLYCHROMASIA BLD QL SMEAR: ABNORMAL
POTASSIUM SERPL-SCNC: 3.1 MMOL/L (ref 3.5–5.1)
POTASSIUM SERPL-SCNC: 3.1 MMOL/L (ref 3.5–5.1)
PROCALCITONIN SERPL IA-MCNC: 0.04 NG/ML
PROT SERPL-MCNC: 6.1 G/DL (ref 6–8.4)
RBC # BLD AUTO: 4.4 M/UL (ref 4.6–6.2)
SODIUM SERPL-SCNC: 136 MMOL/L (ref 136–145)
SODIUM SERPL-SCNC: 136 MMOL/L (ref 136–145)
WBC # BLD AUTO: 15.54 K/UL (ref 3.9–12.7)

## 2022-12-10 PROCEDURE — 85007 BL SMEAR W/DIFF WBC COUNT: CPT | Performed by: HOSPITALIST

## 2022-12-10 PROCEDURE — 85027 COMPLETE CBC AUTOMATED: CPT | Performed by: HOSPITALIST

## 2022-12-10 PROCEDURE — 80053 COMPREHEN METABOLIC PANEL: CPT | Performed by: HOSPITALIST

## 2022-12-10 PROCEDURE — 94799 UNLISTED PULMONARY SVC/PX: CPT

## 2022-12-10 PROCEDURE — 36415 COLL VENOUS BLD VENIPUNCTURE: CPT | Performed by: INTERNAL MEDICINE

## 2022-12-10 PROCEDURE — 99900035 HC TECH TIME PER 15 MIN (STAT)

## 2022-12-10 PROCEDURE — 80069 RENAL FUNCTION PANEL: CPT | Performed by: INTERNAL MEDICINE

## 2022-12-10 PROCEDURE — 25000003 PHARM REV CODE 250: Performed by: INTERNAL MEDICINE

## 2022-12-10 PROCEDURE — 25000003 PHARM REV CODE 250: Performed by: HOSPITALIST

## 2022-12-10 PROCEDURE — 94668 MNPJ CHEST WALL SBSQ: CPT

## 2022-12-10 PROCEDURE — 63600175 PHARM REV CODE 636 W HCPCS: Performed by: INTERNAL MEDICINE

## 2022-12-10 PROCEDURE — 94761 N-INVAS EAR/PLS OXIMETRY MLT: CPT

## 2022-12-10 PROCEDURE — 92526 ORAL FUNCTION THERAPY: CPT

## 2022-12-10 PROCEDURE — 21400001 HC TELEMETRY ROOM

## 2022-12-10 PROCEDURE — 83735 ASSAY OF MAGNESIUM: CPT | Performed by: INTERNAL MEDICINE

## 2022-12-10 PROCEDURE — 27000221 HC OXYGEN, UP TO 24 HOURS

## 2022-12-10 PROCEDURE — 84145 PROCALCITONIN (PCT): CPT | Performed by: INTERNAL MEDICINE

## 2022-12-10 PROCEDURE — 63600175 PHARM REV CODE 636 W HCPCS: Performed by: HOSPITALIST

## 2022-12-10 RX ORDER — LEVOFLOXACIN 750 MG/1
750 TABLET ORAL DAILY
Status: COMPLETED | OUTPATIENT
Start: 2022-12-10 | End: 2022-12-12

## 2022-12-10 RX ORDER — POTASSIUM CHLORIDE 20 MEQ/1
40 TABLET, EXTENDED RELEASE ORAL 2 TIMES DAILY
Status: COMPLETED | OUTPATIENT
Start: 2022-12-10 | End: 2022-12-10

## 2022-12-10 RX ORDER — MAGNESIUM SULFATE HEPTAHYDRATE 40 MG/ML
2 INJECTION, SOLUTION INTRAVENOUS ONCE
Status: COMPLETED | OUTPATIENT
Start: 2022-12-10 | End: 2022-12-10

## 2022-12-10 RX ADMIN — HYPROMELLOSE 2910 2 DROP: 5 SOLUTION/ DROPS OPHTHALMIC at 02:12

## 2022-12-10 RX ADMIN — ENOXAPARIN SODIUM 120 MG: 150 INJECTION SUBCUTANEOUS at 09:12

## 2022-12-10 RX ADMIN — TAMSULOSIN HYDROCHLORIDE 0.4 MG: 0.4 CAPSULE ORAL at 09:12

## 2022-12-10 RX ADMIN — HYPROMELLOSE 2910 2 DROP: 5 SOLUTION/ DROPS OPHTHALMIC at 08:12

## 2022-12-10 RX ADMIN — TRIAMCINOLONE ACETONIDE: 0.25 CREAM TOPICAL at 08:12

## 2022-12-10 RX ADMIN — POTASSIUM CHLORIDE 40 MEQ: 1500 TABLET, EXTENDED RELEASE ORAL at 08:12

## 2022-12-10 RX ADMIN — MEROPENEM AND SODIUM CHLORIDE 500 MG: 500 INJECTION, SOLUTION INTRAVENOUS at 01:12

## 2022-12-10 RX ADMIN — MUPIROCIN: 20 OINTMENT TOPICAL at 09:12

## 2022-12-10 RX ADMIN — ENOXAPARIN SODIUM 120 MG: 150 INJECTION SUBCUTANEOUS at 08:12

## 2022-12-10 RX ADMIN — TRIAMCINOLONE ACETONIDE: 0.25 CREAM TOPICAL at 09:12

## 2022-12-10 RX ADMIN — PANTOPRAZOLE SODIUM 40 MG: 40 TABLET, DELAYED RELEASE ORAL at 04:12

## 2022-12-10 RX ADMIN — LISINOPRIL 5 MG: 5 TABLET ORAL at 04:12

## 2022-12-10 RX ADMIN — ATORVASTATIN CALCIUM 40 MG: 40 TABLET, FILM COATED ORAL at 08:12

## 2022-12-10 RX ADMIN — VENLAFAXINE HYDROCHLORIDE 75 MG: 75 CAPSULE, EXTENDED RELEASE ORAL at 08:12

## 2022-12-10 RX ADMIN — LEVETIRACETAM 1000 MG: 500 TABLET, FILM COATED ORAL at 09:12

## 2022-12-10 RX ADMIN — PHENOBARBITAL 64.8 MG: 32.4 TABLET ORAL at 09:12

## 2022-12-10 RX ADMIN — CLOPIDOGREL BISULFATE 75 MG: 75 TABLET ORAL at 04:12

## 2022-12-10 RX ADMIN — MUPIROCIN: 20 OINTMENT TOPICAL at 08:12

## 2022-12-10 RX ADMIN — LEVETIRACETAM 1000 MG: 500 TABLET, FILM COATED ORAL at 08:12

## 2022-12-10 RX ADMIN — FUROSEMIDE 40 MG: 40 TABLET ORAL at 04:12

## 2022-12-10 RX ADMIN — HYPROMELLOSE 2910 2 DROP: 5 SOLUTION/ DROPS OPHTHALMIC at 09:12

## 2022-12-10 RX ADMIN — FINASTERIDE 5 MG: 5 TABLET, FILM COATED ORAL at 08:12

## 2022-12-10 RX ADMIN — POTASSIUM CHLORIDE 40 MEQ: 1500 TABLET, EXTENDED RELEASE ORAL at 09:12

## 2022-12-10 RX ADMIN — VENLAFAXINE HYDROCHLORIDE 75 MG: 75 CAPSULE, EXTENDED RELEASE ORAL at 09:12

## 2022-12-10 RX ADMIN — MAGNESIUM SULFATE HEPTAHYDRATE 2 G: 40 INJECTION, SOLUTION INTRAVENOUS at 11:12

## 2022-12-10 RX ADMIN — LEVOFLOXACIN 750 MG: 750 TABLET, FILM COATED ORAL at 09:12

## 2022-12-10 RX ADMIN — AMIODARONE HYDROCHLORIDE 200 MG: 200 TABLET ORAL at 09:12

## 2022-12-10 RX ADMIN — METOPROLOL SUCCINATE 25 MG: 25 TABLET, EXTENDED RELEASE ORAL at 04:12

## 2022-12-10 NOTE — ASSESSMENT & PLAN NOTE
Patient with Permanent atrial fibrillation which is controlled currently with Beta Blocker and Amiodarone. Patient is currently in atrial fibrillation.[id number] Score: 2.. Anticoagulation indicated. Anticoagulation done with eliquis as outpatient  .  Patient is switched to Lovenox while in hospital due to interaction with phenobarbital.    3

## 2022-12-10 NOTE — SUBJECTIVE & OBJECTIVE
Interval History:  Patient seen at bedside.  He has no complaints.  Was able to eat breakfast and drank liquids.  Cough still productive patient getting CPT.  No noted seizure activity    Review of Systems   Respiratory:  Positive for cough (productive of greenish red sputum). Negative for shortness of breath.    Cardiovascular:  Negative for chest pain and palpitations.   Gastrointestinal:  Negative for abdominal pain.   Neurological:  Negative for seizures and syncope.   All other systems reviewed and are negative.  Objective:     Vital Signs (Most Recent):  Temp: 98.1 °F (36.7 °C) (12/10/22 1536)  Pulse: 69 (12/10/22 1615)  Resp: 17 (12/10/22 1536)  BP: (!) 141/65 (12/10/22 1615)  SpO2: (!) 92 % (12/10/22 1536)   Vital Signs (24h Range):  Temp:  [97 °F (36.1 °C)-99.7 °F (37.6 °C)] 98.1 °F (36.7 °C)  Pulse:  [69-78] 69  Resp:  [16-18] 17  SpO2:  [90 %-93 %] 92 %  BP: (125-166)/(58-77) 141/65     Weight: 118.1 kg (260 lb 5.8 oz)  Body mass index is 40.78 kg/m².    Intake/Output Summary (Last 24 hours) at 12/10/2022 1631  Last data filed at 12/10/2022 1557  Gross per 24 hour   Intake --   Output 2300 ml   Net -2300 ml      Physical Exam  Vitals reviewed.   Constitutional:       Appearance: Normal appearance.   HENT:      Head: Normocephalic and atraumatic.      Mouth/Throat:      Mouth: Mucous membranes are moist.      Pharynx: Oropharynx is clear.   Eyes:      Extraocular Movements: Extraocular movements intact.      Conjunctiva/sclera: Conjunctivae normal.   Cardiovascular:      Rate and Rhythm: Normal rate and regular rhythm.      Pulses: Normal pulses.      Heart sounds: Normal heart sounds.   Pulmonary:      Effort: Pulmonary effort is normal.      Breath sounds: Normal breath sounds.   Abdominal:      General: Bowel sounds are normal.      Palpations: Abdomen is soft.   Musculoskeletal:         General: Normal range of motion.      Cervical back: Normal range of motion and neck supple.   Skin:     General:  Skin is warm and dry.   Neurological:      General: No focal deficit present.      Mental Status: He is alert and oriented to person, place, and time. Mental status is at baseline.   Psychiatric:         Mood and Affect: Mood normal.         Behavior: Behavior normal.         Thought Content: Thought content normal.       Significant Labs: All pertinent labs within the past 24 hours have been reviewed.  CBC:   Recent Labs   Lab 12/09/22  0500 12/10/22  0537   WBC 16.41* 15.54*   HGB 11.7* 12.5*   HCT 35.8* 37.0*    241     CMP:   Recent Labs   Lab 12/09/22  0500 12/10/22  0537     139 136  136   K 3.4*  3.4* 3.1*  3.1*     106 103  103   CO2 23  23 23  23   GLU 87  87 92  92   BUN 5*  5* 4*  4*   CREATININE 0.7  0.7 0.7  0.7   CALCIUM 8.7  8.7 8.8  8.8   PROT 6.2 6.1   ALBUMIN 2.3*  2.3* 2.4*  2.4*   BILITOT 0.5 0.4   ALKPHOS 79 76   AST 23 24   ALT 17 17   ANIONGAP 10  10 10  10       Significant Imaging: I have reviewed all pertinent imaging results/findings within the past 24 hours.

## 2022-12-10 NOTE — ASSESSMENT & PLAN NOTE
Patient with known history of epilepsy presented to outside facility following seizure resulting in aspiration pneumonia and acute hypoxic respiratory failure requiring Vapotherm.  Patient continues to have breakthrough seizures requiring p.r.n. Ativan.  Currently on Keppra and phenobarbital with initial levels measuring carbamazepine <0.4, phenobarbital 22.1, phenytoin 17.  Antibiotics changed to Levaquin p.o.  no further seizures  Plan:  -seizure precautions  -continue antiepileptics  -continue treatment of pneumonia  -f/u neurology consult

## 2022-12-10 NOTE — ASSESSMENT & PLAN NOTE
Patient is identified as having unknown heart failure that is Chronic. CHF is currently controlled. Latest ECHO performed and demonstrates- No results found for this or any previous visit.  . Continue Beta Blocker, ACE/ARB and Furosemide and monitor clinical status closely. Monitor on telemetry. Patient is off CHF pathway.  Monitor strict Is&Os and daily weights.  Place on fluid restriction of 1.5 L. Continue to stress to patient importance of self efficacy and  on diet for CHF. Last BNP reviewed- and noted below   Recent Labs   Lab 12/05/22  0865   BNP 28.9   .

## 2022-12-10 NOTE — PT/OT/SLP PROGRESS
Speech Language Pathology Treatment    Patient Name:  John Wayne   MRN:  [id number]  Admitting Diagnosis: <principal problem not specified>    Recommendations:                 General Recommendations:  Dysphagia therapy  Diet recommendations:  Minced & Moist Diet - IDDSI Level 5, Liquid Diet Level: Thin liquids - IDDSI Level 0   Aspiration Precautions: Standard aspiration precautions   General Precautions: Standard, aspiration  Communication strategies:  provide increased time to answer and Solomon    Subjective     Patient alert, cooperative and seen at bedside.   Patient goals: n/a     Pain/Comfort:  Pain Rating 1: 0/10  Pain Rating Post-Intervention 1: 0/10    Respiratory Status:  nasal cannula    Objective:     Has the patient been evaluated by SLP for swallowing?   Yes  Keep patient NPO? No   Current Respiratory Status:    nasal cannula    Observed po intake of thin liquids via straw and soft solids. He tolerated without any overt s/s of aspiration. He is Solomon and requires frequent repetitions for simple directives. Recommend continue Minced & Moist Diet - IDDSI Level 5, Liquid Diet Level: Thin liquids - IDDSI Level 0     Assessment:     John Wayne is a 77 y.o. male with an SLP diagnosis of Dysphagia.  Diet recommendations Minced & Moist Diet - IDDSI Level 5, Liquid Diet Level: Thin liquids - IDDSI Level 0 . ST to continue POC.    Goals:   Multidisciplinary Problems       SLP Goals          Problem: SLP    Goal Priority Disciplines Outcome   SLP Goal     SLP Ongoing, Progressing   Description: 1.  Pt will consume IDDSI 4 (pureed solids) and IDDSI 0 (thin liquids) without incident.  2.  Tx feeds of soft solids for IDDSI 5 advancement with improved efficiency.                       Plan:     Patient to be seen:  2 x/week   Plan of Care expires:  12/15/22  Plan of Care reviewed with:  patient   SLP Follow-Up:  Yes       Discharge recommendations:  nursing facility, skilled   Time Tracking:     SLP Treatment Date:    12/10/22  Speech Start Time:  1424  Speech Stop Time:  1443     Speech Total Time (min):  19 min    Billable Minutes: Treatment Swallowing Dysfunction 11    12/10/2022

## 2022-12-10 NOTE — PROGRESS NOTES
Pharmacokinetic Assessment Follow Up: IV Vancomycin    Vancomycin serum concentration assessment(s):    The trough level was drawn correctly and can be used to guide therapy at this time. The measurement is only slightly below the desired definitive target range of 15 to 20 mcg/mL. Patient is expected to continue to accumulate.     Vancomycin Regimen Plan:    Continue regimen to Vancomycin 1500 mg IV every 12 hours with next serum trough concentration measured at 0915 prior to 4th dose on 12/11    Drug levels (last 3 results):  Recent Labs   Lab Result Units 12/07/22  1726 12/09/22  2112   Vancomycin-Trough ug/mL  --  14.9   Vancomycin Trough ug/ml 13.1*  --        Pharmacy will continue to follow and monitor vancomycin.    Please contact pharmacy at extension [phone] for questions regarding this assessment.    Thank you for the consult,   Darlene Baca       Patient brief summary:  John Wayne is a 77 y.o. male initiated on antimicrobial therapy with IV Vancomycin for treatment of lower respiratory infection    The patient's current regimen is vancomycin 1500 mg IV every 12 hours.     Drug Allergies:   Review of patient's allergies indicates:   Allergen Reactions    Ancef in dextrose (iso-osm)     Codeine     Penicillins        Actual Body Weight:   117.8 kg    Renal Function:   Estimated Creatinine Clearance: 108.5 mL/min (based on SCr of 0.7 mg/dL).,     Dialysis Method (if applicable):  N/A    CBC (last 72 hours):  Recent Labs   Lab Result Units 12/07/22  0546 12/08/22  0454 12/09/22  0500   WBC K/uL 20.7* 16.60* 16.41*   Hemoglobin g/dL  --  11.6* 11.7*   Hgb gm/dL 12.0*  --   --    Hematocrit %  --  35.2* 35.8*   Hct % 37.2*  --   --    Platelets K/uL  --  162 211   Platelet x10(3)/mcL 148  --   --    Gran % %  --  55.0 47.0   Lymph % %  --  40.0 43.0   Mono % %  --  5.0 9.0   Monocyte Man % 5  --   --    Eosinophil % %  --  0.0 0.0   Basophil % %  --  0.0 0.0   Differential Method   --  Manual Manual        Metabolic Panel (last 72 hours):  Recent Labs   Lab Result Units 12/07/22  0546 12/08/22  0454 12/09/22  0500   Sodium mmol/L  --  139 139  139   Sodium Level mmol/L 137  --   --    Potassium mmol/L  --  3.4* 3.4*  3.4*   Potassium Level mmol/L 3.3*  --   --    Chloride mmol/L 104 106 106  106   CO2 mmol/L  --  22* 23  23   Carbon Dioxide mmol/L 27  --   --    Glucose mg/dL  --  80 87  87   Glucose Level mg/dL 114  --   --    BUN mg/dL  --  8 5*  5*   Blood Urea Nitrogen mg/dL 10.0  --   --    Creatinine mg/dL 0.72* 0.7 0.7  0.7   Albumin g/dL  --  2.3* 2.3*  2.3*   Albumin Level gm/dL 2.1*  --   --    Total Bilirubin mg/dL  --  0.5 0.5   Bilirubin Total mg/dL 0.5  --   --    Alkaline Phosphatase U/L 62 74 79   AST U/L  --  23 23   Aspartate Aminotransferase unit/L 23  --   --    ALT U/L  --  13 17   Alanine Aminotransferase unit/L 6  --   --    Magnesium Level mg/dL 1.80  --   --    Phosphorus mg/dL  --   --  1.7*       Vancomycin Administrations:  vancomycin given in the last 96 hours                     vancomycin 1.5 g in dextrose 5 % 250 mL IVPB (ready to mix) (mg) 1,500 mg New Bag 12/09/22 1016     1,500 mg New Bag 12/08/22 1924    vancomycin 1.5 g in 5 % dextrose 250 mL IVPB (mg) 1,500 mg New Bag 12/08/22 0546    vancomycin (VANCOCIN) 1,250 mg in dextrose 5 % 250 mL IVPB (mg) 1,250 mg New Bag 12/07/22 1805     1,250 mg New Bag  0625     1,250 mg New Bag 12/06/22 1807    vancomycin (VANCOCIN) 1,000 mg in dextrose 5 % 250 mL IVPB (mg) 1,000 mg New Bag 12/06/22 0039                    Microbiologic Results:  Microbiology Results (last 7 days)       ** No results found for the last 168 hours. **

## 2022-12-10 NOTE — ASSESSMENT & PLAN NOTE
Body mass index is 40.78 kg/m². Elevation likely secondary to increased calorie intake and sedentary lifestyle. Patient educated on morbidity and mortality in regards to elevated BMI and stressed importance of diet and exercise.   Plan:  -low fat/low calorie diet

## 2022-12-10 NOTE — PROGRESS NOTES
Therapy with Vancomycin complete and/or consult discontinued by provider.  Pharmacy will sign off, please re-consult as needed.    Thank you for allowing us to participate in this patient's care.     Page PETRA Conn, MiguelD
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
## 2022-12-10 NOTE — ASSESSMENT & PLAN NOTE
Nutrition consulted. Most recent weight and BMI monitored-                         Measurements:  Wt Readings from Last 1 Encounters:   12/10/22 118.1 kg (260 lb 5.8 oz)   Body mass index is 40.78 kg/m².    Recommendations:      Patient has been screened and assessed by RD. RD will follow patient.

## 2022-12-10 NOTE — ASSESSMENT & PLAN NOTE
Patient presented to outside facility following seizures in concerns for aspiration pneumonia and was treated with Vapotherm with improvement in O2 saturation and weaned to 4 L via nasal cannula.  Chest x-ray obtained showed bilateral lobe infiltrates concerning for bilateral aspiration pneumonia and was being treated with vancomycin, Levaquin, and clindamycin given antibiotic allergies.  Patient remains afebrile with improvement of leukocytosis from 20.7 now to 16.6--> 15.54  Plan:  -continue antibiotics  -titrate oxygen therapy as needed  -incentive spirometry and CPT  -Buster p.r.n.  -aspiration precautions

## 2022-12-11 LAB
ALBUMIN SERPL BCP-MCNC: 2.4 G/DL (ref 3.5–5.2)
ANION GAP SERPL CALC-SCNC: 10 MMOL/L (ref 8–16)
BACTERIA BLD CULT: NORMAL
BACTERIA BLD CULT: NORMAL
BUN SERPL-MCNC: 7 MG/DL (ref 8–23)
CALCIUM SERPL-MCNC: 9.5 MG/DL (ref 8.7–10.5)
CHLORIDE SERPL-SCNC: 105 MMOL/L (ref 95–110)
CO2 SERPL-SCNC: 23 MMOL/L (ref 23–29)
CREAT SERPL-MCNC: 0.7 MG/DL (ref 0.5–1.4)
EST. GFR  (NO RACE VARIABLE): >60 ML/MIN/1.73 M^2
GLUCOSE SERPL-MCNC: 84 MG/DL (ref 70–110)
PHENOBARB SERPL-MCNC: 18.9 UG/ML (ref 15–40)
PHOSPHATE SERPL-MCNC: 2.2 MG/DL (ref 2.7–4.5)
POTASSIUM SERPL-SCNC: 3.9 MMOL/L (ref 3.5–5.1)
SODIUM SERPL-SCNC: 138 MMOL/L (ref 136–145)

## 2022-12-11 PROCEDURE — 80069 RENAL FUNCTION PANEL: CPT | Performed by: INTERNAL MEDICINE

## 2022-12-11 PROCEDURE — 80184 ASSAY OF PHENOBARBITAL: CPT | Performed by: INTERNAL MEDICINE

## 2022-12-11 PROCEDURE — 94761 N-INVAS EAR/PLS OXIMETRY MLT: CPT

## 2022-12-11 PROCEDURE — 99900035 HC TECH TIME PER 15 MIN (STAT)

## 2022-12-11 PROCEDURE — 63600175 PHARM REV CODE 636 W HCPCS: Performed by: INTERNAL MEDICINE

## 2022-12-11 PROCEDURE — 36415 COLL VENOUS BLD VENIPUNCTURE: CPT | Performed by: INTERNAL MEDICINE

## 2022-12-11 PROCEDURE — 25000003 PHARM REV CODE 250: Performed by: INTERNAL MEDICINE

## 2022-12-11 PROCEDURE — 21400001 HC TELEMETRY ROOM

## 2022-12-11 PROCEDURE — 25000003 PHARM REV CODE 250: Performed by: HOSPITALIST

## 2022-12-11 PROCEDURE — 27000221 HC OXYGEN, UP TO 24 HOURS

## 2022-12-11 RX ORDER — PHENOBARBITAL 32.4 MG/1
64.8 TABLET ORAL DAILY
Qty: 1 TABLET | Refills: 0
Start: 2022-12-11 | End: 2022-12-12 | Stop reason: SDUPTHER

## 2022-12-11 RX ADMIN — FINASTERIDE 5 MG: 5 TABLET, FILM COATED ORAL at 08:12

## 2022-12-11 RX ADMIN — VENLAFAXINE HYDROCHLORIDE 75 MG: 75 CAPSULE, EXTENDED RELEASE ORAL at 08:12

## 2022-12-11 RX ADMIN — ENOXAPARIN SODIUM 120 MG: 150 INJECTION SUBCUTANEOUS at 08:12

## 2022-12-11 RX ADMIN — METOPROLOL SUCCINATE 25 MG: 25 TABLET, EXTENDED RELEASE ORAL at 04:12

## 2022-12-11 RX ADMIN — HYPROMELLOSE 2910 2 DROP: 5 SOLUTION/ DROPS OPHTHALMIC at 08:12

## 2022-12-11 RX ADMIN — LEVETIRACETAM 1000 MG: 500 TABLET, FILM COATED ORAL at 08:12

## 2022-12-11 RX ADMIN — PANTOPRAZOLE SODIUM 40 MG: 40 TABLET, DELAYED RELEASE ORAL at 04:12

## 2022-12-11 RX ADMIN — APIXABAN 5 MG: 2.5 TABLET, FILM COATED ORAL at 08:12

## 2022-12-11 RX ADMIN — MUPIROCIN: 20 OINTMENT TOPICAL at 08:12

## 2022-12-11 RX ADMIN — ATORVASTATIN CALCIUM 40 MG: 40 TABLET, FILM COATED ORAL at 08:12

## 2022-12-11 RX ADMIN — TRIAMCINOLONE ACETONIDE: 0.25 CREAM TOPICAL at 08:12

## 2022-12-11 RX ADMIN — AMIODARONE HYDROCHLORIDE 200 MG: 200 TABLET ORAL at 08:12

## 2022-12-11 RX ADMIN — HYPROMELLOSE 2910 2 DROP: 5 SOLUTION/ DROPS OPHTHALMIC at 03:12

## 2022-12-11 RX ADMIN — PHENOBARBITAL 64.8 MG: 32.4 TABLET ORAL at 09:12

## 2022-12-11 RX ADMIN — FUROSEMIDE 40 MG: 40 TABLET ORAL at 04:12

## 2022-12-11 RX ADMIN — LISINOPRIL 5 MG: 5 TABLET ORAL at 04:12

## 2022-12-11 RX ADMIN — TAMSULOSIN HYDROCHLORIDE 0.4 MG: 0.4 CAPSULE ORAL at 08:12

## 2022-12-11 RX ADMIN — CLOPIDOGREL BISULFATE 75 MG: 75 TABLET ORAL at 04:12

## 2022-12-11 RX ADMIN — LEVOFLOXACIN 750 MG: 750 TABLET, FILM COATED ORAL at 08:12

## 2022-12-11 NOTE — ASSESSMENT & PLAN NOTE
Patient presented to outside facility following seizures in concerns for aspiration pneumonia and was treated with Vapotherm with improvement in O2 saturation and weaned to 4 L via nasal cannula.  Chest x-ray obtained showed bilateral lobe infiltrates concerning for bilateral aspiration pneumonia and was being treated with vancomycin, Levaquin, and clindamycin given antibiotic allergies.  Patient remains afebrile with improvement of leukocytosis from 20.7 now to 16.6--> 15.54  Plan:  -continue antibiotics to complete 5 total days of treatment  -wean O2 as able  -incentive spirometry and CPT  -DuoNebs p.r.n.  -aspiration precautions

## 2022-12-11 NOTE — ASSESSMENT & PLAN NOTE
Patient with Permanent atrial fibrillation which is controlled currently with Beta Blocker and Amiodarone. Patient is currently in atrial fibrillation.[id number] Score: 2.. Anticoagulation indicated. Anticoagulation done with eliquis as outpatient.  Due to potential interaction with Eliquis and phenobarbital neurology recommended attempting to wean for phenobarbital as outpatient   I will START or STAY ON the medications listed below when I get home from the hospital:  None

## 2022-12-11 NOTE — NURSING
Received lying in bed awake and alert, resp even and unlabored, assessment per flowsheet. Denies any pain or discomfort at this time. Bed low, call light within reach, bed alarm activated. Will continue to monitor.
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
## 2022-12-11 NOTE — ASSESSMENT & PLAN NOTE
Body mass index is 40.19 kg/m². Elevation likely secondary to increased calorie intake and sedentary lifestyle. Patient educated on morbidity and mortality in regards to elevated BMI and stressed importance of diet and exercise.   Plan:  -low fat/low calorie diet

## 2022-12-11 NOTE — ASSESSMENT & PLAN NOTE
Nutrition consulted. Most recent weight and BMI monitored-                         Measurements:  Wt Readings from Last 1 Encounters:   12/10/22 116.4 kg (256 lb 9.9 oz)   Body mass index is 40.19 kg/m².    Recommendations:      Patient has been screened and assessed by RD. RD will follow patient.
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
## 2022-12-11 NOTE — ASSESSMENT & PLAN NOTE
Patient is identified as having biventricular heart failure that is Chronic. CHF is currently controlled. Latest ECHO performed and demonstrates- No results found for this or any previous visit.  . Continue Beta Blocker, ACE/ARB and Furosemide and monitor clinical status closely. Monitor on telemetry. Patient is off CHF pathway.  Monitor strict Is&Os and daily weights.  Place on fluid restriction of 1.5 L. Continue to stress to patient importance of self efficacy and  on diet for CHF. Last BNP reviewed- and noted below   Recent Labs   Lab 12/05/22  1279   BNP 28.9   .
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
## 2022-12-11 NOTE — ASSESSMENT & PLAN NOTE
Patient with known history of epilepsy presented to outside facility following seizure resulting in aspiration pneumonia and acute hypoxic respiratory failure requiring Vapotherm.  Patient continued to have breakthrough seizures requiring p.r.n. Ativan.  Currently on Keppra and phenobarbital with initial levels measuring carbamazepine <0.4, phenobarbital 22.1, phenytoin 17.    Antibiotics changed to Levaquin p.o. will complete 5 days in am    no further seizures  Plan:  -seizure precautions  -continue antiepileptics  -f/u neurology consult    recs for weaning phenobarb as outpatient

## 2022-12-11 NOTE — ASSESSMENT & PLAN NOTE
Patient is chronically on statin.will continue for now. Last Lipid Panel:   Lab Results   Component Value Date    CHOL 123 11/11/2021    HDL 41 11/11/2021    TRIG 85 11/11/2021   Plan:  -Continue home medication of atorvastatin  -low fat/low calorie diet

## 2022-12-11 NOTE — DISCHARGE INSTRUCTIONS
Recommend follow up with neurology as outpatient adjustment of seizure meds and weaning of phenobarb  Recommend follow up with cardiology as outpatient for afib, nstemi

## 2022-12-11 NOTE — ASSESSMENT & PLAN NOTE
Patient found to have evidence of NSTEMI at outside facility with troponin which and peak of 10.143 in his since downtrended with most recent level measuring 1.457.  EKG was noted to have negative evidence of acute ischemia and likely secondary to demand ischemia from seizures.  Patient does report for chest pain upon deep inspiration but denied chest pain throughout bedside assessment.  Cardiology consulted and awaiting further evaluation/recommendations.  Patient currently on eliquis  and Plavix.  Plan:  -telemetry  -continue home medications Lasix, beta blocker, ACE-inhibitor, Eliquis, Plavix  Echo with wall motion abnormalities EF 45% pulmonary hypertension    -f/u cardiology as outpatient

## 2022-12-12 ENCOUNTER — HOSPITAL ENCOUNTER (EMERGENCY)
Facility: HOSPITAL | Age: 77
Discharge: HOME | End: 2022-12-13
Attending: INTERNAL MEDICINE
Payer: MEDICARE

## 2022-12-12 VITALS
TEMPERATURE: 98 F | WEIGHT: 254.44 LBS | BODY MASS INDEX: 39.93 KG/M2 | HEIGHT: 67 IN | OXYGEN SATURATION: 93 % | DIASTOLIC BLOOD PRESSURE: 73 MMHG | HEART RATE: 66 BPM | RESPIRATION RATE: 20 BRPM | SYSTOLIC BLOOD PRESSURE: 149 MMHG

## 2022-12-12 DIAGNOSIS — G40.909 SEIZURE DISORDER: Primary | ICD-10-CM

## 2022-12-12 DIAGNOSIS — E87.20 LACTIC ACID ACIDOSIS: ICD-10-CM

## 2022-12-12 DIAGNOSIS — B37.49 YEAST UTI: ICD-10-CM

## 2022-12-12 DIAGNOSIS — E87.6 HYPOKALEMIA: ICD-10-CM

## 2022-12-12 DIAGNOSIS — G40.919 BREAKTHROUGH SEIZURE: ICD-10-CM

## 2022-12-12 LAB
ALBUMIN SERPL-MCNC: 2.7 GM/DL (ref 3.4–4.8)
ALBUMIN/GLOB SERPL: 0.6 RATIO (ref 1.1–2)
ALP SERPL-CCNC: 90 UNIT/L (ref 40–150)
ALT SERPL-CCNC: 38 UNIT/L (ref 0–55)
ANISOCYTOSIS BLD QL SMEAR: SLIGHT
APPEARANCE UR: CLEAR
AST SERPL-CCNC: 44 UNIT/L (ref 5–34)
BACTERIA #/AREA URNS AUTO: ABNORMAL /HPF
BASOPHILS # BLD AUTO: 0.07 X10(3)/MCL (ref 0–0.2)
BASOPHILS NFR BLD AUTO: 0.3 %
BASOPHILS NFR BLD: 0 % (ref 0–1.9)
BILIRUB UR QL STRIP.AUTO: NEGATIVE MG/DL
BILIRUBIN DIRECT+TOT PNL SERPL-MCNC: 0.3 MG/DL
BUN SERPL-MCNC: 8 MG/DL (ref 8.4–25.7)
CALCIUM SERPL-MCNC: 9.8 MG/DL (ref 8.8–10)
CHLORIDE SERPL-SCNC: 102 MMOL/L (ref 98–107)
CK SERPL-CCNC: 33 U/L (ref 30–200)
CO2 SERPL-SCNC: 21 MMOL/L (ref 23–31)
COLOR UR AUTO: YELLOW
CREAT SERPL-MCNC: 0.85 MG/DL (ref 0.73–1.18)
DIFFERENTIAL METHOD: ABNORMAL
EOSINOPHIL # BLD AUTO: 0.14 X10(3)/MCL (ref 0–0.9)
EOSINOPHIL NFR BLD AUTO: 0.7 %
EOSINOPHIL NFR BLD: 0 % (ref 0–8)
ERYTHROCYTE [DISTWIDTH] IN BLOOD BY AUTOMATED COUNT: 13.2 % (ref 11.5–14.5)
ERYTHROCYTE [DISTWIDTH] IN BLOOD BY AUTOMATED COUNT: 13.2 % (ref 11.5–17)
GFR SERPLBLD CREATININE-BSD FMLA CKD-EPI: >60 MLS/MIN/1.73/M2
GLOBULIN SER-MCNC: 4.3 GM/DL (ref 2.4–3.5)
GLUCOSE SERPL-MCNC: 129 MG/DL (ref 82–115)
GLUCOSE UR QL STRIP.AUTO: NEGATIVE MG/DL
HCT VFR BLD AUTO: 39 % (ref 40–54)
HCT VFR BLD AUTO: 42.2 % (ref 42–52)
HGB BLD-MCNC: 13.2 G/DL (ref 14–18)
HGB BLD-MCNC: 13.8 GM/DL (ref 14–18)
IMM GRANULOCYTES # BLD AUTO: 0.34 X10(3)/MCL (ref 0–0.04)
IMM GRANULOCYTES # BLD AUTO: ABNORMAL K/UL (ref 0–0.04)
IMM GRANULOCYTES NFR BLD AUTO: 1.6 %
IMM GRANULOCYTES NFR BLD AUTO: ABNORMAL % (ref 0–0.5)
KETONES UR QL STRIP.AUTO: NEGATIVE MG/DL
LACTATE SERPL-SCNC: 3.5 MMOL/L (ref 0.5–2.2)
LEUKOCYTE ESTERASE UR QL STRIP.AUTO: NEGATIVE UNIT/L
LYMPHOCYTES # BLD AUTO: 12.5 X10(3)/MCL (ref 0.6–4.6)
LYMPHOCYTES NFR BLD AUTO: 59.3 %
LYMPHOCYTES NFR BLD: 61 % (ref 18–48)
MCH RBC QN AUTO: 28.5 PG (ref 27–31)
MCH RBC QN AUTO: 29.1 PG (ref 27–31)
MCHC RBC AUTO-ENTMCNC: 32.7 MG/DL (ref 33–36)
MCHC RBC AUTO-ENTMCNC: 33.8 G/DL (ref 32–36)
MCV RBC AUTO: 86 FL (ref 82–98)
MCV RBC AUTO: 87 FL (ref 80–94)
MONOCYTES # BLD AUTO: 1.8 X10(3)/MCL (ref 0.1–1.3)
MONOCYTES NFR BLD AUTO: 8.5 %
MONOCYTES NFR BLD: 11 % (ref 4–15)
NEUTROPHILS # BLD AUTO: 6.2 X10(3)/MCL (ref 2.1–9.2)
NEUTROPHILS NFR BLD AUTO: 29.6 %
NEUTROPHILS NFR BLD: 28 % (ref 38–73)
NITRITE UR QL STRIP.AUTO: NEGATIVE
NRBC BLD-RTO: 1 /100 WBC
PH UR STRIP.AUTO: 5 [PH]
PLATELET # BLD AUTO: 289 K/UL (ref 150–450)
PLATELET # BLD AUTO: 364 X10(3)/MCL (ref 130–400)
PLATELET BLD QL SMEAR: ABNORMAL
PMV BLD AUTO: 10.3 FL (ref 9.2–12.9)
PMV BLD AUTO: 9.6 FL (ref 7.4–10.4)
POIKILOCYTOSIS BLD QL SMEAR: SLIGHT
POLYCHROMASIA BLD QL SMEAR: ABNORMAL
POTASSIUM SERPL-SCNC: 3.2 MMOL/L (ref 3.5–5.1)
PROT SERPL-MCNC: 7 GM/DL (ref 5.8–7.6)
PROT UR QL STRIP.AUTO: 30 MG/DL
RBC # BLD AUTO: 4.53 M/UL (ref 4.6–6.2)
RBC # BLD AUTO: 4.85 X10(6)/MCL (ref 4.7–6.1)
RBC #/AREA URNS AUTO: ABNORMAL /HPF
RBC UR QL AUTO: ABNORMAL UNIT/L
SODIUM SERPL-SCNC: 135 MMOL/L (ref 136–145)
SP GR UR STRIP.AUTO: >=1.03
SQUAMOUS #/AREA URNS AUTO: ABNORMAL /HPF
UROBILINOGEN UR STRIP-ACNC: 0.2 MG/DL
WBC # BLD AUTO: 15.77 K/UL (ref 3.9–12.7)
WBC # SPEC AUTO: 21.1 X10(3)/MCL (ref 4.5–11.5)
WBC #/AREA URNS AUTO: ABNORMAL /HPF
YEAST URNS QL MICRO: ABNORMAL /HPF

## 2022-12-12 PROCEDURE — 85007 BL SMEAR W/DIFF WBC COUNT: CPT | Performed by: INTERNAL MEDICINE

## 2022-12-12 PROCEDURE — G0009 ADMIN PNEUMOCOCCAL VACCINE: HCPCS | Performed by: INTERNAL MEDICINE

## 2022-12-12 PROCEDURE — 96361 HYDRATE IV INFUSION ADD-ON: CPT

## 2022-12-12 PROCEDURE — 81001 URINALYSIS AUTO W/SCOPE: CPT | Performed by: INTERNAL MEDICINE

## 2022-12-12 PROCEDURE — 63600175 PHARM REV CODE 636 W HCPCS: Performed by: INTERNAL MEDICINE

## 2022-12-12 PROCEDURE — 94668 MNPJ CHEST WALL SBSQ: CPT

## 2022-12-12 PROCEDURE — 27000646 HC AEROBIKA DEVICE

## 2022-12-12 PROCEDURE — 25000003 PHARM REV CODE 250: Performed by: INTERNAL MEDICINE

## 2022-12-12 PROCEDURE — 94664 DEMO&/EVAL PT USE INHALER: CPT

## 2022-12-12 PROCEDURE — 36415 COLL VENOUS BLD VENIPUNCTURE: CPT | Performed by: INTERNAL MEDICINE

## 2022-12-12 PROCEDURE — 85027 COMPLETE CBC AUTOMATED: CPT | Performed by: INTERNAL MEDICINE

## 2022-12-12 PROCEDURE — 25000003 PHARM REV CODE 250: Performed by: HOSPITALIST

## 2022-12-12 PROCEDURE — 85025 COMPLETE CBC W/AUTO DIFF WBC: CPT | Performed by: INTERNAL MEDICINE

## 2022-12-12 PROCEDURE — 90471 IMMUNIZATION ADMIN: CPT | Performed by: INTERNAL MEDICINE

## 2022-12-12 PROCEDURE — 99900035 HC TECH TIME PER 15 MIN (STAT)

## 2022-12-12 PROCEDURE — 82550 ASSAY OF CK (CPK): CPT | Performed by: INTERNAL MEDICINE

## 2022-12-12 PROCEDURE — 81003 URINALYSIS AUTO W/O SCOPE: CPT | Performed by: INTERNAL MEDICINE

## 2022-12-12 PROCEDURE — 96374 THER/PROPH/DIAG INJ IV PUSH: CPT

## 2022-12-12 PROCEDURE — 83605 ASSAY OF LACTIC ACID: CPT | Performed by: INTERNAL MEDICINE

## 2022-12-12 PROCEDURE — 80053 COMPREHEN METABOLIC PANEL: CPT | Performed by: INTERNAL MEDICINE

## 2022-12-12 PROCEDURE — 90677 PCV20 VACCINE IM: CPT | Performed by: INTERNAL MEDICINE

## 2022-12-12 PROCEDURE — 94761 N-INVAS EAR/PLS OXIMETRY MLT: CPT

## 2022-12-12 PROCEDURE — 94618 PULMONARY STRESS TESTING: CPT

## 2022-12-12 PROCEDURE — 99284 EMERGENCY DEPT VISIT MOD MDM: CPT | Mod: 25

## 2022-12-12 PROCEDURE — 27000221 HC OXYGEN, UP TO 24 HOURS

## 2022-12-12 PROCEDURE — 96375 TX/PRO/DX INJ NEW DRUG ADDON: CPT

## 2022-12-12 RX ORDER — FUROSEMIDE 40 MG/1
TABLET ORAL
Qty: 60 TABLET | Refills: 1 | Status: SHIPPED | OUTPATIENT
Start: 2022-12-12

## 2022-12-12 RX ORDER — PHENOBARBITAL 15 MG/1
TABLET ORAL
Qty: 1 TABLET | Refills: 0 | Status: SHIPPED | OUTPATIENT
Start: 2022-12-12 | End: 2023-01-30

## 2022-12-12 RX ORDER — LEVOFLOXACIN 750 MG/1
750 TABLET ORAL DAILY
Qty: 7 TABLET | Refills: 0 | Status: SHIPPED | OUTPATIENT
Start: 2022-12-12 | End: 2022-12-19

## 2022-12-12 RX ORDER — LEVETIRACETAM 500 MG/5ML
1000 INJECTION, SOLUTION, CONCENTRATE INTRAVENOUS
Status: COMPLETED | OUTPATIENT
Start: 2022-12-12 | End: 2022-12-12

## 2022-12-12 RX ADMIN — AMIODARONE HYDROCHLORIDE 200 MG: 200 TABLET ORAL at 08:12

## 2022-12-12 RX ADMIN — HYPROMELLOSE 2910 2 DROP: 5 SOLUTION/ DROPS OPHTHALMIC at 03:12

## 2022-12-12 RX ADMIN — APIXABAN 5 MG: 2.5 TABLET, FILM COATED ORAL at 08:12

## 2022-12-12 RX ADMIN — MUPIROCIN: 20 OINTMENT TOPICAL at 08:12

## 2022-12-12 RX ADMIN — LEVETIRACETAM 1000 MG: 500 TABLET, FILM COATED ORAL at 08:12

## 2022-12-12 RX ADMIN — TAMSULOSIN HYDROCHLORIDE 0.4 MG: 0.4 CAPSULE ORAL at 08:12

## 2022-12-12 RX ADMIN — LEVOFLOXACIN 750 MG: 750 TABLET, FILM COATED ORAL at 08:12

## 2022-12-12 RX ADMIN — PHENOBARBITAL 64.8 MG: 32.4 TABLET ORAL at 09:12

## 2022-12-12 RX ADMIN — HYPROMELLOSE 2910 2 DROP: 5 SOLUTION/ DROPS OPHTHALMIC at 08:12

## 2022-12-12 RX ADMIN — FUROSEMIDE 40 MG: 40 TABLET ORAL at 05:12

## 2022-12-12 RX ADMIN — VENLAFAXINE HYDROCHLORIDE 75 MG: 75 CAPSULE, EXTENDED RELEASE ORAL at 08:12

## 2022-12-12 RX ADMIN — METOPROLOL SUCCINATE 25 MG: 25 TABLET, EXTENDED RELEASE ORAL at 05:12

## 2022-12-12 RX ADMIN — PNEUMOCOCCAL 20-VALENT CONJUGATE VACCINE 0.5 ML
2.2; 2.2; 2.2; 2.2; 2.2; 2.2; 2.2; 2.2; 2.2; 2.2; 2.2; 2.2; 2.2; 2.2; 2.2; 2.2; 4.4; 2.2; 2.2; 2.2 INJECTION, SUSPENSION INTRAMUSCULAR at 12:12

## 2022-12-12 RX ADMIN — TRIAMCINOLONE ACETONIDE: 0.25 CREAM TOPICAL at 08:12

## 2022-12-12 RX ADMIN — LISINOPRIL 5 MG: 5 TABLET ORAL at 05:12

## 2022-12-12 RX ADMIN — SODIUM CHLORIDE 3183 ML: 9 INJECTION, SOLUTION INTRAVENOUS at 10:12

## 2022-12-12 RX ADMIN — PANTOPRAZOLE SODIUM 40 MG: 40 TABLET, DELAYED RELEASE ORAL at 05:12

## 2022-12-12 RX ADMIN — LEVETIRACETAM 1000 MG: 100 INJECTION, SOLUTION INTRAVENOUS at 09:12

## 2022-12-12 RX ADMIN — CLOPIDOGREL BISULFATE 75 MG: 75 TABLET ORAL at 05:12

## 2022-12-12 NOTE — PLAN OF CARE
Patient is stable to return to [facility].  Left message for Sugar, admissions coordinator at [facility].  Referral and discharge paperwork sent via [employer].    1:15pm Left message again for Sugar.    1:40pm Spoke to nursing administrator. States Sugar is in the facility.  Will give her the message to call CM.    2:15pm Received call from Sugar.  She is not at the facility at this time.  Will review discharge paperwork and advise when nurse can call report.    2:25pm  Received call from Ellen at [facility].  Requested CM e-mail discharge paperwork.  Ok to call report to her at [phone].  [facility] order placed for wheelchair transport           12/12/22 1258   Post-Acute Status   Post-Acute Authorization Placement   Post-Acute Placement Status Set-up Complete/Auth obtained   Discharge Plan   Discharge Plan A Return to nursing home

## 2022-12-12 NOTE — ASSESSMENT & PLAN NOTE
Patient is identified as having biventricular heart failure that is Chronic. CHF is currently controlled. Latest ECHO performed and demonstrates- No results found for this or any previous visit.  . Continue Beta Blocker, ACE/ARB and Furosemide and monitor clinical status closely. Monitor on telemetry. Patient is off CHF pathway.  Monitor strict Is&Os and daily weights.  Place on fluid restriction of 1.5 L. Continue to stress to patient importance of self efficacy and  on diet for CHF. Last BNP reviewed- and noted below   Recent Labs   Lab 12/05/22  9234   BNP 28.9   .

## 2022-12-12 NOTE — PROGRESS NOTES
12/12/22 1403   AVS Confirmation   Discharge instructions and AVS given to and reviewed with patient and/or significant other. No (Comment)  (Transfer to [facility])       Report given to [facility] @ 1543, Toney Gee 12/12/2022 4:13 PM

## 2022-12-12 NOTE — ASSESSMENT & PLAN NOTE
Patient with Permanent atrial fibrillation which is controlled currently with Beta Blocker and Amiodarone. Patient is currently in atrial fibrillation.[id number] Score: 2.. Anticoagulation indicated. Anticoagulation done with eliquis as outpatient.  Due to potential interaction with Eliquis and phenobarbital neurology recommended attempting to wean for phenobarbital as outpatient

## 2022-12-12 NOTE — PLAN OF CARE
[facility] - Telemetry (Hospital)  Discharge Final Note    Primary Care Provider: SHAY Schmidt MD    Expected Discharge Date: 12/12/2022    Final Discharge Note (most recent)       Final Note - 12/12/22 1452          Final Note    Assessment Type Final Discharge Note     Anticipated Discharge Disposition [facility]        Post-Acute Status    Post-Acute Authorization Placement     Post-Acute Placement Status Set-up Complete/Auth obtained   Return to [facility] [place]                    Important Message from Medicare             Contact Info       SHAY Schmidt MD   Specialty: Family Medicine   Relationship: PCP - General    [place] [address]   Phone: [phone]       Next Steps: Follow up in 1 day(s)    SHAY Schmidt MD   Specialty: Family Medicine   Relationship: PCP - General    [address]  [place] [address]   Phone: [phone]       Next Steps: Follow up in 1 week(s)

## 2022-12-12 NOTE — PLAN OF CARE
Patient/ family updated on plan of care, all questions answered up to now regarding plan of care, will continue to monitor per hourly rounding & unit practice/ policy Toney Gee 12/12/2022 2:02 PM

## 2022-12-12 NOTE — ASSESSMENT & PLAN NOTE
Nutrition consulted. Most recent weight and BMI monitored-                         Measurements:  Wt Readings from Last 1 Encounters:   12/11/22 115.4 kg (254 lb 6.6 oz)   Body mass index is 39.85 kg/m².    Recommendations:      Patient has been screened and assessed by RD. RD will follow patient.
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
## 2022-12-12 NOTE — ASSESSMENT & PLAN NOTE
Body mass index is 39.85 kg/m². Elevation likely secondary to increased calorie intake and sedentary lifestyle. Patient educated on morbidity and mortality in regards to elevated BMI and stressed importance of diet and exercise.   Plan:  -low fat/low calorie diet

## 2022-12-12 NOTE — RESPIRATORY THERAPY
Home Oxygen Evaluation - [facility] - Cardiopulmonary Department      Date Performed: 2022      1) Patient's Home O2 Sat on room air, while at rest: Room Air SpO2 At Rest: (!) 86 %        If O2 sats on room air at rest are 88% or below, patient qualifies.  Document O2 liter flow needed in Step 2.  If O2 sats are 89% or above, complete Step 3.        2)  If patient is not ambulated and O2 sats are <88%, what is the O2 liter flow required to meet ordered saturation?      If O2 sats on room air while exercising remain 89% or above patient does not qualify, no further testing needed Document N/A in step 3. If O2 sats on room air while exercising are 88% or below, continue to Step 4.    3) Patient's O2 Sat on room air while exercisin) Patient's O2 Sat while exercising on O2: SpO2 During Ambulation on O2: 90 % at Ambulation O2 LPM: 4 LPM         (Must show improvement from #4 for patients to qualify)

## 2022-12-13 VITALS
SYSTOLIC BLOOD PRESSURE: 126 MMHG | OXYGEN SATURATION: 96 % | HEART RATE: 73 BPM | RESPIRATION RATE: 20 BRPM | DIASTOLIC BLOOD PRESSURE: 60 MMHG | BODY MASS INDEX: 36.65 KG/M2 | WEIGHT: 234 LBS

## 2022-12-13 LAB — LACTATE SERPL-SCNC: 0.9 MMOL/L (ref 0.5–2.2)

## 2022-12-13 PROCEDURE — 63700000 PHARM REV CODE 250 ALT 637 W/O HCPCS: Performed by: INTERNAL MEDICINE

## 2022-12-13 PROCEDURE — 83605 ASSAY OF LACTIC ACID: CPT | Performed by: INTERNAL MEDICINE

## 2022-12-13 PROCEDURE — 25000003 PHARM REV CODE 250: Performed by: INTERNAL MEDICINE

## 2022-12-13 PROCEDURE — 63600175 PHARM REV CODE 636 W HCPCS: Performed by: INTERNAL MEDICINE

## 2022-12-13 RX ORDER — PHENYTOIN SODIUM 50 MG/ML
1000 INJECTION INTRAMUSCULAR; INTRAVENOUS ONCE
Status: COMPLETED | OUTPATIENT
Start: 2022-12-13 | End: 2022-12-13

## 2022-12-13 RX ORDER — FLUCONAZOLE 100 MG/1
200 TABLET ORAL
Status: COMPLETED | OUTPATIENT
Start: 2022-12-13 | End: 2022-12-13

## 2022-12-13 RX ORDER — ONDANSETRON 2 MG/ML
4 INJECTION INTRAMUSCULAR; INTRAVENOUS
Status: COMPLETED | OUTPATIENT
Start: 2022-12-13 | End: 2022-12-13

## 2022-12-13 RX ADMIN — ONDANSETRON 4 MG: 2 INJECTION INTRAMUSCULAR; INTRAVENOUS at 03:12

## 2022-12-13 RX ADMIN — FOSPHENYTOIN SODIUM 150 MG PE: 50 INJECTION, SOLUTION INTRAMUSCULAR; INTRAVENOUS at 01:12

## 2022-12-13 RX ADMIN — PHENYTOIN SODIUM 1000 MG: 50 INJECTION INTRAMUSCULAR; INTRAVENOUS at 02:12

## 2022-12-13 RX ADMIN — POTASSIUM BICARBONATE 40 MEQ: 782 TABLET, EFFERVESCENT ORAL at 04:12

## 2022-12-13 RX ADMIN — FLUCONAZOLE 200 MG: 100 TABLET ORAL at 04:12

## 2022-12-13 NOTE — ED PROVIDER NOTES
Encounter Date: 12/12/2022       History     Chief Complaint   Patient presents with    Seizures     From [facility] with reports of witnessed seizure tonight after returning from [facility]. Disoriented x4     77-year-old white male brought into the emergency department via EMS with seizure-like activity.  Patient was just released from a hospital in [facility] back to the nursing home and was actually at the nursing home less than 30 minutes and began to seize so he was brought to the ER.  Patient has a history of a seizure disorder apparently did not get any of his seizure medications this evening which is primarily the cause of him having seizures    Review of patient's allergies indicates:   Allergen Reactions    Ancef in dextrose (iso-osm)     Codeine     Penicillins      Past Medical History:   Diagnosis Date    Anticoagulant long-term use     CHF (congestive heart failure) 10/2021    EF of 25-30% on ECHO    COPD (chronic obstructive pulmonary disease)     Coronary artery disease     Depression     Difficult intubation     GERD (gastroesophageal reflux disease)     Hypertension     Mixed hyperlipidemia     PVD (peripheral vascular disease)     Seizures     Sleep apnea, unspecified     TIA (transient ischemic attack)      Past Surgical History:   Procedure Laterality Date    CORONARY ANGIOPLASTY WITH STENT PLACEMENT  10/18/2016    pLAD, pLAD, dLAD, mCX,    INSERTION OF BARE METAL STENT INTO PERIPHERAL ARTERY  2018    B/L [place] Vessels    PERCUTANEOUS BALLOON VALVULOPLASTY  04/04/2018     Family History   Problem Relation Age of Onset    Hypertension Mother     Hypertension Father     Coronary artery disease Father     Heart attack Father      Social History     Tobacco Use    Smoking status: Former    Smokeless tobacco: Never   Substance Use Topics    Alcohol use: Not Currently    Drug use: Never     Review of Systems   Unable to perform ROS: Mental status change (Patient is postictal from his  seizure and can not answer questions at time of interview)     Physical Exam     Initial Vitals [12/12/22 2111]   BP Pulse Resp Temp SpO2   (!) 161/86 84 16 -- (!) 91 %      MAP       --         Physical Exam    Nursing note and vitals reviewed.  Constitutional: He appears well-developed and well-nourished.   HENT:   Head: Normocephalic and atraumatic.   Eyes: Conjunctivae and EOM are normal. Pupils are equal, round, and reactive to light.   Neck: Neck supple.   Normal range of motion.  Cardiovascular:  Normal rate and regular rhythm.           Pulmonary/Chest:   Tachypneic with shallow breaths bilaterally   Abdominal: Abdomen is soft. Bowel sounds are normal.   Musculoskeletal:         General: Normal range of motion.      Cervical back: Normal range of motion and neck supple.     Skin: Skin is warm and dry. Capillary refill takes less than 2 seconds.       ED Course   Procedures  Admission on 12/12/2022   Component Date Value Ref Range Status    Sodium Level 12/12/2022 135 (L)  136 - 145 mmol/L Final    Potassium Level 12/12/2022 3.2 (L)  3.5 - 5.1 mmol/L Final    Chloride 12/12/2022 102  98 - 107 mmol/L Final    Carbon Dioxide 12/12/2022 21 (L)  23 - 31 mmol/L Final    Glucose Level 12/12/2022 129 (H)  82 - 115 mg/dL Final    Blood Urea Nitrogen 12/12/2022 8.0 (L)  8.4 - 25.7 mg/dL Final    Creatinine 12/12/2022 0.85  0.73 - 1.18 mg/dL Final    Calcium Level Total 12/12/2022 9.8  8.8 - 10.0 mg/dL Final    Protein Total 12/12/2022 7.0  5.8 - 7.6 gm/dL Final    Albumin Level 12/12/2022 2.7 (L)  3.4 - 4.8 gm/dL Final    Globulin 12/12/2022 4.3 (H)  2.4 - 3.5 gm/dL Final    Albumin/Globulin Ratio 12/12/2022 0.6 (L)  1.1 - 2.0 ratio Final    Bilirubin Total 12/12/2022 0.3  <=1.5 mg/dL Final    Alkaline Phosphatase 12/12/2022 90  40 - 150 unit/L Final    Alanine Aminotransferase 12/12/2022 38  0 - 55 unit/L Final    Aspartate Aminotransferase 12/12/2022 44 (H)  5 - 34 unit/L Final    eGFR 12/12/2022 >60   mls/min/1.73/m2 Final    Lactic Acid Level 12/12/2022 3.5 (HH)  0.5 - 2.2 mmol/L Final    Color, UA 12/12/2022 Yellow  Yellow, Light-Yellow, Dark Yellow, Sadie, Straw Final    Appearance, UA 12/12/2022 Clear  Clear Final    Specific Gravity, UA 12/12/2022 >=1.030   Final    pH, UA 12/12/2022 5.0  5.0 - 8.5 Final    Protein, UA 12/12/2022 30 (A)  Negative mg/dL Final    Glucose, UA 12/12/2022 Negative  Negative, Normal mg/dL Final    Ketones, UA 12/12/2022 Negative  Negative mg/dL Final    Blood, UA 12/12/2022 Small (A)  Negative unit/L Final    Bilirubin, UA 12/12/2022 Negative  Negative mg/dL Final    Urobilinogen, UA 12/12/2022 0.2  0.2, 1.0, Normal mg/dL Final    Nitrites, UA 12/12/2022 Negative  Negative Final    Leukocyte Esterase, UA 12/12/2022 Negative  Negative unit/L Final    Creatine Kinase 12/12/2022 33  30 - 200 U/L Final    WBC 12/12/2022 21.1 (H)  4.5 - 11.5 x10(3)/mcL Final    RBC 12/12/2022 4.85  4.70 - 6.10 x10(6)/mcL Final    Hgb 12/12/2022 13.8 (L)  14.0 - 18.0 gm/dL Final    Hct 12/12/2022 42.2  42.0 - 52.0 % Final    MCV 12/12/2022 87.0  80.0 - 94.0 fL Final    MCH 12/12/2022 28.5  27.0 - 31.0 pg Final    MCHC 12/12/2022 32.7 (L)  33.0 - 36.0 mg/dL Final    RDW 12/12/2022 13.2  11.5 - 17.0 % Final    Platelet 12/12/2022 364  130 - 400 x10(3)/mcL Final    MPV 12/12/2022 9.6  7.4 - 10.4 fL Final    Neut % 12/12/2022 29.6  % Final    Lymph % 12/12/2022 59.3  % Final    Mono % 12/12/2022 8.5  % Final    Eos % 12/12/2022 0.7  % Final    Basophil % 12/12/2022 0.3  % Final    Lymph # 12/12/2022 12.50 (H)  0.6 - 4.6 x10(3)/mcL Final    Neut # 12/12/2022 6.2  2.1 - 9.2 x10(3)/mcL Final    Mono # 12/12/2022 1.80 (H)  0.1 - 1.3 x10(3)/mcL Final    Eos # 12/12/2022 0.14  0 - 0.9 x10(3)/mcL Final    Baso # 12/12/2022 0.07  0 - 0.2 x10(3)/mcL Final    IG# 12/12/2022 0.34 (H)  0 - 0.04 x10(3)/mcL Final    IG% 12/12/2022 1.6  % Final    Lactic Acid Level 12/13/2022 0.9  0.5 - 2.2 mmol/L Final    Bacteria, UA  12/12/2022 None Seen  None Seen, Rare, Occasional /HPF Final    Yeast, UA 12/12/2022 Few (A)  None Seen /HPF Final    RBC, UA 12/12/2022 3-5  None Seen, 0-2, 3-5, 0-5 /HPF Final    WBC, UA 12/12/2022 0-2  None Seen, 0-2, 3-5, 0-5 /HPF Final    Squamous Epithelial Cells, UA 12/12/2022 Few (A)  None Seen, Rare, Occasional, Occ /HPF Final       Labs Reviewed   COMPREHENSIVE METABOLIC PANEL - Abnormal; Notable for the following components:       Result Value    Sodium Level 135 (*)     Potassium Level 3.2 (*)     Carbon Dioxide 21 (*)     Glucose Level 129 (*)     Blood Urea Nitrogen 8.0 (*)     Albumin Level 2.7 (*)     Globulin 4.3 (*)     Albumin/Globulin Ratio 0.6 (*)     Aspartate Aminotransferase 44 (*)     All other components within normal limits   LACTIC ACID, PLASMA - Abnormal; Notable for the following components:    Lactic Acid Level 3.5 (*)     All other components within normal limits   URINALYSIS, REFLEX TO URINE CULTURE - Abnormal; Notable for the following components:    Protein, UA 30 (*)     Blood, UA Small (*)     All other components within normal limits   CBC WITH DIFFERENTIAL - Abnormal; Notable for the following components:    WBC 21.1 (*)     Hgb 13.8 (*)     MCHC 32.7 (*)     Lymph # 12.50 (*)     Mono # 1.80 (*)     IG# 0.34 (*)     All other components within normal limits   URINALYSIS, MICROSCOPIC - Abnormal; Notable for the following components:    Yeast, UA Few (*)     Squamous Epithelial Cells, UA Few (*)     All other components within normal limits   CK - Normal   LACTIC ACID, PLASMA - Normal   CBC W/ AUTO DIFFERENTIAL    Narrative:     The following orders were created for panel order CBC auto differential.  Procedure                               Abnormality         Status                     ---------                               -----------         ------                     CBC with Differential[[id number]]        Abnormal            Final result                 Please view results  for these tests on the individual orders.          Imaging Results    None          Medications   phenytoin (DILANTIN) 1,000 mg in sodium chloride 0.9% 50 mL IVPB (1,000 mg Intravenous Not Given 12/13/22 0300)   phenytoin (DILANTIN) 1,000 mg in sodium chloride 0.9% 50 mL IVPB (1,000 mg Intravenous Not Given 12/13/22 0315)   ondansetron injection 4 mg (has no administration in time range)   fluconazole tablet 200 mg (has no administration in time range)   potassium bicarbonate disintegrating tablet 40 mEq (has no administration in time range)   levETIRAcetam injection 1,000 mg (1,000 mg Intravenous Given 12/12/22 2158)   sodium chloride 0.9% bolus 3,183 mL (3,183 mLs Intravenous New Bag 12/12/22 2216)   FOSphenytoin (CEREBYX) 500 mg PE in dextrose 5 % 100 mL IVPB (150 mg PE Intravenous New Bag 12/13/22 0150)   phenytoin (DILANTIN) 1,000 mg in sodium chloride 0.9% 50 mL IVPB (1,000 mg Intravenous New Bag 12/13/22 0230)   phenytoin (DILANTIN) injection 1,000 mg (1,000 mg Intravenous Given 12/13/22 0239)                 ED Course as of 12/13/22 0352   Mon Dec 12, 2022   2150 Either fact the patient did not get his seizure medications I have ordered 1 g of Keppra to be given intravenously [PL]   2215 Patient has an elevated lactic acid as expected during his seizures have ordered a 30 milliliter/kilogram body weight IV bolus to be given [PL]   Tue Dec 13, 2022   0120 Patient had recovered and was conversing when he began to have rapid facial taking what appeared to be a focal seizure at that time I ordered fosphenytoin to be given [PL]   0147 Due to pharmacy issues we were only able to pull 6 vials of the ordered amount therefore I had to reorder for the nurse supervisor to pull another 5 vials secondary to again pharmacy and epic ordering inadequacy.  For some reason the nursing staff or supervisor states that they are not able to override the medication in the system therefore patient care is delayed. [PL]   0215 There  were numerous pharmacy issues and we could not give him an adequate dose of fosphenytoin so the pharmacist recommended we give Dilantin however due to the inadequacy of epic was not able to order Dilantin intravenously it is not available to be ordered at that time the pharmacist was able to log into the system and a g of Dilantin was then able to be given intravenously [PL]   0343 Patient is awake alert oriented x4 states he feels markedly improved and he is asking to go home.  He does have an elevated white count and yeast in his urine so will give 1 dose of Diflucan to treat with this and he has been covered for his night seizure medications therefore he can go home to the [facility] and receive his morning doses on time and hopefully prevent his further seizures [PL]      ED Course User Index  [PL] Brandon Gibbons MD                 Clinical Impression:   Final diagnoses:  [G40.909] Seizure disorder (Primary)  [E87.20] Lactic acid acidosis  [G40.919] Breakthrough seizure  [B37.49] Yeast UTI  [E87.6] Hypokalemia        ED Disposition Condition    Discharge Stable          ED Prescriptions    None       Follow-up Information       Follow up With Specialties Details Why Contact Info    ISAC Schmidt MD Family Medicine In 2 days  [address] [facility] [address] [phone]            Nilam Lowe is a certified MA and was present during the entire interaction with this patient      Brandon Gibbons MD  12/13/22 2887

## 2023-01-09 PROBLEM — J96.02 ACUTE RESPIRATORY FAILURE WITH HYPERCAPNIA: Status: RESOLVED | Noted: 2022-10-06 | Resolved: 2023-01-09

## 2023-01-30 ENCOUNTER — HOSPITAL ENCOUNTER (EMERGENCY)
Facility: HOSPITAL | Age: 78
Discharge: ANOTHER HOSPITAL | End: 2023-01-31
Attending: INTERNAL MEDICINE
Payer: MEDICARE

## 2023-01-30 DIAGNOSIS — Z87.828 HISTORY OF BURNS: ICD-10-CM

## 2023-01-30 DIAGNOSIS — G40.909 SEIZURE DISORDER: ICD-10-CM

## 2023-01-30 DIAGNOSIS — J18.9 PNEUMONIA OF RIGHT LUNG DUE TO INFECTIOUS ORGANISM, UNSPECIFIED PART OF LUNG: ICD-10-CM

## 2023-01-30 DIAGNOSIS — I10 HYPERTENSION, UNSPECIFIED TYPE: ICD-10-CM

## 2023-01-30 DIAGNOSIS — I73.9 PERIPHERAL ARTERIAL DISEASE: ICD-10-CM

## 2023-01-30 DIAGNOSIS — I25.10 CORONARY ARTERY DISEASE, UNSPECIFIED VESSEL OR LESION TYPE, UNSPECIFIED WHETHER ANGINA PRESENT, UNSPECIFIED WHETHER NATIVE OR TRANSPLANTED HEART: ICD-10-CM

## 2023-01-30 DIAGNOSIS — E78.5 HYPERLIPIDEMIA, UNSPECIFIED HYPERLIPIDEMIA TYPE: ICD-10-CM

## 2023-01-30 DIAGNOSIS — I50.9 CONGESTIVE HEART FAILURE, UNSPECIFIED HF CHRONICITY, UNSPECIFIED HEART FAILURE TYPE: ICD-10-CM

## 2023-01-30 DIAGNOSIS — R79.89 ELEVATED TROPONIN: Primary | ICD-10-CM

## 2023-01-30 LAB
ABS NEUT CALC (OHS): 12.01 X10(3)/MCL (ref 2.1–9.2)
ALBUMIN SERPL-MCNC: 3.5 G/DL (ref 3.4–4.8)
ALBUMIN/GLOB SERPL: 1.1 RATIO (ref 1.1–2)
ALP SERPL-CCNC: 136 UNIT/L (ref 40–150)
ALT SERPL-CCNC: 21 UNIT/L (ref 0–55)
APPEARANCE UR: ABNORMAL
AST SERPL-CCNC: 20 UNIT/L (ref 5–34)
BACTERIA #/AREA URNS AUTO: ABNORMAL /HPF
BILIRUB UR QL STRIP.AUTO: NEGATIVE MG/DL
BILIRUBIN DIRECT+TOT PNL SERPL-MCNC: 0.2 MG/DL
BNP BLD-MCNC: 57.5 PG/ML
BUN SERPL-MCNC: 15 MG/DL (ref 8.4–25.7)
CALCIUM SERPL-MCNC: 9.2 MG/DL (ref 8.8–10)
CHLORIDE SERPL-SCNC: 106 MMOL/L (ref 98–107)
CK SERPL-CCNC: 37 U/L (ref 30–200)
CO2 SERPL-SCNC: 25 MMOL/L (ref 23–31)
COLOR UR AUTO: ABNORMAL
CREAT SERPL-MCNC: 1.18 MG/DL (ref 0.73–1.18)
EOSINOPHIL NFR BLD MANUAL: 0.59 X10(3)/MCL (ref 0–0.9)
EOSINOPHIL NFR BLD MANUAL: 2 % (ref 0–8)
ERYTHROCYTE [DISTWIDTH] IN BLOOD BY AUTOMATED COUNT: 14 % (ref 11.5–17)
GFR SERPLBLD CREATININE-BSD FMLA CKD-EPI: >60 MLS/MIN/1.73/M2
GLOBULIN SER-MCNC: 3.3 GM/DL (ref 2.4–3.5)
GLUCOSE SERPL-MCNC: 161 MG/DL (ref 82–115)
GLUCOSE UR QL STRIP.AUTO: NEGATIVE MG/DL
HCT VFR BLD AUTO: 45.9 % (ref 42–52)
HGB BLD-MCNC: 14.5 GM/DL (ref 14–18)
IMM GRANULOCYTES # BLD AUTO: 0.15 X10(3)/MCL (ref 0–0.04)
IMM GRANULOCYTES NFR BLD AUTO: 0.5 %
KETONES UR QL STRIP.AUTO: NEGATIVE MG/DL
LACTATE SERPL-SCNC: 1.8 MMOL/L (ref 0.5–2.2)
LEUKOCYTE ESTERASE UR QL STRIP.AUTO: NEGATIVE UNIT/L
LYMPHOCYTES NFR BLD MANUAL: 15.82 X10(3)/MCL
LYMPHOCYTES NFR BLD MANUAL: 54 % (ref 13–40)
MAGNESIUM SERPL-MCNC: 1.7 MG/DL (ref 1.6–2.6)
MCH RBC QN AUTO: 28.5 PG
MCHC RBC AUTO-ENTMCNC: 31.6 MG/DL (ref 33–36)
MCV RBC AUTO: 90.2 FL (ref 80–94)
MONOCYTES NFR BLD MANUAL: 0.88 X10(3)/MCL (ref 0.1–1.3)
MONOCYTES NFR BLD MANUAL: 3 % (ref 2–11)
NEUTROPHILS NFR BLD MANUAL: 41 % (ref 47–80)
NITRITE UR QL STRIP.AUTO: POSITIVE
PH UR STRIP.AUTO: 5.5 [PH]
PHENYTOIN SERPL-MCNC: 21.6 UG/ML (ref 10–20)
PLATELET # BLD AUTO: 282 X10(3)/MCL (ref 130–400)
PLATELET # BLD EST: ADEQUATE 10*3/UL
PMV BLD AUTO: 10 FL (ref 7.4–10.4)
POTASSIUM SERPL-SCNC: 3.9 MMOL/L (ref 3.5–5.1)
PROT SERPL-MCNC: 6.8 GM/DL (ref 5.8–7.6)
PROT UR QL STRIP.AUTO: 100 MG/DL
RBC # BLD AUTO: 5.09 X10(6)/MCL (ref 4.7–6.1)
RBC #/AREA URNS AUTO: ABNORMAL /HPF
RBC UR QL AUTO: ABNORMAL UNIT/L
SODIUM SERPL-SCNC: 143 MMOL/L (ref 136–145)
SP GR UR STRIP.AUTO: 1.02
SQUAMOUS #/AREA URNS AUTO: ABNORMAL /HPF
TROPONIN I SERPL-MCNC: 0.17 NG/ML (ref 0–0.04)
TSH SERPL-ACNC: 4.68 UIU/ML (ref 0.35–4.94)
UROBILINOGEN UR STRIP-ACNC: 0.2 MG/DL
WBC # SPEC AUTO: 29.3 X10(3)/MCL (ref 4.5–11.5)
WBC #/AREA URNS AUTO: ABNORMAL /HPF

## 2023-01-30 PROCEDURE — 96361 HYDRATE IV INFUSION ADD-ON: CPT

## 2023-01-30 PROCEDURE — 96365 THER/PROPH/DIAG IV INF INIT: CPT | Mod: 59

## 2023-01-30 PROCEDURE — 25000003 PHARM REV CODE 250: Performed by: INTERNAL MEDICINE

## 2023-01-30 PROCEDURE — 36556 INSERT NON-TUNNEL CV CATH: CPT | Mod: 59

## 2023-01-30 PROCEDURE — 84484 ASSAY OF TROPONIN QUANT: CPT | Mod: 91 | Performed by: INTERNAL MEDICINE

## 2023-01-30 PROCEDURE — 96372 THER/PROPH/DIAG INJ SC/IM: CPT | Performed by: INTERNAL MEDICINE

## 2023-01-30 PROCEDURE — 96375 TX/PRO/DX INJ NEW DRUG ADDON: CPT | Mod: 59

## 2023-01-30 PROCEDURE — 25000242 PHARM REV CODE 250 ALT 637 W/ HCPCS: Performed by: INTERNAL MEDICINE

## 2023-01-30 PROCEDURE — 94640 AIRWAY INHALATION TREATMENT: CPT

## 2023-01-30 PROCEDURE — 80185 ASSAY OF PHENYTOIN TOTAL: CPT | Performed by: INTERNAL MEDICINE

## 2023-01-30 PROCEDURE — 96366 THER/PROPH/DIAG IV INF ADDON: CPT

## 2023-01-30 PROCEDURE — 63600175 PHARM REV CODE 636 W HCPCS: Performed by: INTERNAL MEDICINE

## 2023-01-30 PROCEDURE — 83735 ASSAY OF MAGNESIUM: CPT | Performed by: INTERNAL MEDICINE

## 2023-01-30 PROCEDURE — 81003 URINALYSIS AUTO W/O SCOPE: CPT | Performed by: INTERNAL MEDICINE

## 2023-01-30 PROCEDURE — 87040 BLOOD CULTURE FOR BACTERIA: CPT | Performed by: INTERNAL MEDICINE

## 2023-01-30 PROCEDURE — 85025 COMPLETE CBC W/AUTO DIFF WBC: CPT | Performed by: INTERNAL MEDICINE

## 2023-01-30 PROCEDURE — 85027 COMPLETE CBC AUTOMATED: CPT | Performed by: INTERNAL MEDICINE

## 2023-01-30 PROCEDURE — 83605 ASSAY OF LACTIC ACID: CPT | Performed by: INTERNAL MEDICINE

## 2023-01-30 PROCEDURE — 83880 ASSAY OF NATRIURETIC PEPTIDE: CPT | Performed by: INTERNAL MEDICINE

## 2023-01-30 PROCEDURE — 99900035 HC TECH TIME PER 15 MIN (STAT)

## 2023-01-30 PROCEDURE — 80053 COMPREHEN METABOLIC PANEL: CPT | Performed by: INTERNAL MEDICINE

## 2023-01-30 PROCEDURE — 99285 EMERGENCY DEPT VISIT HI MDM: CPT | Mod: 25

## 2023-01-30 PROCEDURE — 84443 ASSAY THYROID STIM HORMONE: CPT | Performed by: INTERNAL MEDICINE

## 2023-01-30 PROCEDURE — 82550 ASSAY OF CK (CPK): CPT | Performed by: INTERNAL MEDICINE

## 2023-01-30 RX ORDER — IPRATROPIUM BROMIDE AND ALBUTEROL SULFATE 2.5; .5 MG/3ML; MG/3ML
3 SOLUTION RESPIRATORY (INHALATION)
Status: COMPLETED | OUTPATIENT
Start: 2023-01-30 | End: 2023-01-30

## 2023-01-30 RX ORDER — LORAZEPAM 2 MG/ML
2 INJECTION INTRAMUSCULAR
Status: COMPLETED | OUTPATIENT
Start: 2023-01-30 | End: 2023-01-30

## 2023-01-30 RX ORDER — SODIUM CHLORIDE 9 MG/ML
125 INJECTION, SOLUTION INTRAVENOUS ONCE
Status: COMPLETED | OUTPATIENT
Start: 2023-01-30 | End: 2023-01-31

## 2023-01-30 RX ORDER — IPRATROPIUM BROMIDE AND ALBUTEROL SULFATE 2.5; .5 MG/3ML; MG/3ML
3 SOLUTION RESPIRATORY (INHALATION)
Status: COMPLETED | OUTPATIENT
Start: 2023-01-31 | End: 2023-01-31

## 2023-01-30 RX ORDER — PHENYTOIN SODIUM 100 MG/1
100 CAPSULE, EXTENDED RELEASE ORAL
COMMUNITY
Start: 2023-01-19

## 2023-01-30 RX ORDER — PHENYTOIN 50 MG/1
100 TABLET, CHEWABLE ORAL DAILY
COMMUNITY
Start: 2023-01-19

## 2023-01-30 RX ORDER — LEVOFLOXACIN 5 MG/ML
750 INJECTION, SOLUTION INTRAVENOUS
Status: DISCONTINUED | OUTPATIENT
Start: 2023-01-30 | End: 2023-01-31 | Stop reason: HOSPADM

## 2023-01-30 RX ADMIN — SODIUM CHLORIDE 1000 ML: 9 INJECTION, SOLUTION INTRAVENOUS at 09:01

## 2023-01-30 RX ADMIN — LORAZEPAM 2 MG: 2 INJECTION INTRAMUSCULAR; INTRAVENOUS at 08:01

## 2023-01-30 RX ADMIN — LEVOFLOXACIN 750 MG: 750 INJECTION, SOLUTION INTRAVENOUS at 09:01

## 2023-01-30 RX ADMIN — IPRATROPIUM BROMIDE AND ALBUTEROL SULFATE 3 ML: 2.5; .5 SOLUTION RESPIRATORY (INHALATION) at 09:01

## 2023-01-30 RX ADMIN — SODIUM CHLORIDE 125 ML/HR: 9 INJECTION, SOLUTION INTRAVENOUS at 10:01

## 2023-01-31 VITALS
HEART RATE: 62 BPM | BODY MASS INDEX: 36.1 KG/M2 | RESPIRATION RATE: 19 BRPM | SYSTOLIC BLOOD PRESSURE: 136 MMHG | TEMPERATURE: 99 F | HEIGHT: 67 IN | DIASTOLIC BLOOD PRESSURE: 58 MMHG | OXYGEN SATURATION: 98 % | WEIGHT: 230 LBS

## 2023-01-31 LAB
ABS NEUT CALC (OHS): 11.81 X10(3)/MCL (ref 2.1–9.2)
ANION GAP SERPL CALC-SCNC: 10 MEQ/L
BNP BLD-MCNC: 587.7 PG/ML
BUN SERPL-MCNC: 14 MG/DL (ref 8.4–25.7)
CALCIUM SERPL-MCNC: 9.7 MG/DL (ref 8.8–10)
CHLORIDE SERPL-SCNC: 105 MMOL/L (ref 98–107)
CO2 SERPL-SCNC: 27 MMOL/L (ref 23–31)
CREAT SERPL-MCNC: 0.81 MG/DL (ref 0.73–1.18)
CREAT/UREA NIT SERPL: 17
EOSINOPHIL NFR BLD MANUAL: 0.49 X10(3)/MCL (ref 0–0.9)
EOSINOPHIL NFR BLD MANUAL: 2 % (ref 0–8)
ERYTHROCYTE [DISTWIDTH] IN BLOOD BY AUTOMATED COUNT: 14.3 % (ref 11.5–17)
GFR SERPLBLD CREATININE-BSD FMLA CKD-EPI: >60 MLS/MIN/1.73/M2
GLUCOSE SERPL-MCNC: 96 MG/DL (ref 82–115)
HCT VFR BLD AUTO: 40.6 % (ref 42–52)
HGB BLD-MCNC: 13 GM/DL (ref 14–18)
IMM GRANULOCYTES # BLD AUTO: 0.09 X10(3)/MCL (ref 0–0.04)
IMM GRANULOCYTES NFR BLD AUTO: 0.4 %
LYMPHOCYTES NFR BLD MANUAL: 10.09 X10(3)/MCL
LYMPHOCYTES NFR BLD MANUAL: 41 % (ref 13–40)
MCH RBC QN AUTO: 29 PG
MCHC RBC AUTO-ENTMCNC: 32 MG/DL (ref 33–36)
MCV RBC AUTO: 90.4 FL (ref 80–94)
MONOCYTES NFR BLD MANUAL: 2.21 X10(3)/MCL (ref 0.1–1.3)
MONOCYTES NFR BLD MANUAL: 9 % (ref 2–11)
NEUTROPHILS NFR BLD MANUAL: 48 % (ref 47–80)
PLATELET # BLD AUTO: 215 X10(3)/MCL (ref 130–400)
PLATELET # BLD EST: NORMAL 10*3/UL
PMV BLD AUTO: 9.9 FL (ref 7.4–10.4)
POTASSIUM SERPL-SCNC: 3.8 MMOL/L (ref 3.5–5.1)
RBC # BLD AUTO: 4.49 X10(6)/MCL (ref 4.7–6.1)
RBC MORPH BLD: NORMAL
SODIUM SERPL-SCNC: 142 MMOL/L (ref 136–145)
TROPONIN I SERPL-MCNC: 0.57 NG/ML (ref 0–0.04)
TROPONIN I SERPL-MCNC: 0.78 NG/ML (ref 0–0.04)
WBC # SPEC AUTO: 24.6 X10(3)/MCL (ref 4.5–11.5)

## 2023-01-31 PROCEDURE — 84484 ASSAY OF TROPONIN QUANT: CPT | Performed by: INTERNAL MEDICINE

## 2023-01-31 PROCEDURE — 93010 ELECTROCARDIOGRAM REPORT: CPT | Mod: ,,, | Performed by: INTERNAL MEDICINE

## 2023-01-31 PROCEDURE — 99900035 HC TECH TIME PER 15 MIN (STAT)

## 2023-01-31 PROCEDURE — 25000003 PHARM REV CODE 250: Performed by: INTERNAL MEDICINE

## 2023-01-31 PROCEDURE — 25000242 PHARM REV CODE 250 ALT 637 W/ HCPCS: Performed by: INTERNAL MEDICINE

## 2023-01-31 PROCEDURE — 93010 EKG 12-LEAD: ICD-10-PCS | Mod: ,,, | Performed by: INTERNAL MEDICINE

## 2023-01-31 PROCEDURE — 80048 BASIC METABOLIC PNL TOTAL CA: CPT | Performed by: INTERNAL MEDICINE

## 2023-01-31 PROCEDURE — 85027 COMPLETE CBC AUTOMATED: CPT | Mod: 91 | Performed by: INTERNAL MEDICINE

## 2023-01-31 PROCEDURE — 94640 AIRWAY INHALATION TREATMENT: CPT

## 2023-01-31 PROCEDURE — 85025 COMPLETE CBC W/AUTO DIFF WBC: CPT | Performed by: INTERNAL MEDICINE

## 2023-01-31 PROCEDURE — 93005 ELECTROCARDIOGRAM TRACING: CPT

## 2023-01-31 PROCEDURE — 83880 ASSAY OF NATRIURETIC PEPTIDE: CPT | Performed by: INTERNAL MEDICINE

## 2023-01-31 PROCEDURE — 63600175 PHARM REV CODE 636 W HCPCS: Performed by: NURSE PRACTITIONER

## 2023-01-31 RX ORDER — METOPROLOL SUCCINATE 25 MG/1
25 TABLET, EXTENDED RELEASE ORAL DAILY
Status: DISCONTINUED | OUTPATIENT
Start: 2023-01-31 | End: 2023-01-31 | Stop reason: HOSPADM

## 2023-01-31 RX ORDER — PHENOBARBITAL 15 MG/1
15 TABLET ORAL DAILY
Status: DISCONTINUED | OUTPATIENT
Start: 2023-01-31 | End: 2023-01-31 | Stop reason: HOSPADM

## 2023-01-31 RX ORDER — LISINOPRIL 5 MG/1
5 TABLET ORAL DAILY
Status: DISCONTINUED | OUTPATIENT
Start: 2023-01-31 | End: 2023-01-31 | Stop reason: HOSPADM

## 2023-01-31 RX ORDER — LEVETIRACETAM 500 MG/1
1000 TABLET ORAL 2 TIMES DAILY
Status: DISCONTINUED | OUTPATIENT
Start: 2023-01-31 | End: 2023-01-31 | Stop reason: HOSPADM

## 2023-01-31 RX ORDER — PHENYTOIN SODIUM 100 MG/1
300 CAPSULE, EXTENDED RELEASE ORAL DAILY
Status: DISCONTINUED | OUTPATIENT
Start: 2023-01-31 | End: 2023-01-31 | Stop reason: HOSPADM

## 2023-01-31 RX ORDER — FUROSEMIDE 40 MG/1
40 TABLET ORAL DAILY
Status: DISCONTINUED | OUTPATIENT
Start: 2023-01-31 | End: 2023-01-31 | Stop reason: HOSPADM

## 2023-01-31 RX ORDER — CLOPIDOGREL BISULFATE 75 MG/1
75 TABLET ORAL DAILY
Status: DISCONTINUED | OUTPATIENT
Start: 2023-01-31 | End: 2023-01-31 | Stop reason: HOSPADM

## 2023-01-31 RX ORDER — AMIODARONE HYDROCHLORIDE 200 MG/1
200 TABLET ORAL DAILY
Status: DISCONTINUED | OUTPATIENT
Start: 2023-01-31 | End: 2023-01-31 | Stop reason: HOSPADM

## 2023-01-31 RX ORDER — FUROSEMIDE 10 MG/ML
40 INJECTION INTRAMUSCULAR; INTRAVENOUS ONCE
Status: COMPLETED | OUTPATIENT
Start: 2023-01-31 | End: 2023-01-31

## 2023-01-31 RX ADMIN — FUROSEMIDE 40 MG: 40 TABLET ORAL at 10:01

## 2023-01-31 RX ADMIN — PHENYTOIN SODIUM 300 MG: 100 CAPSULE, EXTENDED RELEASE ORAL at 11:01

## 2023-01-31 RX ADMIN — APIXABAN 5 MG: 5 TABLET, FILM COATED ORAL at 11:01

## 2023-01-31 RX ADMIN — FUROSEMIDE 40 MG: 10 INJECTION, SOLUTION INTRAMUSCULAR; INTRAVENOUS at 03:01

## 2023-01-31 RX ADMIN — LEVETIRACETAM 1000 MG: 500 TABLET, FILM COATED ORAL at 10:01

## 2023-01-31 RX ADMIN — CLOPIDOGREL BISULFATE 75 MG: 75 TABLET ORAL at 04:01

## 2023-01-31 RX ADMIN — METOPROLOL SUCCINATE 25 MG: 25 TABLET, EXTENDED RELEASE ORAL at 04:01

## 2023-01-31 RX ADMIN — AMIODARONE HYDROCHLORIDE 200 MG: 200 TABLET ORAL at 10:01

## 2023-01-31 RX ADMIN — IPRATROPIUM BROMIDE AND ALBUTEROL SULFATE 3 ML: 2.5; .5 SOLUTION RESPIRATORY (INHALATION) at 12:01

## 2023-01-31 RX ADMIN — PHENOBARBITAL 15 MG: 15 TABLET ORAL at 11:01

## 2023-01-31 RX ADMIN — LISINOPRIL 5 MG: 5 TABLET ORAL at 04:01
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
## 2023-01-31 NOTE — CONSULTS
[facility] - Emergency Dept  Cardiology  Consult Note    Patient Name: John Wayne  MRN: [id number]  Admission Date: 1/30/2023  Hospital Length of Stay: 0 days  Code Status: Prior   Attending Provider: Brandon Gibbons MD   Consulting Provider: Barber Foster NP  Primary Care Physician: SHAY Schmidt MD  Principal Problem:<principal problem not specified>    Patient information was obtained from patient, ER records, and primary team.     Consults  Subjective:     Chief Complaint:      Telemetry cardiology consult  Location: [facility]  Reason for consultation: Elevated troponin  Consulting provider: Dr Gibbons  Duration: Greater than 30 minutes including review of medical records patient and provider interview    HPI:     77-year-old male known to Dr. Cramer May last seen September 2022.  He has a known medical history of atrial fibrillation currently on Pradaxa, coronary artery disease with history of PCI and stenting last coronary angiogram November 2021, hypertension, dyslipidemia, morbid obesity, seizure disorder, CVI with venogram and stenting per Dr. Robbins, frequent falls, recurrent UTIs, GERD and history of COVID-19.    It is difficult to get accurate information from Mr. Wayne at this time.  He was brought from the nursing home today after he had been in the hospital for some time for recurrent seizure disorder.  At the nursing home he having some tremors and started grunting, the staff was concerned for tremors and called EMS.  He was found to be markedly hypoxic was started on 15 L of O2 brought to the ER for evaluation.  EKG done upon arrival showed normal sinus rhythm without acute changes.  Lab work showed a white cell count of 29,000 his initial troponin was 0.166 is since elevated to 0.779.  He denies any overt chest discomfort but again is hard to get an accurate information.  He was put on a Ventimask now his breathing much better with improved O2 sats.  A chest x-ray  performed showed marked infiltrates on the right and some noted congestion.    I did review some progress notes from from December when he was transferred to [place].  Apparently his troponin went from 1.8-6 0.9-10 0.1-3 0.6-1.457.  This was felt to be secondary to hypoxia and aspiration pneumonia.  He had an echocardiogram showing a EF of 45% with a segmental LV wall motion abnormalities.          Past Medical History:   Diagnosis Date    Anticoagulant long-term use     CHF (congestive heart failure) 10/2021    EF of 25-30% on ECHO    COPD (chronic obstructive pulmonary disease)     Coronary artery disease     Depression     Difficult intubation     GERD (gastroesophageal reflux disease)     Hypertension     Mixed hyperlipidemia     PVD (peripheral vascular disease)     Seizures     Sleep apnea, unspecified     TIA (transient ischemic attack)        Past Surgical History:   Procedure Laterality Date    CORONARY ANGIOPLASTY WITH STENT PLACEMENT  10/18/2016    pLAD, pLAD, dLAD, mCX,    INSERTION OF BARE METAL STENT INTO PERIPHERAL ARTERY  2018    B/L [place] Vessels    PERCUTANEOUS BALLOON VALVULOPLASTY  04/04/2018       Review of patient's allergies indicates:   Allergen Reactions    Ancef in dextrose (iso-osm)     Codeine     Penicillins        No current facility-administered medications on file prior to encounter.     Current Outpatient Medications on File Prior to Encounter   Medication Sig    amiodarone (PACERONE) 200 MG Tab Take 200 mg by mouth once daily.    atorvastatin (LIPITOR) 40 MG tablet Take 40 mg by mouth every evening.    clopidogreL (PLAVIX) 75 mg tablet Take 1 tablet by mouth Daily.    ELIQUIS 5 mg Tab Take 1 tablet by mouth 2 (two) times a day.    finasteride (PROSCAR) 5 mg tablet Take 1 tablet (5 mg total) by mouth once daily. (Patient taking differently: Take 5 mg by mouth nightly.)    furosemide (LASIX) 40 MG tablet Take 40 mg oral two times a day x 5 days then after 40 mg oral daily     levETIRAcetam (KEPPRA) 1000 MG tablet Take 1 tablet by mouth 2 (two) times a day.    metoprolol succinate (TOPROL-XL) 25 MG 24 hr tablet Take 1 tablet by mouth Daily.    pantoprazole (PROTONIX) 40 MG tablet Take 40 mg by mouth Daily.    PHENobarbitaL (LUMINAL) 15 MG tablet Take 4 tablets (60 mg total) by mouth once daily for 7 days, THEN 2 tablets (30 mg total) once daily for 7 days, THEN 1 tablet (15 mg total) once daily for 7 days, THEN 0.5 tablets (7.5 mg total) once daily for 7 days.    phenytoin (DILANTIN) 100 MG ER capsule Take 100 mg by mouth.    phenytoin (DILANTIN) 50 mg chewable tablet Take by mouth.    tamsulosin (FLOMAX) 0.4 mg Cap Take 1 tablet by mouth nightly.    venlafaxine (EFFEXOR) 75 MG tablet Take 1 tablet (75 mg total) by mouth 2 (two) times daily.    lisinopriL (PRINIVIL,ZESTRIL) 5 MG tablet Take 1 tablet by mouth Daily.     Family History       Problem Relation (Age of Onset)    Coronary artery disease Father    Heart attack Father    Hypertension Mother, Father          Tobacco Use    Smoking status: Former    Smokeless tobacco: Never   Substance and Sexual Activity    Alcohol use: Not Currently    Drug use: Never    Sexual activity: Not on file     Review of Systems   Reason unable to perform ROS: Difficult to obtain a full ROS.   Cardiovascular:  Negative for chest pain.   Respiratory:  Positive for cough and shortness of breath.    Objective:     Vital Signs (Most Recent):  Temp: 98.7 °F (37.1 °C) (01/1945)  Pulse: 71 (01/31/23 0202)  Resp: 20 (01/31/23 0202)  BP: 126/66 (01/31/23 0202)  SpO2: 95 % (01/31/23 0202) Vital Signs (24h Range):  Temp:  [98.7 °F (37.1 °C)] 98.7 °F (37.1 °C)  Pulse:  [53-99] 71  Resp:  [13-30] 20  SpO2:  [91 %-99 %] 95 %  BP: (126-166)/(64-75) 126/66     Weight: 104.3 kg (230 lb)  Body mass index is 36.02 kg/m².    SpO2: 95 %         Intake/Output Summary (Last 24 hours) at 1/31/2023 0229  Last data filed at 1/30/2023 2309  Gross per 24 hour   Intake 1149 ml    Output --   Net 1149 ml       Lines/Drains/Airways       Central Venous Catheter Line  Duration             Percutaneous Central Line Insertion/Assessment - Triple Lumen  01/30/23 2100 right internal jugular <1 day              Drain  Duration                  Urethral Catheter 01/30/23 2220 Silicone 16 Fr. <1 day                    Physical Exam  HENT:      Head: Normocephalic.      Mouth/Throat:      Mouth: Mucous membranes are moist.   Cardiovascular:      Rate and Rhythm: Normal rate and regular rhythm.      Comments: distant  Pulmonary:      Effort: No respiratory distress.      Comments: Decreased on R rise - Rhonchi   Abdominal:      General: Abdomen is flat. Bowel sounds are normal.      Palpations: Abdomen is soft.   Musculoskeletal:      Left lower leg: Edema present.      Comments: Chronic LE edema    Skin:     General: Skin is warm and dry.   Neurological:      General: No focal deficit present.      Mental Status: He is alert. Mental status is at baseline.   Psychiatric:         Mood and Affect: Mood normal.       Significant Labs:   Recent Lab Results         01/31/23  0054   01/30/23  2218   01/30/23  2101        Albumin/Globulin Ratio     1.1       Abs Neut calc     12.013       Albumin     3.5       Alkaline Phosphatase     136       ALT     21       Appearance, UA   Slightly Cloudy         AST     20       Bacteria, UA   Moderate         BILIRUBIN TOTAL     0.2       Bilirubin, UA   Negative         BNP     57.5       BUN     15.0       Calcium     9.2       Chloride     106       CO2     25       Color, UA   Dark Yellow         CPK     37       Creatinine     1.18       eGFR     >60       Eos #     0.586       Eosinophil %     2       Globulin, Total     3.3       Glucose     161       Glucose, UA   Negative         Hematocrit     45.9       Hemoglobin     14.5       Immature Grans (Abs)     0.15       Immature Granulocytes     0.5       Ketones, UA   Negative         Lactate, Kunal     1.8        Leukocytes, UA   Negative         Lymph #     15.822       Lymph Man     54       Magnesium     1.70       MCH     28.5       MCHC     31.6       MCV     90.2       Mono #     0.879       Mono %     3       MPV     10.0       Neutrophils Relative     41       NITRITE UA   Positive         Occult Blood UA   Trace-Intact         pH, UA   5.5         Phenytoin Lvl     21.6       Platelet Estimate     Adequate       Platelets     282       Potassium     3.9       PROTEIN TOTAL     6.8       Protein, UA   100         RBC     5.09       RBC, UA   0-2         RDW     14.0       Sodium     143       Specific Gravity,UA   1.025         Squam Epithel, UA   Few         Thyroid Stimulating Hormone     4.684       Troponin I 0.779     0.166       Urobilinogen, UA   0.2         WBC, UA   6-10         WBC     29.3               Significant Imaging:           Assessment and Plan:       Impression:  Mild elevation in troponin prob type II leak secondary to hypoxia ( EKG is Normal )   WBC 29K  - ABX   PNA/UTI  CAD/PCI/non-STEMI  -PTCA LAD and ramus November/2022  History of ischemic cardiomyopathy, EF 2/2022 showed normal LVF, hoveverf Ehco 12/2022 showed antior WMA with EF 45%  Congestion on CXR  Paroxysmal atrial fibrillation  -NOAC with Eliquis  -D cc cardioversion October/2021  Venous insufficiency  -Status post venogram by Dr. Bone had wallstents placed  -Edema is chronic and stable  Hypertension  Dyslipidemia  Morbid obesity  Seizure disorder    Plan:  Currently without chest discomfort but some chronic shortness of breath currently it is difficult to get accurate information from Mr. Wayne.  Do feel his troponin is probably a type II leak secondary to his hypoxia and pneumonia however he did have a significant bump of troponin in December 2022 with some wall motion abnormalities on his Echo with a decrease in his ejection fraction when compared to an echocardiogram done in February 2022.  I do feel he is a riskier type  patient and should be transferred to a facility where cardiology is available and there is an option for invasive studies should this become an issue, there is no need for emergent studies at this time.  He should be admitted to the hospitalist service for treatment of his pneumonia and UTI  We will be glad to follow  Hold his Eliquis for now  Lovenox milligram kilogram subcu x1  Lasix 40 mg x 1 IV  Continue Plavix  Add aspirin 81 mg daily  We will be available at [facility] for consultation and will follow along with you.    Thank you for the consultation should you have any questions or concerns please do not hesitate to call    There are no hospital problems to display for this patient.      VTE Risk Mitigation (From admission, onward)      None            Thank you for your consult.     Barber Foster NP  Cardiology   [facility] - Emergency Dept

## 2023-02-05 LAB
BACTERIA BLD CULT: NORMAL
BACTERIA BLD CULT: NORMAL

## 2023-03-13 PROBLEM — J69.0 ASPIRATION PNEUMONIA: Status: RESOLVED | Noted: 2022-12-08 | Resolved: 2023-03-13

## 2023-03-13 PROBLEM — I21.4 NSTEMI (NON-ST ELEVATED MYOCARDIAL INFARCTION): Status: RESOLVED | Noted: 2022-12-08 | Resolved: 2023-03-13

## 2023-03-20 ENCOUNTER — HOSPITAL ENCOUNTER (EMERGENCY)
Facility: HOSPITAL | Age: 78
Discharge: HOME | End: 2023-03-20
Attending: INTERNAL MEDICINE
Payer: MEDICARE

## 2023-03-20 VITALS
HEART RATE: 61 BPM | DIASTOLIC BLOOD PRESSURE: 77 MMHG | RESPIRATION RATE: 20 BRPM | WEIGHT: 230 LBS | BODY MASS INDEX: 36.1 KG/M2 | SYSTOLIC BLOOD PRESSURE: 148 MMHG | TEMPERATURE: 98 F | HEIGHT: 67 IN | OXYGEN SATURATION: 100 %

## 2023-03-20 DIAGNOSIS — R06.02 SHORTNESS OF BREATH: ICD-10-CM

## 2023-03-20 DIAGNOSIS — R06.02 SOB (SHORTNESS OF BREATH): ICD-10-CM

## 2023-03-20 DIAGNOSIS — R06.2 WHEEZING: ICD-10-CM

## 2023-03-20 DIAGNOSIS — J44.9 CHRONIC OBSTRUCTIVE PULMONARY DISEASE, UNSPECIFIED COPD TYPE: Primary | ICD-10-CM

## 2023-03-20 LAB
ALBUMIN SERPL-MCNC: 3.3 G/DL (ref 3.4–4.8)
ALBUMIN/GLOB SERPL: 1 RATIO (ref 1.1–2)
ALLENS TEST: ABNORMAL
ALP SERPL-CCNC: 85 UNIT/L (ref 40–150)
ALT SERPL-CCNC: 15 UNIT/L (ref 0–55)
AST SERPL-CCNC: 13 UNIT/L (ref 5–34)
BASOPHILS # BLD AUTO: 0.11 X10(3)/MCL (ref 0–0.2)
BASOPHILS NFR BLD AUTO: 0.5 %
BILIRUBIN DIRECT+TOT PNL SERPL-MCNC: 0.1 MG/DL
BNP BLD-MCNC: 122.2 PG/ML
BUN SERPL-MCNC: 16 MG/DL (ref 8.4–25.7)
CALCIUM SERPL-MCNC: 9.4 MG/DL (ref 8.8–10)
CHLORIDE SERPL-SCNC: 104 MMOL/L (ref 98–107)
CK SERPL-CCNC: 50 U/L (ref 30–200)
CO2 SERPL-SCNC: 26 MMOL/L (ref 23–31)
CREAT SERPL-MCNC: 0.97 MG/DL (ref 0.73–1.18)
DELSYS: ABNORMAL
EOSINOPHIL # BLD AUTO: 0.3 X10(3)/MCL (ref 0–0.9)
EOSINOPHIL NFR BLD AUTO: 1.4 %
ERYTHROCYTE [DISTWIDTH] IN BLOOD BY AUTOMATED COUNT: 14.3 % (ref 11.5–17)
GFR SERPLBLD CREATININE-BSD FMLA CKD-EPI: >60 MLS/MIN/1.73/M2
GLOBULIN SER-MCNC: 3.4 GM/DL (ref 2.4–3.5)
GLUCOSE SERPL-MCNC: 81 MG/DL (ref 82–115)
HCO3 UR-SCNC: 28.9 MMOL/L (ref 24–28)
HCT VFR BLD AUTO: 38.2 % (ref 42–52)
HGB BLD-MCNC: 11.9 G/DL (ref 14–18)
IMM GRANULOCYTES # BLD AUTO: 0.07 X10(3)/MCL (ref 0–0.04)
IMM GRANULOCYTES NFR BLD AUTO: 0.3 %
LACTATE SERPL-SCNC: 0.9 MMOL/L (ref 0.5–2.2)
LYMPHOCYTES # BLD AUTO: 15.06 X10(3)/MCL (ref 0.6–4.6)
LYMPHOCYTES NFR BLD AUTO: 68.6 %
MAGNESIUM SERPL-MCNC: 1.9 MG/DL (ref 1.6–2.6)
MCH RBC QN AUTO: 27.2 PG
MCHC RBC AUTO-ENTMCNC: 31.2 G/DL (ref 33–36)
MCV RBC AUTO: 87.4 FL (ref 80–94)
MONOCYTES # BLD AUTO: 1.25 X10(3)/MCL (ref 0.1–1.3)
MONOCYTES NFR BLD AUTO: 5.7 %
NEUTROPHILS # BLD AUTO: 5.15 X10(3)/MCL (ref 2.1–9.2)
NEUTROPHILS NFR BLD AUTO: 23.5 %
PCO2 BLDA: 44 MMHG (ref 35–45)
PH SMN: 7.42 [PH] (ref 7.35–7.45)
PLATELET # BLD AUTO: 274 X10(3)/MCL (ref 130–400)
PMV BLD AUTO: 9.9 FL (ref 7.4–10.4)
PO2 BLDA: 77 MMHG (ref 80–100)
POC BE: 4 MMOL/L
POC SATURATED O2: 95 % (ref 95–100)
POC TCO2: 30 MMOL/L (ref 23–27)
POTASSIUM SERPL-SCNC: 3.8 MMOL/L (ref 3.5–5.1)
PROT SERPL-MCNC: 6.7 GM/DL (ref 5.8–7.6)
RBC # BLD AUTO: 4.37 X10(6)/MCL (ref 4.7–6.1)
SAMPLE: ABNORMAL
SITE: ABNORMAL
SODIUM SERPL-SCNC: 139 MMOL/L (ref 136–145)
TROPONIN I SERPL-MCNC: 0.08 NG/ML (ref 0–0.04)
WBC # SPEC AUTO: 21.9 X10(3)/MCL (ref 4.5–11.5)

## 2023-03-20 PROCEDURE — 99285 EMERGENCY DEPT VISIT HI MDM: CPT | Mod: 25

## 2023-03-20 PROCEDURE — 84484 ASSAY OF TROPONIN QUANT: CPT | Performed by: INTERNAL MEDICINE

## 2023-03-20 PROCEDURE — 94640 AIRWAY INHALATION TREATMENT: CPT

## 2023-03-20 PROCEDURE — 82803 BLOOD GASES ANY COMBINATION: CPT

## 2023-03-20 PROCEDURE — 80053 COMPREHEN METABOLIC PANEL: CPT | Performed by: INTERNAL MEDICINE

## 2023-03-20 PROCEDURE — 85025 COMPLETE CBC W/AUTO DIFF WBC: CPT | Performed by: INTERNAL MEDICINE

## 2023-03-20 PROCEDURE — 94761 N-INVAS EAR/PLS OXIMETRY MLT: CPT

## 2023-03-20 PROCEDURE — 82550 ASSAY OF CK (CPK): CPT | Performed by: INTERNAL MEDICINE

## 2023-03-20 PROCEDURE — 93010 ELECTROCARDIOGRAM REPORT: CPT | Mod: ,,, | Performed by: INTERNAL MEDICINE

## 2023-03-20 PROCEDURE — 83735 ASSAY OF MAGNESIUM: CPT | Performed by: INTERNAL MEDICINE

## 2023-03-20 PROCEDURE — 93005 ELECTROCARDIOGRAM TRACING: CPT

## 2023-03-20 PROCEDURE — 25000242 PHARM REV CODE 250 ALT 637 W/ HCPCS: Performed by: INTERNAL MEDICINE

## 2023-03-20 PROCEDURE — 83605 ASSAY OF LACTIC ACID: CPT | Performed by: INTERNAL MEDICINE

## 2023-03-20 PROCEDURE — 83880 ASSAY OF NATRIURETIC PEPTIDE: CPT | Performed by: INTERNAL MEDICINE

## 2023-03-20 PROCEDURE — 87040 BLOOD CULTURE FOR BACTERIA: CPT | Performed by: INTERNAL MEDICINE

## 2023-03-20 PROCEDURE — 93010 EKG 12-LEAD: ICD-10-PCS | Mod: ,,, | Performed by: INTERNAL MEDICINE

## 2023-03-20 PROCEDURE — 36600 WITHDRAWAL OF ARTERIAL BLOOD: CPT

## 2023-03-20 RX ORDER — IPRATROPIUM BROMIDE AND ALBUTEROL SULFATE 2.5; .5 MG/3ML; MG/3ML
3 SOLUTION RESPIRATORY (INHALATION)
Status: COMPLETED | OUTPATIENT
Start: 2023-03-20 | End: 2023-03-20

## 2023-03-20 RX ADMIN — IPRATROPIUM BROMIDE AND ALBUTEROL SULFATE 3 ML: .5; 3 SOLUTION RESPIRATORY (INHALATION) at 06:03

## 2023-03-20 NOTE — ED PROVIDER NOTES
Encounter Date: 3/20/2023       History     Chief Complaint   Patient presents with    Shortness of Breath     Brought per [employer] from [facility] with reports of respiratory distress     77-year-old white male brought to the emergency department with reports of respiratory distress however when patient arrived in the emergency department he is sitting up in bed smiling    Review of patient's allergies indicates:   Allergen Reactions    Ancef in dextrose (iso-osm)     Codeine     Penicillins      Past Medical History:   Diagnosis Date    Anticoagulant long-term use     CHF (congestive heart failure) 10/2021    EF of 25-30% on ECHO    COPD (chronic obstructive pulmonary disease)     Coronary artery disease     Depression     Difficult intubation     GERD (gastroesophageal reflux disease)     Hypertension     Mixed hyperlipidemia     PVD (peripheral vascular disease)     Seizures     Sleep apnea, unspecified     TIA (transient ischemic attack)      Past Surgical History:   Procedure Laterality Date    CORONARY ANGIOPLASTY WITH STENT PLACEMENT  10/18/2016    pLAD, pLAD, dLAD, mCX,    INSERTION OF BARE METAL STENT INTO PERIPHERAL ARTERY  2018    B/L [place] Vessels    PERCUTANEOUS BALLOON VALVULOPLASTY  04/04/2018     Family History   Problem Relation Age of Onset    Hypertension Mother     Hypertension Father     Coronary artery disease Father     Heart attack Father      Social History     Tobacco Use    Smoking status: Former    Smokeless tobacco: Never   Substance Use Topics    Alcohol use: Not Currently    Drug use: Never     Review of Systems   Constitutional: Negative.  Negative for activity change, appetite change, chills, diaphoresis, fatigue, fever and unexpected weight change.   HENT: Negative.  Negative for congestion, dental problem, drooling, ear discharge, ear pain, facial swelling, hearing loss, mouth sores, nosebleeds, postnasal drip, rhinorrhea, sinus pressure, sinus pain, sneezing, sore  throat, tinnitus, trouble swallowing and voice change.    Eyes: Negative.  Negative for photophobia, pain, discharge, redness, itching and visual disturbance.   Respiratory:  Positive for shortness of breath and wheezing. Negative for apnea, cough, choking, chest tightness and stridor.    Cardiovascular:  Positive for leg swelling. Negative for chest pain and palpitations.   Gastrointestinal: Negative.  Negative for abdominal distention, abdominal pain, anal bleeding, blood in stool, constipation, diarrhea, nausea, rectal pain and vomiting.   Endocrine: Negative.  Negative for cold intolerance, heat intolerance, polydipsia, polyphagia and polyuria.   Genitourinary: Negative.  Negative for decreased urine volume, difficulty urinating, dysuria, enuresis, flank pain, frequency, genital sores, hematuria, penile discharge, penile pain, penile swelling, scrotal swelling, testicular pain and urgency.   Musculoskeletal: Negative.  Negative for arthralgias, back pain, gait problem, joint swelling, myalgias, neck pain and neck stiffness.   Skin: Negative.  Negative for color change, pallor, rash and wound.   Allergic/Immunologic: Negative.  Negative for environmental allergies, food allergies and immunocompromised state.   Neurological: Negative.  Negative for dizziness, tremors, seizures, syncope, facial asymmetry, speech difficulty, weakness, light-headedness, numbness and headaches.   Hematological: Negative.  Negative for adenopathy. Does not bruise/bleed easily.   Psychiatric/Behavioral: Negative.  Negative for agitation, behavioral problems, confusion, decreased concentration, dysphoric mood, hallucinations, self-injury, sleep disturbance and suicidal ideas. The patient is not nervous/anxious and is not hyperactive.    All other systems reviewed and are negative.    Physical Exam     Initial Vitals [03/20/23 1813]   BP Pulse Resp Temp SpO2   (!) 143/71 (!) 53 18 97.9 °F (36.6 °C) (!) 93 %      MAP       --          Physical Exam    Nursing note and vitals reviewed.  Constitutional: He appears well-developed and well-nourished.   HENT:   Head: Normocephalic and atraumatic.   Eyes: Conjunctivae and EOM are normal. Pupils are equal, round, and reactive to light.   Neck: Neck supple.   Normal range of motion.  Cardiovascular:  Normal rate and regular rhythm.           Pulmonary/Chest: He has wheezes. He has rhonchi.   Abdominal: Abdomen is soft. Bowel sounds are normal.   Musculoskeletal:         General: Normal range of motion.      Cervical back: Normal range of motion and neck supple.     Neurological: He is alert.   Skin: Skin is warm and dry. Capillary refill takes less than 2 seconds.   Psychiatric: He has a normal mood and affect. His behavior is normal. Judgment and thought content normal.       ED Course   Procedures    Admission on 03/20/2023   Component Date Value Ref Range Status    Sodium Level 03/20/2023 139  136 - 145 mmol/L Final    Potassium Level 03/20/2023 3.8  3.5 - 5.1 mmol/L Final    Chloride 03/20/2023 104  98 - 107 mmol/L Final    Carbon Dioxide 03/20/2023 26  23 - 31 mmol/L Final    Glucose Level 03/20/2023 81 (L)  82 - 115 mg/dL Final    Blood Urea Nitrogen 03/20/2023 16.0  8.4 - 25.7 mg/dL Final    Creatinine 03/20/2023 0.97  0.73 - 1.18 mg/dL Final    Calcium Level Total 03/20/2023 9.4  8.8 - 10.0 mg/dL Final    Protein Total 03/20/2023 6.7  5.8 - 7.6 gm/dL Final    Albumin Level 03/20/2023 3.3 (L)  3.4 - 4.8 g/dL Final    Globulin 03/20/2023 3.4  2.4 - 3.5 gm/dL Final    Albumin/Globulin Ratio 03/20/2023 1.0 (L)  1.1 - 2.0 ratio Final    Bilirubin Total 03/20/2023 0.1  <=1.5 mg/dL Final    Alkaline Phosphatase 03/20/2023 85  40 - 150 unit/L Final    Alanine Aminotransferase 03/20/2023 15  0 - 55 unit/L Final    Aspartate Aminotransferase 03/20/2023 13  5 - 34 unit/L Final    eGFR 03/20/2023 >60  mls/min/1.73/m2 Final    Lactic Acid Level 03/20/2023 0.9  0.5 - 2.2 mmol/L Final    Troponin-I  03/20/2023 0.079 (H)  0.000 - 0.045 ng/mL Final    Natriuretic Peptide 03/20/2023 122.2 (H)  <=100.0 pg/mL Final    Magnesium Level 03/20/2023 1.90  1.60 - 2.60 mg/dL Final    Creatine Kinase 03/20/2023 50  30 - 200 U/L Final    WBC 03/20/2023 21.9 (H)  4.5 - 11.5 x10(3)/mcL Final    RBC 03/20/2023 4.37 (L)  4.70 - 6.10 x10(6)/mcL Final    Hgb 03/20/2023 11.9 (L)  14.0 - 18.0 g/dL Final    Hct 03/20/2023 38.2 (L)  42.0 - 52.0 % Final    MCV 03/20/2023 87.4  80.0 - 94.0 fL Final    MCH 03/20/2023 27.2  pg Final    MCHC 03/20/2023 31.2 (L)  33.0 - 36.0 g/dL Final    RDW 03/20/2023 14.3  11.5 - 17.0 % Final    Platelet 03/20/2023 274  130 - 400 x10(3)/mcL Final    MPV 03/20/2023 9.9  7.4 - 10.4 fL Final    Neut % 03/20/2023 23.5  % Final    Lymph % 03/20/2023 68.6  % Final    Mono % 03/20/2023 5.7  % Final    Eos % 03/20/2023 1.4  % Final    Basophil % 03/20/2023 0.5  % Final    Lymph # 03/20/2023 15.06 (H)  0.6 - 4.6 x10(3)/mcL Final    Neut # 03/20/2023 5.15  2.1 - 9.2 x10(3)/mcL Final    Mono # 03/20/2023 1.25  0.1 - 1.3 x10(3)/mcL Final    Eos # 03/20/2023 0.30  0 - 0.9 x10(3)/mcL Final    Baso # 03/20/2023 0.11  0 - 0.2 x10(3)/mcL Final    IG# 03/20/2023 0.07 (H)  0 - 0.04 x10(3)/mcL Final    IG% 03/20/2023 0.3  % Final    POC PH 03/20/2023 7.425  7.35 - 7.45 Final    POC PCO2 03/20/2023 44.0  35 - 45 mmHg Final    POC PO2 03/20/2023 77 (L)  80 - 100 mmHg Final    POC HCO3 03/20/2023 28.9 (H)  24 - 28 mmol/L Final    POC BE 03/20/2023 4  -2 to 2 mmol/L Final    POC SATURATED O2 03/20/2023 95  95 - 100 % Final    POC TCO2 03/20/2023 30 (H)  23 - 27 mmol/L Final    Sample 03/20/2023 ARTERIAL   Final    Site 03/20/2023 LR   Final    Allens Test 03/20/2023 Pass   Final    DelSys 03/20/2023 Room Air   Final       Labs Reviewed   COMPREHENSIVE METABOLIC PANEL - Abnormal; Notable for the following components:       Result Value    Glucose Level 81 (*)     Albumin Level 3.3 (*)     Albumin/Globulin Ratio 1.0 (*)     All  other components within normal limits   TROPONIN I - Abnormal; Notable for the following components:    Troponin-I 0.079 (*)     All other components within normal limits   B-TYPE NATRIURETIC PEPTIDE - Abnormal; Notable for the following components:    Natriuretic Peptide 122.2 (*)     All other components within normal limits   CBC WITH DIFFERENTIAL - Abnormal; Notable for the following components:    WBC 21.9 (*)     RBC 4.37 (*)     Hgb 11.9 (*)     Hct 38.2 (*)     MCHC 31.2 (*)     Lymph # 15.06 (*)     IG# 0.07 (*)     All other components within normal limits   ISTAT PROCEDURE - Abnormal; Notable for the following components:    POC PO2 77 (*)     POC HCO3 28.9 (*)     POC TCO2 30 (*)     All other components within normal limits   LACTIC ACID, PLASMA - Normal   MAGNESIUM - Normal   CK - Normal   BLOOD CULTURE [facility]   BLOOD CULTURE [facility]   CBC W/ AUTO DIFFERENTIAL    Narrative:     The following orders were created for panel order CBC auto differential.  Procedure                               Abnormality         Status                     ---------                               -----------         ------                     CBC with Differential[[id number]]        Abnormal            Final result                 Please view results for these tests on the individual orders.     EKG Readings: (Independently Interpreted)   EKG done at 6:33 p.m. on March 20, 2023 shows sinus bradycardia with a ventricular rate of 55 beats per minute     Imaging Results              X-Ray Chest AP Portable (Preliminary result)  Result time 03/20/23 21:09:12      Wet Read by Brandon Gibbons MD (03/20/23 21:09:12, [facility] - Emergency Dept, Emergency Medicine)    No acute cardiopulmonary changes noted                                     Medications   albuterol-ipratropium 2.5 mg-0.5 mg/3 mL nebulizer solution 3 mL (3 mLs Nebulization Given 3/20/23 1829)                 ED Course as of 03/20/23 2115   Mon Mar 20, 2023    2000 After DuoNeb patient has complete resolution of his symptoms and his satting 100% on room air [PL]      ED Course User Index  [PL] Brandon Gibbons MD          Medical Decision Making  77-year-old white male brought into the emergency department for apparent respiratory distress that had resolved by the time he got to the emergency department.  Patient is in this emergency department frequently with various symptoms and today his symptom was shortness of breath with a history of COPD.  He has got a chronic elevated white count but it is markedly improved from what his baseline appears to be and after 1 DuoNeb he has had completely resolution of all his symptoms    Problems Addressed:  Chronic obstructive pulmonary disease, unspecified COPD type: chronic illness or injury  Shortness of breath: acute illness or injury  SOB (shortness of breath): self-limited or minor problem  Wheezing: acute illness or injury     Details: Resolved after 1 DuoNeb    Amount and/or Complexity of Data Reviewed  Independent Historian: EMS  External Data Reviewed: labs, radiology, ECG and notes.  Labs: ordered. Decision-making details documented in ED Course.  Radiology: ordered and independent interpretation performed. Decision-making details documented in ED Course.  ECG/medicine tests: ordered and independent interpretation performed. Decision-making details documented in ED Course.            Clinical Impression:   Final diagnoses:  [R06.02] Shortness of breath  [R06.02] SOB (shortness of breath)  [J44.9] Chronic obstructive pulmonary disease, unspecified COPD type (Primary)  [R06.2] Wheezing        ED Disposition Condition    Discharge Stable          ED Prescriptions    None       Follow-up Information       Follow up With Specialties Details Why Contact Dalia Schmidt MD Family Medicine In 3 days  [address] [facility] [address]  [phone]            Nilam Lowe is a certified MA and was present during the  entire interaction with this patient      Brandon Gibbons MD  03/20/23 4540

## 2023-03-26 LAB
BACTERIA BLD CULT: NORMAL
BACTERIA BLD CULT: NORMAL

## 2023-05-20 ENCOUNTER — HOSPITAL ENCOUNTER (EMERGENCY)
Facility: HOSPITAL | Age: 78
Discharge: NURSING, REHAB OR LONG-TERM CARE | End: 2023-05-20
Attending: INTERNAL MEDICINE
Payer: MEDICARE

## 2023-05-20 VITALS
BODY MASS INDEX: 37.12 KG/M2 | TEMPERATURE: 98 F | DIASTOLIC BLOOD PRESSURE: 59 MMHG | SYSTOLIC BLOOD PRESSURE: 139 MMHG | RESPIRATION RATE: 23 BRPM | OXYGEN SATURATION: 100 % | HEART RATE: 58 BPM | WEIGHT: 237 LBS

## 2023-05-20 DIAGNOSIS — G40.909 RECURRENT SEIZURES: Primary | ICD-10-CM

## 2023-05-20 LAB — HEMOCCULT SP1 STL QL: NEGATIVE

## 2023-05-20 PROCEDURE — 82270 OCCULT BLOOD FECES: CPT | Performed by: FAMILY MEDICINE

## 2023-05-20 PROCEDURE — 96374 THER/PROPH/DIAG INJ IV PUSH: CPT

## 2023-05-20 PROCEDURE — 99284 EMERGENCY DEPT VISIT MOD MDM: CPT | Mod: 25

## 2023-05-20 PROCEDURE — 63600175 PHARM REV CODE 636 W HCPCS: Performed by: FAMILY MEDICINE

## 2023-05-20 RX ORDER — LEVETIRACETAM 500 MG/5ML
1000 INJECTION, SOLUTION, CONCENTRATE INTRAVENOUS
Status: COMPLETED | OUTPATIENT
Start: 2023-05-20 | End: 2023-05-20

## 2023-05-20 RX ADMIN — LEVETIRACETAM 1000 MG: 100 INJECTION, SOLUTION INTRAVENOUS at 06:05

## 2023-05-20 NOTE — ED PROVIDER NOTES
Encounter Date: 5/20/2023       History     Chief Complaint   Patient presents with    Seizures     Nursing home reports seizure activity, when EMS arrived pt was easily arousable and c/o headache & fatigue     Patient is a 77-year-old gentleman presents emergency room from the nursing home with reports of seizure-like activity.  Patient reports while using eating dinner tonight, he began shaking and reports feeling fatigued.  Denies loss of consciousness.  EMS reports on arrival the patient had a headache and was easily arousable.  Patient currently without complaints currently.  Takes Keppra.  Denies abdominal pain nausea or vomiting.    The history is provided by the patient, the nursing home and the EMS personnel.   Review of patient's allergies indicates:   Allergen Reactions    Ancef in dextrose (iso-osm)     Codeine     Penicillins      Past Medical History:   Diagnosis Date    Anticoagulant long-term use     CHF (congestive heart failure) 10/2021    EF of 25-30% on ECHO    COPD (chronic obstructive pulmonary disease)     Coronary artery disease     Depression     Difficult intubation     GERD (gastroesophageal reflux disease)     Hypertension     Mixed hyperlipidemia     PVD (peripheral vascular disease)     Seizures     Sleep apnea, unspecified     TIA (transient ischemic attack)      Past Surgical History:   Procedure Laterality Date    CORONARY ANGIOPLASTY WITH STENT PLACEMENT  10/18/2016    pLAD, pLAD, dLAD, mCX,    INSERTION OF BARE METAL STENT INTO PERIPHERAL ARTERY  2018    B/L [place] Vessels    PERCUTANEOUS BALLOON VALVULOPLASTY  04/04/2018     Family History   Problem Relation Age of Onset    Hypertension Mother     Hypertension Father     Coronary artery disease Father     Heart attack Father      Social History     Tobacco Use    Smoking status: Former    Smokeless tobacco: Never   Substance Use Topics    Alcohol use: Not Currently    Drug use: Never     Review of Systems   Constitutional:   Negative for chills, fatigue and fever.   HENT:  Negative for ear pain, rhinorrhea and sore throat.    Eyes:  Negative for photophobia and pain.   Respiratory:  Negative for cough, shortness of breath and wheezing.    Cardiovascular:  Negative for chest pain.   Gastrointestinal:  Negative for abdominal pain, diarrhea, nausea and vomiting.   Genitourinary:  Negative for dysuria.   Neurological:  Negative for dizziness, weakness and headaches.   All other systems reviewed and are negative.    Physical Exam     Initial Vitals [05/20/23 1728]   BP Pulse Resp Temp SpO2   (!) 120/50 62 20 97.7 °F (36.5 °C) 95 %      MAP       --         Physical Exam    Nursing note and vitals reviewed.  Constitutional: He appears well-developed and well-nourished.   HENT:   Head: Normocephalic and atraumatic.   Eyes: EOM are normal. Pupils are equal, round, and reactive to light.   Neck: Neck supple.   Normal range of motion.  Cardiovascular:  Normal rate, regular rhythm, normal heart sounds and intact distal pulses.     Exam reveals no gallop and no friction rub.       No murmur heard.  Pulmonary/Chest: Breath sounds normal. No respiratory distress.   Abdominal: Abdomen is soft. Bowel sounds are normal. He exhibits no distension. There is no abdominal tenderness.   Musculoskeletal:         General: Normal range of motion.      Cervical back: Normal range of motion and neck supple.     Neurological: He is alert and oriented to person, place, and time. He has normal strength.   Skin: Skin is warm and dry.   Psychiatric: He has a normal mood and affect. His behavior is normal. Judgment and thought content normal.       ED Course   Procedures  Labs Reviewed   OCCULT BLOOD,STOOL,SCREEN (1-3)    Narrative:     The following orders were created for panel order Occult Blood, Stool Screening (1 -3).  Procedure                               Abnormality         Status                     ---------                               -----------          ------                     Occult Blood Stool, CA S...[[id number]]                                                 OCCULT BLOOD STOOL, CA S...[[id number]]                                                   Please view results for these tests on the individual orders.   OCCULT BLOOD STOOL, CA SCREEN   OCCULT BLOOD STOOL, CA SCREEN 2ND SPEC          Imaging Results    None          Medications   levETIRAcetam injection 1,000 mg (1,000 mg Intravenous Given 5/20/23 7974)     Medical Decision Making:   Initial Assessment:   Patient is 77-year-old gentleman presents emergency room from nursing home with seizure-like activity.  Currently awake and alert.  Denies any seizures.  Does take Keppra at home.  Reports feeling his normal state of health otherwise.  Patient was on Keppra 1000 mg twice a day.  Will give a dose of his nighttime Keppra here via IV (Keppra 1000 mg).  Will continue to monitor.  Differential Diagnosis:   Recurrent seizures           ED Course as of 05/20/23 1904   Sat May 20, 2023   1841 Nursing home is now requesting a stool occult blood screen.  Will obtain and send to the lab. [MW]   1903 On rectal examination, brown stool, no blood.  Patient reports feeling at his baseline his ready go back to the nursing home.  Will send stool for an occult blood test, but stable to go back to the nursing home.  Nursing home can follow-up on results. [MW]      ED Course User Index  [MW] Steve Santiago MD                 Clinical Impression:   Final diagnoses:  [G40.909] Recurrent seizures (Primary)        ED Disposition Condition    Discharge Stable          ED Prescriptions    None       Follow-up Information       Follow up With Specialties Details Why Contact Info    [facility] - Emergency Dept Emergency Medicine  As needed, If symptoms worsen [address] [facility] [id number] [phone]    ISAC Schmidt MD Family Medicine   [address] [place] [address]  [id number]  [phone]               Steve Santiago MD  05/20/23 1906

## 2023-06-20 ENCOUNTER — OFFICE VISIT (OUTPATIENT)
Dept: HEMATOLOGY/ONCOLOGY | Facility: CLINIC | Age: 78
End: 2023-06-20
Payer: MEDICARE

## 2023-06-20 VITALS
DIASTOLIC BLOOD PRESSURE: 74 MMHG | SYSTOLIC BLOOD PRESSURE: 153 MMHG | RESPIRATION RATE: 18 BRPM | BODY MASS INDEX: 37.12 KG/M2 | HEIGHT: 67 IN | TEMPERATURE: 99 F | OXYGEN SATURATION: 98 % | HEART RATE: 62 BPM

## 2023-06-20 DIAGNOSIS — C91.10 CLL (CHRONIC LYMPHOCYTIC LEUKEMIA): Primary | ICD-10-CM

## 2023-06-20 PROCEDURE — 99214 PR OFFICE/OUTPT VISIT, EST, LEVL IV, 30-39 MIN: ICD-10-PCS | Mod: S$PBB,,, | Performed by: NURSE PRACTITIONER

## 2023-06-20 PROCEDURE — 99999 PR PBB SHADOW E&M-EST. PATIENT-LVL V: ICD-10-PCS | Mod: PBBFAC,,, | Performed by: NURSE PRACTITIONER

## 2023-06-20 PROCEDURE — 99214 OFFICE O/P EST MOD 30 MIN: CPT | Mod: S$PBB,,, | Performed by: NURSE PRACTITIONER

## 2023-06-20 PROCEDURE — 99215 OFFICE O/P EST HI 40 MIN: CPT | Mod: PBBFAC | Performed by: NURSE PRACTITIONER

## 2023-06-20 PROCEDURE — 99999 PR PBB SHADOW E&M-EST. PATIENT-LVL V: CPT | Mod: PBBFAC,,, | Performed by: NURSE PRACTITIONER

## 2023-06-21 NOTE — PROGRESS NOTES
Subjective:       Patient ID: John Wayne is a 77 y.o. male.      Chief Complaint: CLL (No complaints)      Patient is a 77 y.o. year old male with oncology and medical history as below.     Interval history: (06/20/2023)  Patient returns for F/U today for his CLL.   He presents in a wheelchair and currently staying at a nursing home.   He was recently hospitalized for seizure-like activity in January and May 2023, which is an ongoing uncontrolled issue.  He was also seen in the ER in March for COPD exacerbation/SOB.     Today he presents with his caregiver from the home, he is in good spirits. He is able to answer questions appropriately.  He denies symptoms such as a voluntary weight loss, fever, recurring infections, abdominal pain, diarrhea or constipation or reflux. He denies abnormal bruising/bleeding, abnormal lumps or masses.       Oncology History    No history exists.     Past Medical History:   Diagnosis Date    Anticoagulant long-term use     CHF (congestive heart failure) 10/2021    EF of 25-30% on ECHO    COPD (chronic obstructive pulmonary disease)     Coronary artery disease     Depression     Difficult intubation     GERD (gastroesophageal reflux disease)     Hypertension     Mixed hyperlipidemia     PVD (peripheral vascular disease)     Seizures     Sleep apnea, unspecified     TIA (transient ischemic attack)       Review of patient's allergies indicates:   Allergen Reactions    Ancef in dextrose (iso-osm)     Codeine     Penicillins       Current Outpatient Medications on File Prior to Visit   Medication Sig Dispense Refill    amiodarone (PACERONE) 200 MG Tab Take 200 mg by mouth once daily.      atorvastatin (LIPITOR) 40 MG tablet Take 40 mg by mouth every evening.      clopidogreL (PLAVIX) 75 mg tablet Take 1 tablet by mouth Daily.      ELIQUIS 5 mg Tab Take 1 tablet by mouth 2 (two) times a day.      finasteride (PROSCAR) 5 mg tablet Take 1 tablet (5 mg total) by mouth once daily. (Patient  taking differently: Take 5 mg by mouth nightly.) 30 tablet 0    furosemide (LASIX) 40 MG tablet Take 40 mg oral two times a day x 5 days then after 40 mg oral daily 60 tablet 1    levETIRAcetam (KEPPRA) 1000 MG tablet Take 1 tablet by mouth 2 (two) times a day.      lisinopriL (PRINIVIL,ZESTRIL) 5 MG tablet Take 1 tablet by mouth Daily.      metoprolol succinate (TOPROL-XL) 25 MG 24 hr tablet Take 1 tablet by mouth Daily.      pantoprazole (PROTONIX) 40 MG tablet Take 40 mg by mouth Daily.      phenytoin (DILANTIN) 100 MG ER capsule Take 100 mg by mouth.      phenytoin (DILANTIN) 50 mg chewable tablet Take by mouth.      tamsulosin (FLOMAX) 0.4 mg Cap Take 1 tablet by mouth nightly.      venlafaxine (EFFEXOR) 75 MG tablet Take 1 tablet (75 mg total) by mouth 2 (two) times daily. 60 tablet 0    PHENobarbitaL (LUMINAL) 15 MG tablet Take 4 tablets (60 mg total) by mouth once daily for 7 days, THEN 2 tablets (30 mg total) once daily for 7 days, THEN 1 tablet (15 mg total) once daily for 7 days, THEN 0.5 tablets (7.5 mg total) once daily for 7 days. 1 tablet 0     No current facility-administered medications on file prior to visit.                 ECOG: 3    Review of Systems  All 12 review of systems negative except as mentioned in HPI.     Physical Exam  Gen: Well appearing, appears stated age, in no acute distress  CV: RRR, no murmurs / thrills auscultated  Pulm: CTA bilaterally, normal respiratory effort  GI: Nondistended, nontender to palpation  Skin: No rashes noted, warm to touch  Extremities: Bilateral lower extremity edema.   MSK: Normal ROM.   Psych: Normal affect, normal speech and thought content  Neuro: AAOx3      No visits with results within 2 Week(s) from this visit.   Latest known visit with results is:   Admission on 05/20/2023, Discharged on 05/20/2023   Component Date Value    Occult Blood Stool 1 05/20/2023 Negative           Problem list:  1. CLL (chronic lymphocytic leukemia)         Assessment:        CLL   He has borderline cytopenias. His leukocytosis has increased to 25.1, but stable over the last 6 months. His anemia is mild at 13.1 and no lymphadenopathy or B symptoms.   Given his other comoridities such as increasing neurological impairment, seizures, COPD, risks outweight benefits of starting any therapy at this time.   Will continue monitoring unless CLL is creating significant tumor burden    Plan:         RTC in 3 months with MD + labs (CBC, CMP, LDH)

## 2023-07-27 ENCOUNTER — HOSPITAL ENCOUNTER (EMERGENCY)
Facility: HOSPITAL | Age: 78
End: 2023-07-27
Attending: EMERGENCY MEDICINE
Payer: MEDICARE

## 2023-07-27 VITALS
SYSTOLIC BLOOD PRESSURE: 148 MMHG | OXYGEN SATURATION: 97 % | DIASTOLIC BLOOD PRESSURE: 76 MMHG | HEART RATE: 65 BPM | RESPIRATION RATE: 18 BRPM | BODY MASS INDEX: 36.1 KG/M2 | WEIGHT: 230 LBS | HEIGHT: 67 IN | TEMPERATURE: 98 F

## 2023-07-27 DIAGNOSIS — G40.909 SEIZURE DISORDER: Primary | ICD-10-CM

## 2023-07-27 LAB
ABS NEUT CALC (OHS): 11.34 X10(3)/MCL (ref 2.1–9.2)
ALBUMIN SERPL-MCNC: 3.4 G/DL (ref 3.4–4.8)
ALBUMIN/GLOB SERPL: 1 RATIO (ref 1.1–2)
ALP SERPL-CCNC: 106 UNIT/L (ref 40–150)
ALT SERPL-CCNC: 26 UNIT/L (ref 0–55)
AST SERPL-CCNC: 20 UNIT/L (ref 5–34)
BILIRUBIN DIRECT+TOT PNL SERPL-MCNC: 0.2 MG/DL
BUN SERPL-MCNC: 11 MG/DL (ref 8.4–25.7)
BURR CELLS (OLG): SLIGHT
CALCIUM SERPL-MCNC: 9.3 MG/DL (ref 8.8–10)
CHLORIDE SERPL-SCNC: 103 MMOL/L (ref 98–107)
CO2 SERPL-SCNC: 26 MMOL/L (ref 23–31)
CREAT SERPL-MCNC: 0.85 MG/DL (ref 0.73–1.18)
ERYTHROCYTE [DISTWIDTH] IN BLOOD BY AUTOMATED COUNT: 15.4 % (ref 11.5–17)
GFR SERPLBLD CREATININE-BSD FMLA CKD-EPI: >60 MLS/MIN/1.73/M2
GLOBULIN SER-MCNC: 3.3 GM/DL (ref 2.4–3.5)
GLUCOSE SERPL-MCNC: 131 MG/DL (ref 82–115)
HCT VFR BLD AUTO: 41.8 % (ref 42–52)
HGB BLD-MCNC: 13.5 G/DL (ref 14–18)
LYMPHOCYTES NFR BLD MANUAL: 16.42 X10(3)/MCL
LYMPHOCYTES NFR BLD MANUAL: 55 % (ref 13–40)
MAGNESIUM SERPL-MCNC: 2 MG/DL (ref 1.6–2.6)
MCH RBC QN AUTO: 26.6 PG (ref 27–31)
MCHC RBC AUTO-ENTMCNC: 32.3 G/DL (ref 33–36)
MCV RBC AUTO: 82.4 FL (ref 80–94)
MONOCYTES NFR BLD MANUAL: 2.09 X10(3)/MCL (ref 0.1–1.3)
MONOCYTES NFR BLD MANUAL: 7 % (ref 2–11)
NEUTROPHILS NFR BLD MANUAL: 38 % (ref 47–80)
OVALOCYTES (OLG): SLIGHT
PHENOBARB SERPL-MCNC: 21.8 UG/ML (ref 15–40)
PHENYTOIN SERPL-MCNC: 9.7 UG/ML (ref 10–20)
PHOSPHATE SERPL-MCNC: 3.5 MG/DL (ref 2.3–4.7)
PLATELET # BLD AUTO: 233 X10(3)/MCL (ref 130–400)
PLATELET # BLD EST: ADEQUATE 10*3/UL
PMV BLD AUTO: 9.5 FL (ref 7.4–10.4)
POIKILOCYTOSIS BLD QL SMEAR: SLIGHT
POTASSIUM SERPL-SCNC: 3.7 MMOL/L (ref 3.5–5.1)
PROT SERPL-MCNC: 6.7 GM/DL (ref 5.8–7.6)
RBC # BLD AUTO: 5.07 X10(6)/MCL (ref 4.7–6.1)
RBC MORPH BLD: ABNORMAL
SODIUM SERPL-SCNC: 138 MMOL/L (ref 136–145)
WBC # SPEC AUTO: 29.85 X10(3)/MCL (ref 4.5–11.5)

## 2023-07-27 PROCEDURE — 80184 ASSAY OF PHENOBARBITAL: CPT | Performed by: EMERGENCY MEDICINE

## 2023-07-27 PROCEDURE — 85027 COMPLETE CBC AUTOMATED: CPT | Performed by: EMERGENCY MEDICINE

## 2023-07-27 PROCEDURE — 84100 ASSAY OF PHOSPHORUS: CPT | Performed by: EMERGENCY MEDICINE

## 2023-07-27 PROCEDURE — 99284 EMERGENCY DEPT VISIT MOD MDM: CPT | Mod: 25

## 2023-07-27 PROCEDURE — 25000003 PHARM REV CODE 250: Performed by: EMERGENCY MEDICINE

## 2023-07-27 PROCEDURE — 85025 COMPLETE CBC W/AUTO DIFF WBC: CPT | Performed by: EMERGENCY MEDICINE

## 2023-07-27 PROCEDURE — 80185 ASSAY OF PHENYTOIN TOTAL: CPT | Performed by: EMERGENCY MEDICINE

## 2023-07-27 PROCEDURE — 80053 COMPREHEN METABOLIC PANEL: CPT | Performed by: EMERGENCY MEDICINE

## 2023-07-27 PROCEDURE — 83735 ASSAY OF MAGNESIUM: CPT | Performed by: EMERGENCY MEDICINE

## 2023-07-27 RX ORDER — PHENYTOIN SODIUM 100 MG/1
100 CAPSULE, EXTENDED RELEASE ORAL
Status: COMPLETED | OUTPATIENT
Start: 2023-07-27 | End: 2023-07-27

## 2023-07-27 RX ADMIN — PHENYTOIN SODIUM 100 MG: 100 CAPSULE ORAL at 07:07

## 2023-07-27 NOTE — ED PROVIDER NOTES
Encounter Date: 7/27/2023       History     Chief Complaint   Patient presents with    Seizures     Pt arrives from [facility] per [facility]. EMS states pt had seizure @ Nursing home last ing around 30 min , received 5mg of valium IN at NH and 5mg IM per [facility].     The patient is a 77-year-old male with a history of seizure disorder, coronary artery disease, TIAs, depression, hypertension, GERD, who presents to emergency department with seizure-like activity.  Patient in emergency department gradient 30 minutes prior to my arrival, no other information available.  The patient himself states that he had a seizure today, states it has been awhile since his last seizure.  He states he has been compliant with his medications at the nursing home.  He denies any headache, visual disturbances, abdominal pain, nausea, vomiting, difficulty sleeping, diarrhea, dysuria or constipation.  He does admit to having dry cough for unknown amount of time.  The patient does state that he feels much better at this time.    The history is provided by the patient. No  was used.   Seizures   This is a recurrent problem. The current episode started just prior to arrival. Associated symptoms include cough.   Review of patient's allergies indicates:   Allergen Reactions    Ancef in dextrose (iso-osm)     Codeine     Penicillins      Past Medical History:   Diagnosis Date    Anticoagulant long-term use     CHF (congestive heart failure) 10/2021    EF of 25-30% on ECHO    COPD (chronic obstructive pulmonary disease)     Coronary artery disease     Depression     Difficult intubation     GERD (gastroesophageal reflux disease)     Hypertension     Mixed hyperlipidemia     PVD (peripheral vascular disease)     Seizures     Sleep apnea, unspecified     TIA (transient ischemic attack)      Past Surgical History:   Procedure Laterality Date    CORONARY ANGIOPLASTY WITH STENT PLACEMENT  10/18/2016    pLAD, pLAD, dLDMITRY, mCX,     INSERTION OF BARE METAL STENT INTO PERIPHERAL ARTERY  2018    B/L [place] Vessels    PERCUTANEOUS BALLOON VALVULOPLASTY  04/04/2018     Family History   Problem Relation Age of Onset    Hypertension Mother     Hypertension Father     Coronary artery disease Father     Heart attack Father      Social History     Tobacco Use    Smoking status: Former    Smokeless tobacco: Never   Substance Use Topics    Alcohol use: Not Currently    Drug use: Never     Review of Systems   Respiratory:  Positive for cough.    Neurological:  Positive for seizures.   All other systems reviewed and are negative.    Physical Exam     Initial Vitals [07/27/23 1730]   BP Pulse Resp Temp SpO2   (!) 204/93 87 18 98.4 °F (36.9 °C) (!) 91 %      MAP       --         Physical Exam    Nursing note and vitals reviewed.  Constitutional: He appears well-developed and well-nourished. He is not diaphoretic. No distress.   HENT:   Head: Normocephalic and atraumatic.   Dry mucous membranes   Eyes: Conjunctivae and EOM are normal. Pupils are equal, round, and reactive to light.   Neck: Neck supple. No JVD present.   Normal range of motion.  Cardiovascular:  Normal rate, regular rhythm, normal heart sounds and intact distal pulses.           No murmur heard.  Pulmonary/Chest: Breath sounds normal. No respiratory distress. He has no wheezes. He has no rhonchi.   Abdominal: Abdomen is soft. Bowel sounds are normal. He exhibits no distension. There is no abdominal tenderness.   Genitourinary:    Genitourinary Comments: Deferred     Musculoskeletal:         General: No edema. Normal range of motion.      Cervical back: Normal range of motion and neck supple.     Neurological: He is alert and oriented to person, place, and time. He has normal strength. No sensory deficit.   Skin: Skin is warm and dry. Capillary refill takes less than 2 seconds.   Psychiatric: He has a normal mood and affect. His behavior is normal. Judgment and thought content normal.       ED  Course   Procedures  Labs Reviewed   COMPREHENSIVE METABOLIC PANEL - Abnormal; Notable for the following components:       Result Value    Glucose Level 131 (*)     Albumin/Globulin Ratio 1.0 (*)     All other components within normal limits   PHENYTOIN LEVEL, TOTAL - Abnormal; Notable for the following components:    Phenytoin Level Total 9.7 (*)     All other components within normal limits   CBC WITH DIFFERENTIAL - Abnormal; Notable for the following components:    WBC 29.85 (*)     Hgb 13.5 (*)     Hct 41.8 (*)     MCH 26.6 (*)     MCHC 32.3 (*)     All other components within normal limits   MANUAL DIFFERENTIAL - Abnormal; Notable for the following components:    Neutrophils % 38 (*)     Lymphs % 55 (*)     Neutrophils Abs Calc 11.343 (*)     Lymphs Abs 16.4175 (*)     Monocytes Abs 2.0895 (*)     RBC Morph Abnormal (*)     Poikilocytosis Slight (*)     Ovalocytes Slight (*)     [place] Cells Slight (*)     All other components within normal limits   PHENOBARBITAL LEVEL - Normal   MAGNESIUM - Normal   PHOSPHORUS - Normal   CBC W/ AUTO DIFFERENTIAL    Narrative:     The following orders were created for panel order CBC Auto Differential.  Procedure                               Abnormality         Status                     ---------                               -----------         ------                     CBC with Differential[[id number]]        Abnormal            Final result               Manual Differential[[id number]]          Abnormal            Final result                 Please view results for these tests on the individual orders.          Imaging Results              X-Ray Chest 1 View (Final result)  Result time 07/27/23 19:47:16      Final result by Daniel Hong MD (07/27/23 19:47:16)                   Impression:      Low lung volumes with mild patchy opacities in both lung bases which may reflect atelectasis and or pneumonitis.      Electronically signed by: Daniel Hong  MD  Date:    07/27/2023  Time:    19:47               Narrative:    EXAMINATION:  Chest one view    CLINICAL HISTORY:  Aspiration    COMPARISON:  03/20/2023    FINDINGS:  Cardiac silhouette is enlarged.  Calcifications aortic knob are noted.  No consolidation seen..  Lung volumes are low.  There are mild patchy opacities in the lung bases which may reflect atelectasis and or pneumonitis.    No visible pneumothorax or pleural effusion.                                       Medications   phenytoin (DILANTIN) ER capsule 100 mg (100 mg Oral Given 7/27/23 1943)     Medical Decision Making:   Patient is a 77-year-old male with history of seizures who presents to the emergency department from his nursing home with seizure.  Dilantin in mildly subtherapeutic, remainder of workup normal.  Patient's white blood cell count is noted, has been greater than 20 for several months at this time, no concern for any infection, question erin CML.  We will discharge patient back to his nursing home, will give small dose of Dilantin here in emergency department.                        Clinical Impression:   Final diagnoses:  [G40.909] Seizure disorder (Primary)        ED Disposition Condition    Discharge Stable          ED Prescriptions    None       Follow-up Information    None          Adrienne Mora MD  07/27/23 2056

## 2023-09-26 ENCOUNTER — OFFICE VISIT (OUTPATIENT)
Dept: HEMATOLOGY/ONCOLOGY | Facility: CLINIC | Age: 78
End: 2023-09-26
Payer: MEDICARE

## 2023-09-26 VITALS
TEMPERATURE: 99 F | OXYGEN SATURATION: 97 % | DIASTOLIC BLOOD PRESSURE: 71 MMHG | HEART RATE: 53 BPM | SYSTOLIC BLOOD PRESSURE: 153 MMHG | RESPIRATION RATE: 18 BRPM

## 2023-09-26 DIAGNOSIS — R56.9 SEIZURES: ICD-10-CM

## 2023-09-26 DIAGNOSIS — C91.10 CLL (CHRONIC LYMPHOCYTIC LEUKEMIA): Primary | ICD-10-CM

## 2023-09-26 PROCEDURE — 99214 OFFICE O/P EST MOD 30 MIN: CPT | Mod: PBBFAC

## 2023-09-26 PROCEDURE — 99214 PR OFFICE/OUTPT VISIT, EST, LEVL IV, 30-39 MIN: ICD-10-PCS | Mod: S$PBB,,,

## 2023-09-26 PROCEDURE — 99214 OFFICE O/P EST MOD 30 MIN: CPT | Mod: S$PBB,,,

## 2023-09-26 PROCEDURE — 99999 PR PBB SHADOW E&M-EST. PATIENT-LVL IV: ICD-10-PCS | Mod: PBBFAC,,,

## 2023-09-26 PROCEDURE — 99999 PR PBB SHADOW E&M-EST. PATIENT-LVL IV: CPT | Mod: PBBFAC,,,

## 2023-09-26 NOTE — PROGRESS NOTES
Subjective:       Patient ID: John Wayne is a 77 y.o. male.      Chief Complaint: Leukemia (Patient c/o frequent seizures since last visit.)      Patient is a 77 y.o. year old male with oncology and medical history as below.     Interval history:   He returns to clinic today for a three-month follow up regarding his CLL.  He continues to reside in a nursing home.  He is able to answer questions appropriately.  He was last hospitalized 07/27/2023 for his seizure disorder.  He denies symptoms such as a voluntary weight loss, fever, recurring infections, abdominal pain, diarrhea or constipation or reflux. He denies abnormal bruising/bleeding, abnormal lumps or masses.  Labs were reviewed in detail with him.    Oncology History    No history exists.     Past Medical History:   Diagnosis Date    Anticoagulant long-term use     CHF (congestive heart failure) 10/2021    EF of 25-30% on ECHO    COPD (chronic obstructive pulmonary disease)     Coronary artery disease     Depression     Difficult intubation     GERD (gastroesophageal reflux disease)     Hypertension     Mixed hyperlipidemia     PVD (peripheral vascular disease)     Seizures     Sleep apnea, unspecified     TIA (transient ischemic attack)       Review of patient's allergies indicates:   Allergen Reactions    Ancef in dextrose (iso-osm)     Codeine     Penicillins       Current Outpatient Medications on File Prior to Visit   Medication Sig Dispense Refill    amiodarone (PACERONE) 200 MG Tab Take 200 mg by mouth once daily.      atorvastatin (LIPITOR) 40 MG tablet Take 40 mg by mouth every evening.      clopidogreL (PLAVIX) 75 mg tablet Take 1 tablet by mouth Daily.      ELIQUIS 5 mg Tab Take 1 tablet by mouth 2 (two) times a day.      finasteride (PROSCAR) 5 mg tablet Take 1 tablet (5 mg total) by mouth once daily. (Patient taking differently: Take 5 mg by mouth nightly.) 30 tablet 0    furosemide (LASIX) 40 MG tablet Take 40 mg oral two times a day x 5 days  then after 40 mg oral daily 60 tablet 1    levETIRAcetam (KEPPRA) 1000 MG tablet Take 1 tablet by mouth 2 (two) times a day.      lisinopriL (PRINIVIL,ZESTRIL) 5 MG tablet Take 1 tablet by mouth Daily.      metoprolol succinate (TOPROL-XL) 25 MG 24 hr tablet Take 1 tablet by mouth Daily.      pantoprazole (PROTONIX) 40 MG tablet Take 40 mg by mouth Daily.      phenytoin (DILANTIN) 100 MG ER capsule Take 100 mg by mouth.      phenytoin (DILANTIN) 50 mg chewable tablet Take by mouth.      tamsulosin (FLOMAX) 0.4 mg Cap Take 1 tablet by mouth nightly.      venlafaxine (EFFEXOR) 75 MG tablet Take 1 tablet (75 mg total) by mouth 2 (two) times daily. 60 tablet 0    PHENobarbitaL (LUMINAL) 15 MG tablet Take 4 tablets (60 mg total) by mouth once daily for 7 days, THEN 2 tablets (30 mg total) once daily for 7 days, THEN 1 tablet (15 mg total) once daily for 7 days, THEN 0.5 tablets (7.5 mg total) once daily for 7 days. 1 tablet 0     No current facility-administered medications on file prior to visit.                 ECOG: 3    Review of Systems  Review of Systems   Constitutional:  Negative for appetite change, chills, fatigue, fever and unexpected weight change.   HENT:  Negative for ear discharge, facial swelling, mouth sores, nosebleeds, sinus pressure/congestion and sore throat.    Eyes:  Negative for pain and visual disturbance.   Respiratory:  Negative for cough, chest tightness, shortness of breath and wheezing.    Cardiovascular:  Negative for chest pain, palpitations and leg swelling.   Gastrointestinal:  Negative for abdominal pain, blood in stool, change in bowel habit, constipation, diarrhea, nausea and vomiting.   Endocrine: Negative.    Genitourinary:  Negative for dysuria, frequency, hematuria and urgency.   Musculoskeletal:  Negative for arthralgias, gait problem, joint swelling and myalgias.   Integumentary:  Negative for color change, pallor, rash and wound.   Allergic/Immunologic: Negative.  Negative for  frequent infections.   Neurological:  Negative for dizziness, vertigo, syncope, weakness and numbness.   Hematological:  Negative for adenopathy. Does not bruise/bleed easily.   Psychiatric/Behavioral:  Negative for confusion and dysphoric mood. The patient is not nervous/anxious.    All other systems reviewed and are negative.       Physical Exam  Physical Exam  Vitals reviewed.   Constitutional:       General: He is not in acute distress.     Appearance: Normal appearance. He is normal weight.   HENT:      Head: Normocephalic and atraumatic.      Nose: Nose normal.      Mouth/Throat:      Mouth: Mucous membranes are moist.      Pharynx: Oropharynx is clear. No oropharyngeal exudate or posterior oropharyngeal erythema.   Eyes:      Extraocular Movements: Extraocular movements intact.      Conjunctiva/sclera: Conjunctivae normal.      Pupils: Pupils are equal, round, and reactive to light.   Cardiovascular:      Rate and Rhythm: Normal rate and regular rhythm.      Pulses: Normal pulses.      Heart sounds: No murmur heard.     No friction rub. No gallop.   Pulmonary:      Effort: Pulmonary effort is normal. No respiratory distress.      Breath sounds: No wheezing, rhonchi or rales.   Abdominal:      General: Abdomen is flat. Bowel sounds are normal.      Palpations: Abdomen is soft. There is no mass.      Tenderness: There is no abdominal tenderness. There is no guarding.   Musculoskeletal:         General: No swelling, tenderness or deformity. Normal range of motion.      Cervical back: Normal range of motion and neck supple.      Right lower leg: No edema.      Left lower leg: No edema.      Comments: Presents in wheelchair   Lymphadenopathy:      Cervical: No cervical adenopathy.   Skin:     General: Skin is warm and dry.      Findings: No erythema, lesion or rash.   Neurological:      General: No focal deficit present.      Mental Status: He is alert and oriented to person, place, and time. Mental status is at  baseline.      Cranial Nerves: No cranial nerve deficit.      Gait: Gait normal.   Psychiatric:         Mood and Affect: Mood normal.         Behavior: Behavior normal.         Thought Content: Thought content normal.         Judgment: Judgment normal.            No visits with results within 2 Week(s) from this visit.   Latest known visit with results is:   Admission on 07/27/2023, Discharged on 07/27/2023   Component Date Value    Sodium Level 07/27/2023 138     Potassium Level 07/27/2023 3.7     Chloride 07/27/2023 103     Carbon Dioxide 07/27/2023 26     Glucose Level 07/27/2023 131 (H)     Blood Urea Nitrogen 07/27/2023 11.0     Creatinine 07/27/2023 0.85     Calcium Level Total 07/27/2023 9.3     Protein Total 07/27/2023 6.7     Albumin Level 07/27/2023 3.4     Globulin 07/27/2023 3.3     Albumin/Globulin Ratio 07/27/2023 1.0 (L)     Bilirubin Total 07/27/2023 0.2     Alkaline Phosphatase 07/27/2023 106     Alanine Aminotransferase 07/27/2023 26     Aspartate Aminotransfera* 07/27/2023 20     eGFR 07/27/2023 >60     Phenytoin Level Total 07/27/2023 9.7 (L)     Phenobarbital Level 07/27/2023 21.8     Magnesium Level 07/27/2023 2.00     Phosphorus Level 07/27/2023 3.5     WBC 07/27/2023 29.85 (H)     RBC 07/27/2023 5.07     Hgb 07/27/2023 13.5 (L)     Hct 07/27/2023 41.8 (L)     MCV 07/27/2023 82.4     MCH 07/27/2023 26.6 (L)     MCHC 07/27/2023 32.3 (L)     RDW 07/27/2023 15.4     Platelet 07/27/2023 233     MPV 07/27/2023 9.5     Neutrophils % 07/27/2023 38 (L)     Lymphs % 07/27/2023 55 (H)     Monocytes % 07/27/2023 7     Neutrophils Abs Calc 07/27/2023 11.343 (H)     Lymphs Abs 07/27/2023 16.4175 (H)     Monocytes Abs 07/27/2023 2.0895 (H)     Platelets 07/27/2023 Adequate     RBC Morph 07/27/2023 Abnormal (A)     Poikilocytosis 07/27/2023 Slight (A)     Ovalocytes 07/27/2023 Slight (A)     Sarmad Cells 07/27/2023 Slight (A)           Problem list:  1. CLL (chronic lymphocytic leukemia)    2. Seizures            Assessment:   CLL   He has borderline cytopenias. His leukocytosis has increased to 26.8, but stable over the last 6 months. His anemia is mild at 13.2 today and no lymphadenopathy or B symptoms.   Given his other comoridities such as increasing neurological impairment, seizures, COPD, risks outweight benefits of starting any therapy at this time.   Will continue monitoring unless CLL is creating significant tumor burden    Plan:   Labs stable.  Continue observation.    Return to clinic in 4 months with NP for follow up/lab      BONI Garcia  Oncology/Hematology   [facility] personally reviewed all pertinent imaging, lab results, explained to the patient the pertinent information in detail including radiographic imaging, abnormal lab results. All questions were answered thoroughly. Total time spent on this visit was >30 minutes.

## 2023-10-21 ENCOUNTER — HOSPITAL ENCOUNTER (EMERGENCY)
Facility: HOSPITAL | Age: 78
Discharge: ANOTHER HOSPITAL | End: 2023-10-21
Attending: INTERNAL MEDICINE
Payer: MEDICARE

## 2023-10-21 VITALS
HEART RATE: 75 BPM | TEMPERATURE: 97 F | HEIGHT: 67 IN | OXYGEN SATURATION: 100 % | DIASTOLIC BLOOD PRESSURE: 80 MMHG | BODY MASS INDEX: 37.67 KG/M2 | SYSTOLIC BLOOD PRESSURE: 138 MMHG | WEIGHT: 240 LBS | RESPIRATION RATE: 21 BRPM

## 2023-10-21 DIAGNOSIS — J18.9 PNEUMONIA OF BOTH LOWER LOBES DUE TO INFECTIOUS ORGANISM: Primary | ICD-10-CM

## 2023-10-21 DIAGNOSIS — G40.909 RECURRENT SEIZURES: ICD-10-CM

## 2023-10-21 DIAGNOSIS — R06.02 SHORTNESS OF BREATH: ICD-10-CM

## 2023-10-21 DIAGNOSIS — D72.829 LEUKOCYTOSIS, UNSPECIFIED TYPE: ICD-10-CM

## 2023-10-21 DIAGNOSIS — C91.10 CLL (CHRONIC LYMPHOCYTIC LEUKEMIA): ICD-10-CM

## 2023-10-21 DIAGNOSIS — E87.29 RESPIRATORY ACIDOSIS: ICD-10-CM

## 2023-10-21 DIAGNOSIS — J44.9 CHRONIC OBSTRUCTIVE PULMONARY DISEASE, UNSPECIFIED COPD TYPE: ICD-10-CM

## 2023-10-21 LAB
ABS NEUT CALC (OHS): 23.08 X10(3)/MCL (ref 2.1–9.2)
ALBUMIN SERPL-MCNC: 3.5 G/DL (ref 3.4–4.8)
ALBUMIN/GLOB SERPL: 0.9 RATIO (ref 1.1–2)
ALLENS TEST: ABNORMAL
ALLENS TEST: ABNORMAL
ALP SERPL-CCNC: 122 UNIT/L (ref 40–150)
ALT SERPL-CCNC: 21 UNIT/L (ref 0–55)
AST SERPL-CCNC: 20 UNIT/L (ref 5–34)
BILIRUB SERPL-MCNC: 0.2 MG/DL
BNP BLD-MCNC: 110.3 PG/ML
BUN SERPL-MCNC: 13 MG/DL (ref 8.4–25.7)
CALCIUM SERPL-MCNC: 9.6 MG/DL (ref 8.8–10)
CHLORIDE SERPL-SCNC: 105 MMOL/L (ref 98–107)
CO2 SERPL-SCNC: 18 MMOL/L (ref 23–31)
CREAT SERPL-MCNC: 0.83 MG/DL (ref 0.73–1.18)
DELSYS: ABNORMAL
DELSYS: ABNORMAL
EOSINOPHIL NFR BLD MANUAL: 1.03 X10(3)/MCL (ref 0–0.9)
EOSINOPHIL NFR BLD MANUAL: 2 % (ref 0–8)
EP: 7
EP: 7
ERYTHROCYTE [DISTWIDTH] IN BLOOD BY AUTOMATED COUNT: 14.3 % (ref 11.5–17)
ERYTHROCYTE [SEDIMENTATION RATE] IN BLOOD BY WESTERGREN METHOD: 20 MM/H
ERYTHROCYTE [SEDIMENTATION RATE] IN BLOOD BY WESTERGREN METHOD: 20 MM/H
FIO2: 50
FIO2: 50
GFR SERPLBLD CREATININE-BSD FMLA CKD-EPI: >60 MLS/MIN/1.73/M2
GLOBULIN SER-MCNC: 3.7 GM/DL (ref 2.4–3.5)
GLUCOSE SERPL-MCNC: 183 MG/DL (ref 82–115)
HCO3 UR-SCNC: 21.6 MMOL/L (ref 24–28)
HCO3 UR-SCNC: 22.9 MMOL/L (ref 24–28)
HCT VFR BLD AUTO: 49.8 % (ref 42–52)
HGB BLD-MCNC: 15.5 G/DL (ref 14–18)
IP: 15
IP: 20
LACTATE SERPL-SCNC: 2.1 MMOL/L (ref 0.5–2.2)
LYMPHOCYTES NFR BLD MANUAL: 24.1 X10(3)/MCL
LYMPHOCYTES NFR BLD MANUAL: 47 % (ref 13–40)
MCH RBC QN AUTO: 27.4 PG (ref 27–31)
MCHC RBC AUTO-ENTMCNC: 31.1 G/DL (ref 33–36)
MCV RBC AUTO: 88.1 FL (ref 80–94)
MODE: ABNORMAL
MODE: ABNORMAL
MONOCYTES NFR BLD MANUAL: 3.59 X10(3)/MCL (ref 0.1–1.3)
MONOCYTES NFR BLD MANUAL: 7 % (ref 2–11)
NEUTROPHILS NFR BLD MANUAL: 45 % (ref 47–80)
PCO2 BLDA: 38 MMHG (ref 35–45)
PCO2 BLDA: 48.9 MMHG (ref 35–45)
PH SMN: 7.28 [PH] (ref 7.35–7.45)
PH SMN: 7.36 [PH] (ref 7.35–7.45)
PLATELET # BLD AUTO: 313 X10(3)/MCL (ref 130–400)
PLATELET # BLD EST: NORMAL 10*3/UL
PMV BLD AUTO: 9.8 FL (ref 7.4–10.4)
PO2 BLDA: 51 MMHG (ref 80–100)
PO2 BLDA: 85 MMHG (ref 80–100)
POC BE: -4 MMOL/L
POC BE: -4 MMOL/L
POC SATURATED O2: 84 % (ref 95–100)
POC SATURATED O2: 95 % (ref 95–100)
POC TCO2: 23 MMOL/L (ref 23–27)
POC TCO2: 24 MMOL/L (ref 23–27)
POTASSIUM SERPL-SCNC: 4.3 MMOL/L (ref 3.5–5.1)
PROT SERPL-MCNC: 7.2 GM/DL (ref 5.8–7.6)
RBC # BLD AUTO: 5.65 X10(6)/MCL (ref 4.7–6.1)
RBC MORPH BLD: NORMAL
SAMPLE: ABNORMAL
SAMPLE: ABNORMAL
SITE: ABNORMAL
SITE: ABNORMAL
SODIUM SERPL-SCNC: 136 MMOL/L (ref 136–145)
SPONT RATE: 4
TROPONIN I SERPL-MCNC: 0.28 NG/ML (ref 0–0.04)
WBC # SPEC AUTO: 51.28 X10(3)/MCL (ref 4.5–11.5)

## 2023-10-21 PROCEDURE — 27000190 HC CPAP FULL FACE MASK W/VALVE

## 2023-10-21 PROCEDURE — 99900031 HC PATIENT EDUCATION (STAT)

## 2023-10-21 PROCEDURE — 99900035 HC TECH TIME PER 15 MIN (STAT)

## 2023-10-21 PROCEDURE — 36600 WITHDRAWAL OF ARTERIAL BLOOD: CPT

## 2023-10-21 PROCEDURE — 83605 ASSAY OF LACTIC ACID: CPT | Performed by: INTERNAL MEDICINE

## 2023-10-21 PROCEDURE — 27000221 HC OXYGEN, UP TO 24 HOURS

## 2023-10-21 PROCEDURE — 25000003 PHARM REV CODE 250: Performed by: INTERNAL MEDICINE

## 2023-10-21 PROCEDURE — 94761 N-INVAS EAR/PLS OXIMETRY MLT: CPT

## 2023-10-21 PROCEDURE — 93010 ELECTROCARDIOGRAM REPORT: CPT | Mod: ,,, | Performed by: INTERNAL MEDICINE

## 2023-10-21 PROCEDURE — 63600175 PHARM REV CODE 636 W HCPCS: Performed by: INTERNAL MEDICINE

## 2023-10-21 PROCEDURE — 84484 ASSAY OF TROPONIN QUANT: CPT | Performed by: INTERNAL MEDICINE

## 2023-10-21 PROCEDURE — 99285 EMERGENCY DEPT VISIT HI MDM: CPT | Mod: 25

## 2023-10-21 PROCEDURE — 83880 ASSAY OF NATRIURETIC PEPTIDE: CPT | Performed by: INTERNAL MEDICINE

## 2023-10-21 PROCEDURE — 93005 ELECTROCARDIOGRAM TRACING: CPT

## 2023-10-21 PROCEDURE — 82803 BLOOD GASES ANY COMBINATION: CPT

## 2023-10-21 PROCEDURE — 80053 COMPREHEN METABOLIC PANEL: CPT | Performed by: INTERNAL MEDICINE

## 2023-10-21 PROCEDURE — 94660 CPAP INITIATION&MGMT: CPT

## 2023-10-21 PROCEDURE — 96365 THER/PROPH/DIAG IV INF INIT: CPT

## 2023-10-21 PROCEDURE — 93010 EKG 12-LEAD: ICD-10-PCS | Mod: ,,, | Performed by: INTERNAL MEDICINE

## 2023-10-21 PROCEDURE — 87040 BLOOD CULTURE FOR BACTERIA: CPT | Mod: 59 | Performed by: INTERNAL MEDICINE

## 2023-10-21 PROCEDURE — 96375 TX/PRO/DX INJ NEW DRUG ADDON: CPT

## 2023-10-21 PROCEDURE — 85027 COMPLETE CBC AUTOMATED: CPT | Performed by: INTERNAL MEDICINE

## 2023-10-21 RX ORDER — LORAZEPAM 2 MG/ML
2 INJECTION INTRAMUSCULAR
Status: COMPLETED | OUTPATIENT
Start: 2023-10-21 | End: 2023-10-21

## 2023-10-21 RX ADMIN — LORAZEPAM 2 MG: 2 INJECTION INTRAMUSCULAR; INTRAVENOUS at 04:10

## 2023-10-21 RX ADMIN — MEROPENEM 1 G: 1 INJECTION, POWDER, FOR SOLUTION INTRAVENOUS at 06:10

## 2023-10-21 NOTE — ED PROVIDER NOTES
Encounter Date: 10/21/2023  History from medics, history limited due to patient's acute condition, but he is able to answer some questions by shaking the head     History     Chief Complaint   Patient presents with    Respiratory Distress     Pt from [place] shortness of breath and respiratory distress     HPI    John Wayne is 77 y.o. male who  has a past medical history of Anticoagulant long-term use, CHF (congestive heart failure) (10/2021), CLL (chronic lymphocytic leukemia), COPD (chronic obstructive pulmonary disease), Coronary artery disease, Depression, Difficult intubation, GERD (gastroesophageal reflux disease), Hypertension, Mixed hyperlipidemia, PVD (peripheral vascular disease), Seizures, Sleep apnea, unspecified, and TIA (transient ischemic attack). arrives in ER with c/o Respiratory Distress (Pt from [place] shortness of breath and respiratory distress)      Review of patient's allergies indicates:   Allergen Reactions    Ancef in dextrose (iso-osm)     Codeine     Penicillins      Past Medical History:   Diagnosis Date    Anticoagulant long-term use     CHF (congestive heart failure) 10/2021    EF of 25-30% on ECHO    CLL (chronic lymphocytic leukemia)     COPD (chronic obstructive pulmonary disease)     Coronary artery disease     Depression     Difficult intubation     GERD (gastroesophageal reflux disease)     Hypertension     Mixed hyperlipidemia     PVD (peripheral vascular disease)     Seizures     Sleep apnea, unspecified     TIA (transient ischemic attack)      Past Surgical History:   Procedure Laterality Date    CORONARY ANGIOPLASTY WITH STENT PLACEMENT  10/18/2016    pLAD, pLAD, dLAD, mCX,    INSERTION OF BARE METAL STENT INTO PERIPHERAL ARTERY  2018    B/L [place] Vessels    PERCUTANEOUS BALLOON VALVULOPLASTY  04/04/2018     Family History   Problem Relation Age of Onset    Hypertension Mother     Hypertension Father     Coronary artery disease Father     Heart attack Father      Social  History     Tobacco Use    Smoking status: Former    Smokeless tobacco: Never   Substance Use Topics    Alcohol use: Not Currently    Drug use: Never     Review of Systems   Constitutional:  Negative for fever.   Respiratory:  Positive for shortness of breath.    Skin:  Positive for pallor.   Neurological:  Positive for seizures.        Patient shakes his head in affirmative when I asked him if he had a seizure today       Physical Exam     Initial Vitals   BP Pulse Resp Temp SpO2   10/21/23 0424 10/21/23 0319 10/21/23 0319 -- 10/21/23 0319   133/74 (!) 116 (!) 30  (!) 88 %      MAP       --                Physical Exam    Constitutional: He appears well-nourished. He appears distressed.   Obese patient   HENT:   Head: Atraumatic.   Eyes: EOM are normal.   Neck: Neck supple.   Cardiovascular:  Normal heart sounds.           Tachycardia   Pulmonary/Chest: He is in respiratory distress. He has no wheezes. He has no rhonchi. He has no rales. He exhibits no tenderness.   Abdominal: Abdomen is soft.   Obese, protuberant   Musculoskeletal:         General: Edema present.      Cervical back: Neck supple.     Neurological: He is alert.         ED Course   Critical Care    Date/Time: 10/21/2023 3:49 AM    Performed by: Glenn Sagastume MD  Authorized by: Glenn Sagastume MD  Direct patient critical care time: 35 minutes  Total critical care time (exclusive of procedural time) : 35 minutes  Critical care was necessary to treat or prevent imminent or life-threatening deterioration of the following conditions: respiratory failure.  Critical care was time spent personally by me on the following activities: development of treatment plan with patient or surrogate, evaluation of patient's response to treatment, examination of patient, ordering and performing treatments and interventions, obtaining history from patient or surrogate, ordering and review of laboratory studies, ordering and review of radiographic studies, pulse  oximetry, re-evaluation of patient's condition and vascular access procedures.        Orders Placed This Encounter   Procedures    Critical Care    Blood culture #1 **CANNOT BE ORDERED STAT**    Blood culture #2 **CANNOT BE ORDERED STAT**    X-Ray Chest 1 View    Comprehensive metabolic panel    CBC auto differential    Troponin I    Brain natriuretic peptide    CBC with Differential    Manual Differential    Lactic acid, plasma    Bipap Continuous    POCT ARTERIAL BLOOD GAS Blood Gas    POCT ARTERIAL BLOOD GAS Blood Gas    EKG 12-lead    Insert Saline lock IV    PFC Facilitated Request     Medications   LORazepam injection 2 mg (2 mg Intravenous Given 10/21/23 4303)   meropenem (MERREM) 1 g in sodium chloride 0.9 % 100 mL IVPB (MB+) (0 g Intravenous Stopped 10/21/23 0249)     Admission on 10/21/2023   Component Date Value Ref Range Status    Sodium Level 10/21/2023 136  136 - 145 mmol/L Final    Potassium Level 10/21/2023 4.3  3.5 - 5.1 mmol/L Final    Chloride 10/21/2023 105  98 - 107 mmol/L Final    Carbon Dioxide 10/21/2023 18 (L)  23 - 31 mmol/L Final    Glucose Level 10/21/2023 183 (H)  82 - 115 mg/dL Final    Blood Urea Nitrogen 10/21/2023 13.0  8.4 - 25.7 mg/dL Final    Creatinine 10/21/2023 0.83  0.73 - 1.18 mg/dL Final    Calcium Level Total 10/21/2023 9.6  8.8 - 10.0 mg/dL Final    Protein Total 10/21/2023 7.2  5.8 - 7.6 gm/dL Final    Albumin Level 10/21/2023 3.5  3.4 - 4.8 g/dL Final    Globulin 10/21/2023 3.7 (H)  2.4 - 3.5 gm/dL Final    Albumin/Globulin Ratio 10/21/2023 0.9 (L)  1.1 - 2.0 ratio Final    Bilirubin Total 10/21/2023 0.2  <=1.5 mg/dL Final    Alkaline Phosphatase 10/21/2023 122  40 - 150 unit/L Final    Alanine Aminotransferase 10/21/2023 21  0 - 55 unit/L Final    Aspartate Aminotransferase 10/21/2023 20  5 - 34 unit/L Final    eGFR 10/21/2023 >60  mls/min/1.73/m2 Final    Troponin-I 10/21/2023 0.282 (H)  0.000 - 0.045 ng/mL Final    Natriuretic Peptide 10/21/2023 110.3 (H)  <=100.0  pg/mL Final    WBC 10/21/2023 51.28 (HH)  4.50 - 11.50 x10(3)/mcL Final    RBC 10/21/2023 5.65  4.70 - 6.10 x10(6)/mcL Final    Hgb 10/21/2023 15.5  14.0 - 18.0 g/dL Final    Hct 10/21/2023 49.8  42.0 - 52.0 % Final    MCV 10/21/2023 88.1  80.0 - 94.0 fL Final    MCH 10/21/2023 27.4  27.0 - 31.0 pg Final    MCHC 10/21/2023 31.1 (L)  33.0 - 36.0 g/dL Final    RDW 10/21/2023 14.3  11.5 - 17.0 % Final    Platelet 10/21/2023 313  130 - 400 x10(3)/mcL Final    MPV 10/21/2023 9.8  7.4 - 10.4 fL Final    POC PH 10/21/2023 7.278 (LL)  7.35 - 7.45 Final    POC PCO2 10/21/2023 48.9 (H)  35 - 45 mmHg Final    POC PO2 10/21/2023 85  80 - 100 mmHg Final    POC HCO3 10/21/2023 22.9 (L)  24 - 28 mmol/L Final    POC BE 10/21/2023 -4 (L)  -2 to 2 mmol/L Final    POC SATURATED O2 10/21/2023 95  95 - 100 % Final    POC TCO2 10/21/2023 24  23 - 27 mmol/L Final    Rate 10/21/2023 20   Final    Sample 10/21/2023 ARTERIAL   Final    Site 10/21/2023 RR   Final    Allens Test 10/21/2023 Pass   Final    DelSys 10/21/2023 CPAP/BiPAP   Final    Mode 10/21/2023 BiPAP   Final    FiO2 10/21/2023 50   Final    Spont Rate 10/21/2023 4   Final    IP 10/21/2023 15   Final    EP 10/21/2023 7   Final    Neutrophils % 10/21/2023 45 (L)  47 - 80 % Final    Lymphs % 10/21/2023 47 (H)  13 - 40 % Final    Monocytes % 10/21/2023 7  2 - 11 % Final    Eosinophils % 10/21/2023 2  0 - 8 % Final    Neutrophils Abs Calc 10/21/2023 23.076 (H)  2.1 - 9.2 x10(3)/mcL Final    Lymphs Abs 10/21/2023 24.1016 (H)  0.6 - 4.6 x10(3)/mcL Final    Eosinophils Abs 10/21/2023 1.0256 (H)  0 - 0.9 x10(3)/mcL Final    Monocytes Abs 10/21/2023 3.5896 (H)  0.1 - 1.3 x10(3)/mcL Final    Platelets 10/21/2023 Normal  Normal, Adequate Final    RBC Morph 10/21/2023 Normal  Normal Final    Lactic Acid Level 10/21/2023 2.1  0.5 - 2.2 mmol/L Final    POC PH 10/21/2023 7.362  7.35 - 7.45 Final    POC PCO2 10/21/2023 38.0  35 - 45 mmHg Final    POC PO2 10/21/2023 51 (LL)  80 - 100 mmHg Final     POC HCO3 10/21/2023 21.6 (L)  24 - 28 mmol/L Final    POC BE 10/21/2023 -4 (L)  -2 to 2 mmol/L Final    POC SATURATED O2 10/21/2023 84  95 - 100 % Final    POC TCO2 10/21/2023 23  23 - 27 mmol/L Final    Rate 10/21/2023 20   Final    Sample 10/21/2023 ARTERIAL   Final    Site 10/21/2023 LR   Final    Allens Test 10/21/2023 Pass   Final    DelSys 10/21/2023 CPAP/BiPAP   Final    Mode 10/21/2023 BiPAP   Final    FiO2 10/21/2023 50   Final    IP 10/21/2023 20   Final    EP 10/21/2023 7   Final       Labs Reviewed   COMPREHENSIVE METABOLIC PANEL - Abnormal; Notable for the following components:       Result Value    Carbon Dioxide 18 (*)     Glucose Level 183 (*)     Globulin 3.7 (*)     Albumin/Globulin Ratio 0.9 (*)     All other components within normal limits   TROPONIN I - Abnormal; Notable for the following components:    Troponin-I 0.282 (*)     All other components within normal limits   B-TYPE NATRIURETIC PEPTIDE - Abnormal; Notable for the following components:    Natriuretic Peptide 110.3 (*)     All other components within normal limits   CBC WITH DIFFERENTIAL - Abnormal; Notable for the following components:    WBC 51.28 (*)     MCHC 31.1 (*)     All other components within normal limits   MANUAL DIFFERENTIAL - Abnormal; Notable for the following components:    Neutrophils % 45 (*)     Lymphs % 47 (*)     Neutrophils Abs Calc 23.076 (*)     Lymphs Abs 24.1016 (*)     Eosinophils Abs 1.0256 (*)     Monocytes Abs 3.5896 (*)     All other components within normal limits   ISTAT PROCEDURE - Abnormal; Notable for the following components:    POC PH 7.278 (*)     POC PCO2 48.9 (*)     POC HCO3 22.9 (*)     POC BE -4 (*)     All other components within normal limits   ISTAT PROCEDURE - Abnormal; Notable for the following components:    POC PO2 51 (*)     POC HCO3 21.6 (*)     POC BE -4 (*)     All other components within normal limits   LACTIC ACID, PLASMA - Normal   BLOOD CULTURE [facility]LG   BLOOD CULTURE [facility]   CBC  W/ AUTO DIFFERENTIAL    Narrative:     The following orders were created for panel order CBC auto differential.  Procedure                               Abnormality         Status                     ---------                               -----------         ------                     CBC with Differential[[id number]]       Abnormal            Final result               Manual Differential[[id number]]         Abnormal            Final result                 Please view results for these tests on the individual orders.        ECG Results              EKG 12-lead (In process)  Result time 10/21/23 06:31:09      In process by Interface, Lab In [facility] (10/21/23 06:31:09)                   Narrative:    Test Reason : R06.02,    Vent. Rate : 173 BPM     Atrial Rate : 093 BPM     P-R Int : 000 ms          QRS Dur : 078 ms      QT Int : 266 ms       P-R-T Axes : 000 072 240 degrees     QTc Int : 451 ms    Undetermined rhythm  Low voltage QRS  Anterolateral infarct (cited on or before 27-NOV-2022)  Abnormal ECG  When compared with ECG of 20-MAR-2023 18:33,  Current undetermined rhythm precludes rhythm comparison, needs review  Questionable change in initial forces of Anterior-septal leads  Nonspecific T wave abnormality now evident in Inferior leads  T wave inversion now evident in Anterior-lateral leads    Referred By: [email or web address]   SELF           Confirmed By:                                   Imaging Results              X-Ray Chest 1 View (Final result)  Result time 10/21/23 04:55:41      Final result by Fransico Jain MD (10/21/23 04:55:41)                   Impression:      Cardiomegaly and mild interstitial pulmonary edema.      Electronically signed by: Fransico Jain MD  Date:    10/21/2023  Time:    04:55               Narrative:    EXAMINATION:  Single view chest radiograph.    CLINICAL HISTORY:  Shortness of breath    TECHNIQUE:  Single view of the chest.    COMPARISON:  Chest radiograph  07/27/2023.    FINDINGS:  There is mild interstitial pulmonary edema.  There is no pneumothorax.  The cardiac silhouette is enlarged.  There is no acute osseous abnormality.                                       Medications   LORazepam injection 2 mg (2 mg Intravenous Given 10/21/23 3665)   meropenem (MERREM) 1 g in sodium chloride 0.9 % 100 mL IVPB (MB+) (0 g Intravenous Stopped 10/21/23 8152)     Medical Decision Making    John Wayne is 77 y.o. male who  has a past medical history of Anticoagulant long-term use, CHF (congestive heart failure) (10/2021), COPD (chronic obstructive pulmonary disease), Coronary artery disease, Depression, Difficult intubation, GERD (gastroesophageal reflux disease), Hypertension, Mixed hyperlipidemia, PVD (peripheral vascular disease), Seizures, Sleep apnea, unspecified, and TIA (transient ischemic attack). arrives in ER with c/o Respiratory Distress (Pt from [place] shortness of breath and respiratory distress)      Patient has history of seizures, he has had multiple seizures in the past, he also has COPD, congestive heart failure, he developed some respiratory distress in the nursing home, according to nurses his O2 sat was 86%, and they put him on 2 L, it came up to 92%, when medics got there his O2 sat was in 70s, they put him on CPAP and brought him to the emergency room.  They could not start an IV line on him, on arrival in the emergency room patient's O2 sat is between 88-92%, when asked patient if he had a seizure he shakes his head in affirmative,    I was able to start an IV line in his left forearm with the help of ultrasound,    We put him on BiPAP and his oxygen saturation came up to 100% with a heart rate of 98, blood pressure is elevated, 214/115.        Amount and/or Complexity of Data Reviewed  Labs: ordered. Decision-making details documented in ED Course.  Radiology: ordered.    Risk  Prescription drug management.               ED Course as of 10/21/23 0608    Sat Oct 21, 2023   0410 We have put patient on BiPAP, his oxygen saturation is up to 100%, but his blood gas shows respiratory acidosis, I am going to increase the IPAP to 20, EPAP 7, his rate is 20, and will re-evaluate. [GQ]   0417 WBC(!!): 51.28  Patient's white count is 51 K, of course he has chronic leukemia, but it has never been this high before, [GQ]   0429 Patient has bilateral infiltrates, he has the respiratory acidosis, I will cover him broad-spectrum for respiratory pathogens, he is allergic to penicillin and Ancef, I will put him on meropenem 1 g q.8 hours I talked to the pharmacist and since patient has allergies to other broad-spectrum antibiotics pharmacist okay starting patient on meropenem. [GQ]   0434 Patient had the questionable seizure, and then he became hypoxic, he has bilateral infiltrates on the chest x-ray, he has respiratory acidosis, I will essentially have to transfer him to a higher level of care possibly in ICU in [place] for further evaluation by Neurology, pulmonology, Hematology,  His blood pressure has come down 133/74, O2 sat is maintained at 96% now, heart rate is in 90s, his troponin is slightly abnormal but his EKG does not show any particular ST elevation, [GQ]   0436 With Ativan patient has calmed down, and legs and breathing comfortably on BiPAP [GQ]   0459 I am going to repeat his blood gas to see if BiPAP is working to bring his pCO2 down and pH up. [GQ]   0541 Patient's pH has come up, pCO2 has come down, but his oxygenation has come down, we are going to put him on 60% FiO2, done will try to find a bed for him [GQ]   0608 Dr. Baxter will take over the care of patient now [GQ]   0611 Care assumed from Dr. Sagastume.  See his documentation for ED course until now.  Patient resting comfrotably on BiPap 20/7 with FiO2 60%.  Patient given merrem for bilateral pulmonary infiltrates, ativan for seizure activity in ED.  Patient will answer questions with head nods and shakes,  he does feel better on BiPap. Patient with CLL. WBC higher than baseline.  Troponin also noted, has been elevated since at least 1/2023.  Night physician did place transfer request secondary to hypercapnic respiratory failure, seizures, abnomal WBC. [AA]   0634 Per nursing staff, the patient has a functional peripheral IV.  Nursing reports that they have been unable to draw blood cultures, so antibiotics were not given as ordered prior to the start of my shift.  Nursing staff to give antibiotics now after I was asked. [AA]   0939 [facility], [facility], [facility], [facility], [facility], [facility], [facility], [facility] all declined transfer.  Patient accepted to the ER to [facility]. [AA]   0941 Patient is still stable in emergency department, answers questions with head nods and shakes.  Patient told that he will be going to [facility] for further evaluation, patient bulged his eyes and raised his eyebrows, but nodded okay.  Respiratory therapist at bedside as well, FiO2 titrated down to 40%.  Patient's blood pressure 120/81, 96% on 40% on BiPAP, heart rate 75.  Patient lungs are clear to auscultation as well. [AA]      ED Course User Index  [AA] Adrienne Mora MD  [GQ] Glenn Sagastume MD                    Clinical Impression:   Final diagnoses:  [R06.02] Shortness of breath  [J18.9] Pneumonia of both lower lobes due to infectious organism (Primary)  [C91.10] CLL (chronic lymphocytic leukemia)  [D72.829] Leukocytosis, unspecified type  [E87.29] Respiratory acidosis  [G40.909] Recurrent seizures  [J44.9] Chronic obstructive pulmonary disease, unspecified COPD type        ED Disposition Condition    Transfer to Another Facility Stable                Adrienne Mora MD  10/21/23 0952       Adrienne Mora MD  10/21/23 0927

## 2023-10-26 LAB
BACTERIA BLD CULT: NORMAL
BACTERIA BLD CULT: NORMAL

## 2023-11-01 ENCOUNTER — DOCUMENTATION ONLY (OUTPATIENT)
Dept: HEMATOLOGY/ONCOLOGY | Facility: CLINIC | Age: 78
End: 2023-11-01
Payer: MEDICARE

## 2024-01-26 ENCOUNTER — OFFICE VISIT (OUTPATIENT)
Dept: HEMATOLOGY/ONCOLOGY | Facility: CLINIC | Age: 79
End: 2024-01-26
Payer: MEDICARE

## 2024-01-26 VITALS
HEART RATE: 61 BPM | WEIGHT: 240 LBS | RESPIRATION RATE: 18 BRPM | OXYGEN SATURATION: 100 % | SYSTOLIC BLOOD PRESSURE: 138 MMHG | DIASTOLIC BLOOD PRESSURE: 70 MMHG | TEMPERATURE: 98 F | HEIGHT: 67 IN | BODY MASS INDEX: 37.67 KG/M2

## 2024-01-26 DIAGNOSIS — R56.9 SEIZURES: ICD-10-CM

## 2024-01-26 DIAGNOSIS — C91.10 CLL (CHRONIC LYMPHOCYTIC LEUKEMIA): Primary | ICD-10-CM

## 2024-01-26 PROCEDURE — 99215 OFFICE O/P EST HI 40 MIN: CPT | Mod: S$PBB,,,

## 2024-01-26 PROCEDURE — 99215 OFFICE O/P EST HI 40 MIN: CPT | Mod: PBBFAC

## 2024-01-26 PROCEDURE — 99999 PR PBB SHADOW E&M-EST. PATIENT-LVL V: CPT | Mod: PBBFAC,,,

## 2024-01-26 RX ORDER — ALBUTEROL SULFATE 0.83 MG/ML
SOLUTION RESPIRATORY (INHALATION)
Status: ON HOLD | COMMUNITY
Start: 2023-09-01

## 2024-01-26 RX ORDER — PRIMIDONE 50 MG/1
150 TABLET ORAL 2 TIMES DAILY
Status: ON HOLD | COMMUNITY
Start: 2023-12-27

## 2024-01-26 NOTE — PROGRESS NOTES
Subjective:       Patient ID: John Wayne is a 78 y.o. male.      Chief Complaint: No chief complaint on file.      Patient is a 78 y.o. year old male with oncology and medical history as below.     Interval history:   He returns to clinic today for a three-month follow up regarding his CLL.  He continues to reside in a nursing home.  He is able to answer questions appropriately.  He was last hospitalized 10/21/2023 for his bilateral pneumonia.  He denies symptoms such as a voluntary weight loss, fever, recurring infections, abdominal pain, diarrhea or constipation or reflux. He denies abnormal bruising/bleeding, abnormal lumps or masses.  Labs were reviewed in detail with him.    Oncology History    No history exists.     Past Medical History:   Diagnosis Date    Anticoagulant long-term use     CHF (congestive heart failure) 10/2021    EF of 25-30% on ECHO    CLL (chronic lymphocytic leukemia)     COPD (chronic obstructive pulmonary disease)     Coronary artery disease     Depression     Difficult intubation     GERD (gastroesophageal reflux disease)     Hypertension     Mixed hyperlipidemia     PVD (peripheral vascular disease)     Seizures     Sleep apnea, unspecified     TIA (transient ischemic attack)       Review of patient's allergies indicates:   Allergen Reactions    Ancef in dextrose (iso-osm)     Codeine     Penicillins       Current Outpatient Medications on File Prior to Visit   Medication Sig Dispense Refill    amiodarone (PACERONE) 200 MG Tab Take 200 mg by mouth once daily.      atorvastatin (LIPITOR) 40 MG tablet Take 40 mg by mouth every evening.      clopidogreL (PLAVIX) 75 mg tablet Take 1 tablet by mouth Daily.      ELIQUIS 5 mg Tab Take 1 tablet by mouth 2 (two) times a day.      finasteride (PROSCAR) 5 mg tablet Take 1 tablet (5 mg total) by mouth once daily. (Patient taking differently: Take 5 mg by mouth nightly.) 30 tablet 0    furosemide (LASIX) 40 MG tablet Take 40 mg oral two times a  day x 5 days then after 40 mg oral daily 60 tablet 1    levETIRAcetam (KEPPRA) 1000 MG tablet Take 1 tablet by mouth 2 (two) times a day.      lisinopriL (PRINIVIL,ZESTRIL) 5 MG tablet Take 1 tablet by mouth Daily.      metoprolol succinate (TOPROL-XL) 25 MG 24 hr tablet Take 1 tablet by mouth Daily.      pantoprazole (PROTONIX) 40 MG tablet Take 40 mg by mouth Daily.      PHENobarbitaL (LUMINAL) 15 MG tablet Take 4 tablets (60 mg total) by mouth once daily for 7 days, THEN 2 tablets (30 mg total) once daily for 7 days, THEN 1 tablet (15 mg total) once daily for 7 days, THEN 0.5 tablets (7.5 mg total) once daily for 7 days. 1 tablet 0    phenytoin (DILANTIN) 100 MG ER capsule Take 100 mg by mouth.      phenytoin (DILANTIN) 50 mg chewable tablet Take by mouth.      tamsulosin (FLOMAX) 0.4 mg Cap Take 1 tablet by mouth nightly.      venlafaxine (EFFEXOR) 75 MG tablet Take 1 tablet (75 mg total) by mouth 2 (two) times daily. 60 tablet 0     No current facility-administered medications on file prior to visit.                 ECOG: 3    Review of Systems  Review of Systems   Constitutional:  Negative for appetite change, chills, fatigue, fever and unexpected weight change.   HENT:  Negative for ear discharge, facial swelling, mouth sores, nosebleeds, sinus pressure/congestion and sore throat.    Eyes:  Negative for pain and visual disturbance.   Respiratory:  Negative for cough, chest tightness, shortness of breath and wheezing.    Cardiovascular:  Negative for chest pain, palpitations and leg swelling.   Gastrointestinal:  Negative for abdominal pain, blood in stool, change in bowel habit, constipation, diarrhea, nausea and vomiting.   Endocrine: Negative.    Genitourinary:  Negative for dysuria, frequency, hematuria and urgency.   Musculoskeletal:  Negative for arthralgias, gait problem, joint swelling and myalgias.   Integumentary:  Negative for color change, pallor, rash and wound.   Allergic/Immunologic: Negative.   Negative for frequent infections.   Neurological:  Negative for dizziness, vertigo, syncope, weakness and numbness.   Hematological:  Negative for adenopathy. Does not bruise/bleed easily.   Psychiatric/Behavioral:  Negative for confusion and dysphoric mood. The patient is not nervous/anxious.    All other systems reviewed and are negative.       Physical Exam  Physical Exam  Vitals reviewed.   Constitutional:       General: He is not in acute distress.     Appearance: Normal appearance. He is normal weight.   HENT:      Head: Normocephalic and atraumatic.      Nose: Nose normal.      Mouth/Throat:      Mouth: Mucous membranes are moist.      Pharynx: Oropharynx is clear. No oropharyngeal exudate or posterior oropharyngeal erythema.   Eyes:      Extraocular Movements: Extraocular movements intact.      Conjunctiva/sclera: Conjunctivae normal.      Pupils: Pupils are equal, round, and reactive to light.   Cardiovascular:      Rate and Rhythm: Normal rate and regular rhythm.      Pulses: Normal pulses.      Heart sounds: No murmur heard.     No friction rub. No gallop.   Pulmonary:      Effort: Pulmonary effort is normal. No respiratory distress.      Breath sounds: No wheezing, rhonchi or rales.   Abdominal:      General: Abdomen is flat. Bowel sounds are normal.      Palpations: Abdomen is soft. There is no mass.      Tenderness: There is no abdominal tenderness. There is no guarding.   Musculoskeletal:         General: No swelling, tenderness or deformity. Normal range of motion.      Cervical back: Normal range of motion and neck supple.      Right lower leg: No edema.      Left lower leg: No edema.      Comments: Presents in wheelchair   Lymphadenopathy:      Cervical: No cervical adenopathy.   Skin:     General: Skin is warm and dry.      Findings: No erythema, lesion or rash.   Neurological:      General: No focal deficit present.      Mental Status: He is alert and oriented to person, place, and time. Mental  status is at baseline.      Cranial Nerves: No cranial nerve deficit.      Gait: Gait normal.   Psychiatric:         Mood and Affect: Mood normal.         Behavior: Behavior normal.         Thought Content: Thought content normal.         Judgment: Judgment normal.            No visits with results within 2 Week(s) from this visit.   Latest known visit with results is:   Admission on 10/21/2023, Discharged on 10/21/2023   Component Date Value    Sodium Level 10/21/2023 136     Potassium Level 10/21/2023 4.3     Chloride 10/21/2023 105     Carbon Dioxide 10/21/2023 18 (L)     Glucose Level 10/21/2023 183 (H)     Blood Urea Nitrogen 10/21/2023 13.0     Creatinine 10/21/2023 0.83     Calcium Level Total 10/21/2023 9.6     Protein Total 10/21/2023 7.2     Albumin Level 10/21/2023 3.5     Globulin 10/21/2023 3.7 (H)     Albumin/Globulin Ratio 10/21/2023 0.9 (L)     Bilirubin Total 10/21/2023 0.2     Alkaline Phosphatase 10/21/2023 122     Alanine Aminotransferase 10/21/2023 21     Aspartate Aminotransfera* 10/21/2023 20     eGFR 10/21/2023 >60     Troponin-I 10/21/2023 0.282 (H)     Natriuretic Peptide 10/21/2023 110.3 (H)     WBC 10/21/2023 51.28 (HH)     RBC 10/21/2023 5.65     Hgb 10/21/2023 15.5     Hct 10/21/2023 49.8     MCV 10/21/2023 88.1     MCH 10/21/2023 27.4     MCHC 10/21/2023 31.1 (L)     RDW 10/21/2023 14.3     Platelet 10/21/2023 313     MPV 10/21/2023 9.8     POC PH 10/21/2023 7.278 (LL)     POC PCO2 10/21/2023 48.9 (H)     POC PO2 10/21/2023 85     POC HCO3 10/21/2023 22.9 (L)     POC BE 10/21/2023 -4 (L)     POC SATURATED O2 10/21/2023 95     POC TCO2 10/21/2023 24     Rate 10/21/2023 20     Sample 10/21/2023 ARTERIAL     Site 10/21/2023 RR     Allens Test 10/21/2023 Pass     DelSys 10/21/2023 CPAP/BiPAP     Mode 10/21/2023 BiPAP     FiO2 10/21/2023 50     Spont Rate 10/21/2023 4     IP 10/21/2023 15     EP 10/21/2023 7     Neutrophils % 10/21/2023 45 (L)     Lymphs % 10/21/2023 47 (H)     Monocytes %  10/21/2023 7     Eosinophils % 10/21/2023 2     Neutrophils Abs Calc 10/21/2023 23.076 (H)     Lymphs Abs 10/21/2023 24.1016 (H)     Eosinophils Abs 10/21/2023 1.0256 (H)     Monocytes Abs 10/21/2023 3.5896 (H)     Platelets 10/21/2023 Normal     RBC Morph 10/21/2023 Normal     Lactic Acid Level 10/21/2023 2.1     CULTURE, BLOOD ([facility]) 10/21/2023 No Growth at 5 days     CULTURE, BLOOD ([facility]) 10/21/2023 No Growth at 5 days     POC PH 10/21/2023 7.362     POC PCO2 10/21/2023 38.0     POC PO2 10/21/2023 51 (LL)     POC HCO3 10/21/2023 21.6 (L)     POC BE 10/21/2023 -4 (L)     POC SATURATED O2 10/21/2023 84     POC TCO2 10/21/2023 23     Rate 10/21/2023 20     Sample 10/21/2023 ARTERIAL     Site 10/21/2023 LR     Allens Test 10/21/2023 Pass     DelSys 10/21/2023 CPAP/BiPAP     Mode 10/21/2023 BiPAP     FiO2 10/21/2023 50     IP 10/21/2023 20     EP 10/21/2023 7           Problem list:  No diagnosis found.         Assessment:   CLL   He has borderline cytopenias. His leukocytosis is stable at 25.4. His anemia is mild and stable at 12.9 today and no lymphadenopathy or B symptoms.   Given his other comoridities such as increasing neurological impairment, seizures, COPD, risks outweight benefits of starting any therapy at this time.   Will continue monitoring unless CLL is creating significant tumor burden    Plan:   Labs stable.  Continue observation.    Return to clinic in 3 months with NP for follow up/lab      ENEDINA GarciaC  Oncology/Hematology   [facility]          I personally reviewed all pertinent imaging, lab results, explained to the patient the pertinent information in detail including radiographic imaging, abnormal lab results. All questions were answered thoroughly. Total time spent on this visit was >30 minutes.

## 2024-03-16 ENCOUNTER — HOSPITAL ENCOUNTER (EMERGENCY)
Facility: HOSPITAL | Age: 79
End: 2024-03-16
Attending: INTERNAL MEDICINE
Payer: MEDICARE

## 2024-03-16 VITALS
WEIGHT: 240 LBS | TEMPERATURE: 99 F | SYSTOLIC BLOOD PRESSURE: 131 MMHG | HEART RATE: 48 BPM | DIASTOLIC BLOOD PRESSURE: 55 MMHG | OXYGEN SATURATION: 95 % | RESPIRATION RATE: 13 BRPM | BODY MASS INDEX: 37.58 KG/M2

## 2024-03-16 DIAGNOSIS — L03.116 CELLULITIS OF LEFT LOWER EXTREMITY: Primary | ICD-10-CM

## 2024-03-16 LAB
ABS NEUT CALC (OHS): 4.91 X10(3)/MCL (ref 2.1–9.2)
ANION GAP SERPL CALC-SCNC: 10 MEQ/L
BUN SERPL-MCNC: 13 MG/DL (ref 8.4–25.7)
CALCIUM SERPL-MCNC: 9.2 MG/DL (ref 8.8–10)
CHLORIDE SERPL-SCNC: 107 MMOL/L (ref 98–107)
CO2 SERPL-SCNC: 24 MMOL/L (ref 23–31)
CREAT SERPL-MCNC: 0.92 MG/DL (ref 0.73–1.18)
CREAT/UREA NIT SERPL: 14
D DIMER PPP IA.FEU-MCNC: <0.27 UG/ML FEU (ref 0–0.5)
EOSINOPHIL NFR BLD MANUAL: 0.27 X10(3)/MCL (ref 0–0.9)
EOSINOPHIL NFR BLD MANUAL: 1 % (ref 0–8)
ERYTHROCYTE [DISTWIDTH] IN BLOOD BY AUTOMATED COUNT: 15 % (ref 11.5–17)
GFR SERPLBLD CREATININE-BSD FMLA CKD-EPI: >60 MLS/MIN/1.73/M2
GLUCOSE SERPL-MCNC: 95 MG/DL (ref 82–115)
HCT VFR BLD AUTO: 42.1 % (ref 42–52)
HGB BLD-MCNC: 13.3 G/DL (ref 14–18)
LYMPHOCYTES NFR BLD MANUAL: 20.75 X10(3)/MCL
LYMPHOCYTES NFR BLD MANUAL: 76 % (ref 13–40)
MCH RBC QN AUTO: 27.3 PG (ref 27–31)
MCHC RBC AUTO-ENTMCNC: 31.6 G/DL (ref 33–36)
MCV RBC AUTO: 86.4 FL (ref 80–94)
MONOCYTES NFR BLD MANUAL: 1.36 X10(3)/MCL (ref 0.1–1.3)
MONOCYTES NFR BLD MANUAL: 5 % (ref 2–11)
NEUTROPHILS NFR BLD MANUAL: 18 % (ref 47–80)
PHENOBARB SERPL-MCNC: 27.9 UG/ML (ref 15–40)
PHENYTOIN SERPL-MCNC: 14.1 UG/ML (ref 10–20)
PLATELET # BLD AUTO: 195 X10(3)/MCL (ref 130–400)
PLATELET # BLD EST: ADEQUATE 10*3/UL
PMV BLD AUTO: 10 FL (ref 7.4–10.4)
POTASSIUM SERPL-SCNC: 3.6 MMOL/L (ref 3.5–5.1)
RBC # BLD AUTO: 4.87 X10(6)/MCL (ref 4.7–6.1)
RBC MORPH BLD: NORMAL
SODIUM SERPL-SCNC: 141 MMOL/L (ref 136–145)
WBC # SPEC AUTO: 27.3 X10(3)/MCL (ref 4.5–11.5)

## 2024-03-16 PROCEDURE — 80177 DRUG SCRN QUAN LEVETIRACETAM: CPT | Performed by: INTERNAL MEDICINE

## 2024-03-16 PROCEDURE — 85379 FIBRIN DEGRADATION QUANT: CPT | Performed by: INTERNAL MEDICINE

## 2024-03-16 PROCEDURE — 80184 ASSAY OF PHENOBARBITAL: CPT | Performed by: INTERNAL MEDICINE

## 2024-03-16 PROCEDURE — 80048 BASIC METABOLIC PNL TOTAL CA: CPT | Performed by: INTERNAL MEDICINE

## 2024-03-16 PROCEDURE — 80185 ASSAY OF PHENYTOIN TOTAL: CPT | Performed by: INTERNAL MEDICINE

## 2024-03-16 PROCEDURE — 99283 EMERGENCY DEPT VISIT LOW MDM: CPT

## 2024-03-16 PROCEDURE — 85027 COMPLETE CBC AUTOMATED: CPT | Performed by: INTERNAL MEDICINE

## 2024-03-16 RX ORDER — PHENOBARBITAL 32.4 MG/1
3 TABLET ORAL DAILY
Status: ON HOLD | COMMUNITY

## 2024-03-16 RX ORDER — FUROSEMIDE 40 MG/1
40 TABLET ORAL DAILY
Status: ON HOLD | COMMUNITY

## 2024-03-16 RX ORDER — DOXYCYCLINE 100 MG/1
100 CAPSULE ORAL EVERY 12 HOURS
Qty: 20 CAPSULE | Refills: 0 | Status: SHIPPED | OUTPATIENT
Start: 2024-03-16 | End: 2024-03-26

## 2024-03-16 RX ORDER — MIDAZOLAM 5 MG/.1ML
1 SPRAY NASAL DAILY PRN
Status: ON HOLD | COMMUNITY

## 2024-03-16 RX ORDER — ALBUTEROL SULFATE 2.5 MG/.5ML
2.5 SOLUTION RESPIRATORY (INHALATION) EVERY 6 HOURS PRN
Status: ON HOLD | COMMUNITY

## 2024-03-16 RX ORDER — PHENYTOIN 50 MG/1
150 TABLET, CHEWABLE ORAL NIGHTLY
Status: ON HOLD | COMMUNITY
End: 2024-05-11 | Stop reason: HOSPADM

## 2024-03-16 NOTE — DISCHARGE INSTRUCTIONS
Patient is already on Eliquis and Plavix and is D-dimer is normal, make sure to continue giving Eliquis and Plavix and give the antibiotic for cellulitis as prescribed.    Patient must be seen by patient's primary care M.D. for follow-up and further treatment as needed.     Inform patients Primary Care physician about the ER visit and need for follow up.    Get medicines and orders from the emergency room approved by patient's primary care M.D.    The exam and treatment you received in Emergency Room was for an urgent problem and NOT INTENDED AS COMPLETE CARE. It is important that you FOLLOW UP with a doctor for ongoing care. If your symptoms become WORSE or you DO NOT IMPROVE and you are unable to reach your health care provider, you should RETURN to the emergency department. The Emergency Room doctor has provided a PRELIMINARY INTERPRETATION of all your STUDIES. A final interpretation may be done after you are discharged. IF A CHANGE in your diagnosis or treatment is needed WE WILL CONTACT YOU. It is critical that we have a CURRENT PHONE NUMBER FOR YOU.

## 2024-03-16 NOTE — ED PROVIDER NOTES
Encounter Date: 3/16/2024  History from patient     History     Chief Complaint   Patient presents with    Leg Swelling     From [facility] with bilateral lower leg edema for the last 9 months that became worse yesterday. NH concern for DVT     HPI    John Wayne is 78 y.o. male who  has a past medical history of Anticoagulant long-term use, CHF (congestive heart failure) (10/2021), CLL (chronic lymphocytic leukemia), COPD (chronic obstructive pulmonary disease), Coronary artery disease, Depression, Difficult intubation, GERD (gastroesophageal reflux disease), Hypertension, Mixed hyperlipidemia, PVD (peripheral vascular disease), Seizures, Sleep apnea, unspecified, and TIA (transient ischemic attack). arrives in ER with c/o Leg Swelling (From [facility] with bilateral lower leg edema for the last 9 months that became worse yesterday. NH concern for DVT)      Review of patient's allergies indicates:   Allergen Reactions    Ancef in dextrose (iso-osm)     Codeine     Penicillins      Past Medical History:   Diagnosis Date    Anticoagulant long-term use     CHF (congestive heart failure) 10/2021    EF of 25-30% on ECHO    CLL (chronic lymphocytic leukemia)     COPD (chronic obstructive pulmonary disease)     Coronary artery disease     Depression     Difficult intubation     GERD (gastroesophageal reflux disease)     Hypertension     Mixed hyperlipidemia     PVD (peripheral vascular disease)     Seizures     Sleep apnea, unspecified     TIA (transient ischemic attack)      Past Surgical History:   Procedure Laterality Date    CORONARY ANGIOPLASTY WITH STENT PLACEMENT  10/18/2016    pLAD, pLAD, dLAD, mCX,    INSERTION OF BARE METAL STENT INTO PERIPHERAL ARTERY  2018    B/L [place] Vessels    PERCUTANEOUS BALLOON VALVULOPLASTY  04/04/2018     Family History   Problem Relation Age of Onset    Hypertension Mother     Hypertension Father     Coronary artery disease Father     Heart attack Father      Social History      Tobacco Use    Smoking status: Former    Smokeless tobacco: Never   Substance Use Topics    Alcohol use: Not Currently    Drug use: Never     Review of Systems   Cardiovascular:  Positive for leg swelling.        Chronic leg swelling, just got worse over the weekend he complained of pain in the leg so the nurses sent him from the nursing home to get him checked for deep vein thrombosis.  Patient is on Eliquis and Plavix.       Physical Exam     Initial Vitals [03/16/24 0551]   BP Pulse Resp Temp SpO2   (!) 154/81 (!) 56 18 98.5 °F (36.9 °C) 96 %      MAP       --         Physical Exam    Constitutional: He appears well-developed and well-nourished.   Eyes: EOM are normal.   Neck: Neck supple.   Cardiovascular:  Normal rate and regular rhythm.           Pulmonary/Chest: Breath sounds normal. No respiratory distress. He has no wheezes. He has no rhonchi. He has no rales.   Abdominal: Abdomen is soft. Bowel sounds are normal.   Musculoskeletal:         General: Tenderness and edema present.      Cervical back: Neck supple.      Comments: Patient has some edema of the lower extremity but it is mainly adiposity, he does have some erythema has some chronic venous stasis dermatitis in bilateral lower extremities, he does complain of more tenderness in the left lower extremity than the right.     Skin:   Venous stasis dermatitis bilateral lower extremities   Psychiatric: He has a normal mood and affect.         ED Course   Procedures  Orders Placed This Encounter   Procedures    Basic metabolic panel    CBC auto differential    D dimer, quantitative    CBC with Differential    Phenobarbital Level    Phenytoin Level, Total    Levetiracetam Level    Manual Differential     Medications - No data to display  Admission on 03/16/2024   Component Date Value Ref Range Status    Sodium Level 03/16/2024 141  136 - 145 mmol/L Final    Potassium Level 03/16/2024 3.6  3.5 - 5.1 mmol/L Final    Chloride 03/16/2024 107  98 - 107  mmol/L Final    Carbon Dioxide 03/16/2024 24  23 - 31 mmol/L Final    Glucose Level 03/16/2024 95  82 - 115 mg/dL Final    Blood Urea Nitrogen 03/16/2024 13.0  8.4 - 25.7 mg/dL Final    Creatinine 03/16/2024 0.92  0.73 - 1.18 mg/dL Final    BUN/Creatinine Ratio 03/16/2024 14   Final    Calcium Level Total 03/16/2024 9.2  8.8 - 10.0 mg/dL Final    Anion Gap 03/16/2024 10.0  mEq/L Final    eGFR 03/16/2024 >60  mls/min/1.73/m2 Final    D-Dimer 03/16/2024 <0.27  0.00 - 0.50 ug/mL FEU Final    WBC 03/16/2024 27.30 (H)  4.50 - 11.50 x10(3)/mcL Final    RBC 03/16/2024 4.87  4.70 - 6.10 x10(6)/mcL Final    Hgb 03/16/2024 13.3 (L)  14.0 - 18.0 g/dL Final    Hct 03/16/2024 42.1  42.0 - 52.0 % Final    MCV 03/16/2024 86.4  80.0 - 94.0 fL Final    MCH 03/16/2024 27.3  27.0 - 31.0 pg Final    MCHC 03/16/2024 31.6 (L)  33.0 - 36.0 g/dL Final    RDW 03/16/2024 15.0  11.5 - 17.0 % Final    Platelet 03/16/2024 195  130 - 400 x10(3)/mcL Final    MPV 03/16/2024 10.0  7.4 - 10.4 fL Final    Phenobarbital Level 03/16/2024 27.9  15.0 - 40.0 ug/ml Final    Phenytoin Level Total 03/16/2024 14.1  10.0 - 20.0 ug/ml Final    Neutrophils % 03/16/2024 18 (L)  47 - 80 % Final    Lymphs % 03/16/2024 76 (H)  13 - 40 % Final    Monocytes % 03/16/2024 5  2 - 11 % Final    Eosinophils % 03/16/2024 1  0 - 8 % Final    Neutrophils Abs Calc 03/16/2024 4.914  2.1 - 9.2 x10(3)/mcL Final    Lymphs Abs 03/16/2024 20.748 (H)  0.6 - 4.6 x10(3)/mcL Final    Eosinophils Abs 03/16/2024 0.273  0 - 0.9 x10(3)/mcL Final    Monocytes Abs 03/16/2024 1.365 (H)  0.1 - 1.3 x10(3)/mcL Final    Platelets 03/16/2024 Adequate  Normal, Adequate Final    RBC Morph 03/16/2024 Normal  Normal Final       Labs Reviewed   CBC WITH DIFFERENTIAL - Abnormal; Notable for the following components:       Result Value    WBC 27.30 (*)     Hgb 13.3 (*)     MCHC 31.6 (*)     All other components within normal limits   MANUAL DIFFERENTIAL - Abnormal; Notable for the following components:     Neutrophils % 18 (*)     Lymphs % 76 (*)     Lymphs Abs 20.748 (*)     Monocytes Abs 1.365 (*)     All other components within normal limits   D DIMER, QUANTITATIVE - Normal   PHENOBARBITAL LEVEL - Normal   PHENYTOIN LEVEL, TOTAL - Normal   BASIC METABOLIC PANEL   CBC W/ AUTO DIFFERENTIAL    Narrative:     The following orders were created for panel order CBC auto differential.  Procedure                               Abnormality         Status                     ---------                               -----------         ------                     CBC with Differential[[id number]]       Abnormal            Final result               Manual Differential[[id number]]         Abnormal            Final result                 Please view results for these tests on the individual orders.   LEVETIRACETAM  (KEPPRA) LEVEL          Imaging Results    None          Medications - No data to display  Medical Decision Making    John Wayne is 78 y.o. male who  has a past medical history of Anticoagulant long-term use, CHF (congestive heart failure) (10/2021), CLL (chronic lymphocytic leukemia), COPD (chronic obstructive pulmonary disease), Coronary artery disease, Depression, Difficult intubation, GERD (gastroesophageal reflux disease), Hypertension, Mixed hyperlipidemia, PVD (peripheral vascular disease), Seizures, Sleep apnea, unspecified, and TIA (transient ischemic attack). arrives in ER with c/o Leg Swelling (From [facility] with bilateral lower leg edema for the last 9 months that became worse yesterday. NH concern for DVT)    Patient has chronic bilateral lower extremity swelling, apparently they report that it got worse over the last couple of days, nurses in the nursing home are concerned that he has deep vein thrombosis.  Patient has been on Eliquis and Plavix for some time now, he does have some swelling and venous stasis dermatitis of the bilateral lower extremities and tenderness of the left lower extremity more  than the right lower extremity.  I am going to do D-dimer on him, also patient has history of seizures and he is on 3 antiseizure medications I will send the levels on them also, if the D-dimer is elevated I will see if I need to do a ultrasound on lower extremities but otherwise it appears like some mild cellulitis.    Amount and/or Complexity of Data Reviewed  Labs: ordered. Decision-making details documented in ED Course.    Risk  Prescription drug management.               ED Course as of 03/16/24 0715   Sat Mar 16, 2024   0711 WBC(!): 27.30  Patient's white count has been running high for a long time consistent with his chronic lymphocytic leukemia, his D-dimer is normal, I do not think that he has deep vein thrombosis, it has mainly a little bit superficial cellulitis, I will put him on antibiotic for that and let him go back to the nursing home.  He is already on anticoagulants Eliquis and Plavix.  Is not having any respiratory symptoms. [GQ]      ED Course User Index  [GQ] Glenn Sagastume MD                           Clinical Impression:  Final diagnoses:  [L03.116] Cellulitis of left lower extremity (Primary)          ED Disposition Condition    Discharge Stable          ED Prescriptions       Medication Sig Dispense Start Date End Date Auth. Provider    doxycycline (VIBRAMYCIN) 100 MG Cap Take 1 capsule (100 mg total) by mouth every 12 (twelve) hours. for 10 days 20 capsule 3/16/2024 3/26/2024 Glenn Sagastume MD          Follow-up Information       Follow up With Specialties Details Why Contact Info    ISAC cShmidt MD Family [facility] In 1 day  [address] [facility] [address] [phone]               Glenn Sagastume MD  03/16/24 0715

## 2024-03-20 LAB — LEVETIRACETAM SERPL-MCNC: 23.2 MCG/ML (ref 10–40)

## 2024-04-27 ENCOUNTER — HOSPITAL ENCOUNTER (EMERGENCY)
Facility: HOSPITAL | Age: 79
End: 2024-04-27
Attending: EMERGENCY MEDICINE
Payer: MEDICARE

## 2024-04-27 VITALS
DIASTOLIC BLOOD PRESSURE: 83 MMHG | HEART RATE: 50 BPM | BODY MASS INDEX: 37.58 KG/M2 | RESPIRATION RATE: 14 BRPM | TEMPERATURE: 98 F | OXYGEN SATURATION: 95 % | WEIGHT: 240 LBS | SYSTOLIC BLOOD PRESSURE: 175 MMHG
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
## 2024-04-27 DIAGNOSIS — R53.1 WEAKNESS: ICD-10-CM

## 2024-04-27 DIAGNOSIS — N39.0 ACUTE UTI: ICD-10-CM

## 2024-04-27 LAB
ALBUMIN SERPL-MCNC: 3.5 G/DL (ref 3.4–4.8)
ALBUMIN/GLOB SERPL: 1 RATIO (ref 1.1–2)
ALP SERPL-CCNC: 88 UNIT/L (ref 40–150)
ALT SERPL-CCNC: 17 UNIT/L (ref 0–55)
APPEARANCE UR: CLEAR
AST SERPL-CCNC: 15 UNIT/L (ref 5–34)
BACTERIA #/AREA URNS AUTO: ABNORMAL /HPF
BASOPHILS # BLD AUTO: 0.12 X10(3)/MCL
BASOPHILS NFR BLD AUTO: 0.4 %
BILIRUB SERPL-MCNC: 0.3 MG/DL
BILIRUB UR QL STRIP.AUTO: NEGATIVE
BUN SERPL-MCNC: 16 MG/DL (ref 8.4–25.7)
CALCIUM SERPL-MCNC: 9.4 MG/DL (ref 8.8–10)
CHLORIDE SERPL-SCNC: 107 MMOL/L (ref 98–107)
CO2 SERPL-SCNC: 23 MMOL/L (ref 23–31)
COLOR UR AUTO: YELLOW
CREAT SERPL-MCNC: 0.92 MG/DL (ref 0.73–1.18)
EOSINOPHIL # BLD AUTO: 0.41 X10(3)/MCL (ref 0–0.9)
EOSINOPHIL NFR BLD AUTO: 1.5 %
ERYTHROCYTE [DISTWIDTH] IN BLOOD BY AUTOMATED COUNT: 14.7 % (ref 11.5–17)
GFR SERPLBLD CREATININE-BSD FMLA CKD-EPI: >60 MLS/MIN/1.73/M2
GLOBULIN SER-MCNC: 3.4 GM/DL (ref 2.4–3.5)
GLUCOSE SERPL-MCNC: 93 MG/DL (ref 82–115)
GLUCOSE UR QL STRIP.AUTO: NEGATIVE
HCT VFR BLD AUTO: 43.3 % (ref 42–52)
HGB BLD-MCNC: 14 G/DL (ref 14–18)
IMM GRANULOCYTES # BLD AUTO: 0.08 X10(3)/MCL (ref 0–0.04)
IMM GRANULOCYTES NFR BLD AUTO: 0.3 %
KETONES UR QL STRIP.AUTO: NEGATIVE
LEUKOCYTE ESTERASE UR QL STRIP.AUTO: ABNORMAL
LYMPHOCYTES # BLD AUTO: 20.8 X10(3)/MCL (ref 0.6–4.6)
LYMPHOCYTES NFR BLD AUTO: 73.8 %
MAGNESIUM SERPL-MCNC: 2 MG/DL (ref 1.6–2.6)
MCH RBC QN AUTO: 28.1 PG (ref 27–31)
MCHC RBC AUTO-ENTMCNC: 32.3 G/DL (ref 33–36)
MCV RBC AUTO: 86.8 FL (ref 80–94)
MONOCYTES # BLD AUTO: 1.44 X10(3)/MCL (ref 0.1–1.3)
MONOCYTES NFR BLD AUTO: 5.1 %
NEUTROPHILS # BLD AUTO: 5.34 X10(3)/MCL (ref 2.1–9.2)
NEUTROPHILS NFR BLD AUTO: 18.9 %
NITRITE UR QL STRIP.AUTO: POSITIVE
OHS QRS DURATION: 90 MS
OHS QTC CALCULATION: 409 MS
PH UR STRIP.AUTO: 6 [PH]
PLATELET # BLD AUTO: 194 X10(3)/MCL (ref 130–400)
PMV BLD AUTO: 9.9 FL (ref 7.4–10.4)
POTASSIUM SERPL-SCNC: 3.8 MMOL/L (ref 3.5–5.1)
PROT SERPL-MCNC: 6.9 GM/DL (ref 5.8–7.6)
PROT UR QL STRIP.AUTO: NEGATIVE
RBC # BLD AUTO: 4.99 X10(6)/MCL (ref 4.7–6.1)
RBC #/AREA URNS AUTO: ABNORMAL /HPF
RBC UR QL AUTO: ABNORMAL
SODIUM SERPL-SCNC: 140 MMOL/L (ref 136–145)
SP GR UR STRIP.AUTO: 1.02 (ref 1–1.03)
SQUAMOUS #/AREA URNS AUTO: ABNORMAL /HPF
TROPONIN I SERPL-MCNC: 0.05 NG/ML (ref 0–0.04)
TROPONIN I SERPL-MCNC: 0.07 NG/ML (ref 0–0.04)
UROBILINOGEN UR STRIP-ACNC: 0.2
WBC # SPEC AUTO: 28.19 X10(3)/MCL (ref 4.5–11.5)
WBC #/AREA URNS AUTO: ABNORMAL /HPF

## 2024-04-27 PROCEDURE — 81003 URINALYSIS AUTO W/O SCOPE: CPT | Performed by: EMERGENCY MEDICINE

## 2024-04-27 PROCEDURE — 83735 ASSAY OF MAGNESIUM: CPT | Performed by: EMERGENCY MEDICINE

## 2024-04-27 PROCEDURE — 81001 URINALYSIS AUTO W/SCOPE: CPT | Performed by: EMERGENCY MEDICINE

## 2024-04-27 PROCEDURE — 85025 COMPLETE CBC W/AUTO DIFF WBC: CPT | Performed by: EMERGENCY MEDICINE

## 2024-04-27 PROCEDURE — 80053 COMPREHEN METABOLIC PANEL: CPT | Performed by: EMERGENCY MEDICINE

## 2024-04-27 PROCEDURE — 93010 ELECTROCARDIOGRAM REPORT: CPT | Mod: ,,, | Performed by: INTERNAL MEDICINE

## 2024-04-27 PROCEDURE — 93005 ELECTROCARDIOGRAM TRACING: CPT

## 2024-04-27 PROCEDURE — 84484 ASSAY OF TROPONIN QUANT: CPT | Performed by: EMERGENCY MEDICINE

## 2024-04-27 PROCEDURE — 25000003 PHARM REV CODE 250: Performed by: EMERGENCY MEDICINE

## 2024-04-27 PROCEDURE — 99285 EMERGENCY DEPT VISIT HI MDM: CPT | Mod: 25

## 2024-04-27 RX ORDER — NITROFURANTOIN 25; 75 MG/1; MG/1
100 CAPSULE ORAL 2 TIMES DAILY
Qty: 14 CAPSULE | Refills: 0 | Status: SHIPPED | OUTPATIENT
Start: 2024-04-27 | End: 2024-05-04

## 2024-04-27 RX ORDER — NITROFURANTOIN 25; 75 MG/1; MG/1
100 CAPSULE ORAL
Status: COMPLETED | OUTPATIENT
Start: 2024-04-27 | End: 2024-04-27

## 2024-04-27 RX ADMIN — NITROFURANTOIN 100 MG: 25; 75 CAPSULE ORAL at 10:04
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
## 2024-04-29 NOTE — PROGRESS NOTES
Subjective:       Patient ID: John Wayne is a 78 y.o. male.      Chief Complaint: CLL (Chronic lymphocytic leukemia) (Pt returns to clinic today for his 3 month follow up to discuss labs. Pt reports feeling well today. Pt did have a fall on 4/27/24 due to leg weakness. )      Patient is a 78 y.o. year old male with oncology and medical history as below.     Interval history:   He returns to clinic today for a three-month follow up regarding his CLL.  He continues to reside in a nursing home.  He is able to answer questions appropriately.  He was last hospitalized 4/27/2023 for leg weakness and after a fall.  He denies symptoms such as a voluntary weight loss, fever, recurring infections, abdominal pain, diarrhea or constipation or reflux. He denies abnormal bruising/bleeding, abnormal lumps or masses.  Labs were reviewed in detail with him.    Oncology History    No history exists.     Past Medical History:   Diagnosis Date    Anticoagulant long-term use     CHF (congestive heart failure) 10/2021    EF of 25-30% on ECHO    CLL (chronic lymphocytic leukemia)     COPD (chronic obstructive pulmonary disease)     Coronary artery disease     Depression     Difficult intubation     GERD (gastroesophageal reflux disease)     Hypertension     Mixed hyperlipidemia     PVD (peripheral vascular disease)     Seizures     Sleep apnea, unspecified     TIA (transient ischemic attack)       Review of patient's allergies indicates:   Allergen Reactions    Ancef in dextrose (iso-osm)     Codeine     Penicillins       Current Outpatient Medications on File Prior to Visit   Medication Sig Dispense Refill    albuterol (PROVENTIL) 2.5 mg /3 mL (0.083 %) nebulizer solution       albuterol sulfate 2.5 mg/0.5 mL Nebu Take 2.5 mg by nebulization every 6 (six) hours as needed. Rescue      amiodarone (PACERONE) 200 MG Tab Take 200 mg by mouth once daily.      atorvastatin (LIPITOR) 40 MG tablet Take 40 mg by mouth every evening.       clopidogreL (PLAVIX) 75 mg tablet Take 1 tablet by mouth Daily.      ELIQUIS 5 mg Tab Take 1 tablet by mouth 2 (two) times a day.      finasteride (PROSCAR) 5 mg tablet Take 1 tablet (5 mg total) by mouth once daily. (Patient taking differently: Take 5 mg by mouth nightly.) 30 tablet 0    furosemide (LASIX) 40 MG tablet Take 40 mg by mouth once daily.      levETIRAcetam (KEPPRA) 1000 MG tablet Take 1 tablet by mouth 2 (two) times a day.      lisinopriL (PRINIVIL,ZESTRIL) 5 MG tablet Take 1 tablet by mouth Daily.      metoprolol succinate (TOPROL-XL) 25 MG 24 hr tablet Take 1 tablet by mouth 2 (two) times daily.      midazolam (NAYZILAM) 5 mg/spray (0.1 mL) Spry 1 spray by Nasal route daily as needed (seizure). To left nostril      nitrofurantoin, macrocrystal-monohydrate, (MACROBID) 100 MG capsule Take 1 capsule (100 mg total) by mouth 2 (two) times daily. for 7 days 14 capsule 0    pantoprazole (PROTONIX) 40 MG tablet Take 40 mg by mouth Daily.      PHENobarbitaL 32.4 MG tablet Take 3 tablets by mouth Daily.      phenytoin (DILANTIN) 100 MG ER capsule Take 100 mg by mouth.      phenytoin (DILANTIN) 50 mg chewable tablet Take 100 mg by mouth once daily.      phenytoin (DILANTIN) 50 mg chewable tablet Take 150 mg by mouth every evening.      primidone (MYSOLINE) 50 MG Tab Take 150 mg by mouth 3 (three) times daily.      tamsulosin (FLOMAX) 0.4 mg Cap Take 1 tablet by mouth nightly.      venlafaxine (EFFEXOR) 75 MG tablet Take 1 tablet (75 mg total) by mouth 2 (two) times daily. 60 tablet 0    [DISCONTINUED] furosemide (LASIX) 40 MG tablet Take 40 mg oral two times a day x 5 days then after 40 mg oral daily (Patient not taking: Reported on 1/26/2024) 60 tablet 1     No current facility-administered medications on file prior to visit.                 ECOG: 3    Review of Systems  Review of Systems   Constitutional:  Negative for appetite change, chills, fatigue, fever and unexpected weight change.   HENT:  Negative for  ear discharge, facial swelling, mouth sores, nosebleeds, sinus pressure/congestion and sore throat.    Eyes:  Negative for pain and visual disturbance.   Respiratory:  Negative for cough, chest tightness, shortness of breath and wheezing.    Cardiovascular:  Negative for chest pain, palpitations and leg swelling.   Gastrointestinal:  Negative for abdominal pain, blood in stool, change in bowel habit, constipation, diarrhea, nausea and vomiting.   Endocrine: Negative.    Genitourinary:  Negative for dysuria, frequency, hematuria and urgency.   Musculoskeletal:  Negative for arthralgias, gait problem, joint swelling and myalgias.   Integumentary:  Negative for color change, pallor, rash and wound.   Allergic/Immunologic: Negative.  Negative for frequent infections.   Neurological:  Negative for dizziness, vertigo, syncope, weakness and numbness.   Hematological:  Negative for adenopathy. Does not bruise/bleed easily.   Psychiatric/Behavioral:  Negative for confusion and dysphoric mood. The patient is not nervous/anxious.    All other systems reviewed and are negative.       Physical Exam  Physical Exam  Vitals reviewed.   Constitutional:       General: He is not in acute distress.     Appearance: He is morbidly obese.   HENT:      Head: Normocephalic and atraumatic.      Nose: Nose normal.      Mouth/Throat:      Mouth: Mucous membranes are moist.      Pharynx: Oropharynx is clear. No oropharyngeal exudate or posterior oropharyngeal erythema.   Eyes:      Extraocular Movements: Extraocular movements intact.      Conjunctiva/sclera: Conjunctivae normal.      Pupils: Pupils are equal, round, and reactive to light.   Cardiovascular:      Rate and Rhythm: Normal rate and regular rhythm.      Pulses: Normal pulses.      Heart sounds: No murmur heard.     No friction rub. No gallop.   Pulmonary:      Effort: Pulmonary effort is normal. No respiratory distress.      Breath sounds: No wheezing, rhonchi or rales.   Abdominal:       General: Abdomen is flat. Bowel sounds are normal.      Palpations: Abdomen is soft. There is no mass.      Tenderness: There is no abdominal tenderness. There is no guarding.   Musculoskeletal:         General: No swelling, tenderness or deformity. Normal range of motion.      Cervical back: Normal range of motion and neck supple.      Right lower leg: No edema.      Left lower leg: No edema.      Comments: Presents in wheelchair   Lymphadenopathy:      Cervical: No cervical adenopathy.   Skin:     General: Skin is warm and dry.      Findings: No erythema, lesion or rash.   Neurological:      General: No focal deficit present.      Mental Status: He is alert and oriented to person, place, and time. Mental status is at baseline.      Cranial Nerves: Cranial nerves 2-12 are intact.      Motor: Weakness present.      Gait: Gait abnormal (unsteady).      Comments: On wheelchair today   Psychiatric:         Mood and Affect: Mood normal.         Behavior: Behavior normal. Behavior is cooperative.         Thought Content: Thought content normal.         Judgment: Judgment normal.            Admission on 04/27/2024, Discharged on 04/27/2024   Component Date Value    Sodium Level 04/27/2024 140     Potassium Level 04/27/2024 3.8     Chloride 04/27/2024 107     Carbon Dioxide 04/27/2024 23     Glucose Level 04/27/2024 93     Blood Urea Nitrogen 04/27/2024 16.0     Creatinine 04/27/2024 0.92     Calcium Level Total 04/27/2024 9.4     Protein Total 04/27/2024 6.9     Albumin Level 04/27/2024 3.5     Globulin 04/27/2024 3.4     Albumin/Globulin Ratio 04/27/2024 1.0 (L)     Bilirubin Total 04/27/2024 0.3     Alkaline Phosphatase 04/27/2024 88     Alanine Aminotransferase 04/27/2024 17     Aspartate Aminotransfera* 04/27/2024 15     eGFR 04/27/2024 >60     Troponin-I 04/27/2024 0.072 (H)     Color, UA 04/27/2024 Yellow     Appearance, UA 04/27/2024 Clear     Specific Gravity, UA 04/27/2024 1.020     pH, UA 04/27/2024 6.0      Protein, UA 04/27/2024 Negative     Glucose, UA 04/27/2024 Negative     Ketones, UA 04/27/2024 Negative     Blood, UA 04/27/2024 Trace-Intact (A)     Bilirubin, UA 04/27/2024 Negative     Urobilinogen, UA 04/27/2024 0.2     Nitrites, UA 04/27/2024 Positive (A)     Leukocyte Esterase, UA 04/27/2024 Trace (A)     Magnesium Level 04/27/2024 2.00     QRS Duration 04/27/2024 90     OHS QTC Calculation 04/27/2024 409     WBC 04/27/2024 28.19 (H)     RBC 04/27/2024 4.99     Hgb 04/27/2024 14.0     Hct 04/27/2024 43.3     MCV 04/27/2024 86.8     MCH 04/27/2024 28.1     MCHC 04/27/2024 32.3 (L)     RDW 04/27/2024 14.7     Platelet 04/27/2024 194     MPV 04/27/2024 9.9     Neut % 04/27/2024 18.9     Lymph % 04/27/2024 73.8     Mono % 04/27/2024 5.1     Eos % 04/27/2024 1.5     Basophil % 04/27/2024 0.4     Lymph # 04/27/2024 20.80 (H)     Neut # 04/27/2024 5.34     Mono # 04/27/2024 1.44 (H)     Eos # 04/27/2024 0.41     Baso # 04/27/2024 0.12     IG# 04/27/2024 0.08 (H)     IG% 04/27/2024 0.3     Bacteria, UA 04/27/2024 Many (A)     RBC, UA 04/27/2024 0-2     WBC, UA 04/27/2024 6-10 (A)     Squamous Epithelial Cell* 04/27/2024 Few (A)     Troponin-I 04/27/2024 0.054 (H)           Problem list:  1. CLL (chronic lymphocytic leukemia)             Assessment:   CLL   He has borderline cytopenias. His leukocytosis is stable at 25.4. His anemia is mild and stable at 12.9 today and no lymphadenopathy or B symptoms.   Given his other comoridities such as increasing neurological impairment, seizures, COPD, risks outweight benefits of starting any therapy at this time.   Will continue monitoring unless CLL is creating significant tumor burden    Plan:   Patient with stable disease.   Labs stable.  Continue observation.    Return to clinic in 3 months with NP for follow up/lab  Cbc, cmp, ldh, immunoglobulins- 1 week prior @ [facility]         I personally reviewed all pertinent imaging, lab results, explained to the patient the pertinent  information in detail including radiographic imaging, abnormal lab results. All questions were answered thoroughly. Total time spent on this visit was >30 minutes.       Stacy Celestin MD  Hematology/Oncology        Professional Services   I, Ghazala Abdullahi LPN, acted solely as a scribe for and in the presence of Dr. Stacy Celestin, who performed these services.

## 2024-04-30 ENCOUNTER — OFFICE VISIT (OUTPATIENT)
Dept: HEMATOLOGY/ONCOLOGY | Facility: CLINIC | Age: 79
End: 2024-04-30
Payer: MEDICARE

## 2024-04-30 VITALS
OXYGEN SATURATION: 96 % | DIASTOLIC BLOOD PRESSURE: 68 MMHG | TEMPERATURE: 98 F | HEIGHT: 67 IN | SYSTOLIC BLOOD PRESSURE: 159 MMHG | BODY MASS INDEX: 37.58 KG/M2 | RESPIRATION RATE: 20 BRPM | HEART RATE: 56 BPM

## 2024-04-30 DIAGNOSIS — C91.10 CLL (CHRONIC LYMPHOCYTIC LEUKEMIA): Primary | ICD-10-CM

## 2024-04-30 PROCEDURE — 99215 OFFICE O/P EST HI 40 MIN: CPT | Mod: PBBFAC | Performed by: INTERNAL MEDICINE

## 2024-04-30 PROCEDURE — 99214 OFFICE O/P EST MOD 30 MIN: CPT | Mod: S$PBB,,, | Performed by: INTERNAL MEDICINE

## 2024-04-30 PROCEDURE — 99999 PR PBB SHADOW E&M-EST. PATIENT-LVL V: CPT | Mod: PBBFAC,,, | Performed by: INTERNAL MEDICINE

## 2024-05-07 ENCOUNTER — HOSPITAL ENCOUNTER (INPATIENT)
Facility: HOSPITAL | Age: 79
LOS: 4 days | Discharge: HOME | DRG: 309 | End: 2024-05-11
Attending: STUDENT IN AN ORGANIZED HEALTH CARE EDUCATION/TRAINING PROGRAM | Admitting: STUDENT IN AN ORGANIZED HEALTH CARE EDUCATION/TRAINING PROGRAM
Payer: MEDICARE

## 2024-05-07 DIAGNOSIS — T42.0X1A DILANTIN TOXICITY: Primary | ICD-10-CM

## 2024-05-07 LAB
ALBUMIN SERPL-MCNC: 3.6 G/DL (ref 3.4–4.8)
ALBUMIN/GLOB SERPL: 0.9 RATIO (ref 1.1–2)
ALP SERPL-CCNC: 95 UNIT/L (ref 40–150)
ALT SERPL-CCNC: 14 UNIT/L (ref 0–55)
AST SERPL-CCNC: 16 UNIT/L (ref 5–34)
BASOPHILS # BLD AUTO: 0.08 X10(3)/MCL
BASOPHILS NFR BLD AUTO: 0.2 %
BILIRUB SERPL-MCNC: 0.2 MG/DL
BUN SERPL-MCNC: 22 MG/DL (ref 8.4–25.7)
CALCIUM SERPL-MCNC: 9.5 MG/DL (ref 8.8–10)
CHLORIDE SERPL-SCNC: 111 MMOL/L (ref 98–107)
CO2 SERPL-SCNC: 23 MMOL/L (ref 23–31)
CREAT SERPL-MCNC: 1.13 MG/DL (ref 0.73–1.18)
EOSINOPHIL # BLD AUTO: 0.37 X10(3)/MCL (ref 0–0.9)
EOSINOPHIL NFR BLD AUTO: 1.1 %
ERYTHROCYTE [DISTWIDTH] IN BLOOD BY AUTOMATED COUNT: 14.5 % (ref 11.5–17)
GFR SERPLBLD CREATININE-BSD FMLA CKD-EPI: >60 ML/MIN/1.73/M2
GLOBULIN SER-MCNC: 3.9 GM/DL (ref 2.4–3.5)
GLUCOSE SERPL-MCNC: 110 MG/DL (ref 82–115)
HCT VFR BLD AUTO: 46.6 % (ref 42–52)
HGB BLD-MCNC: 14.8 G/DL (ref 14–18)
IMM GRANULOCYTES # BLD AUTO: 0.12 X10(3)/MCL (ref 0–0.04)
IMM GRANULOCYTES NFR BLD AUTO: 0.3 %
LYMPHOCYTES # BLD AUTO: 25.35 X10(3)/MCL (ref 0.6–4.6)
LYMPHOCYTES NFR BLD AUTO: 73.4 %
MCH RBC QN AUTO: 28.5 PG (ref 27–31)
MCHC RBC AUTO-ENTMCNC: 31.8 G/DL (ref 33–36)
MCV RBC AUTO: 89.6 FL (ref 80–94)
MONOCYTES # BLD AUTO: 1.65 X10(3)/MCL (ref 0.1–1.3)
MONOCYTES NFR BLD AUTO: 4.8 %
NEUTROPHILS # BLD AUTO: 6.97 X10(3)/MCL (ref 2.1–9.2)
NEUTROPHILS NFR BLD AUTO: 20.2 %
PHENYTOIN SERPL-MCNC: 36.8 UG/ML (ref 10–20)
PLATELET # BLD AUTO: 235 X10(3)/MCL (ref 130–400)
PMV BLD AUTO: 9.9 FL (ref 7.4–10.4)
POCT GLUCOSE: 99 MG/DL (ref 70–110)
POTASSIUM SERPL-SCNC: 4.2 MMOL/L (ref 3.5–5.1)
PROT SERPL-MCNC: 7.5 GM/DL (ref 5.8–7.6)
RBC # BLD AUTO: 5.2 X10(6)/MCL (ref 4.7–6.1)
SODIUM SERPL-SCNC: 144 MMOL/L (ref 136–145)
WBC # SPEC AUTO: 34.54 X10(3)/MCL (ref 4.5–11.5)

## 2024-05-07 PROCEDURE — 82962 GLUCOSE BLOOD TEST: CPT

## 2024-05-07 PROCEDURE — 93005 ELECTROCARDIOGRAM TRACING: CPT

## 2024-05-07 PROCEDURE — 20000000 HC ICU ROOM

## 2024-05-07 PROCEDURE — 80053 COMPREHEN METABOLIC PANEL: CPT | Performed by: STUDENT IN AN ORGANIZED HEALTH CARE EDUCATION/TRAINING PROGRAM

## 2024-05-07 PROCEDURE — 99285 EMERGENCY DEPT VISIT HI MDM: CPT | Mod: 25

## 2024-05-07 PROCEDURE — 80185 ASSAY OF PHENYTOIN TOTAL: CPT | Performed by: STUDENT IN AN ORGANIZED HEALTH CARE EDUCATION/TRAINING PROGRAM

## 2024-05-07 PROCEDURE — 25000003 PHARM REV CODE 250: Performed by: INTERNAL MEDICINE

## 2024-05-07 PROCEDURE — 63600175 PHARM REV CODE 636 W HCPCS: Performed by: INTERNAL MEDICINE

## 2024-05-07 PROCEDURE — 93010 ELECTROCARDIOGRAM REPORT: CPT | Mod: ,,, | Performed by: INTERNAL MEDICINE

## 2024-05-07 PROCEDURE — 85025 COMPLETE CBC W/AUTO DIFF WBC: CPT | Performed by: STUDENT IN AN ORGANIZED HEALTH CARE EDUCATION/TRAINING PROGRAM

## 2024-05-07 RX ORDER — CLOPIDOGREL BISULFATE 75 MG/1
75 TABLET ORAL DAILY
Status: DISCONTINUED | OUTPATIENT
Start: 2024-05-08 | End: 2024-05-11 | Stop reason: HOSPADM

## 2024-05-07 RX ORDER — MUPIROCIN 20 MG/G
OINTMENT TOPICAL 2 TIMES DAILY
Status: DISCONTINUED | OUTPATIENT
Start: 2024-05-07 | End: 2024-05-11 | Stop reason: HOSPADM

## 2024-05-07 RX ORDER — METOPROLOL SUCCINATE 25 MG/1
25 TABLET, EXTENDED RELEASE ORAL 2 TIMES DAILY
Status: DISCONTINUED | OUTPATIENT
Start: 2024-05-07 | End: 2024-05-09

## 2024-05-07 RX ORDER — FINASTERIDE 5 MG/1
5 TABLET, FILM COATED ORAL DAILY
Status: DISCONTINUED | OUTPATIENT
Start: 2024-05-08 | End: 2024-05-11 | Stop reason: HOSPADM

## 2024-05-07 RX ORDER — PANTOPRAZOLE SODIUM 40 MG/1
40 TABLET, DELAYED RELEASE ORAL DAILY
Status: DISCONTINUED | OUTPATIENT
Start: 2024-05-08 | End: 2024-05-11 | Stop reason: HOSPADM

## 2024-05-07 RX ORDER — PHENOBARBITAL 32.4 MG/1
97.2 TABLET ORAL DAILY
Status: DISCONTINUED | OUTPATIENT
Start: 2024-05-08 | End: 2024-05-11 | Stop reason: HOSPADM

## 2024-05-07 RX ORDER — LEVETIRACETAM 500 MG/1
1000 TABLET ORAL 2 TIMES DAILY
Status: DISCONTINUED | OUTPATIENT
Start: 2024-05-07 | End: 2024-05-11 | Stop reason: HOSPADM

## 2024-05-07 RX ORDER — METOCLOPRAMIDE HYDROCHLORIDE 5 MG/ML
10 INJECTION INTRAMUSCULAR; INTRAVENOUS EVERY 6 HOURS PRN
Status: DISCONTINUED | OUTPATIENT
Start: 2024-05-07 | End: 2024-05-11 | Stop reason: HOSPADM

## 2024-05-07 RX ORDER — FUROSEMIDE 40 MG/1
40 TABLET ORAL DAILY
Status: DISCONTINUED | OUTPATIENT
Start: 2024-05-08 | End: 2024-05-11 | Stop reason: HOSPADM

## 2024-05-07 RX ORDER — DEXTROSE MONOHYDRATE AND SODIUM CHLORIDE 5; .9 G/100ML; G/100ML
INJECTION, SOLUTION INTRAVENOUS CONTINUOUS
Status: DISCONTINUED | OUTPATIENT
Start: 2024-05-07 | End: 2024-05-09

## 2024-05-07 RX ORDER — LISINOPRIL 5 MG/1
5 TABLET ORAL DAILY
Status: DISCONTINUED | OUTPATIENT
Start: 2024-05-08 | End: 2024-05-11 | Stop reason: HOSPADM

## 2024-05-07 RX ORDER — SODIUM CHLORIDE 0.9 % (FLUSH) 0.9 %
10 SYRINGE (ML) INJECTION
Status: DISCONTINUED | OUTPATIENT
Start: 2024-05-07 | End: 2024-05-11 | Stop reason: HOSPADM

## 2024-05-07 RX ORDER — ATORVASTATIN CALCIUM 40 MG/1
40 TABLET, FILM COATED ORAL NIGHTLY
Status: DISCONTINUED | OUTPATIENT
Start: 2024-05-07 | End: 2024-05-11 | Stop reason: HOSPADM

## 2024-05-07 RX ADMIN — LEVETIRACETAM 1000 MG: 500 TABLET, FILM COATED ORAL at 11:05

## 2024-05-07 RX ADMIN — APIXABAN 5 MG: 5 TABLET, FILM COATED ORAL at 11:05

## 2024-05-07 RX ADMIN — ATORVASTATIN CALCIUM 40 MG: 40 TABLET, FILM COATED ORAL at 11:05

## 2024-05-07 RX ADMIN — MUPIROCIN 1 G: 20 OINTMENT TOPICAL at 11:05

## 2024-05-07 RX ADMIN — DEXTROSE AND SODIUM CHLORIDE: 5; 900 INJECTION, SOLUTION INTRAVENOUS at 10:05

## 2024-05-07 NOTE — Clinical Note
Diagnosis: Dilantin toxicity [[id number]]   Future Attending Provider: RAJIV NICOLE [[id number]]   Reason for IP Medical Treatment  (Clinical interventions that can only be accomplished in the IP setting? ) :: Dilantin toxicity   I certify that Inpatient services for greater than or equal to 2 midnights are medically necessary:: Yes   Plans for Post-Acute care--if anticipated (pick the single best option):: A. No post acute care anticipated at this time

## 2024-05-08 LAB
ALBUMIN SERPL-MCNC: 3.4 G/DL (ref 3.4–4.8)
ALBUMIN/GLOB SERPL: 1 RATIO (ref 1.1–2)
ALP SERPL-CCNC: 91 UNIT/L (ref 40–150)
ALT SERPL-CCNC: 11 UNIT/L (ref 0–55)
APPEARANCE UR: CLEAR
AST SERPL-CCNC: 12 UNIT/L (ref 5–34)
BACTERIA #/AREA URNS AUTO: ABNORMAL /HPF
BILIRUB SERPL-MCNC: 0.3 MG/DL
BILIRUB UR QL STRIP.AUTO: NEGATIVE
BUN SERPL-MCNC: 19 MG/DL (ref 8.4–25.7)
CALCIUM SERPL-MCNC: 9.5 MG/DL (ref 8.8–10)
CHLORIDE SERPL-SCNC: 112 MMOL/L (ref 98–107)
CO2 SERPL-SCNC: 28 MMOL/L (ref 23–31)
COLOR UR AUTO: YELLOW
CREAT SERPL-MCNC: 0.86 MG/DL (ref 0.73–1.18)
GFR SERPLBLD CREATININE-BSD FMLA CKD-EPI: >60 ML/MIN/1.73/M2
GLOBULIN SER-MCNC: 3.5 GM/DL (ref 2.4–3.5)
GLUCOSE SERPL-MCNC: 96 MG/DL (ref 82–115)
GLUCOSE UR QL STRIP.AUTO: NEGATIVE
KETONES UR QL STRIP.AUTO: NEGATIVE
LEUKOCYTE ESTERASE UR QL STRIP.AUTO: NEGATIVE
NITRITE UR QL STRIP.AUTO: POSITIVE
OHS QRS DURATION: 86 MS
OHS QTC CALCULATION: 414 MS
PH UR STRIP.AUTO: 5.5 [PH]
PHENYTOIN SERPL-MCNC: 30.7 UG/ML (ref 10–20)
POTASSIUM SERPL-SCNC: 3.6 MMOL/L (ref 3.5–5.1)
PROT SERPL-MCNC: 6.9 GM/DL (ref 5.8–7.6)
PROT UR QL STRIP.AUTO: NEGATIVE
RBC #/AREA URNS AUTO: ABNORMAL /HPF
RBC UR QL AUTO: ABNORMAL
SODIUM SERPL-SCNC: 146 MMOL/L (ref 136–145)
SP GR UR STRIP.AUTO: 1.02 (ref 1–1.03)
SQUAMOUS #/AREA URNS AUTO: ABNORMAL /HPF
UROBILINOGEN UR STRIP-ACNC: 1
WBC #/AREA URNS AUTO: ABNORMAL /HPF

## 2024-05-08 PROCEDURE — 94761 N-INVAS EAR/PLS OXIMETRY MLT: CPT

## 2024-05-08 PROCEDURE — 20000000 HC ICU ROOM

## 2024-05-08 PROCEDURE — 63600175 PHARM REV CODE 636 W HCPCS: Performed by: INTERNAL MEDICINE

## 2024-05-08 PROCEDURE — 81001 URINALYSIS AUTO W/SCOPE: CPT | Performed by: INTERNAL MEDICINE

## 2024-05-08 PROCEDURE — 36415 COLL VENOUS BLD VENIPUNCTURE: CPT | Performed by: INTERNAL MEDICINE

## 2024-05-08 PROCEDURE — C1751 CATH, INF, PER/CENT/MIDLINE: HCPCS

## 2024-05-08 PROCEDURE — 02HV33Z INSERTION OF INFUSION DEVICE INTO SUPERIOR VENA CAVA, PERCUTANEOUS APPROACH: ICD-10-PCS | Performed by: INTERNAL MEDICINE

## 2024-05-08 PROCEDURE — 25000003 PHARM REV CODE 250: Performed by: INTERNAL MEDICINE

## 2024-05-08 PROCEDURE — 81003 URINALYSIS AUTO W/O SCOPE: CPT | Performed by: INTERNAL MEDICINE

## 2024-05-08 PROCEDURE — 36569 INSJ PICC 5 YR+ W/O IMAGING: CPT

## 2024-05-08 PROCEDURE — 80053 COMPREHEN METABOLIC PANEL: CPT | Performed by: INTERNAL MEDICINE

## 2024-05-08 PROCEDURE — A4216 STERILE WATER/SALINE, 10 ML: HCPCS | Performed by: INTERNAL MEDICINE

## 2024-05-08 PROCEDURE — 80185 ASSAY OF PHENYTOIN TOTAL: CPT | Performed by: INTERNAL MEDICINE

## 2024-05-08 PROCEDURE — 27000221 HC OXYGEN, UP TO 24 HOURS

## 2024-05-08 RX ORDER — SODIUM CHLORIDE 0.9 % (FLUSH) 0.9 %
10 SYRINGE (ML) INJECTION
Status: DISCONTINUED | OUTPATIENT
Start: 2024-05-08 | End: 2024-05-11 | Stop reason: HOSPADM

## 2024-05-08 RX ORDER — SODIUM CHLORIDE 0.9 % (FLUSH) 0.9 %
10 SYRINGE (ML) INJECTION EVERY 6 HOURS
Status: DISCONTINUED | OUTPATIENT
Start: 2024-05-08 | End: 2024-05-11 | Stop reason: HOSPADM

## 2024-05-08 RX ADMIN — LEVETIRACETAM 1000 MG: 500 TABLET, FILM COATED ORAL at 09:05

## 2024-05-08 RX ADMIN — ATORVASTATIN CALCIUM 40 MG: 40 TABLET, FILM COATED ORAL at 09:05

## 2024-05-08 RX ADMIN — MUPIROCIN 1 G: 20 OINTMENT TOPICAL at 09:05

## 2024-05-08 RX ADMIN — FINASTERIDE 5 MG: 5 TABLET, FILM COATED ORAL at 09:05

## 2024-05-08 RX ADMIN — FUROSEMIDE 40 MG: 40 TABLET ORAL at 09:05

## 2024-05-08 RX ADMIN — Medication 10 ML: at 04:05

## 2024-05-08 RX ADMIN — DEXTROSE AND SODIUM CHLORIDE: 5; 900 INJECTION, SOLUTION INTRAVENOUS at 07:05

## 2024-05-08 RX ADMIN — CLOPIDOGREL BISULFATE 75 MG: 75 TABLET ORAL at 04:05

## 2024-05-08 RX ADMIN — APIXABAN 5 MG: 5 TABLET, FILM COATED ORAL at 09:05

## 2024-05-08 RX ADMIN — PANTOPRAZOLE SODIUM 40 MG: 40 TABLET, DELAYED RELEASE ORAL at 04:05

## 2024-05-08 RX ADMIN — LISINOPRIL 5 MG: 5 TABLET ORAL at 04:05

## 2024-05-08 RX ADMIN — MUPIROCIN 2 G: 20 OINTMENT TOPICAL at 09:05

## 2024-05-08 RX ADMIN — DEXTROSE AND SODIUM CHLORIDE: 5; 900 INJECTION, SOLUTION INTRAVENOUS at 05:05

## 2024-05-08 RX ADMIN — PHENOBARBITAL 97.2 MG: 32.4 TABLET ORAL at 04:05

## 2024-05-08 NOTE — PROCEDURES
"Jonh Wayne is a 78 y.o. male patient.    Temp: 97.3 °F (36.3 °C) (05/08/24 0400)  Pulse: (!) 48 (05/08/24 0746)  Resp: 16 (05/08/24 0746)  BP: (!) 171/68 (05/08/24 0415)  SpO2: 96 % (05/08/24 0746)  Weight: 109.8 kg (242 lb 1 oz) (05/07/24 2120)  Height: 5' 7" (170.2 cm) (05/07/24 2120)    PICC  Date/Time: 5/8/2024 8:34 AM  Performed by: Rohit Olivera RN  Consent Done: Yes  Time out: Immediately prior to procedure a time out was called to verify the correct patient, procedure, equipment, support staff and site/side marked as required  Indications: med administration and vascular access  Anesthesia: local infiltration  Local anesthetic: lidocaine 1% without epinephrine  Anesthetic Total (mL): 5  Preparation: skin prepped with ChloraPrep  Skin prep agent dried: skin prep agent completely dried prior to procedure  Sterile barriers: all five maximum sterile barriers used - cap, mask, sterile gown, sterile gloves, and large sterile sheet  Hand hygiene: hand hygiene performed prior to central venous catheter insertion  Location details: right brachial  Catheter type: double lumen  Catheter size: 5 Fr  Catheter Length: 40cm    Ultrasound guidance: yes  Vessel Caliber: patent, compressibility normal  Vascular Doppler: not done  Needle advanced into vessel with real time Ultrasound guidance.  Guidewire confirmed in vessel.  Sterile sheath used.  no esophageal manometryNumber of attempts: 1  Post-procedure: blood return through all ports, sterile dressing applied and chlorhexidine patch    Assessment: placement verified by x-ray  Complications: none          Rohit Olivera RN  5/8/2024    "

## 2024-05-08 NOTE — H&P
[facility] - ICU    History & Physical      Patient Name: John Wayne  MRN: [id number]  Admission Date: 5/7/2024  Attending Physician: Brittany Mcfarlane MD   Primary Care Provider: ISAC Schmidt MD (Inactive)       Tele-Nocturnist History and Physical  Telemedicine Service was provided using HIPPA compliant web platform using SOC for audio/visual equipment.   Patient Location: [place], [place]  Provider Location: [place], [place]  Participants on call: bedside RN, patient    Patient information was obtained from patient and ER records.     Subjective:     Principal Problem:<principal problem not specified>    Chief Complaint:   Chief Complaint   Patient presents with    abnormal lab value     Pt brought in by [facility] from [facility] with abnormal dilantin level.        HPI: 78-year-old male with a past medical history paroxysmal AFib on Eliquis, COPD, CAD, CHF, hypertension and epilepsy who was sent from the Nursing Home to the ED on account of abnormal lab reported as elevated Dilantin level of 38.1. Patient is reported to have mild mental status change. He is unable to give me any history but awake and able to follow commands.  In the ED, he was noted with nystagmus, mild drowsiness and mild bradycardia with a repeat Dilantin level was 36.8.  He is being admitted to the ICU.    ED course: Started on D5 0.9% NS IVF.  I personally spoke with ED clinician regarding the case and reviewed his notes. Pertinent ED findings noted.    Past Medical History:   Diagnosis Date    Anticoagulant long-term use     CHF (congestive heart failure) 10/2021    EF of 25-30% on ECHO    CLL (chronic lymphocytic leukemia)     COPD (chronic obstructive pulmonary disease)     Coronary artery disease     Depression     Difficult intubation     GERD (gastroesophageal reflux disease)     Hypertension     Mixed hyperlipidemia     PVD (peripheral vascular disease)     Seizures     Sleep apnea, unspecified     TIA (transient  ischemic attack)        Past Surgical History:   Procedure Laterality Date    CORONARY ANGIOPLASTY WITH STENT PLACEMENT  10/18/2016    pLAD, pLAD, dLAD, mCX,    INSERTION OF BARE METAL STENT INTO PERIPHERAL ARTERY  2018    B/L [place] Vessels    PERCUTANEOUS BALLOON VALVULOPLASTY  04/04/2018       Review of patient's allergies indicates:   Allergen Reactions    Ancef in dextrose (iso-osm)     Codeine     Penicillins        No current facility-administered medications on file prior to encounter.     Current Outpatient Medications on File Prior to Encounter   Medication Sig    albuterol sulfate 2.5 mg/0.5 mL Nebu Take 2.5 mg by nebulization every 6 (six) hours as needed. Rescue    amiodarone (PACERONE) 200 MG Tab Take 200 mg by mouth once daily.    atorvastatin (LIPITOR) 40 MG tablet Take 40 mg by mouth every evening.    clopidogreL (PLAVIX) 75 mg tablet Take 1 tablet by mouth Daily.    ELIQUIS 5 mg Tab Take 1 tablet by mouth 2 (two) times a day.    finasteride (PROSCAR) 5 mg tablet Take 1 tablet (5 mg total) by mouth once daily. (Patient taking differently: Take 5 mg by mouth nightly.)    levETIRAcetam (KEPPRA) 1000 MG tablet Take 1 tablet by mouth 2 (two) times a day.    metoprolol succinate (TOPROL-XL) 25 MG 24 hr tablet Take 1 tablet by mouth 2 (two) times daily.    pantoprazole (PROTONIX) 40 MG tablet Take 40 mg by mouth Daily.    PHENobarbitaL 32.4 MG tablet Take 3 tablets by mouth Daily.    phenytoin (DILANTIN) 100 MG ER capsule Take 100 mg by mouth.    phenytoin (DILANTIN) 50 mg chewable tablet Take 150 mg by mouth every evening.    primidone (MYSOLINE) 50 MG Tab Take 50 mg by mouth 3 (three) times daily.    tamsulosin (FLOMAX) 0.4 mg Cap Take 1 tablet by mouth nightly.    venlafaxine (EFFEXOR) 75 MG tablet Take 1 tablet (75 mg total) by mouth 2 (two) times daily.    albuterol (PROVENTIL) 2.5 mg /3 mL (0.083 %) nebulizer solution     furosemide (LASIX) 40 MG tablet Take 40 mg by mouth once daily.    lisinopriL  (PRINIVIL,ZESTRIL) 5 MG tablet Take 1 tablet by mouth Daily.    midazolam (NAYZILAM) 5 mg/spray (0.1 mL) Spry 1 spray by Nasal route daily as needed (seizure). To left nostril    phenytoin (DILANTIN) 50 mg chewable tablet Take 100 mg by mouth once daily.     Family History       Problem Relation (Age of Onset)    Coronary artery disease Father    Heart attack Father    Hypertension Mother, Father          Tobacco Use    Smoking status: Former    Smokeless tobacco: Never   Substance and Sexual Activity    Alcohol use: Not Currently    Drug use: Never    Sexual activity: Not on file     Review of Systems  Unable to obtain review of systems due to mental status change    Objective:     Vital Signs (Most Recent):  Temp: 97.7 °F (36.5 °C) (05/07/24 2120)  Pulse: (!) 52 (05/07/24 2205)  Resp: 15 (05/07/24 2205)  BP: (!) 142/64 (05/07/24 2205)  SpO2: 100 % (05/07/24 2205) Vital Signs (24h Range):  Temp:  [97.7 °F (36.5 °C)-98.1 °F (36.7 °C)] 97.7 °F (36.5 °C)  Pulse:  [52-55] 52  Resp:  [13-20] 15  SpO2:  [94 %-100 %] 100 %  BP: (142-175)/(55-73) 142/64     Weight: 109.8 kg (242 lb 1 oz)  Body mass index is 37.91 kg/m².    Physical Exam   GEN: Appears stated age, drowsy, well nourished  EYES: no conjunctival injection, EOMI, normal hearing  HENT: no goiter, moist mucous membranes, no glossitis  RESP: clear to auscultation bilaterally, no conversational dyspnea, no accessory muscle use  CARDIAC: regular rate and rhythm, no murmurs, rubs or gallops, Has leg/ankle edema b/l  GI: soft non tender, non-distended + bowel sounds  SKIN: no rashes noted  Neuro: CN II-XII intact, non focal exam  Psych: Alert and oriented x2 , normal affect    Significant Labs: All pertinent labs within the past 24 hours have been reviewed.    Significant Imaging: I have reviewed all pertinent imaging results/findings within the past 24 hours.    Assessment/Plan:     Assessment/Plan  #. Dilantin toxicity  -Supportive care  -Give Reglan 10 mg iv Q6H  PRN.  -Continue gentle IV Fluid  -Cardiac monitoring.   -Monitor closely in ICU.    #. AFib   - Resume Eliquis  -Resume Amiodarone, Metoprolol     #. COPD  -Bronchodilators prn    #. CAD   -Resume Metoprolol, Statin, Plavix    #. CHF (HFpEF)  -Resume metoprolol, lisinopril, Lasix 40 mg bid     #. Hypertension  -Metoptolol, lisinopril    #. Epilepsy  -Resume Keppra  -Hold Dilantin      VTE: on Eliquis  Independently reviewed imaging, labs and vitals as pertinent to clinical condition  Discharge Disposition:     Time spent with patient, assessment, chart review, medication, patient education: > 75 minutes         Disposition:   - The patient is expected to have a LOS more than 2 midnights and will be admitted to inpatient status under my care.      VTE Risk Mitigation (From admission, onward)           Ordered     IP VTE HIGH RISK PATIENT  Once         05/07/24 2031     Place sequential compression device  Until discontinued         05/07/24 2031                      Brittany Mcfarlane MD  Department of Hospital Medicine   [facility] - ICU

## 2024-05-08 NOTE — NURSING
Longterm IV unsuccessful. Notified Dr Mcfarlane, PICC ordered.     No urine output per external catheter, diaper dry, bladder scan done showing 607 ml. Notified Dr. Mcfarlane, orders to place fletcher catheter.

## 2024-05-08 NOTE — PLAN OF CARE
Problem: Adult Inpatient Plan of Care  Goal: Plan of Care Review  5/8/2024 0521 by Bibi Vaughn RN  Outcome: Progressing  5/8/2024 0519 by Bibi Vaughn RN  Outcome: Progressing  Goal: Patient-Specific Goal (Individualized)  5/8/2024 0521 by Bibi Vaughn RN  Outcome: Progressing  5/8/2024 0519 by Bibi Vaughn RN  Outcome: Progressing  Goal: Absence of Hospital-Acquired Illness or Injury  5/8/2024 0521 by Bibi Vaughn RN  Outcome: Progressing  5/8/2024 0519 by Bibi Vaughn RN  Outcome: Progressing  Goal: Optimal Comfort and Wellbeing  5/8/2024 0521 by Bibi Vaughn RN  Outcome: Progressing  5/8/2024 0519 by Bibi Vaughn RN  Outcome: Progressing  Goal: Readiness for Transition of Care  5/8/2024 0521 by Bibi Vaughn RN  Outcome: Progressing  5/8/2024 0519 by Bibi Vaughn RN  Outcome: Progressing     Problem: Fall Injury Risk  Goal: Absence of Fall and Fall-Related Injury  5/8/2024 0521 by Bibi Vaughn RN  Outcome: Progressing  5/8/2024 0519 by Bibi Vaughn RN  Outcome: Progressing     Problem: Skin Injury Risk Increased  Goal: Skin Health and Integrity  5/8/2024 0521 by Bibi Vaughn RN  Outcome: Progressing  5/8/2024 0519 by Bibi Vaughn RN  Outcome: Progressing     Problem: Comorbidity Management  Goal: Maintenance of COPD Symptom Control  5/8/2024 0521 by Bibi Vaughn RN  Outcome: Progressing  5/8/2024 0519 by Bibi Vaughn RN  Outcome: Progressing  Goal: Maintenance of Heart Failure Symptom Control  5/8/2024 0521 by Bibi Vaughn RN  Outcome: Progressing  5/8/2024 0519 by Bibi Vaughn RN  Outcome: Progressing  Goal: Blood Pressure in Desired Range  5/8/2024 0521 by Bibi Vaughn RN  Outcome: Progressing  5/8/2024 0519 by Bibi Vaughn RN  Outcome: Progressing  Goal: Maintenance of Seizure Control  5/8/2024 0521 by Bibi Vaughn RN  Outcome: Progressing  5/8/2024 0519 by Bibi Vaughn RN  Outcome: Progressing     Problem:  Infection  Goal: Absence of Infection Signs and Symptoms  5/8/2024 0521 by Bibi Vaughn RN  Outcome: Progressing  5/8/2024 0519 by Bibi Vaughn RN  Outcome: Progressing     Problem: Urinary Elimination Management  Goal: Effective Urinary Elimination/Continence  Outcome: Progressing  Intervention: Promote Urinary Elimination and Continence  Flowsheets (Taken 5/8/2024 0520)  Assistive Device Use (Bladder Management): catheter

## 2024-05-08 NOTE — ED PROVIDER NOTES
Encounter Date: 5/7/2024       History     Chief Complaint   Patient presents with    abnormal lab value     Pt [facility] hospitals with abnormal dilantin level.     HPI      78-year-old male with a past medical history paroxysmal AFib on Eliquis, COPD, CAD, CHF, hypertension and epilepsy who presents emergency department for an abnormal lab value.  Patient was seen here 2 days ago she sustained a fall.  He did not have any known injuries and was discharged.  They did some blood work at the nursing home and his Dilantin level came back elevated.  They are concerned with patient having Dilantin toxicity secondary to increased lethargy.  Patient states he just feels tired.    Review of patient's allergies indicates:   Allergen Reactions    Ancef in dextrose (iso-osm)     Codeine     Penicillins      Past Medical History:   Diagnosis Date    Anticoagulant long-term use     CHF (congestive heart failure) 10/2021    EF of 25-30% on ECHO    CLL (chronic lymphocytic leukemia)     COPD (chronic obstructive pulmonary disease)     Coronary artery disease     Depression     Difficult intubation     GERD (gastroesophageal reflux disease)     Hypertension     Mixed hyperlipidemia     PVD (peripheral vascular disease)     Seizures     Sleep apnea, unspecified     TIA (transient ischemic attack)      Past Surgical History:   Procedure Laterality Date    CORONARY ANGIOPLASTY WITH STENT PLACEMENT  10/18/2016    pLAD, pLAD, dLAD, mCX,    INSERTION OF BARE METAL STENT INTO PERIPHERAL ARTERY  2018    B/L [place] Vessels    PERCUTANEOUS BALLOON VALVULOPLASTY  04/04/2018     Family History   Problem Relation Name Age of Onset    Hypertension Mother      Hypertension Father      Coronary artery disease Father      Heart attack Father       Social History     Tobacco Use    Smoking status: Former    Smokeless tobacco: Never   Substance Use Topics    Alcohol use: Not Currently    Drug use: Never     Review of Systems    Constitutional:  Positive for fatigue. Negative for fever.   Respiratory:  Negative for cough.    Cardiovascular:  Negative for chest pain.   Gastrointestinal:  Negative for abdominal pain.   Neurological:  Negative for seizures and headaches.   All other systems reviewed and are negative.      Physical Exam     Initial Vitals [05/07/24 1914]   BP Pulse Resp Temp SpO2   (!) 170/55 (!) 55 20 98.1 °F (36.7 °C) 97 %      MAP       --         Physical Exam    Nursing note and vitals reviewed.  Constitutional: He appears well-developed and well-nourished. He appears lethargic. He is Obese . No distress.   Eyes:   Bilateral beating nystagmus   Cardiovascular:  Normal rate and regular rhythm.           Pulmonary/Chest: Breath sounds normal. No respiratory distress.   Abdominal: Abdomen is soft. There is no abdominal tenderness.   Musculoskeletal:         General: No tenderness. Normal range of motion.     Neurological: He is oriented to person, place, and time. He appears lethargic. He displays no seizure activity. GCS eye subscore is 4. GCS verbal subscore is 5. GCS motor subscore is 6.   Patient lethargic but easily arousable and able to participate in examination   Skin: Skin is warm. Capillary refill takes less than 2 seconds.         ED Course   Critical Care    Date/Time: 5/7/2024 8:19 PM    Performed by: Anibal Osborn MD  Authorized by: Anibal Osborn MD  Direct patient critical care time: 45 minutes  Total critical care time (exclusive of procedural time) : 45 minutes  Critical care time was exclusive of separately billable procedures and treating other patients.  Critical care was necessary to treat or prevent imminent or life-threatening deterioration of the following conditions: toxidrome.  Critical care was time spent personally by me on the following activities: development of treatment plan with patient or surrogate, interpretation of cardiac output measurements, evaluation of patient's response  to treatment, ordering and review of radiographic studies, pulse oximetry, re-evaluation of patient's condition, review of old charts, ordering and review of laboratory studies, ordering and performing treatments and interventions, obtaining history from patient or surrogate and examination of patient.        Labs Reviewed   COMPREHENSIVE METABOLIC PANEL - Abnormal; Notable for the following components:       Result Value    Chloride 111 (*)     Globulin 3.9 (*)     Albumin/Globulin Ratio 0.9 (*)     All other components within normal limits   CBC WITH DIFFERENTIAL - Abnormal; Notable for the following components:    WBC 34.54 (*)     MCHC 31.8 (*)     Lymph # 25.35 (*)     Mono # 1.65 (*)     IG# 0.12 (*)     All other components within normal limits   PHENYTOIN LEVEL, TOTAL - Abnormal; Notable for the following components:    Phenytoin Level Total 36.8 (*)     All other components within normal limits   CBC W/ AUTO DIFFERENTIAL    Narrative:     The following orders were created for panel order CBC auto differential.  Procedure                               Abnormality         Status                     ---------                               -----------         ------                     CBC with Differential[[id number]]       Abnormal            Final result                 Please view results for these tests on the individual orders.   URINALYSIS, REFLEX TO URINE CULTURE   POCT GLUCOSE, HAND-HELD DEVICE   POCT GLUCOSE     EKG Readings: (Independently Interpreted)   Initial Reading: No STEMI. Rhythm: Sinus Bradycardia. Heart Rate: 52. Ectopy: No Ectopy. Conduction: Normal. ST Segments: Normal ST Segments. T Waves: Normal. Clinical Impression: Sinus Bradycardia       Imaging Results    None          Medications   sodium chloride 0.9% flush 10 mL (has no administration in time range)   dextrose 5 % and 0.9 % NaCl infusion (has no administration in time range)     Medical Decision Making  differential  diagnosis  Dilantin toxicity, metabolic derangement, electrolyte abnormality, dysrhythmia,  as well as multiple other possible etiologies      Problems Addressed:  Dilantin toxicity: acute illness or injury    Amount and/or Complexity of Data Reviewed  Labs: ordered. Decision-making details documented in ED Course.  ECG/medicine tests: ordered and independent interpretation performed.    Risk  Prescription drug management.  Drug therapy requiring intensive monitoring for toxicity.  Decision regarding hospitalization.               ED Course as of 05/07/24 2031   Tue May 07, 2024   2002 WBC(!): 34.54  Chronic   [BS]   2002 Hemoglobin: 14.8 [BS]   2002 Hematocrit: 46.6 [BS]   2002 Platelet Count: 235 [BS]   2018 Phenytoin Level Total(!!): 36.8 [BS]   2018 Phenytoin Level Total(!!): 36.8  Patient does have some symptoms of phenytoin toxicity including nystagmus, mild alteration of mental status, and mild bradycardia.  It is to note, patient is chronically bit bradycardic although his bradycardia is slightly worse than normal.  His blood pressure is maintained.  No indication for atropine or pacing at this time.  We will give him some gentle hydration this as he does have a history of CHF and likely get admitted him to the ICU for further monitoring.  There is no indication for any activated charcoal as this is a chronic processes. [BS]   2029 Hospitalist agrees with ICU admission [BS]      ED Course User Index  [BS] Anibal Osborn MD                           Clinical Impression:  Final diagnoses:  [T42.0X1A] Dilantin toxicity (Primary)          ED Disposition Condition    Admit                 Anibal Osborn MD  05/07/24 2031

## 2024-05-08 NOTE — PLAN OF CARE
Problem: Adult Inpatient Plan of Care  Goal: Plan of Care Review  Outcome: Progressing  Goal: Patient-Specific Goal (Individualized)  Outcome: Progressing  Goal: Absence of Hospital-Acquired Illness or Injury  Outcome: Progressing  Goal: Optimal Comfort and Wellbeing  Outcome: Progressing  Goal: Readiness for Transition of Care  Outcome: Progressing     Problem: Fall Injury Risk  Goal: Absence of Fall and Fall-Related Injury  Outcome: Progressing     Problem: Skin Injury Risk Increased  Goal: Skin Health and Integrity  Outcome: Progressing     Problem: Comorbidity Management  Goal: Maintenance of COPD Symptom Control  Outcome: Progressing  Goal: Maintenance of Heart Failure Symptom Control  Outcome: Progressing  Goal: Blood Pressure in Desired Range  Outcome: Progressing  Goal: Maintenance of Seizure Control  Outcome: Progressing     Problem: Infection  Goal: Absence of Infection Signs and Symptoms  Outcome: Progressing     Problem: Urinary Elimination Management  Goal: Effective Urinary Elimination/Continence  Outcome: Progressing     Problem: Gas Exchange Impaired  Goal: Optimal Gas Exchange  Outcome: Progressing

## 2024-05-09 LAB
ALBUMIN SERPL-MCNC: 3.2 G/DL (ref 3.4–4.8)
ALBUMIN/GLOB SERPL: 1 RATIO (ref 1.1–2)
ALP SERPL-CCNC: 89 UNIT/L (ref 40–150)
ALT SERPL-CCNC: 10 UNIT/L (ref 0–55)
AST SERPL-CCNC: 12 UNIT/L (ref 5–34)
BASOPHILS # BLD AUTO: 0.1 X10(3)/MCL
BASOPHILS NFR BLD AUTO: 0.4 %
BILIRUB SERPL-MCNC: 0.4 MG/DL
BUN SERPL-MCNC: 14 MG/DL (ref 8.4–25.7)
CALCIUM SERPL-MCNC: 9 MG/DL (ref 8.8–10)
CHLORIDE SERPL-SCNC: 113 MMOL/L (ref 98–107)
CO2 SERPL-SCNC: 27 MMOL/L (ref 23–31)
CREAT SERPL-MCNC: 0.91 MG/DL (ref 0.73–1.18)
EOSINOPHIL # BLD AUTO: 0.34 X10(3)/MCL (ref 0–0.9)
EOSINOPHIL NFR BLD AUTO: 1.2 %
ERYTHROCYTE [DISTWIDTH] IN BLOOD BY AUTOMATED COUNT: 14.4 % (ref 11.5–17)
GFR SERPLBLD CREATININE-BSD FMLA CKD-EPI: >60 ML/MIN/1.73/M2
GLOBULIN SER-MCNC: 3.3 GM/DL (ref 2.4–3.5)
GLUCOSE SERPL-MCNC: 117 MG/DL (ref 82–115)
HCT VFR BLD AUTO: 42.4 % (ref 42–52)
HGB BLD-MCNC: 13.5 G/DL (ref 14–18)
IMM GRANULOCYTES # BLD AUTO: 0.08 X10(3)/MCL (ref 0–0.04)
IMM GRANULOCYTES NFR BLD AUTO: 0.3 %
LYMPHOCYTES # BLD AUTO: 20.08 X10(3)/MCL (ref 0.6–4.6)
LYMPHOCYTES NFR BLD AUTO: 71.9 %
MAGNESIUM SERPL-MCNC: 2 MG/DL (ref 1.6–2.6)
MCH RBC QN AUTO: 28.3 PG (ref 27–31)
MCHC RBC AUTO-ENTMCNC: 31.8 G/DL (ref 33–36)
MCV RBC AUTO: 88.9 FL (ref 80–94)
MONOCYTES # BLD AUTO: 1.52 X10(3)/MCL (ref 0.1–1.3)
MONOCYTES NFR BLD AUTO: 5.4 %
NEUTROPHILS # BLD AUTO: 5.8 X10(3)/MCL (ref 2.1–9.2)
NEUTROPHILS NFR BLD AUTO: 20.8 %
PHENYTOIN SERPL-MCNC: 31.5 UG/ML (ref 10–20)
PLATELET # BLD AUTO: 195 X10(3)/MCL (ref 130–400)
PMV BLD AUTO: 10.1 FL (ref 7.4–10.4)
POTASSIUM SERPL-SCNC: 3.4 MMOL/L (ref 3.5–5.1)
PROT SERPL-MCNC: 6.5 GM/DL (ref 5.8–7.6)
RBC # BLD AUTO: 4.77 X10(6)/MCL (ref 4.7–6.1)
SODIUM SERPL-SCNC: 145 MMOL/L (ref 136–145)
WBC # SPEC AUTO: 27.92 X10(3)/MCL (ref 4.5–11.5)

## 2024-05-09 PROCEDURE — 94761 N-INVAS EAR/PLS OXIMETRY MLT: CPT

## 2024-05-09 PROCEDURE — A4216 STERILE WATER/SALINE, 10 ML: HCPCS | Performed by: INTERNAL MEDICINE

## 2024-05-09 PROCEDURE — 63600175 PHARM REV CODE 636 W HCPCS: Performed by: EMERGENCY MEDICINE

## 2024-05-09 PROCEDURE — 20000000 HC ICU ROOM

## 2024-05-09 PROCEDURE — 80185 ASSAY OF PHENYTOIN TOTAL: CPT | Performed by: INTERNAL MEDICINE

## 2024-05-09 PROCEDURE — 25000003 PHARM REV CODE 250: Performed by: EMERGENCY MEDICINE

## 2024-05-09 PROCEDURE — 36415 COLL VENOUS BLD VENIPUNCTURE: CPT | Performed by: INTERNAL MEDICINE

## 2024-05-09 PROCEDURE — 36415 COLL VENOUS BLD VENIPUNCTURE: CPT | Performed by: EMERGENCY MEDICINE

## 2024-05-09 PROCEDURE — 85025 COMPLETE CBC W/AUTO DIFF WBC: CPT | Performed by: EMERGENCY MEDICINE

## 2024-05-09 PROCEDURE — 25000003 PHARM REV CODE 250: Performed by: INTERNAL MEDICINE

## 2024-05-09 PROCEDURE — 83735 ASSAY OF MAGNESIUM: CPT | Performed by: EMERGENCY MEDICINE

## 2024-05-09 PROCEDURE — 80053 COMPREHEN METABOLIC PANEL: CPT | Performed by: INTERNAL MEDICINE

## 2024-05-09 RX ORDER — SODIUM CHLORIDE, SODIUM LACTATE, POTASSIUM CHLORIDE, CALCIUM CHLORIDE 600; 310; 30; 20 MG/100ML; MG/100ML; MG/100ML; MG/100ML
INJECTION, SOLUTION INTRAVENOUS CONTINUOUS
Status: DISCONTINUED | OUTPATIENT
Start: 2024-05-09 | End: 2024-05-10

## 2024-05-09 RX ORDER — POTASSIUM CHLORIDE 20 MEQ/1
20 TABLET, EXTENDED RELEASE ORAL DAILY
Status: DISCONTINUED | OUTPATIENT
Start: 2024-05-09 | End: 2024-05-11 | Stop reason: HOSPADM

## 2024-05-09 RX ADMIN — MUPIROCIN 1 G: 20 OINTMENT TOPICAL at 08:05

## 2024-05-09 RX ADMIN — Medication 10 ML: at 05:05

## 2024-05-09 RX ADMIN — CLOPIDOGREL BISULFATE 75 MG: 75 TABLET ORAL at 04:05

## 2024-05-09 RX ADMIN — POTASSIUM CHLORIDE 20 MEQ: 1500 TABLET, EXTENDED RELEASE ORAL at 08:05

## 2024-05-09 RX ADMIN — PHENOBARBITAL 97.2 MG: 32.4 TABLET ORAL at 04:05

## 2024-05-09 RX ADMIN — FUROSEMIDE 40 MG: 40 TABLET ORAL at 08:05

## 2024-05-09 RX ADMIN — APIXABAN 5 MG: 5 TABLET, FILM COATED ORAL at 08:05

## 2024-05-09 RX ADMIN — LISINOPRIL 5 MG: 5 TABLET ORAL at 04:05

## 2024-05-09 RX ADMIN — Medication 10 ML: at 11:05

## 2024-05-09 RX ADMIN — LEVETIRACETAM 1000 MG: 500 TABLET, FILM COATED ORAL at 08:05

## 2024-05-09 RX ADMIN — PANTOPRAZOLE SODIUM 40 MG: 40 TABLET, DELAYED RELEASE ORAL at 04:05

## 2024-05-09 RX ADMIN — ATORVASTATIN CALCIUM 40 MG: 40 TABLET, FILM COATED ORAL at 08:05

## 2024-05-09 RX ADMIN — SODIUM CHLORIDE, POTASSIUM CHLORIDE, SODIUM LACTATE AND CALCIUM CHLORIDE: 600; 310; 30; 20 INJECTION, SOLUTION INTRAVENOUS at 08:05

## 2024-05-09 RX ADMIN — FINASTERIDE 5 MG: 5 TABLET, FILM COATED ORAL at 08:05

## 2024-05-09 RX ADMIN — Medication 10 ML: at 12:05

## 2024-05-09 NOTE — PLAN OF CARE
Problem: Adult Inpatient Plan of Care  Goal: Plan of Care Review  Outcome: Progressing  Goal: Patient-Specific Goal (Individualized)  Outcome: Progressing  Goal: Absence of Hospital-Acquired Illness or Injury  Outcome: Progressing  Goal: Optimal Comfort and Wellbeing  Outcome: Progressing  Goal: Readiness for Transition of Care  Outcome: Progressing     Problem: Fall Injury Risk  Goal: Absence of Fall and Fall-Related Injury  Outcome: Progressing     Problem: Skin Injury Risk Increased  Goal: Skin Health and Integrity  Outcome: Progressing     Problem: Comorbidity Management  Goal: Maintenance of COPD Symptom Control  Outcome: Progressing  Goal: Maintenance of Heart Failure Symptom Control  Outcome: Progressing  Goal: Blood Pressure in Desired Range  Outcome: Progressing  Goal: Maintenance of Seizure Control  Outcome: Progressing     Problem: Infection  Goal: Absence of Infection Signs and Symptoms  Outcome: Progressing     Problem: Urinary Elimination Management  Goal: Effective Urinary Elimination/Continence  Outcome: Progressing     Problem: Gas Exchange Impaired  Goal: Optimal Gas Exchange  Outcome: Progressing     Problem: Dysrhythmia  Goal: Normalized Cardiac Rhythm  Outcome: Progressing

## 2024-05-09 NOTE — PROGRESS NOTES
[facility] - ICU Hospital Medicine  Progress Note    Patient Name: John Wayne  MRN: [id number]  Patient Class: IP- Inpatient   Admission Date: 5/7/2024  Length of Stay: 2 days  Attending Physician: Brittany Mcfarlane MD  Primary Care Provider: ISAC Schmidt MD (Inactive)        Subjective:     Principal Problem:Dilantin toxicity    Interval History:   HPI: 78-year-old male with a past medical history paroxysmal AFib on Eliquis, COPD, CAD, CHF, hypertension and epilepsy who was sent from the Nursing Home to the ED on account of abnormal lab reported as elevated Dilantin level of 38.1. Patient is reported to have mild mental status change. He is unable to give me any history but awake and able to follow commands.  In the ED, he was noted with nystagmus, mild drowsiness and mild bradycardia with a repeat Dilantin level was 36.8.  He is being admitted to the ICU.  ED course: Started on D5 0.9% NS IVF.    5/8-he remains somewhat lethargic but easily arousable in the ICU; he had no complaints when seen on rounds this morning    5/9-no issues overnight; when seen on rounds this morning was more alert, speech was improved, less garbled or mumbling; he remains quite confused    Objective:     Vital Signs (Most Recent):  Temp: 97.2 °F (36.2 °C) (05/09/24 0826)  Pulse: (!) 57 (05/09/24 0830)  Resp: 19 (05/09/24 0830)  BP: 100/78 (05/09/24 0830)  SpO2: 96 % (05/09/24 0830) Vital Signs (24h Range):  Temp:  [97.2 °F (36.2 °C)-97.6 °F (36.4 °C)] 97.2 °F (36.2 °C)  Pulse:  [22-59] 57  Resp:  [10-23] 19  SpO2:  [85 %-100 %] 96 %  BP: ()/(40-89) 100/78     Weight: 111.1 kg (244 lb 14.9 oz)  Body mass index is 38.36 kg/m².    Intake/Output Summary (Last 24 hours) at 5/9/2024 1051  Last data filed at 5/9/2024 0843  Gross per 24 hour   Intake 2297.15 ml   Output 875 ml   Net 1422.15 ml      Physical exam  Constitution-well nourished, normally developed male in NAD  Eyes-PERRL, EOMI  HENT-normocephalic,  atraumatic  Neck-supple  Respiratory-normal respirations; CTA with good air movement  Heart-RRR; normal S1 and S2; no murmurs, gallops  Abdomen-soft, nontender, nondistended  Genitourinary-deferred  Musculoskeletal-no joint abnormalities, normal ROM throughout  Skin-warm, dry; no rashes  Neurologic-alert and oriented to person and place; nonfocal exam    Scheduled Meds:   apixaban  5 mg Oral BID    atorvastatin  40 mg Oral QHS    clopidogreL  75 mg Oral Daily    finasteride  5 mg Oral Daily    furosemide  40 mg Oral Daily    levETIRAcetam  1,000 mg Oral BID    lisinopriL  5 mg Oral Daily    mupirocin   Nasal BID    pantoprazole  40 mg Oral Daily    PHENobarbitaL  97.2 mg Oral Daily    potassium chloride  20 mEq Oral Daily    sodium chloride 0.9%  10 mL Intravenous Q6H     Continuous Infusions:   lactated ringers   Intravenous Continuous 75 mL/hr at 05/09/24 0821 Rate Change at 05/09/24 0821     PRN Meds:.  Current Facility-Administered Medications:     metoclopramide, 10 mg, Intravenous, Q6H PRN    sodium chloride 0.9%, 10 mL, Intravenous, PRN    Flushing PICC/Midline Protocol, , , Until Discontinued **AND** sodium chloride 0.9%, 10 mL, Intravenous, Q6H **AND** sodium chloride 0.9%, 10 mL, Intravenous, PRN    Significant Labs: All pertinent labs within the past 24 hours have been reviewed.  CBC:   Recent Labs   Lab 05/07/24 1946 05/09/24  0718   WBC 34.54* 27.92*   HGB 14.8 13.5*   HCT 46.6 42.4    195     CMP:   Recent Labs   Lab 05/07/24 1946 05/08/24  0906 05/09/24  0413    146* 145   K 4.2 3.6 3.4*   CO2 23 28 27   BUN 22.0 19.0 14.0   CREATININE 1.13 0.86 0.91   CALCIUM 9.5 9.5 9.0   ALBUMIN 3.6 3.4 3.2*   BILITOT 0.2 0.3 0.4   ALKPHOS 95 91 89   AST 16 12 12   ALT 14 11 10     Magnesium:   Recent Labs   Lab 05/09/24  0413   MG 2.00       Significant Imaging:   CXR  Impression:  1. Interim placement right PICC line  2. Cardiomegaly  3. Interim improved aeration of the lungs bilaterally with  residual infiltrate or atelectasis at the right lower lung  4. Atherosclerosis  5. Thoracic spondylosis and scoliosis    Assessment/Plan:   Dilantin toxicity  Supportive care  Continue gentle IV fluids  Cardiac monitoring in ICU  Dilantin level stable over past 24 hours, 31.5 this morning    History of AFib   Remains in sinus bradycardia with rates varying from the 40s up to the 50s  Continue Eliquis  Metoprolol has been held since admission due to bradycardia  Amiodarone held at admission, resume once Dilantin level therapeutic     COPD  Bronchodilators prn     CAD   Continue Statin, Plavix     Chronic HFpEF  Continue lisinopril, Lasix 40 mg daily     Hypertension  Metoptolol (on hold), lisinopril     Epilepsy  Continue Keppra, phenobarbital  Hold Dilantin    CLL with leukocytosis  Current WBC similar to that over the past year    FEN  Change IVF to LR due to rising chloride  Supplement electrolytes as necessary  Advance diet to solids    GI prophylaxis:  Pantoprazole  VTE: on Eliquis   Active Diagnoses:    Diagnosis Date Noted POA    PRINCIPAL PROBLEM:  Dilantin toxicity [T42.0X1A] 11/28/2022 Yes      Problems Resolved During this Admission:     VTE Risk Mitigation (From admission, onward)           Ordered     apixaban tablet 5 mg  2 times daily         05/07/24 2232     IP VTE HIGH RISK PATIENT  Once         05/07/24 2031     Place sequential compression device  Until discontinued         05/07/24 2031                Social determinants of health limiting diagnosis/treatment   none         Buddy Cole MD  Department of Hospital Medicine   [facility] - ICU

## 2024-05-09 NOTE — PLAN OF CARE
Problem: Adult Inpatient Plan of Care  Goal: Plan of Care Review  5/8/2024 2257 by Melia Samuel RN  Outcome: Progressing  5/8/2024 2231 by Melia Samuel RN  Outcome: Progressing  Goal: Patient-Specific Goal (Individualized)  5/8/2024 2257 by Melia Samuel RN  Outcome: Progressing  5/8/2024 2231 by Melia Samuel, RN  Outcome: Progressing  Goal: Absence of Hospital-Acquired Illness or Injury  5/8/2024 2257 by Melia Samuel, RN  Outcome: Progressing  5/8/2024 2231 by Melia Samuel RN  Outcome: Progressing  Goal: Optimal Comfort and Wellbeing  5/8/2024 2257 by Melia Samuel RN  Outcome: Progressing  5/8/2024 2231 by Melia Samuel RN  Outcome: Progressing  Goal: Readiness for Transition of Care  5/8/2024 2257 by Melia Samuel RN  Outcome: Progressing  5/8/2024 2231 by Melia Samuel RN  Outcome: Progressing     Problem: Fall Injury Risk  Goal: Absence of Fall and Fall-Related Injury  5/8/2024 2257 by Melia Samuel RN  Outcome: Progressing  5/8/2024 2231 by Melia Samuel RN  Outcome: Progressing     Problem: Skin Injury Risk Increased  Goal: Skin Health and Integrity  5/8/2024 2257 by Melia Samuel RN  Outcome: Progressing  5/8/2024 2231 by Melia Samuel RN  Outcome: Progressing     Problem: Comorbidity Management  Goal: Maintenance of COPD Symptom Control  5/8/2024 2257 by Melia Samuel RN  Outcome: Progressing  5/8/2024 2231 by Melia Samuel RN  Outcome: Progressing  Goal: Maintenance of Heart Failure Symptom Control  5/8/2024 2257 by Melia Samuel RN  Outcome: Progressing  5/8/2024 2231 by Melia Samuel RN  Outcome: Progressing  Goal: Blood Pressure in Desired Range  5/8/2024 2257 by Melia Samuel, RN  Outcome: Progressing  5/8/2024 2231 by Melia Samuel RN  Outcome: Progressing  Goal: Maintenance of Seizure Control  5/8/2024 2257 by Melia Samuel, RN  Outcome: Progressing  5/8/2024 2231 by Melia Samuel, RN  Outcome: Progressing     Problem: Infection  Goal: Absence of  Scheduled    Infection Signs and Symptoms  5/8/2024 2257 by Melia Samuel RN  Outcome: Progressing  5/8/2024 2231 by Melia Samuel RN  Outcome: Progressing     Problem: Urinary Elimination Management  Goal: Effective Urinary Elimination/Continence  5/8/2024 2257 by Melia Samuel RN  Outcome: Progressing  5/8/2024 2231 by Melia Samuel RN  Outcome: Progressing     Problem: Gas Exchange Impaired  Goal: Optimal Gas Exchange  5/8/2024 2257 by Melia Samuel RN  Outcome: Progressing  5/8/2024 2231 by Melia Samuel RN  Outcome: Progressing     Problem: Dysrhythmia  Goal: Normalized Cardiac Rhythm  5/8/2024 2257 by Melia Samuel RN  Outcome: Progressing  5/8/2024 2232 by Melia Samuel RN  Outcome: Progressing

## 2024-05-09 NOTE — PROGRESS NOTES
"Inpatient Nutrition Evaluation    Admit Date: 5/7/2024   Total duration of encounter: 2 days    Nutrition Recommendation/Prescription     Continue Heart Healthy Diet as tolerated.     RD following and available as needed. Thank you.     Nutrition Assessment     Chart Review    Reason Seen: continuous nutrition monitoring    Malnutrition Screening Tool Results   Have you recently lost weight without trying?: No  Have you been eating poorly because of a decreased appetite?: No   MST Score: 0     Diagnosis:  Dilantin toxicity.     Relevant Medical History:   Anticoagulant long-term use      CHF (congestive heart failure) 10/2021 EF of 25-30% on ECHO    CLL (chronic lymphocytic leukemia)      COPD (chronic obstructive pulmonary disease)      Coronary artery disease      Depression      Difficult intubation      GERD (gastroesophageal reflux disease)      Hypertension      Mixed hyperlipidemia      PVD (peripheral vascular disease)      Seizures      Sleep apnea, unspecified      TIA (transient ischemic attack          Nutrition-Related Medications:   IV Fluids: LR@75mL/hr.   Lasix; Protonix; Kcl.    Nutrition-Related Labs:  5/9: WBC 27.92(H); Hgb 13.5(L); K+ 3.4(L); Cl 113(H); (H); Alb 3.2(L).    Diet Order: Diet Heart Healthy  Oral Supplement Order: none  Appetite/Oral Intake: good/% of meals  Factors Affecting Nutritional Intake: none identified  Food/Lutheran/Cultural Preferences: none reported  Food Allergies: none reported    Skin Integrity: other (see comments) (skin grafts)  Wound(s):       Comments    5/9: Good appetite and intake. Consuming 75%. No recent weight loss noted/reported. Labs and meds reviewed. Will continue to monitor during stay.    Anthropometrics    Height: 5' 7" (170.2 cm) Height Method: Stated  Last Weight: 111.1 kg (244 lb 14.9 oz) (05/09/24 0617) Weight Method: Bed Scale  BMI (Calculated): 38.4  BMI Classification: obese grade II (BMI 35-39.9)        Ideal Body Weight (IBW), " Male: 148 lb     % Ideal Body Weight, Male (lb): 163.56 %                          Usual Weight Provided By: EMR weight history    Wt Readings from Last 5 Encounters:   05/09/24 111.1 kg (244 lb 14.9 oz)   05/05/24 104.3 kg (230 lb)   04/27/24 108.9 kg (240 lb)   03/16/24 108.9 kg (240 lb)   01/26/24 108.9 kg (240 lb)     Weight Change(s) Since Admission:  Admit Weight: 104.7 kg (230 lb 12.8 oz) (05/07/24 1914)      Patient Education    Not applicable.    Monitoring & Evaluation     Dietitian will monitor food and beverage intake, energy intake, weight, weight change, electrolyte/renal panel, glucose/endocrine profile, and gastrointestinal profile.  Nutrition Risk/Follow-Up: low (follow-up in 5-7 days)  Patients assigned 'low nutrition risk' status do not qualify for a full nutritional assessment but will be monitored and re-evaluated in a 5-7 day time period. Please consult if re-evaluation needed sooner.

## 2024-05-09 NOTE — PLAN OF CARE
Problem: Dysrhythmia  Goal: Normalized Cardiac Rhythm  Outcome: Progressing   Return to normal sinus rhythm  rate 60 to 90 from sinus bradycardia   Present rate 46

## 2024-05-10 LAB
ANION GAP SERPL CALC-SCNC: 8 MEQ/L
BASOPHILS # BLD AUTO: 0.13 X10(3)/MCL
BASOPHILS NFR BLD AUTO: 0.4 %
BUN SERPL-MCNC: 11 MG/DL (ref 8.4–25.7)
CALCIUM SERPL-MCNC: 8.9 MG/DL (ref 8.8–10)
CHLORIDE SERPL-SCNC: 111 MMOL/L (ref 98–107)
CO2 SERPL-SCNC: 25 MMOL/L (ref 23–31)
CREAT SERPL-MCNC: 0.77 MG/DL (ref 0.73–1.18)
CREAT/UREA NIT SERPL: 14
EOSINOPHIL # BLD AUTO: 0.15 X10(3)/MCL (ref 0–0.9)
EOSINOPHIL NFR BLD AUTO: 0.4 %
ERYTHROCYTE [DISTWIDTH] IN BLOOD BY AUTOMATED COUNT: 14.4 % (ref 11.5–17)
GFR SERPLBLD CREATININE-BSD FMLA CKD-EPI: >60 ML/MIN/1.73/M2
GLUCOSE SERPL-MCNC: 98 MG/DL (ref 82–115)
HCT VFR BLD AUTO: 43.9 % (ref 42–52)
HGB BLD-MCNC: 13.9 G/DL (ref 14–18)
IMM GRANULOCYTES # BLD AUTO: 0.16 X10(3)/MCL (ref 0–0.04)
IMM GRANULOCYTES NFR BLD AUTO: 0.4 %
LYMPHOCYTES # BLD AUTO: 22.66 X10(3)/MCL (ref 0.6–4.6)
LYMPHOCYTES NFR BLD AUTO: 62.2 %
MAGNESIUM SERPL-MCNC: 1.7 MG/DL (ref 1.6–2.6)
MCH RBC QN AUTO: 28.1 PG (ref 27–31)
MCHC RBC AUTO-ENTMCNC: 31.7 G/DL (ref 33–36)
MCV RBC AUTO: 88.9 FL (ref 80–94)
MONOCYTES # BLD AUTO: 2.13 X10(3)/MCL (ref 0.1–1.3)
MONOCYTES NFR BLD AUTO: 5.8 %
NEUTROPHILS # BLD AUTO: 11.19 X10(3)/MCL (ref 2.1–9.2)
NEUTROPHILS NFR BLD AUTO: 30.8 %
PHENYTOIN SERPL-MCNC: 28.2 UG/ML (ref 10–20)
PLATELET # BLD AUTO: 221 X10(3)/MCL (ref 130–400)
PMV BLD AUTO: 10.5 FL (ref 7.4–10.4)
POTASSIUM SERPL-SCNC: 3.6 MMOL/L (ref 3.5–5.1)
RBC # BLD AUTO: 4.94 X10(6)/MCL (ref 4.7–6.1)
SODIUM SERPL-SCNC: 144 MMOL/L (ref 136–145)
WBC # SPEC AUTO: 36.42 X10(3)/MCL (ref 4.5–11.5)

## 2024-05-10 PROCEDURE — 27000221 HC OXYGEN, UP TO 24 HOURS

## 2024-05-10 PROCEDURE — 94761 N-INVAS EAR/PLS OXIMETRY MLT: CPT

## 2024-05-10 PROCEDURE — 36415 COLL VENOUS BLD VENIPUNCTURE: CPT | Performed by: EMERGENCY MEDICINE

## 2024-05-10 PROCEDURE — 85025 COMPLETE CBC W/AUTO DIFF WBC: CPT | Performed by: EMERGENCY MEDICINE

## 2024-05-10 PROCEDURE — A4216 STERILE WATER/SALINE, 10 ML: HCPCS | Performed by: INTERNAL MEDICINE

## 2024-05-10 PROCEDURE — 63600175 PHARM REV CODE 636 W HCPCS: Performed by: EMERGENCY MEDICINE

## 2024-05-10 PROCEDURE — 25000003 PHARM REV CODE 250: Performed by: EMERGENCY MEDICINE

## 2024-05-10 PROCEDURE — 25000003 PHARM REV CODE 250: Performed by: INTERNAL MEDICINE

## 2024-05-10 PROCEDURE — 80048 BASIC METABOLIC PNL TOTAL CA: CPT | Performed by: EMERGENCY MEDICINE

## 2024-05-10 PROCEDURE — 11000001 HC ACUTE MED/SURG PRIVATE ROOM

## 2024-05-10 PROCEDURE — 80185 ASSAY OF PHENYTOIN TOTAL: CPT | Performed by: INTERNAL MEDICINE

## 2024-05-10 PROCEDURE — 83735 ASSAY OF MAGNESIUM: CPT | Performed by: EMERGENCY MEDICINE

## 2024-05-10 RX ORDER — MAGNESIUM SULFATE HEPTAHYDRATE 40 MG/ML
2 INJECTION, SOLUTION INTRAVENOUS ONCE
Status: COMPLETED | OUTPATIENT
Start: 2024-05-10 | End: 2024-05-10

## 2024-05-10 RX ORDER — AMIODARONE HYDROCHLORIDE 200 MG/1
200 TABLET ORAL DAILY
Status: DISCONTINUED | OUTPATIENT
Start: 2024-05-11 | End: 2024-05-11 | Stop reason: HOSPADM

## 2024-05-10 RX ORDER — METOPROLOL SUCCINATE 25 MG/1
25 TABLET, EXTENDED RELEASE ORAL 2 TIMES DAILY
Status: DISCONTINUED | OUTPATIENT
Start: 2024-05-10 | End: 2024-05-11 | Stop reason: HOSPADM

## 2024-05-10 RX ADMIN — MUPIROCIN 1 G: 20 OINTMENT TOPICAL at 08:05

## 2024-05-10 RX ADMIN — APIXABAN 5 MG: 5 TABLET, FILM COATED ORAL at 08:05

## 2024-05-10 RX ADMIN — PANTOPRAZOLE SODIUM 40 MG: 40 TABLET, DELAYED RELEASE ORAL at 05:05

## 2024-05-10 RX ADMIN — Medication 10 ML: at 05:05

## 2024-05-10 RX ADMIN — METOPROLOL SUCCINATE 25 MG: 25 TABLET, EXTENDED RELEASE ORAL at 10:05

## 2024-05-10 RX ADMIN — METOPROLOL SUCCINATE 25 MG: 25 TABLET, EXTENDED RELEASE ORAL at 08:05

## 2024-05-10 RX ADMIN — CLOPIDOGREL BISULFATE 75 MG: 75 TABLET ORAL at 05:05

## 2024-05-10 RX ADMIN — Medication 10 ML: at 12:05

## 2024-05-10 RX ADMIN — MAGNESIUM SULFATE HEPTAHYDRATE 2 G: 40 INJECTION, SOLUTION INTRAVENOUS at 08:05

## 2024-05-10 RX ADMIN — FINASTERIDE 5 MG: 5 TABLET, FILM COATED ORAL at 08:05

## 2024-05-10 RX ADMIN — LEVETIRACETAM 1000 MG: 500 TABLET, FILM COATED ORAL at 08:05

## 2024-05-10 RX ADMIN — FUROSEMIDE 40 MG: 40 TABLET ORAL at 08:05

## 2024-05-10 RX ADMIN — PHENOBARBITAL 97.2 MG: 32.4 TABLET ORAL at 05:05

## 2024-05-10 RX ADMIN — ATORVASTATIN CALCIUM 40 MG: 40 TABLET, FILM COATED ORAL at 08:05

## 2024-05-10 RX ADMIN — Medication 10 ML: at 06:05

## 2024-05-10 RX ADMIN — LISINOPRIL 5 MG: 5 TABLET ORAL at 05:05

## 2024-05-10 RX ADMIN — POTASSIUM CHLORIDE 20 MEQ: 1500 TABLET, EXTENDED RELEASE ORAL at 08:05

## 2024-05-10 NOTE — PROGRESS NOTES
[facility] - ICU Hospital Medicine  Progress Note    Patient Name: John Wayne  MRN: [id number]  Patient Class: IP- Inpatient   Admission Date: 5/7/2024  Length of Stay: 3 days  Attending Physician: Brittany Mcfarlane MD  Primary Care Provider: ISAC Schmidt MD (Inactive)        Subjective:     Principal Problem:Dilantin toxicity    Interval History:   HPI: 78-year-old male with a past medical history paroxysmal AFib on Eliquis, COPD, CAD, CHF, hypertension and epilepsy who was sent from the Nursing Home to the ED on account of abnormal lab reported as elevated Dilantin level of 38.1. Patient is reported to have mild mental status change. He is unable to give me any history but awake and able to follow commands.  In the ED, he was noted with nystagmus, mild drowsiness and mild bradycardia with a repeat Dilantin level was 36.8.  He is being admitted to the ICU.  ED course: Started on D5 0.9% NS IVF.    5/8-he remains somewhat lethargic but easily arousable in the ICU; he had no complaints when seen on rounds this morning    5/9-no issues overnight; when seen on rounds this morning was more alert, speech was improved, less garbled or mumbling; he remains quite confused    5/10-he has continued to improve clinically; when seen on rounds this morning was eating breakfast independently where as previously he had to be fed; speech was clear and I suspect mental status is near his baseline; heart rate has improved with rates now in the 60s; following rounds I was notified by nurse that he had a sudden elevation in rate to the 130s which lasted several minutes after which it suddenly broke back into his previous rhythm which has been sinus; his beta-blocker has been resumed  He will be downgraded to med/surg status    Objective:     Vital Signs (Most Recent):  Temp: 96.8 °F (36 °C) (05/10/24 0715)  Pulse: 77 (05/10/24 1050)  Resp: 19 (05/10/24 1015)  BP: (!) 140/57 (05/10/24 1050)  SpO2: 96 % (05/10/24  1015) Vital Signs (24h Range):  Temp:  [96.8 °F (36 °C)-98.4 °F (36.9 °C)] 96.8 °F (36 °C)  Pulse:  [] 77  Resp:  [12-24] 19  SpO2:  [92 %-100 %] 96 %  BP: (120-180)/(38-95) 140/57     Weight: 110.7 kg (244 lb 0.8 oz)  Body mass index is 38.22 kg/m².    Intake/Output Summary (Last 24 hours) at 5/10/2024 1303  Last data filed at 5/10/2024 0925  Gross per 24 hour   Intake 1885.57 ml   Output 1175 ml   Net 710.57 ml      Physical exam  Constitution-well nourished, normally developed male in NAD  Eyes-PERRL, EOMI  HENT-normocephalic, atraumatic  Neck-supple  Respiratory-normal respirations; CTA with good air movement  Heart-RRR; normal S1 and S2; no murmurs, gallops  Abdomen-soft, nontender, nondistended  Genitourinary-deferred  Musculoskeletal-no joint abnormalities, normal ROM throughout  Skin-warm, dry; no rashes  Neurologic-alert and oriented to person and place; nonfocal exam    Scheduled Meds:   apixaban  5 mg Oral BID    atorvastatin  40 mg Oral QHS    clopidogreL  75 mg Oral Daily    finasteride  5 mg Oral Daily    furosemide  40 mg Oral Daily    levETIRAcetam  1,000 mg Oral BID    lisinopriL  5 mg Oral Daily    metoprolol succinate  25 mg Oral BID    mupirocin   Nasal BID    pantoprazole  40 mg Oral Daily    PHENobarbitaL  97.2 mg Oral Daily    potassium chloride  20 mEq Oral Daily    sodium chloride 0.9%  10 mL Intravenous Q6H     Continuous Infusions:      PRN Meds:.  Current Facility-Administered Medications:     metoclopramide, 10 mg, Intravenous, Q6H PRN    sodium chloride 0.9%, 10 mL, Intravenous, PRN    Flushing PICC/Midline Protocol, , , Until Discontinued **AND** sodium chloride 0.9%, 10 mL, Intravenous, Q6H **AND** sodium chloride 0.9%, 10 mL, Intravenous, PRN    Significant Labs: All pertinent labs within the past 24 hours have been reviewed.  CBC:   Recent Labs   Lab 05/09/24  0718 05/10/24  0439   WBC 27.92* 36.42*   HGB 13.5* 13.9*   HCT 42.4 43.9    221     CMP:   Recent Labs   Lab  05/09/24  0413 05/10/24  0439    144   K 3.4* 3.6   CO2 27 25   BUN 14.0 11.0   CREATININE 0.91 0.77   CALCIUM 9.0 8.9   ALBUMIN 3.2*  --    BILITOT 0.4  --    ALKPHOS 89  --    AST 12  --    ALT 10  --      Magnesium:   Recent Labs   Lab 05/09/24  0413 05/10/24  0439   MG 2.00 1.70       Significant Imaging:   CXR  Impression:  1. Interim placement right PICC line  2. Cardiomegaly  3. Interim improved aeration of the lungs bilaterally with residual infiltrate or atelectasis at the right lower lung  4. Atherosclerosis  5. Thoracic spondylosis and scoliosis    Assessment/Plan:   Dilantin toxicity  Dilantin level trending down  Continue Supportive care  Discontinue IV fluids  Continue cardiac monitoring but will downgrade status as noted    History of AFib   In sinus rhythm with rates in the 60s  Continue Eliquis  Resume metoprolol  Plan to resume amiodarone in a.m.     COPD  Bronchodilators prn     CAD   Continue Statin, Plavix     Chronic HFpEF  Continue lisinopril, Lasix 40 mg daily     Hypertension  Metoptolol, lisinopril     Epilepsy  Continue Keppra, phenobarbital  Hold Dilantin    CLL with leukocytosis  Current WBC similar to that over the past year    FEN  Change IVF to LR due to rising chloride  Supplement electrolytes as necessary  Advance diet to solids    GI prophylaxis:  Pantoprazole  VTE: on Eliquis   Active Diagnoses:    Diagnosis Date Noted POA    PRINCIPAL PROBLEM:  Dilantin toxicity [T42.0X1A] 11/28/2022 Yes      Problems Resolved During this Admission:     VTE Risk Mitigation (From admission, onward)           Ordered     apixaban tablet 5 mg  2 times daily         05/07/24 2232     IP VTE HIGH RISK PATIENT  Once         05/07/24 2031     Place sequential compression device  Until discontinued         05/07/24 2031                Social determinants of health limiting diagnosis/treatment   none         Buddy Cole MD  Department of Hospital Medicine   [facility] - [facility]

## 2024-05-10 NOTE — PLAN OF CARE
Problem: Adult Inpatient Plan of Care  Goal: Plan of Care Review  Outcome: Progressing  Goal: Patient-Specific Goal (Individualized)  Outcome: Progressing  Goal: Absence of Hospital-Acquired Illness or Injury  Outcome: Progressing  Goal: Optimal Comfort and Wellbeing  Outcome: Progressing  Goal: Readiness for Transition of Care  Outcome: Progressing     Problem: Fall Injury Risk  Goal: Absence of Fall and Fall-Related Injury  Outcome: Progressing     Problem: Skin Injury Risk Increased  Goal: Skin Health and Integrity  Outcome: Progressing     Problem: Comorbidity Management  Goal: Maintenance of COPD Symptom Control  Outcome: Progressing  Goal: Maintenance of Heart Failure Symptom Control  Outcome: Progressing  Goal: Blood Pressure in Desired Range  Outcome: Progressing  Goal: Maintenance of Seizure Control  Outcome: Progressing     Problem: Infection  Goal: Absence of Infection Signs and Symptoms  Outcome: Progressing     Problem: Urinary Elimination Management  Goal: Effective Urinary Elimination/Continence  Outcome: Progressing     Problem: Gas Exchange Impaired  Goal: Optimal Gas Exchange  Outcome: Progressing     Problem: Dysrhythmia  Goal: Normalized Cardiac Rhythm  Outcome: Progressing     Problem: Wound  Goal: Optimal Coping  Outcome: Progressing  Goal: Optimal Functional Ability  Outcome: Progressing  Goal: Absence of Infection Signs and Symptoms  Outcome: Progressing  Goal: Improved Oral Intake  Outcome: Progressing  Goal: Optimal Pain Control and Function  Outcome: Progressing  Goal: Skin Health and Integrity  Outcome: Progressing  Goal: Optimal Wound Healing  Outcome: Progressing     Problem: Fall Injury Risk  Goal: Absence of Fall and Fall-Related Injury  Outcome: Progressing     Problem: Skin Injury Risk Increased  Goal: Skin Health and Integrity  Outcome: Progressing     Problem: Comorbidity Management  Goal: Maintenance of COPD Symptom Control  Outcome: Progressing     Problem: Infection  Goal:  Absence of Infection Signs and Symptoms  Outcome: Progressing     Problem: Urinary Elimination Management  Goal: Effective Urinary Elimination/Continence  Outcome: Progressing     Problem: Gas Exchange Impaired  Goal: Optimal Gas Exchange  Outcome: Progressing     Problem: Dysrhythmia  Goal: Normalized Cardiac Rhythm  Outcome: Progressing     Problem: Wound  Goal: Optimal Coping  Outcome: Progressing

## 2024-05-10 NOTE — PLAN OF CARE
Problem: Adult Inpatient Plan of Care  Goal: Plan of Care Review  Outcome: Progressing  Goal: Patient-Specific Goal (Individualized)  Outcome: Progressing  Goal: Absence of Hospital-Acquired Illness or Injury  Outcome: Progressing  Goal: Optimal Comfort and Wellbeing  Outcome: Progressing  Goal: Readiness for Transition of Care  Outcome: Progressing     Problem: Fall Injury Risk  Goal: Absence of Fall and Fall-Related Injury  Outcome: Progressing     Problem: Skin Injury Risk Increased  Goal: Skin Health and Integrity  Outcome: Progressing     Problem: Comorbidity Management  Goal: Maintenance of COPD Symptom Control  Outcome: Progressing  Goal: Maintenance of Heart Failure Symptom Control  Outcome: Progressing  Goal: Blood Pressure in Desired Range  Outcome: Progressing  Goal: Maintenance of Seizure Control  Outcome: Progressing     Problem: Infection  Goal: Absence of Infection Signs and Symptoms  Outcome: Progressing     Problem: Urinary Elimination Management  Goal: Effective Urinary Elimination/Continence  Outcome: Progressing     Problem: Gas Exchange Impaired  Goal: Optimal Gas Exchange  Outcome: Progressing     Problem: Dysrhythmia  Goal: Normalized Cardiac Rhythm  Outcome: Progressing     Problem: Wound  Goal: Optimal Coping  Outcome: Progressing  Goal: Optimal Functional Ability  Outcome: Progressing  Goal: Absence of Infection Signs and Symptoms  Outcome: Progressing  Goal: Improved Oral Intake  Outcome: Progressing  Goal: Optimal Pain Control and Function  Outcome: Progressing  Goal: Skin Health and Integrity  Outcome: Progressing  Goal: Optimal Wound Healing  Outcome: Progressing

## 2024-05-10 NOTE — CONSULTS
Inpatient Nutrition Evaluation    Admit Date: 5/7/2024   Total duration of encounter: 3 days    Nutrition Recommendation/Prescription     Continue Heart Healthy Diet as tolerated.   Recommend adding 500 mg Vitamin C + 220 mg Zinc Sulfate daily to assist with healing.   Monitor intake, weight and labs.     RD following and available as needed. Thank you.     Nutrition Assessment     Chart Review    Reason Seen: continuous nutrition monitoring Consult for pressure injury.     Malnutrition Screening Tool Results   Have you recently lost weight without trying?: No  Have you been eating poorly because of a decreased appetite?: No   MST Score: 0     Diagnosis:  Dilantin toxicity.     Relevant Medical History:   Anticoagulant long-term use      CHF (congestive heart failure) 10/2021 EF of 25-30% on ECHO    CLL (chronic lymphocytic leukemia)      COPD (chronic obstructive pulmonary disease)      Coronary artery disease      Depression      Difficult intubation      GERD (gastroesophageal reflux disease)      Hypertension      Mixed hyperlipidemia      PVD (peripheral vascular disease)      Seizures      Sleep apnea, unspecified      TIA (transient ischemic attack          Nutrition-Related Medications:   IV Fluids: LR@75mL/hr.   Lasix; Protonix; Kcl.    Nutrition-Related Labs:  5/10: WBC 36.42(H); Hgb 13.9(L); Cl 111(H).   5/9: WBC 27.92(H); Hgb 13.5(L); K+ 3.4(L); Cl 113(H); (H); Alb 3.2(L).    Diet Order: Diet Heart Healthy  Oral Supplement Order: none  Appetite/Oral Intake: good/% of meals  Factors Affecting Nutritional Intake: none identified  Food/Rastafarian/Cultural Preferences: none reported  Food Allergies: none reported    Skin Integrity: intact  Wound(s):       Comments    5/10: Consult received secondary to pt with pressure injury. Noted pressure injury to sacral area. Recommend adding Vitamin C + Zinc Daily. Pt continues with good appetite and intake. Consuming 100% so far today. Will continue to  "monitor during stay.     5/9: Good appetite and intake. Consuming 75%. No recent weight loss noted/reported. Labs and meds reviewed. Will continue to monitor during stay.    Anthropometrics    Height: 5' 7" (170.2 cm) Height Method: Stated  Last Weight: 110.7 kg (244 lb 0.8 oz) (05/10/24 0000) Weight Method: Bed Scale  BMI (Calculated): 38.2  BMI Classification: obese grade II (BMI 35-39.9)        Ideal Body Weight (IBW), Male: 148 lb     % Ideal Body Weight, Male (lb): 163.56 %                          Usual Weight Provided By: EMR weight history    Wt Readings from Last 5 Encounters:   05/10/24 110.7 kg (244 lb 0.8 oz)   05/05/24 104.3 kg (230 lb)   04/27/24 108.9 kg (240 lb)   03/16/24 108.9 kg (240 lb)   01/26/24 108.9 kg (240 lb)     Weight Change(s) Since Admission:  Admit Weight: 104.7 kg (230 lb 12.8 oz) (05/07/24 1914)      Patient Education    Not applicable.    Monitoring & Evaluation     Dietitian will monitor food and beverage intake, energy intake, weight, weight change, electrolyte/renal panel, glucose/endocrine profile, and gastrointestinal profile.  Nutrition Risk/Follow-Up: low (follow-up in 5-7 days)  Patients assigned 'low nutrition risk' status do not qualify for a full nutritional assessment but will be monitored and re-evaluated in a 5-7 day time period. Please consult if re-evaluation needed sooner.  "

## 2024-05-11 VITALS
TEMPERATURE: 98 F | SYSTOLIC BLOOD PRESSURE: 158 MMHG | RESPIRATION RATE: 20 BRPM | BODY MASS INDEX: 38.3 KG/M2 | HEART RATE: 64 BPM | DIASTOLIC BLOOD PRESSURE: 69 MMHG | HEIGHT: 67 IN | OXYGEN SATURATION: 95 % | WEIGHT: 244.06 LBS

## 2024-05-11 LAB
ALBUMIN SERPL-MCNC: 3 G/DL (ref 3.4–4.8)
ALBUMIN/GLOB SERPL: 0.9 RATIO (ref 1.1–2)
ALP SERPL-CCNC: 97 UNIT/L (ref 40–150)
ALT SERPL-CCNC: 12 UNIT/L (ref 0–55)
AST SERPL-CCNC: 14 UNIT/L (ref 5–34)
BASOPHILS # BLD AUTO: 0.14 X10(3)/MCL
BASOPHILS NFR BLD AUTO: 0.4 %
BILIRUB SERPL-MCNC: 0.4 MG/DL
BUN SERPL-MCNC: 14 MG/DL (ref 8.4–25.7)
CALCIUM SERPL-MCNC: 9.1 MG/DL (ref 8.8–10)
CHLORIDE SERPL-SCNC: 112 MMOL/L (ref 98–107)
CO2 SERPL-SCNC: 21 MMOL/L (ref 23–31)
CREAT SERPL-MCNC: 0.83 MG/DL (ref 0.73–1.18)
EOSINOPHIL # BLD AUTO: 0.44 X10(3)/MCL (ref 0–0.9)
EOSINOPHIL NFR BLD AUTO: 1.3 %
ERYTHROCYTE [DISTWIDTH] IN BLOOD BY AUTOMATED COUNT: 14.6 % (ref 11.5–17)
GFR SERPLBLD CREATININE-BSD FMLA CKD-EPI: >60 ML/MIN/1.73/M2
GLOBULIN SER-MCNC: 3.4 GM/DL (ref 2.4–3.5)
GLUCOSE SERPL-MCNC: 91 MG/DL (ref 82–115)
HCT VFR BLD AUTO: 43.2 % (ref 42–52)
HGB BLD-MCNC: 13.6 G/DL (ref 14–18)
IMM GRANULOCYTES # BLD AUTO: 0.14 X10(3)/MCL (ref 0–0.04)
IMM GRANULOCYTES NFR BLD AUTO: 0.4 %
LYMPHOCYTES # BLD AUTO: 23.6 X10(3)/MCL (ref 0.6–4.6)
LYMPHOCYTES NFR BLD AUTO: 68.2 %
MAGNESIUM SERPL-MCNC: 2 MG/DL (ref 1.6–2.6)
MCH RBC QN AUTO: 28.5 PG (ref 27–31)
MCHC RBC AUTO-ENTMCNC: 31.5 G/DL (ref 33–36)
MCV RBC AUTO: 90.6 FL (ref 80–94)
MONOCYTES # BLD AUTO: 2.07 X10(3)/MCL (ref 0.1–1.3)
MONOCYTES NFR BLD AUTO: 6 %
NEUTROPHILS # BLD AUTO: 8.2 X10(3)/MCL (ref 2.1–9.2)
NEUTROPHILS NFR BLD AUTO: 23.7 %
PHENYTOIN SERPL-MCNC: 22.9 UG/ML (ref 10–20)
PLATELET # BLD AUTO: 217 X10(3)/MCL (ref 130–400)
PMV BLD AUTO: 10.3 FL (ref 7.4–10.4)
POTASSIUM SERPL-SCNC: 3.9 MMOL/L (ref 3.5–5.1)
PROT SERPL-MCNC: 6.4 GM/DL (ref 5.8–7.6)
RBC # BLD AUTO: 4.77 X10(6)/MCL (ref 4.7–6.1)
SODIUM SERPL-SCNC: 140 MMOL/L (ref 136–145)
WBC # SPEC AUTO: 34.59 X10(3)/MCL (ref 4.5–11.5)

## 2024-05-11 PROCEDURE — 25000003 PHARM REV CODE 250: Performed by: INTERNAL MEDICINE

## 2024-05-11 PROCEDURE — 80053 COMPREHEN METABOLIC PANEL: CPT | Performed by: EMERGENCY MEDICINE

## 2024-05-11 PROCEDURE — 83735 ASSAY OF MAGNESIUM: CPT | Performed by: EMERGENCY MEDICINE

## 2024-05-11 PROCEDURE — 36415 COLL VENOUS BLD VENIPUNCTURE: CPT | Performed by: EMERGENCY MEDICINE

## 2024-05-11 PROCEDURE — A4216 STERILE WATER/SALINE, 10 ML: HCPCS | Performed by: INTERNAL MEDICINE

## 2024-05-11 PROCEDURE — 85025 COMPLETE CBC W/AUTO DIFF WBC: CPT | Performed by: EMERGENCY MEDICINE

## 2024-05-11 PROCEDURE — 94761 N-INVAS EAR/PLS OXIMETRY MLT: CPT

## 2024-05-11 PROCEDURE — 25000003 PHARM REV CODE 250: Performed by: EMERGENCY MEDICINE

## 2024-05-11 PROCEDURE — 80185 ASSAY OF PHENYTOIN TOTAL: CPT | Performed by: INTERNAL MEDICINE

## 2024-05-11 RX ADMIN — Medication 10 ML: at 12:05

## 2024-05-11 RX ADMIN — FUROSEMIDE 40 MG: 40 TABLET ORAL at 09:05

## 2024-05-11 RX ADMIN — POTASSIUM CHLORIDE 20 MEQ: 1500 TABLET, EXTENDED RELEASE ORAL at 09:05

## 2024-05-11 RX ADMIN — FINASTERIDE 5 MG: 5 TABLET, FILM COATED ORAL at 09:05

## 2024-05-11 RX ADMIN — METOPROLOL SUCCINATE 25 MG: 25 TABLET, EXTENDED RELEASE ORAL at 09:05

## 2024-05-11 RX ADMIN — APIXABAN 5 MG: 5 TABLET, FILM COATED ORAL at 09:05

## 2024-05-11 RX ADMIN — MUPIROCIN 1 G: 20 OINTMENT TOPICAL at 09:05

## 2024-05-11 RX ADMIN — AMIODARONE HYDROCHLORIDE 200 MG: 200 TABLET ORAL at 09:05

## 2024-05-11 RX ADMIN — LEVETIRACETAM 1000 MG: 500 TABLET, FILM COATED ORAL at 09:05

## 2024-05-11 NOTE — PLAN OF CARE
Problem: Adult Inpatient Plan of Care  Goal: Plan of Care Review  Outcome: Met  Goal: Patient-Specific Goal (Individualized)  Outcome: Met  Goal: Absence of Hospital-Acquired Illness or Injury  Outcome: Met  Goal: Optimal Comfort and Wellbeing  Outcome: Met  Goal: Readiness for Transition of Care  Outcome: Met     Problem: Fall Injury Risk  Goal: Absence of Fall and Fall-Related Injury  Outcome: Met     Problem: Skin Injury Risk Increased  Goal: Skin Health and Integrity  Outcome: Met     Problem: Comorbidity Management  Goal: Maintenance of COPD Symptom Control  Outcome: Met  Goal: Maintenance of Heart Failure Symptom Control  Outcome: Met  Goal: Blood Pressure in Desired Range  Outcome: Met  Goal: Maintenance of Seizure Control  Outcome: Met     Problem: Infection  Goal: Absence of Infection Signs and Symptoms  Outcome: Met     Problem: Urinary Elimination Management  Goal: Effective Urinary Elimination/Continence  Outcome: Met     Problem: Gas Exchange Impaired  Goal: Optimal Gas Exchange  Outcome: Met     Problem: Dysrhythmia  Goal: Normalized Cardiac Rhythm  Outcome: Met     Problem: Wound  Goal: Optimal Coping  Outcome: Met  Goal: Optimal Functional Ability  Outcome: Met  Goal: Absence of Infection Signs and Symptoms  Outcome: Met  Goal: Improved Oral Intake  Outcome: Met  Goal: Optimal Pain Control and Function  Outcome: Met  Goal: Skin Health and Integrity  Outcome: Met  Goal: Optimal Wound Healing  Outcome: Met

## 2024-05-11 NOTE — NURSING
Notified Flavia best, patient being transferred to Med-Surg room 339.   Pt severely agitated, restless, uncooperative, yelling. Pt not receptive to verbal redirection or calming methods. Pt disrupting milieu, flailing arms around. Pt refusing PO medications, refusing to be redirected back to room. Security called to unit and assisted. Pt given PRN thorazine 50mg IM and benadryl 50mg IM @ 21:10 for severe agitation and moderate anxiety. Medications partially effective, pt less agitated, less anxious, and has regained some composure. Pt refusing scheduled qhs medications. Pt remains on continual observation, close proximity.

## 2024-05-11 NOTE — DISCHARGE SUMMARY
[facility] - Medical Surgical Unit  Hospital Medicine  Discharge Summary      Patient Name: John Wayne  MRN: [id number]  Admission Date: 5/7/2024  Hospital Length of Stay: 4 days  Discharge Date and Time:  05/11/2024 9:05 AM  Attending Physician: Buddy Cole MD   Discharging Provider: Buddy Cole MD  Discharge Provider Team: Networked reference to record PCT   Primary Care Provider: ISAC Schmidt MD (Inactive)        HPI:   78-year-old male with a past medical history paroxysmal AFib on Eliquis, COPD, CAD, CHF, hypertension and epilepsy who was sent from the Nursing Home to the ED on account of abnormal lab reported as elevated Dilantin level of 38.1. Patient is reported to have mild mental status change. He is unable to give me any history but awake and able to follow commands.  In the ED, he was noted with nystagmus, mild drowsiness and mild bradycardia with a repeat Dilantin level was 36.8.  He is being admitted to the ICU.  ED course: Started on D5 0.9% NS IVF.    * No surgery found *      Hospital Course:   5/8-he remains somewhat lethargic but easily arousable in the ICU; he had no complaints when seen on rounds this morning     5/9-no issues overnight; when seen on rounds this morning was more alert, speech was improved, less garbled or mumbling; he remains quite confused     5/10-he has continued to improve clinically; when seen on rounds this morning was eating breakfast independently where as previously he had to be fed; speech was clear and I suspect mental status is near his baseline; heart rate has improved with rates now in the 60s; following rounds I was notified by nurse that he had a sudden elevation in rate to the 130s which lasted several minutes after which it suddenly broke back into his previous rhythm which has been sinus; his beta-blocker has been resumed  He will be downgraded to med/surg status    5/10-were no events overnight; when seen on rounds this morning  was alert, oriented to person and place; was able to engage in limited conversation; noted to be mildly confused but able to participate in his care; requires significant assistance for ADLs; Dilantin level down to 23 with heart rates in the 50s and 60s; he is medically stable for discharge at this time    ---    Dilantin toxicity  Dilantin level has trended down  Dilantin we will be held at discharge as level is still mildly supratherapeutic; we will attempt to have Dilantin level performed on Monday, 5/13  We will defer to patient's PCP as to whether to resume Dilantin     History of AFib   In sinus rhythm with rates in the 50's-60's  Continue Eliquis  Resume metoprolol  Resume amiodarone      COPD  Continue routine medication     CAD   Continue Statin, Plavix     Chronic HFpEF  Continue metoprolol, lisinopril, Lasix 40 mg daily     Hypertension  Metoptolol, lisinopril     Epilepsy  Continue Keppra, phenobarbital  Hold Dilantin     CLL with leukocytosis  Current WBC similar to that over the past year    Physical exam  Constitution-well nourished, normally developed male in NAD  Eyes-PERRL, EOMI  HENT-normocephalic, atraumatic  Neck-supple  Respiratory-normal respirations  Heart-RRR  Abdomen-soft, nontender, nondistended  Genitourinary-deferred  Musculoskeletal-no joint abnormalities, normal ROM throughout  Skin-warm, dry; no rashes  Neurologic-alert and oriented to person and place; cognitively limited; he requires significant assistance for ADLs; limited ambulatory status if any       Consults:   Consults (From admission, onward)          Status Ordering Provider     Inpatient consult to Registered Dietitian/Nutritionist  Once        Provider:  (Not yet assigned)    Completed RAJIV NICOLE     [facility]  [facility]        Provider:  (Not yet assigned)    Acknowledged RAJIV NICOLE            Final Active Diagnoses:    Diagnosis Date Noted POA    PRINCIPAL PROBLEM:  Dilantin toxicity [T42.0X1A]  11/28/2022 Yes      Problems Resolved During this Admission:      Discharged Condition: stable    Disposition: Nursing Facility    Follow Up:   Follow-up Information       ISAC Schmidt MD Follow up.    Specialty: Family Medicine  Why: He will be seen on nursing home rounds  Contact information:  [address] [id number] [phone]                           Patient Instructions:      Diet Cardiac     Activity as tolerated     Medications:  Reconciled Home Medications:      Medication List        CHANGE how you take these medications      finasteride 5 mg tablet  Commonly known as: PROSCAR  Take 1 tablet (5 mg total) by mouth once daily.  What changed: when to take this            CONTINUE taking these medications      * albuterol sulfate 2.5 mg/0.5 mL Nebu  Take 2.5 mg by nebulization every 6 (six) hours as needed. Rescue     * albuterol 2.5 mg /3 mL (0.083 %) nebulizer solution  Commonly known as: PROVENTIL     amiodarone 200 MG Tab  Commonly known as: PACERONE  Take 200 mg by mouth once daily.     atorvastatin 40 MG tablet  Commonly known as: LIPITOR  Take 40 mg by mouth every evening.     clopidogreL 75 mg tablet  Commonly known as: PLAVIX  Take 1 tablet by mouth Daily.     ELIQUIS 5 mg Tab  Generic drug: apixaban  Take 1 tablet by mouth 2 (two) times a day.     furosemide 40 MG tablet  Commonly known as: LASIX  Take 40 mg by mouth once daily.     levETIRAcetam 1000 MG tablet  Commonly known as: KEPPRA  Take 1 tablet by mouth 2 (two) times a day.     lisinopriL 5 MG tablet  Commonly known as: PRINIVIL,ZESTRIL  Take 1 tablet by mouth Daily.     metoprolol succinate 25 MG 24 hr tablet  Commonly known as: TOPROL-XL  Take 1 tablet by mouth 2 (two) times daily.     NAYZILAM 5 mg/spray (0.1 mL) Spry  Generic drug: midazolam  1 spray by Nasal route daily as needed (seizure). To left nostril     pantoprazole 40 MG tablet  Commonly known as: PROTONIX  Take 40 mg by mouth Daily.     PHENobarbitaL 32.4 MG  tablet  Take 3 tablets by mouth Daily.     primidone 50 MG Tab  Commonly known as: MYSOLINE  Take 50 mg by mouth 3 (three) times daily.     tamsulosin 0.4 mg Cap  Commonly known as: FLOMAX  Take 1 tablet by mouth nightly.     venlafaxine 75 MG tablet  Commonly known as: EFFEXOR  Take 1 tablet (75 mg total) by mouth 2 (two) times daily.           * This list has 2 medication(s) that are the same as other medications prescribed for you. Read the directions carefully, and ask your doctor or other care provider to review them with you.                STOP taking these medications      phenytoin 100 MG ER capsule  Commonly known as: DILANTIN     phenytoin 50 mg chewable tablet  Commonly known as: DILANTIN              Significant Diagnostic Studies: Labs: CMP   Recent Labs   Lab 05/10/24  0439 05/11/24  0643    140   K 3.6 3.9   CO2 25 21*   BUN 11.0 14.0   CREATININE 0.77 0.83   CALCIUM 8.9 9.1   ALBUMIN  --  3.0*   BILITOT  --  0.4   ALKPHOS  --  97   AST  --  14   ALT  --  12    and CBC   Recent Labs   Lab 05/10/24  0439 05/11/24  0643   WBC 36.42* 34.59*   HGB 13.9* 13.6*   HCT 43.9 43.2    217     Radiology:   CXR  Impression:  1. Interim placement right PICC line  2. Cardiomegaly  3. Interim improved aeration of the lungs bilaterally with residual infiltrate or atelectasis at the right lower lung  4. Atherosclerosis  5. Thoracic spondylosis and scoliosis       Pending Diagnostic Studies:       None          Indwelling Lines/Drains at time of discharge:   Lines/Drains/Airways       Peripherally Inserted Central Catheter Line  Duration             PICC Double Lumen 05/08/24 0835 right brachial 3 days                    Time spent on the discharge of patient: 41 minutes         Buddy Cole MD  Department of Hospital Medicine  [facility] - Medical Surgical Unit

## 2024-05-11 NOTE — PROGRESS NOTES
Report given to Nurse Jonah RN at [facility]. Patient's sister ,Clary Wayne, notified about patient being discharge back to [facility].

## 2024-05-11 NOTE — DISCHARGE INSTRUCTIONS
Patient is returning back to [facility]    Patient requires repeat Dilantin level on Monday, 5/13 with results called to his attending physician

## 2024-05-15 ENCOUNTER — PATIENT OUTREACH (OUTPATIENT)
Dept: ADMINISTRATIVE | Facility: CLINIC | Age: 79
End: 2024-05-15
Payer: MEDICARE

## 2024-06-17 ENCOUNTER — HOSPITAL ENCOUNTER (EMERGENCY)
Facility: HOSPITAL | Age: 79
Discharge: HOME | End: 2024-06-18
Attending: INTERNAL MEDICINE
Payer: MEDICARE

## 2024-06-17 DIAGNOSIS — R78.89 SUBTHERAPEUTIC SERUM DILANTIN LEVEL: Primary | ICD-10-CM

## 2024-06-17 DIAGNOSIS — E87.70 HYPERVOLEMIA, UNSPECIFIED HYPERVOLEMIA TYPE: ICD-10-CM

## 2024-06-17 DIAGNOSIS — G40.909 SEIZURE DISORDER: ICD-10-CM

## 2024-06-17 DIAGNOSIS — R06.02 SHORTNESS OF BREATH: ICD-10-CM

## 2024-06-17 LAB
ALBUMIN SERPL-MCNC: 3.7 G/DL (ref 3.4–4.8)
ALBUMIN/GLOB SERPL: 1 RATIO (ref 1.1–2)
ALLENS TEST: ABNORMAL
ALP SERPL-CCNC: 137 UNIT/L (ref 40–150)
ALT SERPL-CCNC: 24 UNIT/L (ref 0–55)
ANION GAP SERPL CALC-SCNC: 10 MEQ/L
AST SERPL-CCNC: 18 UNIT/L (ref 5–34)
BACTERIA #/AREA URNS AUTO: ABNORMAL /HPF
BASOPHILS # BLD AUTO: 0.19 X10(3)/MCL
BASOPHILS NFR BLD AUTO: 0.5 %
BILIRUB SERPL-MCNC: 0.2 MG/DL
BILIRUB UR QL STRIP.AUTO: NEGATIVE
BUN SERPL-MCNC: 16 MG/DL (ref 8.4–25.7)
CALCIUM SERPL-MCNC: 9.8 MG/DL (ref 8.8–10)
CHLORIDE SERPL-SCNC: 105 MMOL/L (ref 98–107)
CLARITY UR: CLEAR
CO2 SERPL-SCNC: 25 MMOL/L (ref 23–31)
COLOR UR AUTO: YELLOW
CREAT SERPL-MCNC: 1.06 MG/DL (ref 0.73–1.18)
CREAT/UREA NIT SERPL: 15
DELSYS: ABNORMAL
EOSINOPHIL # BLD AUTO: 0.38 X10(3)/MCL (ref 0–0.9)
EOSINOPHIL NFR BLD AUTO: 1 %
ERYTHROCYTE [DISTWIDTH] IN BLOOD BY AUTOMATED COUNT: 14.4 % (ref 11.5–17)
GFR SERPLBLD CREATININE-BSD FMLA CKD-EPI: >60 ML/MIN/1.73/M2
GLOBULIN SER-MCNC: 3.8 GM/DL (ref 2.4–3.5)
GLUCOSE SERPL-MCNC: 115 MG/DL (ref 82–115)
GLUCOSE UR QL STRIP: NEGATIVE
HCO3 UR-SCNC: 24.6 MMOL/L (ref 24–28)
HCT VFR BLD AUTO: 47 % (ref 42–52)
HGB BLD-MCNC: 15.3 G/DL (ref 14–18)
HGB UR QL STRIP: ABNORMAL
IMM GRANULOCYTES # BLD AUTO: 0.16 X10(3)/MCL (ref 0–0.04)
IMM GRANULOCYTES NFR BLD AUTO: 0.4 %
KETONES UR QL STRIP: NEGATIVE
LACTATE SERPL-SCNC: 1.3 MMOL/L (ref 0.5–2.2)
LEUKOCYTE ESTERASE UR QL STRIP: NEGATIVE
LYMPHOCYTES # BLD AUTO: 25.98 X10(3)/MCL (ref 0.6–4.6)
LYMPHOCYTES NFR BLD AUTO: 70.3 %
MAGNESIUM SERPL-MCNC: 2 MG/DL (ref 1.6–2.6)
MCH RBC QN AUTO: 28.7 PG (ref 27–31)
MCHC RBC AUTO-ENTMCNC: 32.6 G/DL (ref 33–36)
MCV RBC AUTO: 88 FL (ref 80–94)
MONOCYTES # BLD AUTO: 1.6 X10(3)/MCL (ref 0.1–1.3)
MONOCYTES NFR BLD AUTO: 4.3 %
NEUTROPHILS # BLD AUTO: 8.64 X10(3)/MCL (ref 2.1–9.2)
NEUTROPHILS NFR BLD AUTO: 23.5 %
NITRITE UR QL STRIP: NEGATIVE
PCO2 BLDA: 45.1 MMHG (ref 35–45)
PH SMN: 7.34 [PH] (ref 7.35–7.45)
PH UR STRIP: 6 [PH]
PHENYTOIN SERPL-MCNC: 5.5 UG/ML (ref 10–20)
PLATELET # BLD AUTO: 252 X10(3)/MCL (ref 130–400)
PMV BLD AUTO: 10 FL (ref 7.4–10.4)
PO2 BLDA: 75 MMHG (ref 80–100)
POC BE: -1 MMOL/L
POC SATURATED O2: 94 % (ref 95–100)
POC TCO2: 26 MMOL/L (ref 23–27)
POTASSIUM SERPL-SCNC: 4 MMOL/L (ref 3.5–5.1)
PROT SERPL-MCNC: 7.5 GM/DL (ref 5.8–7.6)
PROT UR QL STRIP: ABNORMAL
RBC # BLD AUTO: 5.34 X10(6)/MCL (ref 4.7–6.1)
RBC #/AREA URNS AUTO: ABNORMAL /HPF
SAMPLE: ABNORMAL
SITE: ABNORMAL
SODIUM SERPL-SCNC: 140 MMOL/L (ref 136–145)
SP GR UR STRIP.AUTO: >=1.03 (ref 1–1.03)
SQUAMOUS #/AREA URNS AUTO: ABNORMAL /HPF
TROPONIN I SERPL-MCNC: 0.07 NG/ML (ref 0–0.04)
UROBILINOGEN UR STRIP-ACNC: 0.2
WBC # BLD AUTO: 36.95 X10(3)/MCL (ref 4.5–11.5)
WBC #/AREA URNS AUTO: ABNORMAL /HPF

## 2024-06-17 PROCEDURE — 84484 ASSAY OF TROPONIN QUANT: CPT | Performed by: NURSE PRACTITIONER

## 2024-06-17 PROCEDURE — 94640 AIRWAY INHALATION TREATMENT: CPT

## 2024-06-17 PROCEDURE — 80185 ASSAY OF PHENYTOIN TOTAL: CPT | Performed by: INTERNAL MEDICINE

## 2024-06-17 PROCEDURE — 83735 ASSAY OF MAGNESIUM: CPT | Performed by: NURSE PRACTITIONER

## 2024-06-17 PROCEDURE — 99900035 HC TECH TIME PER 15 MIN (STAT)

## 2024-06-17 PROCEDURE — 93010 ELECTROCARDIOGRAM REPORT: CPT | Mod: ,,, | Performed by: STUDENT IN AN ORGANIZED HEALTH CARE EDUCATION/TRAINING PROGRAM

## 2024-06-17 PROCEDURE — 87154 CUL TYP ID BLD PTHGN 6+ TRGT: CPT | Performed by: NURSE PRACTITIONER

## 2024-06-17 PROCEDURE — 36600 WITHDRAWAL OF ARTERIAL BLOOD: CPT

## 2024-06-17 PROCEDURE — 99285 EMERGENCY DEPT VISIT HI MDM: CPT | Mod: 25

## 2024-06-17 PROCEDURE — 27000221 HC OXYGEN, UP TO 24 HOURS

## 2024-06-17 PROCEDURE — 25000003 PHARM REV CODE 250: Performed by: INTERNAL MEDICINE

## 2024-06-17 PROCEDURE — 94761 N-INVAS EAR/PLS OXIMETRY MLT: CPT | Mod: XB

## 2024-06-17 PROCEDURE — 96375 TX/PRO/DX INJ NEW DRUG ADDON: CPT

## 2024-06-17 PROCEDURE — 96365 THER/PROPH/DIAG IV INF INIT: CPT

## 2024-06-17 PROCEDURE — 63600175 PHARM REV CODE 636 W HCPCS: Performed by: INTERNAL MEDICINE

## 2024-06-17 PROCEDURE — 85025 COMPLETE CBC W/AUTO DIFF WBC: CPT | Performed by: NURSE PRACTITIONER

## 2024-06-17 PROCEDURE — 93010 ELECTROCARDIOGRAM REPORT: CPT | Mod: 76,,, | Performed by: STUDENT IN AN ORGANIZED HEALTH CARE EDUCATION/TRAINING PROGRAM

## 2024-06-17 PROCEDURE — 25000242 PHARM REV CODE 250 ALT 637 W/ HCPCS: Performed by: INTERNAL MEDICINE

## 2024-06-17 PROCEDURE — 83605 ASSAY OF LACTIC ACID: CPT | Performed by: NURSE PRACTITIONER

## 2024-06-17 PROCEDURE — 80053 COMPREHEN METABOLIC PANEL: CPT | Performed by: NURSE PRACTITIONER

## 2024-06-17 PROCEDURE — 81003 URINALYSIS AUTO W/O SCOPE: CPT | Performed by: NURSE PRACTITIONER

## 2024-06-17 PROCEDURE — 93005 ELECTROCARDIOGRAM TRACING: CPT

## 2024-06-17 PROCEDURE — 87077 CULTURE AEROBIC IDENTIFY: CPT | Performed by: NURSE PRACTITIONER

## 2024-06-17 PROCEDURE — 82803 BLOOD GASES ANY COMBINATION: CPT

## 2024-06-17 RX ORDER — IPRATROPIUM BROMIDE AND ALBUTEROL SULFATE 2.5; .5 MG/3ML; MG/3ML
3 SOLUTION RESPIRATORY (INHALATION)
Status: COMPLETED | OUTPATIENT
Start: 2024-06-17 | End: 2024-06-17

## 2024-06-17 RX ORDER — FUROSEMIDE 10 MG/ML
80 INJECTION INTRAMUSCULAR; INTRAVENOUS
Status: COMPLETED | OUTPATIENT
Start: 2024-06-17 | End: 2024-06-17

## 2024-06-17 RX ADMIN — PHENYTOIN SODIUM 500 MG: 50 INJECTION INTRAMUSCULAR; INTRAVENOUS at 08:06

## 2024-06-17 RX ADMIN — IPRATROPIUM BROMIDE AND ALBUTEROL SULFATE 3 ML: .5; 3 SOLUTION RESPIRATORY (INHALATION) at 10:06

## 2024-06-17 RX ADMIN — FUROSEMIDE 80 MG: 10 INJECTION, SOLUTION INTRAMUSCULAR; INTRAVENOUS at 10:06

## 2024-06-18 ENCOUNTER — HOSPITAL ENCOUNTER (INPATIENT)
Facility: HOSPITAL | Age: 79
LOS: 6 days | Discharge: NURSING, REHAB OR LONG-TERM CARE | DRG: 281 | End: 2024-06-24
Attending: INTERNAL MEDICINE | Admitting: INTERNAL MEDICINE
Payer: MEDICARE

## 2024-06-18 ENCOUNTER — HOSPITAL ENCOUNTER (EMERGENCY)
Facility: HOSPITAL | Age: 79
Discharge: ANOTHER HOSPITAL | End: 2024-06-18
Attending: INTERNAL MEDICINE
Payer: MEDICARE

## 2024-06-18 VITALS
HEART RATE: 61 BPM | SYSTOLIC BLOOD PRESSURE: 123 MMHG | OXYGEN SATURATION: 96 % | BODY MASS INDEX: 36.1 KG/M2 | HEIGHT: 67 IN | TEMPERATURE: 98 F | DIASTOLIC BLOOD PRESSURE: 75 MMHG | RESPIRATION RATE: 20 BRPM | WEIGHT: 230 LBS

## 2024-06-18 VITALS
HEART RATE: 63 BPM | RESPIRATION RATE: 18 BRPM | HEIGHT: 67 IN | TEMPERATURE: 98 F | SYSTOLIC BLOOD PRESSURE: 130 MMHG | OXYGEN SATURATION: 97 % | BODY MASS INDEX: 38.3 KG/M2 | DIASTOLIC BLOOD PRESSURE: 65 MMHG | WEIGHT: 244 LBS

## 2024-06-18 DIAGNOSIS — I21.4 NSTEMI (NON-ST ELEVATED MYOCARDIAL INFARCTION): ICD-10-CM

## 2024-06-18 DIAGNOSIS — R06.02 SHORTNESS OF BREATH: ICD-10-CM

## 2024-06-18 DIAGNOSIS — I49.9 IRREGULAR HEART RHYTHM: ICD-10-CM

## 2024-06-18 DIAGNOSIS — R56.9 SEIZURE: Primary | ICD-10-CM

## 2024-06-18 DIAGNOSIS — R79.89 ELEVATED TROPONIN: ICD-10-CM

## 2024-06-18 DIAGNOSIS — I50.9 CHRONIC CONGESTIVE HEART FAILURE, UNSPECIFIED HEART FAILURE TYPE: ICD-10-CM

## 2024-06-18 DIAGNOSIS — R00.1 BRADYCARDIA: ICD-10-CM

## 2024-06-18 DIAGNOSIS — R07.9 CHEST PAIN: ICD-10-CM

## 2024-06-18 LAB
ABS NEUT CALC (OHS): 7.04 X10(3)/MCL (ref 2.1–9.2)
ALBUMIN SERPL-MCNC: 3.7 G/DL (ref 3.4–4.8)
ALBUMIN/GLOB SERPL: 0.9 RATIO (ref 1.1–2)
ALP SERPL-CCNC: 123 UNIT/L (ref 40–150)
ALT SERPL-CCNC: 23 UNIT/L (ref 0–55)
ANION GAP SERPL CALC-SCNC: 15 MEQ/L
APTT PPP: 27.9 SECONDS (ref 24.6–37.2)
APTT PPP: 52.7 SECONDS (ref 24.6–37.2)
APTT PPP: 78.7 SECONDS (ref 23.2–33.7)
AST SERPL-CCNC: 22 UNIT/L (ref 5–34)
B-HCG SERPL QL: 58 %
BASOPHILS # BLD AUTO: 0.18 X10(3)/MCL
BASOPHILS NFR BLD AUTO: 0.4 %
BASOPHILS NFR BLD MANUAL: 0.54 X10(3)/MCL (ref 0–0.2)
BASOPHILS NFR BLD MANUAL: 1 % (ref 0–2)
BILIRUB SERPL-MCNC: 0.3 MG/DL
BNP BLD-MCNC: 250.7 PG/ML
BUN SERPL-MCNC: 15 MG/DL (ref 8.4–25.7)
CALCIUM SERPL-MCNC: 9.8 MG/DL (ref 8.8–10)
CHLORIDE SERPL-SCNC: 105 MMOL/L (ref 98–107)
CO2 SERPL-SCNC: 20 MMOL/L (ref 23–31)
CREAT SERPL-MCNC: 1.11 MG/DL (ref 0.73–1.18)
CREAT/UREA NIT SERPL: 14
EOSINOPHIL # BLD AUTO: 0.05 X10(3)/MCL (ref 0–0.9)
EOSINOPHIL NFR BLD AUTO: 0.1 %
EOSINOPHIL NFR BLD MANUAL: 2.17 X10(3)/MCL (ref 0–0.9)
EOSINOPHIL NFR BLD MANUAL: 4 % (ref 0–8)
ERYTHROCYTE [DISTWIDTH] IN BLOOD BY AUTOMATED COUNT: 14.5 % (ref 11.5–17)
ERYTHROCYTE [DISTWIDTH] IN BLOOD BY AUTOMATED COUNT: 14.6 % (ref 11.5–17)
GFR SERPLBLD CREATININE-BSD FMLA CKD-EPI: >60 ML/MIN/1.73/M2
GLOBULIN SER-MCNC: 4 GM/DL (ref 2.4–3.5)
GLUCOSE SERPL-MCNC: 176 MG/DL (ref 82–115)
HCT VFR BLD AUTO: 43 % (ref 42–52)
HCT VFR BLD AUTO: 48.6 % (ref 42–52)
HGB BLD-MCNC: 13.6 G/DL (ref 14–18)
HGB BLD-MCNC: 15.6 G/DL (ref 14–18)
IMM GRANULOCYTES # BLD AUTO: 0.29 X10(3)/MCL (ref 0–0.04)
IMM GRANULOCYTES NFR BLD AUTO: 0.6 %
INR PPP: 1.1
LACTATE SERPL-SCNC: 2.4 MMOL/L (ref 0.5–2.2)
LACTATE SERPL-SCNC: 3.4 MMOL/L (ref 0.5–2.2)
LDH SERPL-CCNC: 269 U/L (ref 125–220)
LYMPHOCYTES # BLD AUTO: 25.35 X10(3)/MCL (ref 0.6–4.6)
LYMPHOCYTES NFR BLD AUTO: 56.4 %
LYMPHOCYTES NFR BLD MANUAL: 12.46 X10(3)/MCL
LYMPHOCYTES NFR BLD MANUAL: 23 % (ref 13–40)
MAGNESIUM SERPL-MCNC: 2 MG/DL (ref 1.6–2.6)
MCH RBC QN AUTO: 28.4 PG (ref 27–31)
MCH RBC QN AUTO: 28.6 PG (ref 27–31)
MCHC RBC AUTO-ENTMCNC: 31.6 G/DL (ref 33–36)
MCHC RBC AUTO-ENTMCNC: 32.1 G/DL (ref 33–36)
MCV RBC AUTO: 89.2 FL (ref 80–94)
MCV RBC AUTO: 89.8 FL (ref 80–94)
METAMYELOCYTES NFR BLD MANUAL: 1 %
MONOCYTES # BLD AUTO: 2.57 X10(3)/MCL (ref 0.1–1.3)
MONOCYTES NFR BLD AUTO: 5.7 %
MONOCYTES NFR BLD MANUAL: 1.08 X10(3)/MCL (ref 0.1–1.3)
MONOCYTES NFR BLD MANUAL: 2 % (ref 2–11)
MYELOCYTES NFR BLD MANUAL: 1 %
NEUTROPHILS # BLD AUTO: 16.48 X10(3)/MCL (ref 2.1–9.2)
NEUTROPHILS NFR BLD AUTO: 36.8 %
NEUTROPHILS NFR BLD MANUAL: 12 % (ref 47–80)
NEUTS BAND NFR BLD MANUAL: 1 % (ref 0–11)
OHS QRS DURATION: 50 MS
OHS QRS DURATION: 86 MS
OHS QRS DURATION: 86 MS
OHS QTC CALCULATION: 415 MS
OHS QTC CALCULATION: 420 MS
OHS QTC CALCULATION: 467 MS
PLATELET # BLD AUTO: 234 X10(3)/MCL (ref 130–400)
PLATELET # BLD AUTO: 292 X10(3)/MCL (ref 130–400)
PLATELET # BLD EST: ADEQUATE 10*3/UL
PMV BLD AUTO: 10 FL (ref 7.4–10.4)
PMV BLD AUTO: 10 FL (ref 7.4–10.4)
POIKILOCYTOSIS BLD QL SMEAR: ABNORMAL
POTASSIUM SERPL-SCNC: 4.1 MMOL/L (ref 3.5–5.1)
PROLYMPHOCYTES # BLD MANUAL: 1 %
PROT SERPL-MCNC: 7.7 GM/DL (ref 5.8–7.6)
PROTHROMBIN TIME: 14.6 SECONDS (ref 12.5–14.5)
RBC # BLD AUTO: 4.79 X10(6)/MCL (ref 4.7–6.1)
RBC # BLD AUTO: 5.45 X10(6)/MCL (ref 4.7–6.1)
SODIUM SERPL-SCNC: 140 MMOL/L (ref 136–145)
TROPONIN I SERPL-MCNC: 1.34 NG/ML (ref 0–0.04)
TROPONIN I SERPL-MCNC: 1.6 NG/ML (ref 0–0.04)
TROPONIN I SERPL-MCNC: 2.28 NG/ML (ref 0–0.04)
TROPONIN I SERPL-MCNC: 2.55 NG/ML (ref 0–0.04)
URATE SERPL-MCNC: 5.2 MG/DL (ref 3.5–7.2)
WBC # BLD AUTO: 44.92 X10(3)/MCL (ref 4.5–11.5)
WBC # BLD AUTO: 54.16 X10(3)/MCL (ref 4.5–11.5)

## 2024-06-18 PROCEDURE — 85610 PROTHROMBIN TIME: CPT | Performed by: FAMILY MEDICINE

## 2024-06-18 PROCEDURE — 99285 EMERGENCY DEPT VISIT HI MDM: CPT | Mod: 25

## 2024-06-18 PROCEDURE — 63600175 PHARM REV CODE 636 W HCPCS: Performed by: FAMILY MEDICINE

## 2024-06-18 PROCEDURE — 84550 ASSAY OF BLOOD/URIC ACID: CPT | Performed by: INTERNAL MEDICINE

## 2024-06-18 PROCEDURE — 21400001 HC TELEMETRY ROOM

## 2024-06-18 PROCEDURE — 25000242 PHARM REV CODE 250 ALT 637 W/ HCPCS: Performed by: INTERNAL MEDICINE

## 2024-06-18 PROCEDURE — 94640 AIRWAY INHALATION TREATMENT: CPT

## 2024-06-18 PROCEDURE — 96365 THER/PROPH/DIAG IV INF INIT: CPT

## 2024-06-18 PROCEDURE — 94761 N-INVAS EAR/PLS OXIMETRY MLT: CPT | Mod: XB

## 2024-06-18 PROCEDURE — 84484 ASSAY OF TROPONIN QUANT: CPT | Performed by: FAMILY MEDICINE

## 2024-06-18 PROCEDURE — 63600175 PHARM REV CODE 636 W HCPCS: Performed by: INTERNAL MEDICINE

## 2024-06-18 PROCEDURE — 36415 COLL VENOUS BLD VENIPUNCTURE: CPT | Performed by: INTERNAL MEDICINE

## 2024-06-18 PROCEDURE — 96361 HYDRATE IV INFUSION ADD-ON: CPT

## 2024-06-18 PROCEDURE — 25000003 PHARM REV CODE 250: Performed by: INTERNAL MEDICINE

## 2024-06-18 PROCEDURE — 84484 ASSAY OF TROPONIN QUANT: CPT | Performed by: INTERNAL MEDICINE

## 2024-06-18 PROCEDURE — 99900035 HC TECH TIME PER 15 MIN (STAT)

## 2024-06-18 PROCEDURE — 82803 BLOOD GASES ANY COMBINATION: CPT

## 2024-06-18 PROCEDURE — 36600 WITHDRAWAL OF ARTERIAL BLOOD: CPT

## 2024-06-18 PROCEDURE — 11000001 HC ACUTE MED/SURG PRIVATE ROOM

## 2024-06-18 PROCEDURE — 96366 THER/PROPH/DIAG IV INF ADDON: CPT

## 2024-06-18 PROCEDURE — 93010 ELECTROCARDIOGRAM REPORT: CPT | Mod: ,,, | Performed by: STUDENT IN AN ORGANIZED HEALTH CARE EDUCATION/TRAINING PROGRAM

## 2024-06-18 PROCEDURE — 93005 ELECTROCARDIOGRAM TRACING: CPT

## 2024-06-18 PROCEDURE — 85027 COMPLETE CBC AUTOMATED: CPT | Performed by: INTERNAL MEDICINE

## 2024-06-18 PROCEDURE — 83615 LACTATE (LD) (LDH) ENZYME: CPT | Performed by: INTERNAL MEDICINE

## 2024-06-18 PROCEDURE — 87077 CULTURE AEROBIC IDENTIFY: CPT | Performed by: INTERNAL MEDICINE

## 2024-06-18 PROCEDURE — 85730 THROMBOPLASTIN TIME PARTIAL: CPT | Performed by: FAMILY MEDICINE

## 2024-06-18 PROCEDURE — 83880 ASSAY OF NATRIURETIC PEPTIDE: CPT | Performed by: INTERNAL MEDICINE

## 2024-06-18 PROCEDURE — 85730 THROMBOPLASTIN TIME PARTIAL: CPT | Performed by: INTERNAL MEDICINE

## 2024-06-18 PROCEDURE — 27000221 HC OXYGEN, UP TO 24 HOURS

## 2024-06-18 PROCEDURE — 96375 TX/PRO/DX INJ NEW DRUG ADDON: CPT

## 2024-06-18 PROCEDURE — 83735 ASSAY OF MAGNESIUM: CPT | Performed by: INTERNAL MEDICINE

## 2024-06-18 PROCEDURE — 80053 COMPREHEN METABOLIC PANEL: CPT | Performed by: INTERNAL MEDICINE

## 2024-06-18 PROCEDURE — 85027 COMPLETE CBC AUTOMATED: CPT | Performed by: FAMILY MEDICINE

## 2024-06-18 PROCEDURE — 83605 ASSAY OF LACTIC ACID: CPT | Performed by: INTERNAL MEDICINE

## 2024-06-18 PROCEDURE — 96374 THER/PROPH/DIAG INJ IV PUSH: CPT | Mod: 59

## 2024-06-18 RX ORDER — PHENOBARBITAL 97.2 MG/1
97.2 TABLET ORAL DAILY
Status: DISCONTINUED | OUTPATIENT
Start: 2024-06-19 | End: 2024-06-24 | Stop reason: HOSPADM

## 2024-06-18 RX ORDER — HEPARIN SODIUM,PORCINE/D5W 25000/250
0-40 INTRAVENOUS SOLUTION INTRAVENOUS CONTINUOUS
Status: DISCONTINUED | OUTPATIENT
Start: 2024-06-18 | End: 2024-06-18 | Stop reason: HOSPADM

## 2024-06-18 RX ORDER — LISINOPRIL 5 MG/1
5 TABLET ORAL DAILY
Status: DISCONTINUED | OUTPATIENT
Start: 2024-06-19 | End: 2024-06-24 | Stop reason: HOSPADM

## 2024-06-18 RX ORDER — SODIUM CHLORIDE 0.9 % (FLUSH) 0.9 %
10 SYRINGE (ML) INJECTION
Status: DISCONTINUED | OUTPATIENT
Start: 2024-06-18 | End: 2024-06-24 | Stop reason: HOSPADM

## 2024-06-18 RX ORDER — LEVETIRACETAM 500 MG/5ML
500 INJECTION, SOLUTION, CONCENTRATE INTRAVENOUS EVERY 12 HOURS
Status: DISCONTINUED | OUTPATIENT
Start: 2024-06-18 | End: 2024-06-18 | Stop reason: HOSPADM

## 2024-06-18 RX ORDER — PROCHLORPERAZINE EDISYLATE 5 MG/ML
5 INJECTION INTRAMUSCULAR; INTRAVENOUS EVERY 6 HOURS PRN
Status: DISCONTINUED | OUTPATIENT
Start: 2024-06-18 | End: 2024-06-24 | Stop reason: HOSPADM

## 2024-06-18 RX ORDER — NAPROXEN SODIUM 220 MG/1
324 TABLET, FILM COATED ORAL ONCE
Status: COMPLETED | OUTPATIENT
Start: 2024-06-18 | End: 2024-06-18

## 2024-06-18 RX ORDER — ALUMINUM HYDROXIDE, MAGNESIUM HYDROXIDE, AND SIMETHICONE 1200; 120; 1200 MG/30ML; MG/30ML; MG/30ML
30 SUSPENSION ORAL 4 TIMES DAILY PRN
Status: DISCONTINUED | OUTPATIENT
Start: 2024-06-18 | End: 2024-06-24 | Stop reason: HOSPADM

## 2024-06-18 RX ORDER — IPRATROPIUM BROMIDE AND ALBUTEROL SULFATE 2.5; .5 MG/3ML; MG/3ML
3 SOLUTION RESPIRATORY (INHALATION)
Status: COMPLETED | OUTPATIENT
Start: 2024-06-18 | End: 2024-06-18

## 2024-06-18 RX ORDER — AMIODARONE HYDROCHLORIDE 200 MG/1
200 TABLET ORAL DAILY
Status: DISCONTINUED | OUTPATIENT
Start: 2024-06-19 | End: 2024-06-24 | Stop reason: HOSPADM

## 2024-06-18 RX ORDER — PHENYTOIN 50 MG/1
100 TABLET, CHEWABLE ORAL ONCE
Status: DISCONTINUED | OUTPATIENT
Start: 2024-06-18 | End: 2024-06-24 | Stop reason: HOSPADM

## 2024-06-18 RX ORDER — VENLAFAXINE 37.5 MG/1
75 TABLET ORAL 2 TIMES DAILY
Status: DISCONTINUED | OUTPATIENT
Start: 2024-06-18 | End: 2024-06-24 | Stop reason: HOSPADM

## 2024-06-18 RX ORDER — TALC
6 POWDER (GRAM) TOPICAL NIGHTLY PRN
Status: DISCONTINUED | OUTPATIENT
Start: 2024-06-18 | End: 2024-06-24 | Stop reason: HOSPADM

## 2024-06-18 RX ORDER — PHENYTOIN SODIUM 100 MG/1
100 CAPSULE, EXTENDED RELEASE ORAL 2 TIMES DAILY
COMMUNITY
Start: 2024-06-07

## 2024-06-18 RX ORDER — HEPARIN SODIUM,PORCINE/D5W 25000/250
0-40 INTRAVENOUS SOLUTION INTRAVENOUS CONTINUOUS
Status: DISCONTINUED | OUTPATIENT
Start: 2024-06-18 | End: 2024-06-23

## 2024-06-18 RX ORDER — TAMSULOSIN HYDROCHLORIDE 0.4 MG/1
0.4 CAPSULE ORAL NIGHTLY
Status: DISCONTINUED | OUTPATIENT
Start: 2024-06-18 | End: 2024-06-24 | Stop reason: HOSPADM

## 2024-06-18 RX ORDER — ASPIRIN 81 MG/1
81 TABLET ORAL DAILY
Status: DISCONTINUED | OUTPATIENT
Start: 2024-06-19 | End: 2024-06-24 | Stop reason: HOSPADM

## 2024-06-18 RX ORDER — METOPROLOL SUCCINATE 25 MG/1
25 TABLET, EXTENDED RELEASE ORAL 2 TIMES DAILY
Status: DISCONTINUED | OUTPATIENT
Start: 2024-06-18 | End: 2024-06-23

## 2024-06-18 RX ORDER — FINASTERIDE 5 MG/1
5 TABLET, FILM COATED ORAL NIGHTLY
Status: DISCONTINUED | OUTPATIENT
Start: 2024-06-18 | End: 2024-06-24 | Stop reason: HOSPADM

## 2024-06-18 RX ORDER — FUROSEMIDE 10 MG/ML
40 INJECTION INTRAMUSCULAR; INTRAVENOUS EVERY 12 HOURS
Status: DISCONTINUED | OUTPATIENT
Start: 2024-06-18 | End: 2024-06-24 | Stop reason: HOSPADM

## 2024-06-18 RX ORDER — LEVETIRACETAM 500 MG/1
1000 TABLET ORAL 2 TIMES DAILY
Status: DISCONTINUED | OUTPATIENT
Start: 2024-06-18 | End: 2024-06-24 | Stop reason: HOSPADM

## 2024-06-18 RX ORDER — AMOXICILLIN 250 MG
2 CAPSULE ORAL 2 TIMES DAILY PRN
Status: DISCONTINUED | OUTPATIENT
Start: 2024-06-18 | End: 2024-06-24 | Stop reason: HOSPADM

## 2024-06-18 RX ORDER — PANTOPRAZOLE SODIUM 40 MG/1
40 TABLET, DELAYED RELEASE ORAL DAILY
Status: DISCONTINUED | OUTPATIENT
Start: 2024-06-19 | End: 2024-06-24 | Stop reason: HOSPADM

## 2024-06-18 RX ORDER — PHENOBARBITAL 97.2 MG/1
97.2 TABLET ORAL DAILY
Status: DISCONTINUED | OUTPATIENT
Start: 2024-06-19 | End: 2024-06-18

## 2024-06-18 RX ORDER — PRIMIDONE 50 MG/1
150 TABLET ORAL 2 TIMES DAILY
Status: DISCONTINUED | OUTPATIENT
Start: 2024-06-18 | End: 2024-06-24 | Stop reason: HOSPADM

## 2024-06-18 RX ORDER — CLOPIDOGREL BISULFATE 75 MG/1
75 TABLET ORAL DAILY
Status: DISCONTINUED | OUTPATIENT
Start: 2024-06-19 | End: 2024-06-24 | Stop reason: HOSPADM

## 2024-06-18 RX ORDER — ATORVASTATIN CALCIUM 40 MG/1
40 TABLET, FILM COATED ORAL NIGHTLY
Status: DISCONTINUED | OUTPATIENT
Start: 2024-06-18 | End: 2024-06-24 | Stop reason: HOSPADM

## 2024-06-18 RX ORDER — ACETAMINOPHEN 325 MG/1
650 TABLET ORAL EVERY 4 HOURS PRN
Status: DISCONTINUED | OUTPATIENT
Start: 2024-06-18 | End: 2024-06-24 | Stop reason: HOSPADM

## 2024-06-18 RX ORDER — METOPROLOL TARTRATE 1 MG/ML
5 INJECTION, SOLUTION INTRAVENOUS
Status: COMPLETED | OUTPATIENT
Start: 2024-06-18 | End: 2024-06-18

## 2024-06-18 RX ORDER — PHENYTOIN SODIUM 100 MG/1
100 CAPSULE, EXTENDED RELEASE ORAL 2 TIMES DAILY
Status: DISCONTINUED | OUTPATIENT
Start: 2024-06-18 | End: 2024-06-24 | Stop reason: HOSPADM

## 2024-06-18 RX ORDER — ONDANSETRON HYDROCHLORIDE 2 MG/ML
4 INJECTION, SOLUTION INTRAVENOUS EVERY 4 HOURS PRN
Status: DISCONTINUED | OUTPATIENT
Start: 2024-06-18 | End: 2024-06-24 | Stop reason: HOSPADM

## 2024-06-18 RX ORDER — POLYETHYLENE GLYCOL 3350 17 G/17G
17 POWDER, FOR SOLUTION ORAL 2 TIMES DAILY PRN
Status: DISCONTINUED | OUTPATIENT
Start: 2024-06-18 | End: 2024-06-24 | Stop reason: HOSPADM

## 2024-06-18 RX ORDER — LORAZEPAM 2 MG/ML
4 INJECTION INTRAMUSCULAR
Status: COMPLETED | OUTPATIENT
Start: 2024-06-18 | End: 2024-06-18

## 2024-06-18 RX ADMIN — LEVETIRACETAM 500 MG: 100 INJECTION, SOLUTION INTRAVENOUS at 10:06

## 2024-06-18 RX ADMIN — PHENYTOIN SODIUM 100 MG: 100 CAPSULE ORAL at 09:06

## 2024-06-18 RX ADMIN — HEPARIN SODIUM 14 UNITS/KG/HR: 10000 INJECTION, SOLUTION INTRAVENOUS at 09:06

## 2024-06-18 RX ADMIN — HEPARIN SODIUM 14 UNITS/KG/HR: 10000 INJECTION, SOLUTION INTRAVENOUS at 01:06

## 2024-06-18 RX ADMIN — METOPROLOL TARTRATE 5 MG: 1 INJECTION, SOLUTION INTRAVENOUS at 05:06

## 2024-06-18 RX ADMIN — SODIUM CHLORIDE 3129 ML: 9 INJECTION, SOLUTION INTRAVENOUS at 03:06

## 2024-06-18 RX ADMIN — LORAZEPAM 4 MG: 2 INJECTION INTRAMUSCULAR; INTRAVENOUS at 02:06

## 2024-06-18 RX ADMIN — FUROSEMIDE 40 MG: 10 INJECTION, SOLUTION INTRAVENOUS at 09:06

## 2024-06-18 RX ADMIN — PRIMIDONE 150 MG: 50 TABLET ORAL at 09:06

## 2024-06-18 RX ADMIN — LEVETIRACETAM 1000 MG: 500 TABLET, FILM COATED ORAL at 09:06

## 2024-06-18 RX ADMIN — ATORVASTATIN CALCIUM 40 MG: 40 TABLET, FILM COATED ORAL at 09:06

## 2024-06-18 RX ADMIN — ASPIRIN 81 MG CHEWABLE TABLET 324 MG: 81 TABLET CHEWABLE at 11:06

## 2024-06-18 RX ADMIN — FINASTERIDE 5 MG: 5 TABLET, FILM COATED ORAL at 09:06

## 2024-06-18 RX ADMIN — VENLAFAXINE 75 MG: 37.5 TABLET ORAL at 09:06

## 2024-06-18 RX ADMIN — METOPROLOL SUCCINATE 25 MG: 25 TABLET, EXTENDED RELEASE ORAL at 09:06

## 2024-06-18 RX ADMIN — HEPARIN SODIUM 12 UNITS/KG/HR: 10000 INJECTION, SOLUTION INTRAVENOUS at 06:06

## 2024-06-18 RX ADMIN — TAMSULOSIN HYDROCHLORIDE 0.4 MG: 0.4 CAPSULE ORAL at 09:06

## 2024-06-18 RX ADMIN — IPRATROPIUM BROMIDE AND ALBUTEROL SULFATE 3 ML: .5; 3 SOLUTION RESPIRATORY (INHALATION) at 02:06

## 2024-06-18 NOTE — ED PROVIDER NOTES
Encounter Date: 6/18/2024       History     Chief Complaint   Patient presents with    Shortness of Breath     Pt brought in by [facility] with c/o SOB [facility]     78-year-old white male that was actually discharged from this emergency department she was too few hours ago in his normal state of health feeling well returns back to the emergency department in respiratory distress.  Evidently was doing well at the nursing home when it appears that he began having seizure-like activity so soon as he started having a seizure he became hypoxic and was transferred right back to the emergency department.  Patient arrives to the emergency department with no IV access hypertensive tachycardic what repetitive cyclical grunting and movements like seizures and respiratory distress was tachypneic with low oxygen saturations so he would to be placed a non-rebreather.  Once IV access was given he was given for Ativan and within seconds the seizure stopped his oxygen came up to 100% his blood pressure resolved in his pulse resolved workup ensued        Review of patient's allergies indicates:   Allergen Reactions    Ancef in dextrose (iso-osm)     Codeine     Penicillins      Past Medical History:   Diagnosis Date    Anticoagulant long-term use     CHF (congestive heart failure) 10/2021    EF of 25-30% on ECHO    CLL (chronic lymphocytic leukemia)     COPD (chronic obstructive pulmonary disease)     Coronary artery disease     Depression     Difficult intubation     GERD (gastroesophageal reflux disease)     Hypertension     Mixed hyperlipidemia     PVD (peripheral vascular disease)     Seizures     Sleep apnea, unspecified     TIA (transient ischemic attack)      Past Surgical History:   Procedure Laterality Date    CORONARY ANGIOPLASTY WITH STENT PLACEMENT  10/18/2016    pLAD, pLAD, dLAD, mCX,    INSERTION OF BARE METAL STENT INTO PERIPHERAL ARTERY  2018    B/L [place] Vessels    PERCUTANEOUS BALLOON  VALVULOPLASTY  04/04/2018     Family History   Problem Relation Name Age of Onset    Hypertension Mother      Hypertension Father      Coronary artery disease Father      Heart attack Father       Social History     Tobacco Use    Smoking status: Former    Smokeless tobacco: Never   Substance Use Topics    Alcohol use: Not Currently    Drug use: Never     Review of Systems   Unable to perform ROS: Mental status change (Postictal)       Physical Exam     Initial Vitals [06/18/24 0155]   BP Pulse Resp Temp SpO2   (!) 214/118 (!) 120 (!) 24 98.1 °F (36.7 °C) (!) 94 %      MAP       --         Physical Exam    Nursing note and vitals reviewed.  Constitutional: He appears well-developed and well-nourished.   HENT:   Head: Normocephalic and atraumatic.   Eyes: Conjunctivae and EOM are normal.   Neck: Neck supple.   Normal range of motion.  Cardiovascular:    Tachycardia present.         Pulmonary/Chest: He is in respiratory distress. He has rhonchi.   Abdominal: Abdomen is soft. Bowel sounds are normal.   Musculoskeletal:         General: Edema present.      Cervical back: Normal range of motion and neck supple.     Skin: Skin is warm and dry. Capillary refill takes less than 2 seconds.         ED Course   Critical Care    Date/Time: 6/18/2024 5:55 AM    Performed by: Brandon Gibbons MD  Authorized by: Brandon Gibbons MD  Direct patient critical care time: 28 minutes  Additional history critical care time: 11 minutes  Ordering / reviewing critical care time: 16 minutes  Documentation critical care time: 11 minutes  Consulting other physicians critical care time: 4 minutes  Total critical care time (exclusive of procedural time) : 70 minutes  Critical care time was exclusive of separately billable procedures and treating other patients.  Critical care was necessary to treat or prevent imminent or life-threatening deterioration of the following conditions: cardiac failure, circulatory failure and metabolic  crisis.  Critical care was time spent personally by me on the following activities: blood draw for specimens, development of treatment plan with patient or surrogate, discussions with consultants, discussions with primary provider, gastric intubation, interpretation of cardiac output measurements, evaluation of patient's response to treatment, examination of patient, obtaining history from patient or surrogate, ordering and performing treatments and interventions, ordering and review of laboratory studies, ordering and review of radiographic studies, pulse oximetry, re-evaluation of patient's condition and review of old charts.        Admission on 06/18/2024   Component Date Value Ref Range Status    Sodium 06/18/2024 140  136 - 145 mmol/L Final    Potassium 06/18/2024 4.1  3.5 - 5.1 mmol/L Final    Chloride 06/18/2024 105  98 - 107 mmol/L Final    CO2 06/18/2024 20 (L)  23 - 31 mmol/L Final    Glucose 06/18/2024 176 (H)  82 - 115 mg/dL Final    Blood Urea Nitrogen 06/18/2024 15.0  8.4 - 25.7 mg/dL Final    Creatinine 06/18/2024 1.11  0.73 - 1.18 mg/dL Final    Calcium 06/18/2024 9.8  8.8 - 10.0 mg/dL Final    Protein Total 06/18/2024 7.7 (H)  5.8 - 7.6 gm/dL Final    Albumin 06/18/2024 3.7  3.4 - 4.8 g/dL Final    Globulin 06/18/2024 4.0 (H)  2.4 - 3.5 gm/dL Final    Albumin/Globulin Ratio 06/18/2024 0.9 (L)  1.1 - 2.0 ratio Final    Bilirubin Total 06/18/2024 0.3  <=1.5 mg/dL Final    ALP 06/18/2024 123  40 - 150 unit/L Final    ALT 06/18/2024 23  0 - 55 unit/L Final    AST 06/18/2024 22  5 - 34 unit/L Final    eGFR 06/18/2024 >60  mL/min/1.73/m2 Final    Anion Gap 06/18/2024 15.0  mEq/L Final    BUN/Creatinine Ratio 06/18/2024 14   Final    Lactic Acid Level 06/18/2024 3.4 (H)  0.5 - 2.2 mmol/L Final    Troponin-I 06/18/2024 1.602 (H)  0.000 - 0.045 ng/mL Final    Natriuretic Peptide 06/18/2024 250.7 (H)  <=100.0 pg/mL Final    Magnesium Level 06/18/2024 2.00  1.60 - 2.60 mg/dL Final    WBC 06/18/2024 54.16 (HH)   4.50 - 11.50 x10(3)/mcL Final    RBC 06/18/2024 5.45  4.70 - 6.10 x10(6)/mcL Final    Hgb 06/18/2024 15.6  14.0 - 18.0 g/dL Final    Hct 06/18/2024 48.6  42.0 - 52.0 % Final    MCV 06/18/2024 89.2  80.0 - 94.0 fL Final    MCH 06/18/2024 28.6  27.0 - 31.0 pg Final    MCHC 06/18/2024 32.1 (L)  33.0 - 36.0 g/dL Final    RDW 06/18/2024 14.5  11.5 - 17.0 % Final    Platelet 06/18/2024 292  130 - 400 x10(3)/mcL Final    MPV 06/18/2024 10.0  7.4 - 10.4 fL Final    Neutrophils % 06/18/2024 12 (L)  47 - 80 % Final    Bands % 06/18/2024 1  0 - 11 % Final    Lymphs % 06/18/2024 23  13 - 40 % Final    Monocytes % 06/18/2024 2  2 - 11 % Final    Eosinophils % 06/18/2024 4  0 - 8 % Final    Basophils % 06/18/2024 1  0 - 2 % Final    Metamyelocytes % 06/18/2024 1  % Final    Myelocytes % 06/18/2024 1  % Final    Blasts % 06/18/2024 58  % Final    Prolymphs % 06/18/2024 1  % Final    Neutrophils Abs Calc 06/18/2024 7.0408  2.1 - 9.2 x10(3)/mcL Final    Basophils Abs 06/18/2024 0.5416 (H)  0 - 0.2 x10(3)/mcL Final    Lymphs Abs 06/18/2024 12.4568 (H)  0.6 - 4.6 x10(3)/mcL Final    Eosinophils Abs 06/18/2024 2.1664 (H)  0 - 0.9 x10(3)/mcL Final    Monocytes Abs 06/18/2024 1.0832  0.1 - 1.3 x10(3)/mcL Final    Platelets 06/18/2024 Adequate  Normal, Adequate Final    Poikilocytosis 06/18/2024 1+ (A)  (none) Final    QRS Duration 06/18/2024 86  ms In process    [facility] QTC Calculation 06/18/2024 467  ms In process    Lactic Acid Level 06/18/2024 2.4 (H)  0.5 - 2.2 mmol/L Final    Troponin-I 06/18/2024 2.553 (H)  0.000 - 0.045 ng/mL Final   Admission on 06/17/2024, Discharged on 06/18/2024   Component Date Value Ref Range Status    Sodium 06/17/2024 140  136 - 145 mmol/L Final    Potassium 06/17/2024 4.0  3.5 - 5.1 mmol/L Final    Chloride 06/17/2024 105  98 - 107 mmol/L Final    CO2 06/17/2024 25  23 - 31 mmol/L Final    Glucose 06/17/2024 115  82 - 115 mg/dL Final    Blood Urea Nitrogen 06/17/2024 16.0  8.4 - 25.7 mg/dL Final    Creatinine  06/17/2024 1.06  0.73 - 1.18 mg/dL Final    Calcium 06/17/2024 9.8  8.8 - 10.0 mg/dL Final    Protein Total 06/17/2024 7.5  5.8 - 7.6 gm/dL Final    Albumin 06/17/2024 3.7  3.4 - 4.8 g/dL Final    Globulin 06/17/2024 3.8 (H)  2.4 - 3.5 gm/dL Final    Albumin/Globulin Ratio 06/17/2024 1.0 (L)  1.1 - 2.0 ratio Final    Bilirubin Total 06/17/2024 0.2  <=1.5 mg/dL Final    ALP 06/17/2024 137  40 - 150 unit/L Final    ALT 06/17/2024 24  0 - 55 unit/L Final    AST 06/17/2024 18  5 - 34 unit/L Final    eGFR 06/17/2024 >60  mL/min/1.73/m2 Final    Anion Gap 06/17/2024 10.0  mEq/L Final    BUN/Creatinine Ratio 06/17/2024 15   Final    Troponin-I 06/17/2024 0.066 (H)  0.000 - 0.045 ng/mL Final    Color, UA 06/17/2024 Yellow  Yellow, Light-Yellow, Dark Yellow, Sadie, Straw Final    Appearance, UA 06/17/2024 Clear  Clear Final    Specific Gravity, UA 06/17/2024 >=1.030  1.005 - 1.030 Final    pH, UA 06/17/2024 6.0  5.0 - 8.5 Final    Protein, UA 06/17/2024 3+ (A)  Negative Final    Glucose, UA 06/17/2024 Negative  Negative, Normal Final    Ketones, UA 06/17/2024 Negative  Negative Final    Blood, UA 06/17/2024 Trace-Lysed (A)  Negative Final    Bilirubin, UA 06/17/2024 Negative  Negative Final    Urobilinogen, UA 06/17/2024 0.2  0.2, 1.0, Normal Final    Nitrites, UA 06/17/2024 Negative  Negative Final    Leukocyte Esterase, UA 06/17/2024 Negative  Negative Final    QRS Duration 06/17/2024 50  ms In process    OHS QTC Calculation 06/17/2024 420  ms In process    Magnesium Level 06/17/2024 2.00  1.60 - 2.60 mg/dL Final    WBC 06/17/2024 36.95 (H)  4.50 - 11.50 x10(3)/mcL Final    RBC 06/17/2024 5.34  4.70 - 6.10 x10(6)/mcL Final    Hgb 06/17/2024 15.3  14.0 - 18.0 g/dL Final    Hct 06/17/2024 47.0  42.0 - 52.0 % Final    MCV 06/17/2024 88.0  80.0 - 94.0 fL Final    MCH 06/17/2024 28.7  27.0 - 31.0 pg Final    MCHC 06/17/2024 32.6 (L)  33.0 - 36.0 g/dL Final    RDW 06/17/2024 14.4  11.5 - 17.0 % Final    Platelet 06/17/2024 252   130 - 400 x10(3)/mcL Final    MPV 06/17/2024 10.0  7.4 - 10.4 fL Final    Neut % 06/17/2024 23.5  % Final    Lymph % 06/17/2024 70.3  % Final    Mono % 06/17/2024 4.3  % Final    Eos % 06/17/2024 1.0  % Final    Basophil % 06/17/2024 0.5  % Final    Lymph # 06/17/2024 25.98 (H)  0.6 - 4.6 x10(3)/mcL Final    Neut # 06/17/2024 8.64  2.1 - 9.2 x10(3)/mcL Final    Mono # 06/17/2024 1.60 (H)  0.1 - 1.3 x10(3)/mcL Final    Eos # 06/17/2024 0.38  0 - 0.9 x10(3)/mcL Final    Baso # 06/17/2024 0.19  <=0.2 x10(3)/mcL Final    IG# 06/17/2024 0.16 (H)  0 - 0.04 x10(3)/mcL Final    IG% 06/17/2024 0.4  % Final    Lactic Acid Level 06/17/2024 1.3  0.5 - 2.2 mmol/L Final    POC PH 06/17/2024 7.344 (L)  7.35 - 7.45 Final    POC PCO2 06/17/2024 45.1 (H)  35 - 45 mmHg Final    POC PO2 06/17/2024 75 (L)  80 - 100 mmHg Final    POC HCO3 06/17/2024 24.6  24 - 28 mmol/L Final    POC BE 06/17/2024 -1  -2 to 2 mmol/L Final    POC SATURATED O2 06/17/2024 94  95 - 100 % Final    POC TCO2 06/17/2024 26  23 - 27 mmol/L Final    Sample 06/17/2024 ARTERIAL   Final    Site 06/17/2024 RR   Final    Allens Test 06/17/2024 Pass   Final    DelSys 06/17/2024 Nasal Can   Final    Phenytoin 06/17/2024 5.5 (L)  10.0 - 20.0 ug/ml Final    Bacteria, UA 06/17/2024 Moderate (A)  None Seen, Rare, Occasional /HPF Final    RBC, UA 06/17/2024 None Seen  None Seen, 0-2, 3-5, 0-5 /HPF Final    WBC, UA 06/17/2024 3-5  None Seen, 0-2, 3-5, 0-5 /HPF Final    Squamous Epithelial Cells, UA 06/17/2024 Rare  None Seen, Rare, Occasional, Occ /HPF Final    QRS Duration 06/17/2024 86  ms In process    [facility]HS QTC Calculation 06/17/2024 415  ms In process       Labs Reviewed   COMPREHENSIVE METABOLIC PANEL - Abnormal; Notable for the following components:       Result Value    CO2 20 (*)     Glucose 176 (*)     Protein Total 7.7 (*)     Globulin 4.0 (*)     Albumin/Globulin Ratio 0.9 (*)     All other components within normal limits   LACTIC ACID, PLASMA - Abnormal; Notable for  the following components:    Lactic Acid Level 3.4 (*)     All other components within normal limits   TROPONIN I - Abnormal; Notable for the following components:    Troponin-I 1.602 (*)     All other components within normal limits   B-TYPE NATRIURETIC PEPTIDE - Abnormal; Notable for the following components:    Natriuretic Peptide 250.7 (*)     All other components within normal limits   CBC WITH DIFFERENTIAL - Abnormal; Notable for the following components:    WBC 54.16 (*)     MCHC 32.1 (*)     All other components within normal limits   MANUAL DIFFERENTIAL - Abnormal; Notable for the following components:    Neutrophils % 12 (*)     Basophils Abs 0.5416 (*)     Lymphs Abs 12.4568 (*)     Eosinophils Abs 2.1664 (*)     Poikilocytosis 1+ (*)     All other components within normal limits   LACTIC ACID, PLASMA - Abnormal; Notable for the following components:    Lactic Acid Level 2.4 (*)     All other components within normal limits   TROPONIN I - Abnormal; Notable for the following components:    Troponin-I 2.553 (*)     All other components within normal limits   MAGNESIUM - Normal   BLOOD CULTURE [facility]   BLOOD CULTURE [facility]   CBC W/ AUTO DIFFERENTIAL    Narrative:     The following orders were created for panel order CBC auto differential.  Procedure                               Abnormality         Status                     ---------                               -----------         ------                     CBC with Differential[[id number]]       Abnormal            Final result               Manual Differential[[id number]]         Abnormal            Final result                 Please view results for these tests on the individual orders.   APTT   PROTIME-INR   CBC W/ AUTO DIFFERENTIAL    Narrative:     The following orders were created for panel order CBC auto differential.  Procedure                               Abnormality         Status                     ---------                                -----------         ------                     CBC with Differential[[id number]]                                                        Please view results for these tests on the individual orders.   CBC WITH DIFFERENTIAL     EKG Readings: (Independently Interpreted)   EKG done at 2:33 a.m. on June 18, 2024 shows normal sinus rhythm with a left axis deviation low-voltage QRS ventricular rate is 96 beats per minute     ECG Results              EKG 12-lead (In process)        Collection Time Result Time QRS Duration [facility] QTC Calculation    06/18/24 02:33:01 06/18/24 04:32:17 86 467                     In process by Interface, Lab In [facility] (06/18/24 04:32:24)                   Narrative:    Test Reason : R06.02,    Vent. Rate : 096 BPM     Atrial Rate : 096 BPM     P-R Int : 200 ms          QRS Dur : 086 ms      QT Int : 370 ms       P-R-T Axes : 115 -34 060 degrees     QTc Int : 467 ms    Normal sinus rhythm  Left axis deviation  Low voltage QRS  Septal infarct (cited on or before 17-JUN-2024)  Possible Lateral infarct ,age undetermined  Abnormal ECG  When compared with ECG of 17-JUN-2024 23:20,  Vent. rate has increased BY  33 BPM  Borderline criteria for Lateral infarct are now Present  T wave inversion no longer evident in Anterior leads  QT has lengthened    Referred By: [email or web address]   SELF           Confirmed By:                                   Imaging Results              X-Ray Chest AP Portable (In process)                      Medications   heparin 25,000 units in dextrose 5% (100 units/ml) IV bolus from bag LOW INTENSITY nomogram - LAF (has no administration in time range)   heparin 25,000 units in dextrose 5% 250 mL (100 units/mL) infusion LOW INTENSITY nomogram - LAF (has no administration in time range)   heparin 25,000 units in dextrose 5% (100 units/ml) IV bolus from bag LOW INTENSITY nomogram - LAF (has no administration in time range)   heparin 25,000 units in dextrose 5% (100 units/ml) IV bolus from  bag LOW INTENSITY nomogram - LAF (has no administration in time range)   albuterol-ipratropium 2.5 mg-0.5 mg/3 mL nebulizer solution 3 mL (3 mLs Nebulization Given 6/18/24 0215)   LORazepam injection 4 mg (4 mg Intravenous Given 6/18/24 0226)   sodium chloride 0.9% bolus 3,129 mL 3,129 mL (0 mLs Intravenous Stopped 6/18/24 0438)   metoprolol injection 5 mg (5 mg Intravenous Given 6/18/24 0554)     Medical Decision Making  We used tele cardiology, Isaais Foster, recommend start heparin gtt and bolus, and xfer for cardiology. We both suspect type 2 MI but needs cardiology evaluation due to increasing troponin.    Amount and/or Complexity of Data Reviewed  Labs: ordered. Decision-making details documented in ED Course.  Radiology: ordered.    Risk  Prescription drug management.               ED Course as of 06/18/24 0615 Tue Jun 18, 2024   0544 Troponin I(!): 2.553  Troponin actually trended up after IV fluid bolus and noting his lactic acid is improving therefore I have consulted tele cardiology.  Patient was already on metoprolol and amiodarone in his already on Plavix and Eliquis will await for Cardiology input is there was no evidence of STEMI on his EKG and he was hypoxic for a significant amount of time prior to arriving to the emergency department [PL]   0603 I have spoken with the tele cardiology and I have done turn over to Dr Monahan who will continue treatment and disposition [PL]      ED Course User Index  [PL] Brandon Gibbons MD                           Clinical Impression:  Final diagnoses:  [R06.02] Shortness of breath  [R56.9] Seizure (Primary)  [R79.89] Elevated troponin  [I50.9] Chronic congestive heart failure, unspecified heart failure type  [I21.4] NSTEMI (non-ST elevated myocardial infarction)          ED Disposition Condition    Transfer to Another Facility Stable                Robbie Monahan Jr., MD  06/18/24 0615

## 2024-06-18 NOTE — CONSULTS
[facility] - Emergency Dept  Cardiology  Consult Note    Patient Name: John Wayne  MRN: [id number]  Admission Date: 6/18/2024  Hospital Length of Stay: 0 days  Code Status: Prior   Attending Provider: Robbie Monahan Jr.,*   Consulting Provider: Barber Foster NP  Primary Care Physician: ISAC Schmidt MD (Inactive)  Principal Problem:<principal problem not specified>    Patient information was obtained from patient, past medical records, ER records, and primary team.     Consults  Subjective:     Chief Complaint:    Telecardiology Consult  Location: [facility] ER  Provider: Dr Gibbons  Reason: > trop, CAD Hx  Duration: > 30 minutes including reivew of past and current medical records, porvider and patient interview    HPI:   78-year-old male known to our services and followed by Dr. Ramirez in [place].  He was last seen in the clinic June 3rd as a routine follow-up.  His known history of coronary artery disease has a PTCA of the LAD and ramus in November of 2021.  He has a history of an ischemic cardiomyopathy that recovered as of February 2022.  Other past medical history includes paroxysmal atrial fibrillation with a BLU cardioversion October 2021, currently anticoagulated with Eliquis and rate controlled on amiodarone.  He also has venous insufficiency in his had venous stents placed with Dr. Robbins.  His longstanding history of a seizure disorder.    This is actually his 2nd visit to the ER this evening.  He had a seizure earlier was treated and discharged back to the nursing home suffered another seizure and was brought back.  This time he was severely hypoxic and was brought into the ER actively having a seizure.  He was markedly hypertensive also.  He was given a mg of Valium than in his seizure stopped.  O2 sats returned to 100% been blood pressure normalized.  EKG during both ER visits showed sinus tachycardia with no acute ST abnormalities.  Initially his troponin was 0.066 which was mildly  elevated.  On return visits to the ER his initial was 1.6 then elevated to 2.5.  He is still groggy from his Ativan.  However he is denying any overt chest discomfort and again EKG showed no acute ST elevation.    Past Medical History:   Diagnosis Date    Anticoagulant long-term use     CHF (congestive heart failure) 10/2021    EF of 25-30% on ECHO    CLL (chronic lymphocytic leukemia)     COPD (chronic obstructive pulmonary disease)     Coronary artery disease     Depression     Difficult intubation     GERD (gastroesophageal reflux disease)     Hypertension     Mixed hyperlipidemia     PVD (peripheral vascular disease)     Seizures     Sleep apnea, unspecified     TIA (transient ischemic attack)        Past Surgical History:   Procedure Laterality Date    CORONARY ANGIOPLASTY WITH STENT PLACEMENT  10/18/2016    pLAD, pLAD, dLAD, mCX,    INSERTION OF BARE METAL STENT INTO PERIPHERAL ARTERY  2018    B/L [place] Vessels    PERCUTANEOUS BALLOON VALVULOPLASTY  04/04/2018       Review of patient's allergies indicates:   Allergen Reactions    Ancef in dextrose (iso-osm)     Codeine     Penicillins        Current Facility-Administered Medications on File Prior to Encounter   Medication    [COMPLETED] albuterol-ipratropium 2.5 mg-0.5 mg/3 mL nebulizer solution 3 mL    [COMPLETED] furosemide injection 80 mg    [COMPLETED] phenytoin (DILANTIN) 500 mg in sodium chloride 0.9% 50 mL IVPB     Current Outpatient Medications on File Prior to Encounter   Medication Sig    albuterol (PROVENTIL) 2.5 mg /3 mL (0.083 %) nebulizer solution     amiodarone (PACERONE) 200 MG Tab Take 200 mg by mouth once daily.    atorvastatin (LIPITOR) 40 MG tablet Take 40 mg by mouth every evening.    clopidogreL (PLAVIX) 75 mg tablet Take 1 tablet by mouth Daily.    ELIQUIS 5 mg Tab Take 1 tablet by mouth 2 (two) times a day.    finasteride (PROSCAR) 5 mg tablet Take 1 tablet (5 mg total) by mouth once daily. (Patient taking differently: Take 5 mg by  mouth nightly.)    furosemide (LASIX) 40 MG tablet Take 40 mg by mouth once daily.    levETIRAcetam (KEPPRA) 1000 MG tablet Take 1 tablet by mouth 2 (two) times a day.    metoprolol succinate (TOPROL-XL) 25 MG 24 hr tablet Take 1 tablet by mouth 2 (two) times daily.    midazolam (NAYZILAM) 5 mg/spray (0.1 mL) Spry 1 spray by Nasal route daily as needed (seizure). To left nostril    pantoprazole (PROTONIX) 40 MG tablet Take 40 mg by mouth Daily.    PHENobarbitaL 32.4 MG tablet Take 3 tablets by mouth Daily.    phenytoin (DILANTIN) 100 MG ER capsule Take 100 mg by mouth 2 (two) times daily.    primidone (MYSOLINE) 50 MG Tab Take 150 mg by mouth 2 (two) times a day.    tamsulosin (FLOMAX) 0.4 mg Cap Take 1 tablet by mouth nightly.    venlafaxine (EFFEXOR) 75 MG tablet Take 1 tablet (75 mg total) by mouth 2 (two) times daily.    albuterol sulfate 2.5 mg/0.5 mL Nebu Take 2.5 mg by nebulization every 6 (six) hours as needed. Rescue    lisinopriL (PRINIVIL,ZESTRIL) 5 MG tablet Take 1 tablet by mouth Daily.     Family History       Problem Relation (Age of Onset)    Coronary artery disease Father    Heart attack Father    Hypertension Mother, Father          Tobacco Use    Smoking status: Former    Smokeless tobacco: Never   Substance and Sexual Activity    Alcohol use: Not Currently    Drug use: Never    Sexual activity: Not on file     Review of Systems   Reason unable to perform ROS: Limited ROS due to Ativan sedation - currently dening any CP.   Cardiovascular:  Negative for chest pain.   Respiratory:  Positive for shortness of breath and wheezing.    Neurological:  Positive for seizures.     Objective:     Vital Signs (Most Recent):  Temp: 98.1 °F (36.7 °C) (06/18/24 0155)  Pulse: 69 (06/18/24 0556)  Resp: 20 (06/18/24 0556)  BP: 132/72 (06/18/24 0556)  SpO2: 100 % (06/18/24 0556) Vital Signs (24h Range):  Temp:  [98 °F (36.7 °C)-98.1 °F (36.7 °C)] 98.1 °F (36.7 °C)  Pulse:  [] 69  Resp:  [13-30] 20  SpO2:  [88  %-100 %] 100 %  BP: (120-214)/() 132/72     Weight: 104.3 kg (230 lb)  Body mass index is 36.02 kg/m².    SpO2: 100 %         Intake/Output Summary (Last 24 hours) at 6/18/2024 0607  Last data filed at 6/18/2024 0438  Gross per 24 hour   Intake 3129 ml   Output --   Net 3129 ml       Lines/Drains/Airways       Peripheral Intravenous Line  Duration                  Peripheral IV - Single Lumen 06/18/24 0224 20 G Anterior;Left Upper Arm <1 day                    Physical Exam  Constitutional:       Appearance: He is obese.   HENT:      Head: Normocephalic.      Mouth/Throat:      Mouth: Mucous membranes are moist.   Eyes:      Extraocular Movements: Extraocular movements intact.   Cardiovascular:      Rate and Rhythm: Regular rhythm. Tachycardia present.   Pulmonary:      Comments: Diminished BS bilat, some wheezing   Abdominal:      General: Bowel sounds are normal.   Skin:     General: Skin is warm and dry.   Neurological:      Comments: Difficult to evaluate          Significant Labs:   Recent Lab Results  (Last 5 results in the past 24 hours)        06/18/24  0511   06/18/24  0223   06/18/24  0222   06/17/24  2149   06/17/24  1910        Albumin/Globulin Ratio   0.9             Neutrophils Abs Calc     7.0408           Albumin   3.7             ALP   123             Allens Test         Pass       ALT   23             Anion Gap   15.0             Appearance, UA       Clear         AST   22             Bacteria, UA       Moderate         Bands     1           Baso #     0.5416           Basophil %     1           Bilirubin (UA)       Negative         BILIRUBIN TOTAL   0.3             Blasts     58           BNP   250.7             Site         RR       BUN   15.0             BUN/CREAT RATIO   14             Calcium   9.8             Chloride   105             CO2   20             Color, UA       Yellow         Creatinine   1.11             DelSys         Nasal Can       eGFR   >60             Eos #      2.1664           Eos %     4           Globulin, Total   4.0             Glucose   176             Glucose, UA       Negative         Hematocrit     48.6           Hemoglobin     15.6           Ketones, UA       Negative         Lactic Acid Level 2.4     3.4           Leukocyte Esterase, UA       Negative         Lymph #     12.4568           Lymphocyte %     23           Magnesium    2.00             MCH     28.6           MCHC     32.1           MCV     89.2           Metamyelocytes     1           Mono #     1.0832           Mono %     2           MPV     10.0           Myelocytes     1           Neutrophils Relative     12           NITRITE UA       Negative         Blood, UA       Trace-Lysed         pH, UA       6.0         Platelet Estimate     Adequate           Platelet Count     292           POC BE         -1       POC HCO3         24.6       POC PCO2         45.1       POC PH         7.344       POC PO2         75       POC SATURATED O2         94       POC TCO2         26       Poikilocytosis     1+           Potassium   4.1             Prolymphs %     1           PROTEIN TOTAL   7.7             Protein, UA       3+         RBC     5.45           RBC, UA       None Seen         RDW     14.5           Sample         ARTERIAL       Sodium   140             Specific Gravity,UA       >=1.030         Squam Epithel, UA       Rare         Troponin I 2.553   1.602             Urobilinogen, UA       0.2         WBC, UA       3-5         WBC     54.16                                  Significant Imaging:   EKG    Assessment and Plan:   Impression:  Trop leak probably type II however type I cannot be ruleed out with his history of CAD  CAD  - PTCA LAD and Ramus 11/2021  ICOM recovered as of Feb 2022  PAF  - BLU/DCCV Oct 2021  - Eliquis Cordarone  Seizure D/O   - two this evening  COPD  Bedbound    Plan:  Currently unable to get an accurate assessment due to his sedation with Ativan.  He was currently denying any  overt chest discomfort.  Suspect his troponin leak is a type 2 from his significant hypoxic event however type 1 can not be ruled out  Currently he is on Plavix continue that medication.  Hold Eliquis   Heparin bolus and drip  He should be transferred to a PCI capable all facility should intervention be required    Thank you again for the consultation should you have any questions or concerns please do not hesitate to call    There are no hospital problems to display for this patient.      VTE Risk Mitigation (From admission, onward)      None            Thank you for your consult.     Barber Foster NP  Cardiology   [facility] - Emergency Dept

## 2024-06-18 NOTE — NURSING
Nurses Note -- 4 Eyes      6/18/2024   5:45 PM      Skin assessed during: Admit      [] No Altered Skin Integrity Present    []Prevention Measures Documented      [x] Yes- Altered Skin Integrity Present or Discovered   [] LDA Added if Not in Epic (Describe Wound)   [] New Altered Skin Integrity was Present on Admit and Documented in LDA   [x] Wound Image Taken    Wound Care Consulted? Yes    Attending Nurse:  Ricarda Reddy    Second RN/Staff Member:   Denisse Root

## 2024-06-18 NOTE — PROGRESS NOTES
[facility] - Emergency Dept  Cardiology  Progress Note    Patient Name: John Wayne  MRN: [id number]  Admission Date: 6/18/2024  Hospital Length of Stay: 0 days  Code Status: Prior   Attending Physician: Robbie Monahan Jr.,*   Primary Care Physician: ISAC Schmidt MD (Inactive)  Expected Discharge Date:   Principal Problem:<principal problem not specified>    Subjective:     Hospital Course:   78-year-old male known to our services and followed by Dr. Ramirez in [place].  He was last seen in the clinic June 3rd as a routine follow-up.  His known history of coronary artery disease has a PTCA of the LAD and ramus in November of 2021.  He has a history of an ischemic cardiomyopathy that recovered as of February 2022.  Other past medical history includes paroxysmal atrial fibrillation with a BLU cardioversion October 2021, currently anticoagulated with Eliquis and rate controlled on amiodarone.  He also has venous insufficiency in his had venous stents placed with Dr. Robbins.  His longstanding history of a seizure disorder.     This is actually his 2nd visit to the ER this evening.  He had a seizure earlier was treated and discharged back to the nursing home suffered another seizure and was brought back.  This time he was severely hypoxic and was brought into the ER actively having a seizure.  He was markedly hypertensive also.  He was given a mg of Valium than in his seizure stopped.  O2 sats returned to 100% been blood pressure normalized.  EKG during both ER visits showed sinus tachycardia with no acute ST abnormalities.  Initially his troponin was 0.066 which was mildly elevated.  On return visits to the ER his initial was 1.6 then elevated to 2.5.  He is still groggy from his Ativan.  However he is denying any overt chest discomfort and again EKG showed no acute ST elevation.    Interval History:     6-18 2:00 pm   More awake, but still groggy - denies CP/SOB, VSS    Review of Systems    Constitutional: Positive for malaise/fatigue.   Eyes: Negative.    Cardiovascular:  Negative for chest pain and orthopnea.   Respiratory:  Positive for shortness of breath and wheezing.    Skin: Negative.    Psychiatric/Behavioral:  Positive for altered mental status.      Objective:     Vital Signs (Most Recent):  Temp: 97.7 °F (36.5 °C) (06/18/24 1032)  Pulse: 62 (06/18/24 1205)  Resp: 19 (06/18/24 1210)  BP: 117/70 (06/18/24 1205)  SpO2: 96 % (06/18/24 1205) Vital Signs (24h Range):  Temp:  [97.7 °F (36.5 °C)-98.1 °F (36.7 °C)] 97.7 °F (36.5 °C)  Pulse:  [] 62  Resp:  [13-30] 19  SpO2:  [88 %-100 %] 96 %  BP: (117-214)/() 117/70     Weight: 104.3 kg (230 lb)  Body mass index is 36.02 kg/m².    SpO2: 96 %         Intake/Output Summary (Last 24 hours) at 6/18/2024 1350  Last data filed at 6/18/2024 0438  Gross per 24 hour   Intake 3129 ml   Output --   Net 3129 ml       Lines/Drains/Airways       Peripheral Intravenous Line  Duration                  Peripheral IV - Single Lumen 06/18/24 0224 20 G Anterior;Left Upper Arm <1 day         Peripheral IV - Single Lumen 06/18/24 1018 22 G Right Hand <1 day                    Physical Exam  Constitutional:       Appearance: He is obese.   HENT:      Head: Normocephalic.      Nose: Nose normal.      Mouth/Throat:      Mouth: Mucous membranes are moist.   Eyes:      Extraocular Movements: Extraocular movements intact.   Cardiovascular:      Rate and Rhythm: Normal rate and regular rhythm.   Pulmonary:      Effort: Pulmonary effort is normal.      Comments: Diminished BS bilat   Musculoskeletal:      Cervical back: Normal range of motion.   Skin:     General: Skin is warm and dry.   Neurological:      Mental Status: Mental status is at baseline.         Significant Labs:   Recent Lab Results  (Last 5 results in the past 24 hours)        06/18/24  1321   06/18/24  0617   06/18/24  0511   06/18/24  0233   06/18/24  0223        Albumin/Globulin Ratio         0.9        Albumin         3.7       ALP         123       ALT         23       Anion Gap         15.0       PTT 52.7  Comment: For Minimal Heparin Infusion, the goal aPTT 64-85 seconds corresponds to an anti-Xa of 0.3-0.5.    For Low Intensity and High Intensity Heparin, the goal aPTT  seconds corresponds to an anti-Xa of 0.3-0.7   27.9  Comment: For Minimal Heparin Infusion, the goal aPTT 64-85 seconds corresponds to an anti-Xa of 0.3-0.5.    For Low Intensity and High Intensity Heparin, the goal aPTT  seconds corresponds to an anti-Xa of 0.3-0.7             AST         22       Baso #   0.18             Basophil %   0.4             BILIRUBIN TOTAL         0.3       BNP         250.7       BUN         15.0       BUN/CREAT RATIO         14       Calcium         9.8       Chloride         105       CO2         20       Creatinine         1.11       eGFR         >60       Eos #   0.05             Eos %   0.1             Globulin, Total         4.0       Glucose         176       Hematocrit   43.0             Hemoglobin   13.6             Immature Grans (Abs)   0.29             Immature Granulocytes   0.6             INR   1.1             Lactic Acid Level     2.4           Lymph #   25.35             LYMPH %   56.4             Magnesium          2.00       MCH   28.4             MCHC   31.6             MCV   89.8             Mono #   2.57             Mono %   5.7             MPV   10.0             Neut #   16.48             Neut %   36.8             QRS Duration       86         [facility] QTC Calculation       467         Platelet Count   234             Potassium         4.1       PROTEIN TOTAL         7.7       PT   14.6             RBC   4.79             RDW   14.6             Sodium         140       Troponin I     2.553     1.602       WBC   44.92                                    Significant Imaging:   Assessment and Plan:     Impression:  Impression:  Trop leak probably type II however type I cannot be ruled  out with his history of CAD  CAD  - PTCA LAD and Ramus 11/2021  ICOM recovered as of Feb 2022  PAF  - BLU/DCCV Oct 2021  - Eliquis Cordarone  Seizure D/O   - two this evening  COPD  Bedbound    Plan:  Discussed with Dr Ramirez   Active seizure with prolonged hypoxia we strongly suspect troponin leak was from a prolonged hypoxic event, however he has significant CAD/PCI history  Awaiting 3rd trop   Echocardiogram at [facility]  If abnormal will consider ischemic evaluation   Continue with current management   Will follow at [facility]       There are no hospital problems to display for this patient.      VTE Risk Mitigation (From admission, onward)           Ordered     heparin 25,000 units in dextrose 5% (100 units/ml) IV bolus from bag LOW INTENSITY nomogram - [facility]  As needed (PRN)        Question:  Heparin Infusion Adjustment (DO NOT MODIFY ANSWER)  Answer:  [email or web address]\epic\Images\Pharmacy\HeparinInfusions\heparin LOW INTENSITY nomogram for [facility] [id number]    06/18/24 0608     heparin 25,000 units in dextrose 5% (100 units/ml) IV bolus from bag LOW INTENSITY nomogram - [facility]  As needed (PRN)        Question:  Heparin Infusion Adjustment (DO NOT MODIFY ANSWER)  Answer:  \\[email or web address]\epic\Images\Pharmacy\HeparinInfusions\heparin LOW INTENSITY nomogram for [facility] [id number].[email or web address]    06/18/24 0608     heparin 25,000 units in dextrose 5% 250 mL (100 units/mL) infusion LOW INTENSITY nomogram - [facility]  Continuous        Question:  Begin at (units/kg/hr)  Answer:  12    06/18/24 0608                    Barber Foster NP  Cardiology  [facility] - Emergency Dept

## 2024-06-18 NOTE — ED PROVIDER NOTES
Encounter Date: 6/17/2024       History     Chief Complaint   Patient presents with    Hypoxia    Seizures     Sent from [facility] for seizure and hypoxia, SpO2 reported to be in the 60s originally on 4L. EMS reports pt currently 96% on 15L NRB.     70-year-old male brought in from the [place] nursing home with reports of being hypoxic.  EMS reports that when they got to him they put him on a non-rebreather is oxygen sat was 96% and they transferred him to the emergency department.  Upon arriving here he was placed on 4 L in his oxygen remained in the mid 90s      Review of patient's allergies indicates:   Allergen Reactions    Ancef in dextrose (iso-osm)     Codeine     Penicillins      Past Medical History:   Diagnosis Date    Anticoagulant long-term use     CHF (congestive heart failure) 10/2021    EF of 25-30% on ECHO    CLL (chronic lymphocytic leukemia)     COPD (chronic obstructive pulmonary disease)     Coronary artery disease     Depression     Difficult intubation     GERD (gastroesophageal reflux disease)     Hypertension     Mixed hyperlipidemia     PVD (peripheral vascular disease)     Seizures     Sleep apnea, unspecified     TIA (transient ischemic attack)      Past Surgical History:   Procedure Laterality Date    CORONARY ANGIOPLASTY WITH STENT PLACEMENT  10/18/2016    pLAD, pLAD, dLAD, mCX,    INSERTION OF BARE METAL STENT INTO PERIPHERAL ARTERY  2018    B/L [place] Vessels    PERCUTANEOUS BALLOON VALVULOPLASTY  04/04/2018     Family History   Problem Relation Name Age of Onset    Hypertension Mother      Hypertension Father      Coronary artery disease Father      Heart attack Father       Social History     Tobacco Use    Smoking status: Former    Smokeless tobacco: Never   Substance Use Topics    Alcohol use: Not Currently    Drug use: Never     Review of Systems   Constitutional: Negative.  Negative for activity change, appetite change, chills, diaphoresis, fatigue, fever and unexpected  weight change.   HENT: Negative.  Negative for congestion, dental problem, drooling, ear discharge, ear pain, facial swelling, hearing loss, mouth sores, nosebleeds, postnasal drip, rhinorrhea, sinus pressure, sinus pain, sneezing, sore throat, tinnitus, trouble swallowing and voice change.    Eyes: Negative.  Negative for photophobia, pain, discharge, redness, itching and visual disturbance.   Respiratory:  Positive for cough and shortness of breath. Negative for apnea, choking, chest tightness, wheezing and stridor.    Cardiovascular: Negative.  Negative for chest pain, palpitations and leg swelling.   Gastrointestinal: Negative.  Negative for abdominal distention, abdominal pain, anal bleeding, blood in stool, constipation, diarrhea, nausea, rectal pain and vomiting.   Endocrine: Negative.  Negative for cold intolerance, heat intolerance, polydipsia, polyphagia and polyuria.   Genitourinary: Negative.  Negative for decreased urine volume, difficulty urinating, dysuria, enuresis, flank pain, frequency, genital sores, hematuria, penile discharge, penile pain, penile swelling, scrotal swelling, testicular pain and urgency.   Musculoskeletal: Negative.  Negative for arthralgias, back pain, gait problem, joint swelling, myalgias, neck pain and neck stiffness.   Skin: Negative.  Negative for color change, pallor, rash and wound.   Allergic/Immunologic: Negative.  Negative for environmental allergies, food allergies and immunocompromised state.   Neurological:  Positive for seizures. Negative for dizziness, tremors, syncope, facial asymmetry, speech difficulty, weakness, light-headedness, numbness and headaches.   Hematological: Negative.  Negative for adenopathy. Does not bruise/bleed easily.   Psychiatric/Behavioral: Negative.  Negative for agitation, behavioral problems, confusion, decreased concentration, dysphoric mood, hallucinations, self-injury, sleep disturbance and suicidal ideas. The patient is not  nervous/anxious and is not hyperactive.    All other systems reviewed and are negative.      Physical Exam     Initial Vitals [06/17/24 1820]   BP Pulse Resp Temp SpO2   (!) 165/86 86 (!) 24 98 °F (36.7 °C) 96 %      MAP       --         Physical Exam    Nursing note and vitals reviewed.  Constitutional: He appears well-developed and well-nourished.   HENT:   Head: Normocephalic and atraumatic.   Eyes: Conjunctivae and EOM are normal. Pupils are equal, round, and reactive to light.   Neck: Neck supple.   Normal range of motion.  Cardiovascular:  Normal rate and regular rhythm.           Pulmonary/Chest:   Bilateral rhonchi throughout all lung zones with crackles noted in the bases   Abdominal: Abdomen is soft. Bowel sounds are normal.   Musculoskeletal:         General: Edema present.      Cervical back: Normal range of motion and neck supple.     Neurological: He is alert.   Skin: Skin is warm and dry. Capillary refill takes less than 2 seconds.         ED Course   Procedures    Admission on 06/17/2024   Component Date Value Ref Range Status    Sodium 06/17/2024 140  136 - 145 mmol/L Final    Potassium 06/17/2024 4.0  3.5 - 5.1 mmol/L Final    Chloride 06/17/2024 105  98 - 107 mmol/L Final    CO2 06/17/2024 25  23 - 31 mmol/L Final    Glucose 06/17/2024 115  82 - 115 mg/dL Final    Blood Urea Nitrogen 06/17/2024 16.0  8.4 - 25.7 mg/dL Final    Creatinine 06/17/2024 1.06  0.73 - 1.18 mg/dL Final    Calcium 06/17/2024 9.8  8.8 - 10.0 mg/dL Final    Protein Total 06/17/2024 7.5  5.8 - 7.6 gm/dL Final    Albumin 06/17/2024 3.7  3.4 - 4.8 g/dL Final    Globulin 06/17/2024 3.8 (H)  2.4 - 3.5 gm/dL Final    Albumin/Globulin Ratio 06/17/2024 1.0 (L)  1.1 - 2.0 ratio Final    Bilirubin Total 06/17/2024 0.2  <=1.5 mg/dL Final    ALP 06/17/2024 137  40 - 150 unit/L Final    ALT 06/17/2024 24  0 - 55 unit/L Final    AST 06/17/2024 18  5 - 34 unit/L Final    eGFR 06/17/2024 >60  mL/min/1.73/m2 Final    Anion Gap 06/17/2024 10.0   mEq/L Final    BUN/Creatinine Ratio 06/17/2024 15   Final    Troponin-I 06/17/2024 0.066 (H)  0.000 - 0.045 ng/mL Final    Color, UA 06/17/2024 Yellow  Yellow, Light-Yellow, Dark Yellow, Sadie, Straw Final    Appearance, UA 06/17/2024 Clear  Clear Final    Specific Gravity, UA 06/17/2024 >=1.030  1.005 - 1.030 Final    pH, UA 06/17/2024 6.0  5.0 - 8.5 Final    Protein, UA 06/17/2024 3+ (A)  Negative Final    Glucose, UA 06/17/2024 Negative  Negative, Normal Final    Ketones, UA 06/17/2024 Negative  Negative Final    Blood, UA 06/17/2024 Trace-Lysed (A)  Negative Final    Bilirubin, UA 06/17/2024 Negative  Negative Final    Urobilinogen, UA 06/17/2024 0.2  0.2, 1.0, Normal Final    Nitrites, UA 06/17/2024 Negative  Negative Final    Leukocyte Esterase, UA 06/17/2024 Negative  Negative Final    QRS Duration 06/17/2024 50  ms In process    OHS QTC Calculation 06/17/2024 420  ms In process    Magnesium Level 06/17/2024 2.00  1.60 - 2.60 mg/dL Final    WBC 06/17/2024 36.95 (H)  4.50 - 11.50 x10(3)/mcL Final    RBC 06/17/2024 5.34  4.70 - 6.10 x10(6)/mcL Final    Hgb 06/17/2024 15.3  14.0 - 18.0 g/dL Final    Hct 06/17/2024 47.0  42.0 - 52.0 % Final    MCV 06/17/2024 88.0  80.0 - 94.0 fL Final    MCH 06/17/2024 28.7  27.0 - 31.0 pg Final    MCHC 06/17/2024 32.6 (L)  33.0 - 36.0 g/dL Final    RDW 06/17/2024 14.4  11.5 - 17.0 % Final    Platelet 06/17/2024 252  130 - 400 x10(3)/mcL Final    MPV 06/17/2024 10.0  7.4 - 10.4 fL Final    Neut % 06/17/2024 23.5  % Final    Lymph % 06/17/2024 70.3  % Final    Mono % 06/17/2024 4.3  % Final    Eos % 06/17/2024 1.0  % Final    Basophil % 06/17/2024 0.5  % Final    Lymph # 06/17/2024 25.98 (H)  0.6 - 4.6 x10(3)/mcL Final    Neut # 06/17/2024 8.64  2.1 - 9.2 x10(3)/mcL Final    Mono # 06/17/2024 1.60 (H)  0.1 - 1.3 x10(3)/mcL Final    Eos # 06/17/2024 0.38  0 - 0.9 x10(3)/mcL Final    Baso # 06/17/2024 0.19  <=0.2 x10(3)/mcL Final    IG# 06/17/2024 0.16 (H)  0 - 0.04 x10(3)/mcL  Final    IG% 06/17/2024 0.4  % Final    Lactic Acid Level 06/17/2024 1.3  0.5 - 2.2 mmol/L Final    POC PH 06/17/2024 7.344 (L)  7.35 - 7.45 Final    POC PCO2 06/17/2024 45.1 (H)  35 - 45 mmHg Final    POC PO2 06/17/2024 75 (L)  80 - 100 mmHg Final    POC HCO3 06/17/2024 24.6  24 - 28 mmol/L Final    POC BE 06/17/2024 -1  -2 to 2 mmol/L Final    POC SATURATED O2 06/17/2024 94  95 - 100 % Final    POC TCO2 06/17/2024 26  23 - 27 mmol/L Final    Sample 06/17/2024 ARTERIAL   Final    Site 06/17/2024 RR   Final    Allens Test 06/17/2024 Pass   Final    DelSys 06/17/2024 Nasal Can   Final    Phenytoin 06/17/2024 5.5 (L)  10.0 - 20.0 ug/ml Final    Bacteria, UA 06/17/2024 Moderate (A)  None Seen, Rare, Occasional /HPF Final    RBC, UA 06/17/2024 None Seen  None Seen, 0-2, 3-5, 0-5 /HPF Final    WBC, UA 06/17/2024 3-5  None Seen, 0-2, 3-5, 0-5 /HPF Final    Squamous Epithelial Cells, UA 06/17/2024 Rare  None Seen, Rare, Occasional, Occ /HPF Final       Labs Reviewed   COMPREHENSIVE METABOLIC PANEL - Abnormal; Notable for the following components:       Result Value    Globulin 3.8 (*)     Albumin/Globulin Ratio 1.0 (*)     All other components within normal limits   TROPONIN I - Abnormal; Notable for the following components:    Troponin-I 0.066 (*)     All other components within normal limits   URINALYSIS, REFLEX TO URINE CULTURE - Abnormal; Notable for the following components:    Protein, UA 3+ (*)     Blood, UA Trace-Lysed (*)     All other components within normal limits   CBC WITH DIFFERENTIAL - Abnormal; Notable for the following components:    WBC 36.95 (*)     MCHC 32.6 (*)     Lymph # 25.98 (*)     Mono # 1.60 (*)     IG# 0.16 (*)     All other components within normal limits   PHENYTOIN LEVEL, TOTAL - Abnormal; Notable for the following components:    Phenytoin 5.5 (*)     All other components within normal limits   URINALYSIS, MICROSCOPIC - Abnormal; Notable for the following components:    Bacteria, UA  Moderate (*)     All other components within normal limits   ISTAT PROCEDURE - Abnormal; Notable for the following components:    POC PH 7.344 (*)     POC PCO2 45.1 (*)     POC PO2 75 (*)     All other components within normal limits   MAGNESIUM - Normal   LACTIC ACID, PLASMA - Normal   BLOOD CULTURE OLG   BLOOD CULTURE OLG   CBC W/ AUTO DIFFERENTIAL    Narrative:     The following orders were created for panel order CBC auto differential.  Procedure                               Abnormality         Status                     ---------                               -----------         ------                     CBC with Differential[[id number]]       Abnormal            Final result                 Please view results for these tests on the individual orders.     EKG Readings: (Independently Interpreted)   EKG done at 6:45 p.m. on June 17, 2024 shows a low-voltage QRS with a ventricular rate of 132 beats per minute nonspecific T-wave abnormalities.  Unfortunately this EKG was taken while patient was having a seizure.    Repeat EKG at 11:20 p.m. on June 17, 2024 shows normal sinus rhythm ventricular rate is 63 beats per minute with a low voltage QRS     ECG Results              EKG 12-lead (In process)        Collection Time Result Time QRS Duration OHS QTC Calculation    06/17/24 18:45:55 06/17/24 20:52:50 50 420                     In process by Interface, Lab In [facility] (06/17/24 20:52:59)                   Narrative:    Test Reason : R06.02,    Vent. Rate : 132 BPM     Atrial Rate : 170 BPM     P-R Int : 000 ms          QRS Dur : 050 ms      QT Int : 284 ms       P-R-T Axes : 000 084 091 degrees     QTc Int : 420 ms    Undetermined rhythm  Low voltage QRS  ST and T wave abnormality, consider anterolateral ischemia  Abnormal ECG  When compared with ECG of 07-MAY-2024 19:28,  Current undetermined rhythm precludes rhythm comparison, needs review  QRS duration has decreased  QRS voltage has decreased  Nonspecific T  wave abnormality now evident in Inferior leads  T wave inversion now evident in Anterior leads    Referred By: PROVIDER NOTINSYSTEM           Confirmed By:                                   Imaging Results              X-Ray Chest 1 View (Final result)  Result time 06/17/24 19:26:17      Final result by Vic Andres MD (06/17/24 19:26:17)                   Impression:      Prominent interstitial markings and ground-glass attenuation in the bilateral lower lung zones may reflect atelectasis and/or edema.      Electronically signed by: Vic Andres  Date:    06/17/2024  Time:    19:26               Narrative:    EXAMINATION:  XR CHEST 1 VIEW    CLINICAL HISTORY:  Shortness of breath    TECHNIQUE:  Single frontal view of the chest was performed.    COMPARISON:  None    FINDINGS:  Stable prominence of the cardiomediastinal silhouette.  Coronary artery stents in place.  Aortic vascular calcifications.  Prominent interstitial markings and ground-glass attenuation in the bilateral lower lung zones.  No lobar consolidation, pneumothorax or pleural effusion.  Dextrocurvature of the thoracic spine.  No acute osseous abnormality identified.                                       Medications   phenytoin (DILANTIN) 500 mg in sodium chloride 0.9% 50 mL IVPB (0 mg Intravenous Stopped 6/17/24 2120)   furosemide injection 80 mg (80 mg Intravenous Given 6/17/24 2237)   albuterol-ipratropium 2.5 mg-0.5 mg/3 mL nebulizer solution 3 mL (3 mLs Nebulization Given 6/17/24 2250)     Medical Decision Making  78-year-old white male was resident of the nursing home presents with seizures and hypoxia.  Patient has a history of being bed-bound and COPD.  Differential diagnosis included seizure activity, pneumonia, congestive heart failure with exacerbation, hypoxic respiratory failure, pneumothorax, hemothorax, volume overload, pulmonary embolism, COPD with exacerbation.  Workup included blood work arterial blood gas a DuoNeb and x-rays.   Workup showed it was Dilantin level was too low so he was given some Dilantin while here.  He perked up after a DuoNeb in his oxygen saturation has been in the mid to upper 90s the entire time so we weaned his oxygen down.  I believe his hypoxia was noted while he was having seizures as since he has been here the whole time his oxygen level has been manageable and patient was now awake alert and oriented and ready to go back to the nursing home.  His x-ray did show some pulmonary vascular congestion therefore he was given some IV Lasix prior to discharge    Problems Addressed:  Hypervolemia, unspecified hypervolemia type: acute illness or injury with systemic symptoms  Seizure disorder: chronic illness or injury that poses a threat to life or bodily functions     Details: Patient's Dilantin level was subtherapeutic therefore he was given a loading dose of while he was here  Shortness of breath: chronic illness or injury  Subtherapeutic serum dilantin level: acute illness or injury with systemic symptoms that poses a threat to life or bodily functions    Amount and/or Complexity of Data Reviewed  Independent Historian: EMS  External Data Reviewed: labs, radiology, ECG and notes.  Labs: ordered. Decision-making details documented in ED Course.  Radiology: ordered and independent interpretation performed. Decision-making details documented in ED Course.  ECG/medicine tests: ordered and independent interpretation performed. Decision-making details documented in ED Course.    Risk  Prescription drug management.  Drug therapy requiring intensive monitoring for toxicity.  Diagnosis or treatment significantly limited by social determinants of health.                                      Clinical Impression:  Final diagnoses:  [R06.02] Shortness of breath  [G40.909] Seizure disorder  [R78.89] Subtherapeutic serum dilantin level (Primary)  [E87.70] Hypervolemia, unspecified hypervolemia type          ED Disposition Condition     Discharge Stable          ED Prescriptions    None       Follow-up Information       Follow up With Specialties Details Why Contact Info    ISAC Schmidt MD Family Medicine In 2 days  [facility] [id number] [phone]               Brandon Gibbons MD  06/17/24 0329

## 2024-06-19 LAB
ABS NEUT (OLG): 21.8 X10(3)/MCL (ref 2.1–9.2)
ACINETOBACTER CALCOACETICUS-BAUMANNII COMPLEX (OHS): NOT DETECTED
ALBUMIN SERPL-MCNC: 3 G/DL (ref 3.4–4.8)
ALBUMIN/GLOB SERPL: 1 RATIO (ref 1.1–2)
ALP SERPL-CCNC: 89 UNIT/L (ref 40–150)
ALT SERPL-CCNC: 14 UNIT/L (ref 0–55)
ANION GAP SERPL CALC-SCNC: 8 MEQ/L
APICAL FOUR CHAMBER EJECTION FRACTION: 54 %
APICAL TWO CHAMBER EJECTION FRACTION: 23 %
APTT PPP: 157.2 SECONDS (ref 23.2–33.7)
APTT PPP: 35.3 SECONDS (ref 23.2–33.7)
APTT PPP: 43.8 SECONDS (ref 23.2–33.7)
AST SERPL-CCNC: 18 UNIT/L (ref 5–34)
AV INDEX (PROSTH): 0.99
AV MEAN GRADIENT: 3 MMHG
AV PEAK GRADIENT: 5 MMHG
AV REGURGITATION PRESSURE HALF TIME: 470 MS
AV VALVE AREA BY VELOCITY RATIO: 3.37 CM²
AV VALVE AREA: 3.41 CM²
AV VELOCITY RATIO: 0.97
BACTEROIDES FRAGILIS (OHS): NOT DETECTED
BILIRUB SERPL-MCNC: 0.4 MG/DL
BSA FOR ECHO PROCEDURE: 2.22 M2
BUN SERPL-MCNC: 12.8 MG/DL (ref 8.4–25.7)
C AURIS DNA BLD POS QL NAA+NON-PROBE: NOT DETECTED
C GATTII+NEOFOR DNA CSF QL NAA+NON-PROBE: NOT DETECTED
CALCIUM SERPL-MCNC: 9 MG/DL (ref 8.8–10)
CANDIDA ALBICANS (OHS): NOT DETECTED
CANDIDA GLABRATA (OHS): NOT DETECTED
CANDIDA KRUSEI (OHS): NOT DETECTED
CANDIDA PARAPSILOSIS (OHS): NOT DETECTED
CANDIDA TROPICALIS (OHS): NOT DETECTED
CHLORIDE SERPL-SCNC: 107 MMOL/L (ref 98–107)
CO2 SERPL-SCNC: 25 MMOL/L (ref 23–31)
CREAT SERPL-MCNC: 0.84 MG/DL (ref 0.73–1.18)
CREAT/UREA NIT SERPL: 15
CTX-M (OHS): ABNORMAL
CV ECHO LV RWT: 0.57 CM
DOP CALC AO PEAK VEL: 1.11 M/S
DOP CALC AO VTI: 28.4 CM
DOP CALC LVOT AREA: 3.5 CM2
DOP CALC LVOT DIAMETER: 2.1 CM
DOP CALC LVOT PEAK VEL: 1.08 M/S
DOP CALC LVOT STROKE VOLUME: 96.93 CM3
DOP CALC MV VTI: 33.8 CM
DOP CALCLVOT PEAK VEL VTI: 28 CM
E WAVE DECELERATION TIME: 229 MSEC
E/A RATIO: 0.94
E/E' RATIO: 9.29 M/S
ECHO LV POSTERIOR WALL: 1.37 CM (ref 0.6–1.1)
ENTEROBACTER CLOACAE COMPLEX (OHS): NOT DETECTED
ENTEROBACTERALES (OHS): NOT DETECTED
ENTEROCOCCUS FAECALIS (OHS): NOT DETECTED
ENTEROCOCCUS FAECIUM (OHS): NOT DETECTED
EOSINOPHIL NFR BLD MANUAL: 0.33 X10(3)/MCL (ref 0–0.9)
EOSINOPHIL NFR BLD MANUAL: 1 %
ERYTHROCYTE [DISTWIDTH] IN BLOOD BY AUTOMATED COUNT: 14.6 % (ref 11.5–17)
ESCHERICHIA COLI (OHS): NOT DETECTED
FRACTIONAL SHORTENING: 34 % (ref 28–44)
GFR SERPLBLD CREATININE-BSD FMLA CKD-EPI: >60 ML/MIN/1.73/M2
GLOBULIN SER-MCNC: 3 GM/DL (ref 2.4–3.5)
GLUCOSE SERPL-MCNC: 110 MG/DL (ref 82–115)
GP B STREP DNA CSF QL NAA+NON-PROBE: NOT DETECTED
HAEM INFLU DNA CSF QL NAA+NON-PROBE: NOT DETECTED
HCT VFR BLD AUTO: 38.5 % (ref 42–52)
HEMATOLOGIST REVIEW: NORMAL
HGB BLD-MCNC: 12.8 G/DL (ref 14–18)
IMP (OHS): ABNORMAL
INSTRUMENT WBC (OLG): 32.54 X10(3)/MCL
INTERVENTRICULAR SEPTUM: 1.63 CM (ref 0.6–1.1)
KLEBSIELLA AEROGENES (OHS): NOT DETECTED
KLEBSIELLA OXYTOCA (OHS): NOT DETECTED
KLEBSIELLA PNEUMONIAE GROUP (OHS): NOT DETECTED
KPC (OHS): ABNORMAL
L MONOCYTOG DNA CSF QL NAA+NON-PROBE: NOT DETECTED
LEFT ATRIUM AREA SYSTOLIC (APICAL 2 CHAMBER): 18.3 CM2
LEFT ATRIUM AREA SYSTOLIC (APICAL 4 CHAMBER): 14.3 CM2
LEFT ATRIUM SIZE: 3.8 CM
LEFT ATRIUM VOLUME INDEX MOD: 22 ML/M2
LEFT ATRIUM VOLUME MOD: 47.3 CM3
LEFT INTERNAL DIMENSION IN SYSTOLE: 3.21 CM (ref 2.1–4)
LEFT VENTRICLE DIASTOLIC VOLUME INDEX: 50.7 ML/M2
LEFT VENTRICLE DIASTOLIC VOLUME: 109 ML
LEFT VENTRICLE END DIASTOLIC VOLUME APICAL 2 CHAMBER: 46.7 ML
LEFT VENTRICLE END DIASTOLIC VOLUME APICAL 4 CHAMBER: 41.3 ML
LEFT VENTRICLE END SYSTOLIC VOLUME APICAL 2 CHAMBER: 51.2 ML
LEFT VENTRICLE END SYSTOLIC VOLUME APICAL 4 CHAMBER: 35.6 ML
LEFT VENTRICLE MASS INDEX: 142 G/M2
LEFT VENTRICLE SYSTOLIC VOLUME INDEX: 19.2 ML/M2
LEFT VENTRICLE SYSTOLIC VOLUME: 41.3 ML
LEFT VENTRICULAR INTERNAL DIMENSION IN DIASTOLE: 4.83 CM (ref 3.5–6)
LEFT VENTRICULAR MASS: 306.25 G
LV LATERAL E/E' RATIO: 7.22 M/S
LV SEPTAL E/E' RATIO: 13 M/S
LVED V (TEICH): 109 ML
LVES V (TEICH): 41.3 ML
LVOT MG: 3 MMHG
LVOT MV: 0.75 CM/S
LYMPHOCYTES NFR BLD MANUAL: 26 %
LYMPHOCYTES NFR BLD MANUAL: 8.46 X10(3)/MCL
MAGNESIUM SERPL-MCNC: 1.8 MG/DL (ref 1.6–2.6)
MCH RBC QN AUTO: 28.8 PG (ref 27–31)
MCHC RBC AUTO-ENTMCNC: 33.2 G/DL (ref 33–36)
MCR-1 (OHS): ABNORMAL
MCV RBC AUTO: 86.7 FL (ref 80–94)
MECA/C (OHS): NOT DETECTED
MECA/C AND MREJ (MRSA)(OHS): ABNORMAL
METAMYELOCYTES NFR BLD MANUAL: 1 %
MONOCYTES NFR BLD MANUAL: 1.63 X10(3)/MCL (ref 0.1–1.3)
MONOCYTES NFR BLD MANUAL: 5 %
MV MEAN GRADIENT: 2 MMHG
MV PEAK A VEL: 0.69 M/S
MV PEAK E VEL: 0.65 M/S
MV PEAK GRADIENT: 4 MMHG
MV STENOSIS PRESSURE HALF TIME: 85 MS
MV VALVE AREA BY CONTINUITY EQUATION: 2.87 CM2
MV VALVE AREA P 1/2 METHOD: 2.59 CM2
N MEN DNA CSF QL NAA+NON-PROBE: NOT DETECTED
NDM (OHS): ABNORMAL
NEUTROPHILS NFR BLD MANUAL: 67 %
NRBC BLD AUTO-RTO: 0 %
OHS CV RV/LV RATIO: 0.61 CM
OHS LV EJECTION FRACTION SIMPSONS BIPLANE MOD: 40 %
OXA-48-LIKE (OHS): ABNORMAL
PHENOBARB SERPL-MCNC: 26.4 UG/ML (ref 15–40)
PHENYTOIN SERPL-MCNC: 5.6 UG/ML (ref 10–20)
PHOSPHATE SERPL-MCNC: 2.2 MG/DL (ref 2.3–4.7)
PISA AR MAX VEL: 3.48 M/S
PISA TR MAX VEL: 1.8 M/S
PLATELET # BLD AUTO: 202 X10(3)/MCL (ref 130–400)
PLATELET # BLD EST: NORMAL 10*3/UL
PMV BLD AUTO: 10.5 FL (ref 7.4–10.4)
POTASSIUM SERPL-SCNC: 3.6 MMOL/L (ref 3.5–5.1)
PROT SERPL-MCNC: 6 GM/DL (ref 5.8–7.6)
PROTEUS SPP. (OHS): NOT DETECTED
PSEUDOMONAS AERUGINOSA (OHS): NOT DETECTED
PV PEAK GRADIENT: 3 MMHG
PV PEAK VELOCITY: 0.91 M/S
RA PRESSURE ESTIMATED: 3 MMHG
RBC # BLD AUTO: 4.44 X10(6)/MCL (ref 4.7–6.1)
RBC MORPH BLD: NORMAL
RIGHT VENTRICULAR END-DIASTOLIC DIMENSION: 2.96 CM
RV TB RVSP: 5 MMHG
S ENT+BONG DNA STL QL NAA+NON-PROBE: NOT DETECTED
S PNEUM DNA CSF QL NAA+NON-PROBE: NOT DETECTED
SERRATIA MARCESCENS (OHS): NOT DETECTED
SODIUM SERPL-SCNC: 140 MMOL/L (ref 136–145)
STAPHYLOCOCCUS AUREUS (OHS): NOT DETECTED
STAPHYLOCOCCUS EPIDERMIDIS (OHS): DETECTED
STAPHYLOCOCCUS LUGDUNENSIS (OHS): NOT DETECTED
STAPHYLOCOCCUS SPP. (OHS): DETECTED
STENOTROPHOMONAS MALTOPHILIA (OHS): NOT DETECTED
STREPTOCOCCUS PYOGENES (GROUP A)(OHS): NOT DETECTED
STREPTOCOCCUS SPP. (OHS): NOT DETECTED
TDI LATERAL: 0.09 M/S
TDI SEPTAL: 0.05 M/S
TDI: 0.07 M/S
TR MAX PG: 13 MMHG
TRICUSPID ANNULAR PLANE SYSTOLIC EXCURSION: 1.26 CM
TROPONIN I SERPL-MCNC: 0.75 NG/ML (ref 0–0.04)
TROPONIN I SERPL-MCNC: 0.95 NG/ML (ref 0–0.04)
TV REST PULMONARY ARTERY PRESSURE: 16 MMHG
VANA/B (OHS): ABNORMAL
VIM (OHS): ABNORMAL
WBC # BLD AUTO: 32.54 X10(3)/MCL (ref 4.5–11.5)
Z-SCORE OF LEFT VENTRICULAR DIMENSION IN END DIASTOLE: -3.68
Z-SCORE OF LEFT VENTRICULAR DIMENSION IN END SYSTOLE: -2.22

## 2024-06-19 PROCEDURE — 92610 EVALUATE SWALLOWING FUNCTION: CPT

## 2024-06-19 PROCEDURE — 94760 N-INVAS EAR/PLS OXIMETRY 1: CPT

## 2024-06-19 PROCEDURE — 84484 ASSAY OF TROPONIN QUANT: CPT | Performed by: INTERNAL MEDICINE

## 2024-06-19 PROCEDURE — 92611 MOTION FLUOROSCOPY/SWALLOW: CPT

## 2024-06-19 PROCEDURE — 80053 COMPREHEN METABOLIC PANEL: CPT | Performed by: INTERNAL MEDICINE

## 2024-06-19 PROCEDURE — 63600175 PHARM REV CODE 636 W HCPCS: Performed by: INTERNAL MEDICINE

## 2024-06-19 PROCEDURE — 36415 COLL VENOUS BLD VENIPUNCTURE: CPT | Performed by: INTERNAL MEDICINE

## 2024-06-19 PROCEDURE — A9698 NON-RAD CONTRAST MATERIALNOC: HCPCS | Performed by: INTERNAL MEDICINE

## 2024-06-19 PROCEDURE — 85730 THROMBOPLASTIN TIME PARTIAL: CPT | Performed by: INTERNAL MEDICINE

## 2024-06-19 PROCEDURE — 25500020 PHARM REV CODE 255: Performed by: INTERNAL MEDICINE

## 2024-06-19 PROCEDURE — 83735 ASSAY OF MAGNESIUM: CPT | Performed by: INTERNAL MEDICINE

## 2024-06-19 PROCEDURE — 80185 ASSAY OF PHENYTOIN TOTAL: CPT | Performed by: INTERNAL MEDICINE

## 2024-06-19 PROCEDURE — 27000221 HC OXYGEN, UP TO 24 HOURS

## 2024-06-19 PROCEDURE — 85007 BL SMEAR W/DIFF WBC COUNT: CPT | Performed by: INTERNAL MEDICINE

## 2024-06-19 PROCEDURE — 84100 ASSAY OF PHOSPHORUS: CPT | Performed by: INTERNAL MEDICINE

## 2024-06-19 PROCEDURE — 80184 ASSAY OF PHENOBARBITAL: CPT | Performed by: INTERNAL MEDICINE

## 2024-06-19 PROCEDURE — 11000001 HC ACUTE MED/SURG PRIVATE ROOM

## 2024-06-19 PROCEDURE — 25000003 PHARM REV CODE 250: Performed by: INTERNAL MEDICINE

## 2024-06-19 PROCEDURE — 25000242 PHARM REV CODE 250 ALT 637 W/ HCPCS: Performed by: INTERNAL MEDICINE

## 2024-06-19 PROCEDURE — 21400001 HC TELEMETRY ROOM

## 2024-06-19 RX ORDER — MICONAZOLE NITRATE 2 %
POWDER (GRAM) TOPICAL 2 TIMES DAILY
Status: DISCONTINUED | OUTPATIENT
Start: 2024-06-19 | End: 2024-06-24 | Stop reason: HOSPADM

## 2024-06-19 RX ADMIN — HEPARIN SODIUM 13 UNITS/KG/HR: 10000 INJECTION, SOLUTION INTRAVENOUS at 05:06

## 2024-06-19 RX ADMIN — HEPARIN SODIUM 13 UNITS/KG/HR: 10000 INJECTION, SOLUTION INTRAVENOUS at 10:06

## 2024-06-19 RX ADMIN — LEVETIRACETAM 1000 MG: 500 TABLET, FILM COATED ORAL at 08:06

## 2024-06-19 RX ADMIN — PRIMIDONE 150 MG: 50 TABLET ORAL at 08:06

## 2024-06-19 RX ADMIN — FUROSEMIDE 40 MG: 10 INJECTION, SOLUTION INTRAVENOUS at 08:06

## 2024-06-19 RX ADMIN — FINASTERIDE 5 MG: 5 TABLET, FILM COATED ORAL at 08:06

## 2024-06-19 RX ADMIN — MICONAZOLE NITRATE: 20 POWDER TOPICAL at 08:06

## 2024-06-19 RX ADMIN — AMIODARONE HYDROCHLORIDE 200 MG: 200 TABLET ORAL at 09:06

## 2024-06-19 RX ADMIN — PHENOBARBITAL 97.2 MG: 97.2 TABLET ORAL at 09:06

## 2024-06-19 RX ADMIN — VENLAFAXINE 75 MG: 37.5 TABLET ORAL at 09:06

## 2024-06-19 RX ADMIN — LEVETIRACETAM 1000 MG: 500 TABLET, FILM COATED ORAL at 09:06

## 2024-06-19 RX ADMIN — TAMSULOSIN HYDROCHLORIDE 0.4 MG: 0.4 CAPSULE ORAL at 08:06

## 2024-06-19 RX ADMIN — LISINOPRIL 5 MG: 5 TABLET ORAL at 05:06

## 2024-06-19 RX ADMIN — ATORVASTATIN CALCIUM 40 MG: 40 TABLET, FILM COATED ORAL at 08:06

## 2024-06-19 RX ADMIN — VENLAFAXINE 75 MG: 37.5 TABLET ORAL at 08:06

## 2024-06-19 RX ADMIN — FUROSEMIDE 40 MG: 10 INJECTION, SOLUTION INTRAVENOUS at 09:06

## 2024-06-19 RX ADMIN — PRIMIDONE 150 MG: 50 TABLET ORAL at 09:06

## 2024-06-19 RX ADMIN — PHENYTOIN SODIUM 100 MG: 100 CAPSULE ORAL at 08:06

## 2024-06-19 RX ADMIN — MICONAZOLE NITRATE: 20 POWDER TOPICAL at 05:06

## 2024-06-19 RX ADMIN — ASPIRIN 81 MG: 81 TABLET, COATED ORAL at 09:06

## 2024-06-19 RX ADMIN — PANTOPRAZOLE SODIUM 40 MG: 40 TABLET, DELAYED RELEASE ORAL at 05:06

## 2024-06-19 RX ADMIN — BARIUM SULFATE 10 ML: 0.81 POWDER, FOR SUSPENSION ORAL at 02:06

## 2024-06-19 RX ADMIN — PHENYTOIN SODIUM 100 MG: 100 CAPSULE ORAL at 09:06

## 2024-06-19 RX ADMIN — METOPROLOL SUCCINATE 25 MG: 25 TABLET, EXTENDED RELEASE ORAL at 09:06

## 2024-06-19 RX ADMIN — CLOPIDOGREL BISULFATE 75 MG: 75 TABLET ORAL at 05:06

## 2024-06-19 RX ADMIN — METOPROLOL SUCCINATE 25 MG: 25 TABLET, EXTENDED RELEASE ORAL at 08:06

## 2024-06-19 NOTE — PLAN OF CARE
06/19/24 0940   Discharge Assessment   Confirmed/corrected address, phone number and insurance Yes   Confirmed Demographics Correct on Facesheet   Source of Information patient;family  (Fredrick Wayne (Brother)  [phone] (Mobile))   If unable to respond/provide information was family/caregiver contacted? Yes   Contact Name/Number Fredrick Wayne (Brother)  [phone] (Mobile)   Communicated SHARRON with patient/caregiver Date not available/Unable to determine   Reason For Admission 78-year-old male with medical history as detailed below and notable for CAD/multiple PCI last being November 20, 2022, ischemic cardiomyopathy/Chronic HFrEF, paroxysmal AFib, and epilepsy who currently lives in a nursing home present to the ED on 06/17/2024 initially with seizure-like activity followed by hypoxia and found to have subtherapeutic phenytoin level given extra dose of phenytoin and discharged back to the nursing home.  Returned again on early morning of 06/18/2024 with recurrent breakthrough seizure and hypoxemia, no further seizure activity.   People in Home   (The patient is a long-term resident of [facility].)   Facility Arrived From: The patient is a long-term resident of [facility].   Do you expect to return to your current living situation? Yes   Do you have help at home or someone to help you manage your care at home? Yes   Who are your caregiver(s) and their phone number(s)? The patient is a long-term resident of [facility].   Prior to hospitilization cognitive status: Alert/Oriented   Current cognitive status: Alert/Oriented   Walking or Climbing Stairs Difficulty yes   Walking or Climbing Stairs transferring difficulty, dependent   Mobility Management The patient is bedbound; staff at [facility] use a Billy lift for all transfers from bed-wheelchair PRN.   Dressing/Bathing Difficulty yes   Dressing/Bathing bathing difficulty, assistance 1 person   Dressing/Bathing Management The  [facility]/CNA staff at [facility] provide for his hygiene (Bathing/Showering) needs PRN.   Home Accessibility wheelchair accessible   Home Layout Able to live on 1st floor   Equipment Currently Used at Home lift device;hospital bed;wheelchair   Readmission within 30 days? No   Patient currently being followed by outpatient case management? No   Do you currently have service(s) that help you manage your care at home? Yes   Name and Contact number of agency The patient is a long-term resident of [facility].   Is the pt/caregiver preference to resume services with current agency Yes   Do you take prescription medications? Yes   Do you have prescription coverage? Yes   Coverage Payor: MEDICARE - MEDICARE PART A & B -   Do you have any problems affording any of your prescribed medications? No   Is the patient taking medications as prescribed? yes   Who is going to help you get home at discharge? The nursing staff at [facility] provide for pharmacological needs and and dispense medications as per MD orders.   How do you get to doctors appointments? agency   Are you on dialysis? No   Do you take coumadin? No   Discharge Plan A Return to nursing home  (FOC: The patient is a long-term resident at [facility]. He will return to this facility upon discharge.)   Discharge Plan B Return to Nursing Home   DME Needed Upon Discharge  none   Discharge Plan discussed with: Patient;Sibling   Name(s) and Number(s) Fredrick Wayne (Brother)  [phone] (Mobile)   Transition of Care Barriers None   Physical Activity   On average, how many days per week do you engage in moderate to strenuous exercise (like a brisk walk)? 5 days   On average, how many minutes do you engage in exercise at this level? 30 min   Financial Resource Strain   How hard is it for you to pay for the very basics like food, housing, medical care, and heating? Not hard   Housing Stability   In the last 12 months, was there a time when you were  not able to pay the mortgage or rent on time? N   At any time in the past 12 months, were you homeless or living in a shelter (including now)? N   Transportation Needs   Has the lack of transportation kept you from medical appointments, meetings, work or from getting things needed for daily living? No   Food Insecurity   Within the past 12 months, you worried that your food would run out before you got the money to buy more. Never true   Within the past 12 months, the food you bought just didn't last and you didn't have money to get more. Never true   Stress   Do you feel stress - tense, restless, nervous, or anxious, or unable to sleep at night because your mind is troubled all the time - these days? To some exte   Social Isolation   How often do you feel lonely or isolated from those around you?  Rarely   Alcohol Use   Q1: How often do you have a drink containing alcohol? Never   Q2: How many drinks containing alcohol do you have on a typical day when you are drinking? None   Q3: How often do you have six or more drinks on one occasion? Never   Utilities   In the past 12 months has the electric, gas, oil, or water company threatened to shut off services in your home? No   Health Literacy   How often do you need to have someone help you when you read instructions, pamphlets, or other written material from your doctor or pharmacy? Never      The patient is a long-term resident at [facility].

## 2024-06-19 NOTE — PLAN OF CARE
Problem: SLP  Goal: SLP Goal  Description: LTG:  Pt will tolerate the least restrictive PO diet with no clinical signs/sx of aspiration.  STGs:  1. Laryngeal elevation exercises and armand, min assist  2.  Thermal stimulation, 100% swallow response, with effortful swallow, min assist  Outcome: Progressing     POC initiated and goals created.  Talia

## 2024-06-19 NOTE — HPI
78-year-old male with medical history as detailed below and notable for CAD/multiple PCI last being November 20, 2022, ischemic cardiomyopathy/Chronic HFrEF, paroxysmal AFib, and epilepsy who currently lives in a nursing home present to the ED on 06/17/2024 initially with seizure-like activity followed by hypoxia and found to have subtherapeutic phenytoin level given extra dose of phenytoin and discharged back to the nursing home.  Returned again on early morning of 06/18/2024 with recurrent breakthrough seizure and hypoxemia, no further seizure activity in the ED but noted to have elevated rising troponin for which Cardiology consulted and recommended initiation of heparin and transferred to [facility] for Cardiology evaluation.     Upon my evaluation he is resting comfortably currently on OxyMask, troponin level downtrending denying any active chest pain but report being short of breath and on auscultation he has diffuse crackles chest x-ray reviewed and show cardiomegaly and pulmonary edema and EKG NSR, interval changes in lateral leads/possible lateral infarct.

## 2024-06-19 NOTE — H&P
[facility]   Hospital Medicine - H&P Note    Patient Name: John Wayne  MRN: [id number]  PCP: ISAC Schmidt MD (Inactive)  Admitting Physician: Darrel Freire MD  Admission Class: IP- Inpatient   Date of Service: 06/18/2024  Code status: DNR    Chief Complaint   Transfer from [facility] ED for possible acute coronary syndrome    History of Present Illness   This is a 78-year-old male with medical history as detailed below and notable for CAD/multiple PCI last being November 20, 2022, ischemic cardiomyopathy/Chronic HFrEF, paroxysmal AFib, and epilepsy who currently lives in a nursing home present to the ED on 06/17/2024 initially with seizure-like activity followed by hypoxia and found to have subtherapeutic phenytoin level given extra dose of phenytoin and discharged back to the nursing home.  Returned again on early morning of 06/18/2024 with recurrent breakthrough seizure and hypoxemia, no further seizure activity in the ED but noted to have elevated rising troponin for which Cardiology consulted and recommended initiation of heparin and transferred to [facility] for Cardiology evaluation.    Upon my evaluation he is resting comfortably currently on OxyMask, troponin level downtrending denying any active chest pain but report being short of breath and on auscultation he has diffuse crackles chest x-ray reviewed and show cardiomegaly and pulmonary edema and EKG NSR, interval changes in lateral leads/possible lateral infarct.      ROS   Except as documented, all other systems reviewed and negative     Past Medical History   CLL on observation  CAD multiple PCI   s/p PCI/stent to LAD and ramus 11/2022  Ischemic cardiomyopathy  Chronic HFrEF  TTE 12/8/2022: LVEF 45%, G1DD, PASP 41  Paroxysmal AFIB  DCCV 10/2021  Amiodarone and BB   Eliquis  Chronic venous insuffiencey  Venous stents  COPD  Epilepsy  TIA  HTN  HLD  Depression  GERD  Morbid obesity    Surgical History     Past  Surgical History:   Procedure Laterality Date    CORONARY ANGIOPLASTY WITH STENT PLACEMENT  10/18/2016    pLAD, pLAD, dLAD, mCX,    INSERTION OF BARE METAL STENT INTO PERIPHERAL ARTERY  2018    B/L [place] Vessels    PERCUTANEOUS BALLOON VALVULOPLASTY  04/04/2018       Social History     Social History     Tobacco Use    Smoking status: Former    Smokeless tobacco: Never   Substance Use Topics    Alcohol use: Not Currently        Screening for social drivers of health:  Patient screened for food insecurity, housing instability, transportation needs, utility difficulties, and interpersonal safety:  []Housing or food  []Transportation needs  []Utility difficulties  []Interpersonal safety  [x]None    Family History   Reviewed and negative    Allergies   Ancef in dextrose (iso-osm), Codeine, and Penicillins    Home Medications     Prior to Admission medications    Medication Sig Start Date End Date Taking? Authorizing Provider   albuterol (PROVENTIL) 2.5 mg /3 mL (0.083 %) nebulizer solution  9/1/23   Provider, Historical   albuterol sulfate 2.5 mg/0.5 mL Nebu Take 2.5 mg by nebulization every 6 (six) hours as needed. Rescue    Provider, Historical   amiodarone (PACERONE) 200 MG Tab Take 200 mg by mouth once daily. 7/18/22   Provider, Historical   atorvastatin (LIPITOR) 40 MG tablet Take 40 mg by mouth every evening.    Provider, Historical   clopidogreL (PLAVIX) 75 mg tablet Take 1 tablet by mouth Daily. 6/28/22   Provider, Historical   ELIQUIS 5 mg Tab Take 1 tablet by mouth 2 (two) times a day. 7/5/22   Provider, Historical   finasteride (PROSCAR) 5 mg tablet Take 1 tablet (5 mg total) by mouth once daily.  Patient taking differently: Take 5 mg by mouth nightly. 10/15/22   Alfa Kumar MD   furosemide (LASIX) 40 MG tablet Take 40 mg by mouth once daily.    Provider, Historical   levETIRAcetam (KEPPRA) 1000 MG tablet Take 1 tablet by mouth 2 (two) times a day. 6/28/22   Provider, Historical   lisinopriL  (PRINIVIL,ZESTRIL) 5 MG tablet Take 1 tablet by mouth Daily. 6/30/22   Provider, Historical   metoprolol succinate (TOPROL-XL) 25 MG 24 hr tablet Take 1 tablet by mouth 2 (two) times daily. 6/28/22   Provider, Historical   midazolam (NAYZILAM) 5 mg/spray (0.1 mL) Spry 1 spray by Nasal route daily as needed (seizure). To left nostril    Provider, Historical   pantoprazole (PROTONIX) 40 MG tablet Take 40 mg by mouth Daily.    Provider, Historical   PHENobarbitaL 32.4 MG tablet Take 3 tablets by mouth Daily.    Provider, Historical   phenytoin (DILANTIN) 100 MG ER capsule Take 100 mg by mouth 2 (two) times daily. 6/7/24   Provider, Historical   primidone (MYSOLINE) 50 MG Tab Take 150 mg by mouth 2 (two) times a day. 12/27/23   Provider, Historical   tamsulosin (FLOMAX) 0.4 mg Cap Take 1 tablet by mouth nightly. 6/30/22   Provider, Historical   venlafaxine (EFFEXOR) 75 MG tablet Take 1 tablet (75 mg total) by mouth 2 (two) times daily. 10/14/22   Alfa Kumar MD        Physical Exam   Vital Signs  Temp:  [97.4 °F (36.3 °C)-97.7 °F (36.5 °C)]   Pulse:  [57-67]   Resp:  [13-23]   BP: (114-142)/(59-79)   SpO2:  [93 %-100 %]    General: Appears comfortable  HEENT: NC/AT  Neck:  JVD 4 cm  Chest:  Bilateral rales/crackles  CVS: Regular rhythm. Normal S1/S2.  Plus one pedal edema  Abdomen: nondistended, normoactive BS, soft and non-tender.  MSK: No obvious deformity or joint swelling  Skin: Warm and dry  Neuro: AAOx3, no focal neurological deficit  Psych: Cooperative    Labs     Recent Labs     06/18/24  0222 06/18/24  0617   WBC 54.16* 44.92*   RBC 5.45 4.79   HGB 15.6 13.6*   HCT 48.6 43.0   MCV 89.2 89.8   MCH 28.6 28.4   MCHC 32.1* 31.6*   RDW 14.5 14.6    234     Recent Labs     06/18/24  0617 06/18/24  1321   LABPROT 14.6*  --    INR 1.1  --    APTT 27.9 52.7*      Recent Labs     06/17/24  1900 06/18/24  0223    140   K 4.0 4.1   CO2 25 20*   BUN 16.0 15.0   CREATININE 1.06 1.11   EGFRNORACEVR >60  >60   GLUCOSE 115 176*   CALCIUM 9.8 9.8   MG 2.00 2.00   ALBUMIN 3.7 3.7   GLOBULIN 3.8* 4.0*   ALKPHOS 137 123   ALT 24 23   AST 18 22   BILITOT 0.2 0.3   BNP  --  250.7*     Recent Labs     06/17/24  1900 06/18/24  0222 06/18/24  0511   LACTATE 1.3 3.4* 2.4*     Recent Labs     06/18/24  0223 06/18/24  0511 06/18/24  1321   TROPONINI 1.602* 2.553* 2.283*           Imaging     CT Head Without Contrast  Narrative: EXAMINATION:  CT HEAD WITHOUT CONTRAST    CLINICAL HISTORY:  Memory loss;Seizure, new-onset, no history of trauma;, .    TECHNIQUE:  PATIENT RADIATION DOSE: DLP(mGycm) 1208    As per PQRS measures, all CT scans at this facility used dose modulation, iterative reconstruction, and/or weight based dose adjustment when appropriate to reduce radiation dose to as low as reasonably achievable.    COMPARISON:  05/05/2024    FINDINGS:  Serial axial images were obtained of the head without the administration of IV contrast. Both brain and bone parenchymal windows were obtained.  Additional coronal and sagittal reconstructions were obtained.  Ventricles, cisterns, and sulci are prominent size.  There is no evidence of intracranial hemorrhage, midline shift, mass effect, or abnormal extra-axial fluid collections.  Scattered hypodensities are again seen within the periventricular white matter suspicious for small vessel ischemic changes.  Intracranial atherosclerosis is identified.  Cerebellar tonsils extend caudally to the level of foramen magnum.  There is beam hardening artifact at the skull base.  Paranasal sinuses and mastoid air cells are relatively clear.  External auditory canals are grossly patent.  Impression: 1. No acute intracranial abnormality identified  2. Generalized cerebral and cerebellar atrophy  3. Findings and other details as above    Electronically signed by: Campos Dobson  Date:    06/18/2024  Time:    12:24  X-Ray Chest AP Portable  Narrative: EXAMINATION:  XR CHEST AP PORTABLE    CLINICAL  HISTORY:  sob;, .    COMPARISON:  06/17/2024    FINDINGS:  An AP view or more reveals the heart to be enlarged.  The trachea is midline.  Atherosclerosis is seen within the aorta.  Central pulmonary vasculature is mildly prominent.  Mild patchy hazy opacities noted to the right mid and lower lung and left lower lung.  Degenerative changes and curvature are noted to the thoracic spine.  Impression: 1. Cardiomegaly  2. Mild patchy hazy infiltrate and subsegmental atelectasis at the right mid and lower lung and left lower lung  3. Atherosclerosis  4. Mildly prominent central pulmonary vasculature    Electronically signed by: Campos Dobson  Date:    06/18/2024  Time:    09:35    Assessment   Breakthrough Seizure  Subtherapeutic phenytoin level  NSTEMI  HX CAD/Stents  Acute on chronic HFrEF  Paroxysmal AFIB  Currently NSR  CLL  Automated differential 58% Blast    HX HTN, HLD, GERD,  PVD/venous stent, Anxiety/depression, TIA, BPH, Morbid obesity      Plan   Heparin drip low intensity, hold Eliquis  Aspirin 324mg x1, then 81 mg daily and continue Plavix, statin metoprolol, lisinopril  Troponin Q6hrs until peak  TTE - pending  Lasix 40mg IV BID  Accurate I/O, daily weight  Resume AEDs  Phenytoin 100mg BID, with an extra dose of 100 mg tonight  Phenobarbital 97.2 mg daily  Keppra 1 g b.i.d.  Check phenytoin and phenobarbital levels in a.m.  Peripheral smear, LDH and uric acid  Remaining home medication reviewed and resumed including amiodarone, PPI, tamsulosin, finasteride and venlafaxine  VTE Prophylaxis:  Fully anticoagulated     Critical care time 35  minutes  Critical care diagnosis: NSTEMI, decompensated heart failure, breakthrough seizure    Darrel Freire MD  Internal Medicine  6/18/2024. at 8:50 PM.

## 2024-06-19 NOTE — ASSESSMENT & PLAN NOTE
Chronic, controlled. Latest blood pressure and vitals reviewed-     Temp:  [97.4 °F (36.3 °C)-98.6 °F (37 °C)]   Pulse:  [52-63]   Resp:  [13-20]   BP: (114-147)/(59-75)   SpO2:  [92 %-100 %] .   Home meds for hypertension were reviewed and noted below.   Hypertension Medications               furosemide (LASIX) 40 MG tablet Take 40 mg by mouth once daily.    lisinopriL (PRINIVIL,ZESTRIL) 5 MG tablet Take 1 tablet by mouth Daily.    metoprolol succinate (TOPROL-XL) 25 MG 24 hr tablet Take 1 tablet by mouth 2 (two) times daily.            While in the hospital, will manage blood pressure as follows; Continue home antihypertensive regimen    Will utilize p.r.n. blood pressure medication only if patient's blood pressure greater than 140/90 and he develops symptoms such as worsening chest pain or shortness of breath.

## 2024-06-19 NOTE — PROCEDURES
[facility]  Speech Language Pathology Department  Modified Barium Swallow (MBS) Study    Patient Name:  John Wayne   MRN:  [id number]    Recommendations     General recommendations:  dysphagia therapy;  MD may consider GI consult to rule out esophageal dysphagia if symptoms perisist  Repeat MBS study: 7-10 days or as clinically indicated  Solid texture recommendation:  Soft & Bite Sized Diet - IDDSI Level 6  Liquid consistency recommendation: Mildly thick liquids - IDDSI Level 2   Medications: whole in mildly thick liquids  Swallow strategies/precautions: small bites/sips, NO straws, upright for PO intake, and upright 30 minutes after intake  General precautions: aspiration    History     John Wayne is a/n 78 y.o. male admitted for seizure like activity with rising troponin.     Past Medical History:   Diagnosis Date    Anticoagulant long-term use     CHF (congestive heart failure) 10/2021    EF of 25-30% on ECHO    CLL (chronic lymphocytic leukemia)     COPD (chronic obstructive pulmonary disease)     Coronary artery disease     Depression     Difficult intubation     GERD (gastroesophageal reflux disease)     Hypertension     Mixed hyperlipidemia     PVD (peripheral vascular disease)     Seizures     Sleep apnea, unspecified     TIA (transient ischemic attack)      Past Surgical History:   Procedure Laterality Date    CORONARY ANGIOPLASTY WITH STENT PLACEMENT  10/18/2016    pLAD, pLAD, dLAD, mCX,    INSERTION OF BARE METAL STENT INTO PERIPHERAL ARTERY  2018    B/L [place] Vessels    PERCUTANEOUS BALLOON VALVULOPLASTY  04/04/2018     A MBS Study was completed to assess the efficiency of his swallow function, rule out aspiration and make recommendations regarding safe dietary consistencies, effective compensatory strategies, and safe eating environment.     Home diet texture/consistency: Regular and thin liquids  Current Method of Nutrition: NPO    Patient complaint: to  eat/drink    Imaging   Results for orders placed during the hospital encounter of 06/17/24    X-Ray Chest 1 View    Narrative  EXAMINATION:  XR CHEST 1 VIEW    CLINICAL HISTORY:  Shortness of breath    TECHNIQUE:  Single frontal view of the chest was performed.    COMPARISON:  None    FINDINGS:  Stable prominence of the cardiomediastinal silhouette.  Coronary artery stents in place.  Aortic vascular calcifications.  Prominent interstitial markings and ground-glass attenuation in the bilateral lower lung zones.  No lobar consolidation, pneumothorax or pleural effusion.  Dextrocurvature of the thoracic spine.  No acute osseous abnormality identified.    Impression  Prominent interstitial markings and ground-glass attenuation in the bilateral lower lung zones may reflect atelectasis and/or edema.      Electronically signed by: Vic Andres  Date:    06/17/2024  Time:    19:26    No results found for this or any previous visit.    No results found for this or any previous visit.    Subjective     Patient awake, alert, calm, and cooperative.    Spiritual/Cultural/Jehovah's witness Beliefs/Practices that affect care: no  Pain/Comfort: Pain Rating 1: 0/10    Respiratory Status:  oxymask    Restraints/positioning devices: none    Fluoroscopic Findings     Oral Musculature  Dentition: missing teeth  Secretion Management: adequate  Mucosal Quality: good  Facial Movement: WFL  Buccal Strength & Mobility: WFL  Mandibular Strength & Mobility: WFL  Oral Labial Strength & Mobility: WFL  Lingual Strength & Mobility: WFL  Vocal Quality: adequate  Volitional Cough: Weak    Setup  Upright in bed  Able to self feed  Adequate head control    Visualization  Lateral view    Oral Phase:   Prolonged mastication  Loss of bolus control with prespill to the pyriform sinuses    Pharyngeal Phase:   Swallow delay with spill to the pyriform sinuses  Reduced base of tongue retraction  Reduced hyolaryngeal excursion  Laryngeal penetration of thin liquids  and of mildly thick liquids by straw  Consistency Fed by Laryngeal Penetration Aspiration Residue   Mildly thick liquid by cup Self None None Trace  Base of tongue and Valleculae   Puree SLP None None Trace  Base of tongue, Valleculae, and Pyriform sinus   Thin liquid by cup Self During the swallow  Did NOT clear None Mild  Base of tongue, Valleculae, and Pyriform sinus   Chewable solid SLP None None Mild  Base of tongue, Valleculae, and Pyriform sinus   Mildly thick liquid by straw SLP During the swallow  Did NOT clear None Mild  Base of tongue, Valleculae, and Pyriform sinus       Cervical Esophageal Phase:   residual material visualized      Assessment     Pt exhibited mild-moderate oropharyngeal dysphagia characterized by the findings noted above.  Laryngeal penetration of thin liquids by cup and mildly thick liquids by straw did NOT clear from the laryngeal vestibule placing the patient at HIGH risk of aspiration.  Swallow efficiency is preserved; however, swallow safety is impaired.    Patient appears to be at high risk for aspiration related pneumonia when considering complicated medical status and possible esophageal dysphagia .  Prognosis for behavioral swallow rehabilitation is good.    Goals     Multidisciplinary Problems       SLP Goals          Problem: SLP    Goal Priority Disciplines Outcome   SLP Goal     SLP Progressing   Description: LTG:  Pt will tolerate the least restrictive PO diet with no clinical signs/sx of aspiration.  STGs:  1. Laryngeal elevation exercises and armand, min assist  2.  Thermal stimulation, 100% swallow response, with effortful swallow, min assist                     Education     Patient provided with verbal education regarding diet recommendations.  Understanding was verbalized, however additional teaching warranted.    Plan     SLP Follow-Up:  Yes    Patient to be seen:  5 x/week   Plan of Care expires:  07/03/24  Plan of Care reviewed with:  patient     Time Tracking      SLP Treatment Date:   06/19/24  Speech Start Time:  1355  Speech Stop Time:  1415     Speech Total Time (min):  20 min    Billable minutes:   Motion Fluoroscopic Evaluation, Video Recording, 20 minutes     06/19/2024

## 2024-06-19 NOTE — PT/OT/SLP EVAL
[facility]  Speech Language Pathology Department  Clinical Swallow Evaluation    Patient Name:  John Wayne   MRN:  [id number]    Recommendations     General recommendations:  Modified Barium Swallow Study  Solid texture recommendation:  NPO  Liquid consistency recommendation: NPO   Medications: crushed in puree  Precautions: aspiration    History     John Wayne is a/n 78 y.o. male admitted for seizure like activity with rising troponin.    Past Medical History:   Diagnosis Date    Anticoagulant long-term use     CHF (congestive heart failure) 10/2021    EF of 25-30% on ECHO    CLL (chronic lymphocytic leukemia)     COPD (chronic obstructive pulmonary disease)     Coronary artery disease     Depression     Difficult intubation     GERD (gastroesophageal reflux disease)     Hypertension     Mixed hyperlipidemia     PVD (peripheral vascular disease)     Seizures     Sleep apnea, unspecified     TIA (transient ischemic attack)      Past Surgical History:   Procedure Laterality Date    CORONARY ANGIOPLASTY WITH STENT PLACEMENT  10/18/2016    pLAD, pLAD, dLAD, mCX,    INSERTION OF BARE METAL STENT INTO PERIPHERAL ARTERY  2018    B/L Illiac Vessels    PERCUTANEOUS BALLOON VALVULOPLASTY  04/04/2018       Home diet texture/consistency: Regular and thin liquids (per nursing, from nursing home records)  Current method of nutrition: NPO    Patient complaint: to eat/drink    Imaging   Results for orders placed during the hospital encounter of 06/17/24    X-Ray Chest 1 View    Narrative  EXAMINATION:  XR CHEST 1 VIEW    CLINICAL HISTORY:  Shortness of breath    TECHNIQUE:  Single frontal view of the chest was performed.    COMPARISON:  None    FINDINGS:  Stable prominence of the cardiomediastinal silhouette.  Coronary artery stents in place.  Aortic vascular calcifications.  Prominent interstitial markings and ground-glass attenuation in the bilateral lower lung zones.  No lobar consolidation,  pneumothorax or pleural effusion.  Dextrocurvature of the thoracic spine.  No acute osseous abnormality identified.    Impression  Prominent interstitial markings and ground-glass attenuation in the bilateral lower lung zones may reflect atelectasis and/or edema.      Electronically signed by: Vic Andres  Date:    06/17/2024  Time:    19:26    No results found for this or any previous visit.    No results found for this or any previous visit.    Subjective     Patient awake, alert, and calm.    Patient goals: to eat/drink   Spiritual/Cultural/Nondenominational Beliefs/Practices that affect care: no    Pain/Comfort: Pain Rating 1: 0/10    Respiratory Status:  Nasal cannula      Objective     ORAL MUSCULATURE  Dentition: missing teeth  Secretion Management: adequate  Mucosal Quality: good  Facial Movement: WFL  Buccal Strength & Mobility: WFL  Mandibular Strength & Mobility: WFL  Oral Labial Strength & Mobility: WFL  Lingual Strength & Mobility: WFL  Vocal Quality: adequate  Volitional Cough: Weak    PO TRIALS  Consistency Fed By Oral Symptoms Pharyngeal Symptoms   Ice chips SLP None Wet vocal quality after swallow   Puree SLP None None     Assessment     Pt with signs/sx of aspiration noted during evaluation.  MBS warranted to further assess swallow function.    Goals     Multidisciplinary Problems       SLP Goals       Not on file                  Education     Patient provided with verbal education regarding POC.  Understanding was verbalized.    Plan     SLP Follow-Up:      Patient to be seen:      Plan of Care expires:     Plan of Care reviewed with:  patient     Time Tracking     SLP Treatment Date:   06/19/24  Speech Start Time:  1255  Speech Stop Time:  1305     Speech Total Time (min):  10 min    Billable minutes:  Swallow and Oral Function Evaluation, 10 minutes     06/19/2024

## 2024-06-19 NOTE — PROGRESS NOTES
[facility] Tele[facility] Hospital Medicine  Progress Note    Patient Name: John Wayne  MRN: [id number]  Patient Class: IP- Inpatient   Admission Date: 6/18/2024  Length of Stay: 1 days  Attending Physician: Darrel Freire MD  Primary Care Provider: ISAC Schmidt MD (Inactive)        Subjective:     Principal Problem:NSTEMI (non-ST elevated myocardial infarction)        HPI:  78-year-old male with medical history as detailed below and notable for CAD/multiple PCI last being November 20, 2022, ischemic cardiomyopathy/Chronic HFrEF, paroxysmal AFib, and epilepsy who currently lives in a nursing home present to the ED on 06/17/2024 initially with seizure-like activity followed by hypoxia and found to have subtherapeutic phenytoin level given extra dose of phenytoin and discharged back to the nursing home.  Returned again on early morning of 06/18/2024 with recurrent breakthrough seizure and hypoxemia, no further seizure activity in the ED but noted to have elevated rising troponin for which Cardiology consulted and recommended initiation of heparin and transferred to [facility] for Cardiology evaluation.     Upon my evaluation he is resting comfortably currently on OxyMask, troponin level downtrending denying any active chest pain but report being short of breath and on auscultation he has diffuse crackles chest x-ray reviewed and show cardiomegaly and pulmonary edema and EKG NSR, interval changes in lateral leads/possible lateral infarct.       Overview/Hospital Course:  6/19/24-No new issues today.  Patient is resting comfortably.    Interval History:     Review of Systems   Constitutional:  Positive for activity change and fatigue.   HENT: Negative.     Eyes: Negative.    Respiratory:  Positive for shortness of breath.    Cardiovascular: Negative.    Gastrointestinal: Negative.    Endocrine: Negative.    Genitourinary: Negative.    Musculoskeletal:  Positive for gait problem.   Skin:  Negative.    Allergic/Immunologic: Positive for immunocompromised state.   Neurological:  Positive for weakness.   Hematological:  Bruises/bleeds easily.   Psychiatric/Behavioral:  Positive for confusion.      Objective:     Vital Signs (Most Recent):  Temp: 98.4 °F (36.9 °C) (06/19/24 1130)  Pulse: (!) 59 (06/19/24 1130)  Resp: 20 (06/18/24 2012)  BP: (!) 147/68 (06/19/24 1130)  SpO2: (!) 94 % (06/19/24 1130) Vital Signs (24h Range):  Temp:  [97.4 °F (36.3 °C)-98.6 °F (37 °C)] 98.4 °F (36.9 °C)  Pulse:  [52-63] 59  Resp:  [13-20] 20  SpO2:  [92 %-100 %] 94 %  BP: (114-147)/(59-75) 147/68     Weight: 104.3 kg (230 lb)  Body mass index is 36.02 kg/m².    Intake/Output Summary (Last 24 hours) at 6/19/2024 1458  Last data filed at 6/19/2024 0619  Gross per 24 hour   Intake --   Output 1400 ml   Net -1400 ml         Physical Exam  Constitutional:       Appearance: Normal appearance. He is obese.   HENT:      Head: Normocephalic and atraumatic.      Nose: Nose normal.      Mouth/Throat:      Mouth: Mucous membranes are moist.      Pharynx: Oropharynx is clear.   Eyes:      Extraocular Movements: Extraocular movements intact.      Conjunctiva/sclera: Conjunctivae normal.      Pupils: Pupils are equal, round, and reactive to light.   Cardiovascular:      Rate and Rhythm: Normal rate and regular rhythm.      Pulses: Normal pulses.      Heart sounds: Normal heart sounds.   Pulmonary:      Effort: Pulmonary effort is normal.      Breath sounds: Rales present.   Abdominal:      General: Bowel sounds are normal.      Palpations: Abdomen is soft.   Musculoskeletal:         General: Normal range of motion.      Cervical back: Normal range of motion and neck supple.   Skin:     General: Skin is warm and dry.      Capillary Refill: Capillary refill takes 2 to 3 seconds.   Neurological:      Mental Status: He is alert. Mental status is at baseline. He is disoriented.   Psychiatric:         Attention and Perception: He is  inattentive.         Speech: Speech is delayed.         Cognition and Memory: Cognition is impaired. Memory is impaired.             Significant Labs: All pertinent labs within the past 24 hours have been reviewed.  BMP:   Recent Labs   Lab 06/19/24  0440      K 3.6      CO2 25   BUN 12.8   CREATININE 0.84   CALCIUM 9.0   MG 1.80     CBC:   Recent Labs   Lab 06/18/24 0222 06/18/24  0617 06/19/24  0440   WBC 54.16* 44.92* 32.54  32.54*   HGB 15.6 13.6* 12.8*   HCT 48.6 43.0 38.5*    234 202     CMP:   Recent Labs   Lab 06/17/24  1900 06/18/24 0223 06/19/24  0440    140 140   K 4.0 4.1 3.6    105 107   CO2 25 20* 25   BUN 16.0 15.0 12.8   CREATININE 1.06 1.11 0.84   CALCIUM 9.8 9.8 9.0   ALBUMIN 3.7 3.7 3.0*   BILITOT 0.2 0.3 0.4   ALKPHOS 137 123 89   AST 18 22 18   ALT 24 23 14     Magnesium:   Recent Labs   Lab 06/17/24 1900 06/18/24 0223 06/19/24  0440   MG 2.00 2.00 1.80       Significant Imaging: I have reviewed all pertinent imaging results/findings within the past 24 hours.    Assessment/Plan:      * NSTEMI (non-ST elevated myocardial infarction)  As per cardiology  Heparin gttp      HTN (hypertension)  Chronic, controlled. Latest blood pressure and vitals reviewed-     Temp:  [97.4 °F (36.3 °C)-98.6 °F (37 °C)]   Pulse:  [52-63]   Resp:  [13-20]   BP: (114-147)/(59-75)   SpO2:  [92 %-100 %] .   Home meds for hypertension were reviewed and noted below.   Hypertension Medications               furosemide (LASIX) 40 MG tablet Take 40 mg by mouth once daily.    lisinopriL (PRINIVIL,ZESTRIL) 5 MG tablet Take 1 tablet by mouth Daily.    metoprolol succinate (TOPROL-XL) 25 MG 24 hr tablet Take 1 tablet by mouth 2 (two) times daily.            While in the hospital, will manage blood pressure as follows; Continue home antihypertensive regimen    Will utilize p.r.n. blood pressure medication only if patient's blood pressure greater than 140/90 and he develops symptoms such as  worsening chest pain or shortness of breath.    Seizures  Seizure precautions        VTE Risk Mitigation (From admission, onward)           Ordered     heparin 25,000 units in dextrose 5% (100 units/ml) IV bolus from bag LOW INTENSITY nomogram - [facility]  As needed (PRN)        Question:  Heparin Infusion Adjustment (DO NOT MODIFY ANSWER)  Answer:  \\[email or web address]\epic\Images\Pharmacy\HeparinInfusions\heparin LOW INTENSITY nomogram for [facility] [id number].pdf    06/18/24 2044     heparin 25,000 units in dextrose 5% (100 units/ml) IV bolus from bag LOW INTENSITY nomogram - [facility]  As needed (PRN)        Question:  Heparin Infusion Adjustment (DO NOT MODIFY ANSWER)  Answer:  \\[email or web address]\epic\Images\Pharmacy\HeparinInfusions\heparin LOW INTENSITY nomogram for [facility] [id number].pdf    06/18/24 2044     IP VTE HIGH RISK PATIENT  Once         06/18/24 2103     Place sequential compression device  Until discontinued         06/18/24 2103     heparin 25,000 units in dextrose 5% 250 mL (100 units/mL) infusion LOW INTENSITY nomogram - [facility]  Continuous        Question:  Begin at (units/kg/hr)  Answer:  12    06/18/24 2044                  Heparin gttp  Follow labs  Await consults  Seizure precautions  Wean O2    Critical care time=34mins  Discharge Planning   SHARRON:      Code Status: DNR   Is the patient medically ready for discharge?:     Reason for patient still in hospital (select all that apply): Patient trending condition, Laboratory test, Treatment, and Consult recommendations  Discharge Plan A: Return to nursing home (FOC: The patient is a long-term resident at [facility]. He will return to this facility upon discharge.)                  John Garcia MD  Department of Hospital Medicine   [facility] - [facility]

## 2024-06-19 NOTE — PLAN OF CARE
[facility] sent updated clinicals to [facility] via [facility]. Pt is a Long-Term resident of [facility].

## 2024-06-19 NOTE — PLAN OF CARE
Problem: Adult Inpatient Plan of Care  Goal: Plan of Care Review  Outcome: Progressing  Goal: Patient-Specific Goal (Individualized)  Outcome: Progressing  Goal: Absence of Hospital-Acquired Illness or Injury  Outcome: Progressing  Goal: Optimal Comfort and Wellbeing  Outcome: Progressing  Goal: Readiness for Transition of Care  Outcome: Progressing      Medication(s) Requested: hydrocodone  mg  Last office visit: 8/26/22  Scheduled appointment 1/4/23  Last Dispensed: 11/7/22  Is the patient due for refill of this medication(s): Yes  PDMP review: Criteria met. Forwarded to Physician/LATRICIA for signature.

## 2024-06-19 NOTE — NURSING
Subjective:      Patient ID: John Wayne is a 78 y.o. male.    Chief Complaint: No chief complaint on file.    HPI  Review of Systems   Objective:     Physical Exam   Assessment:     1. NSTEMI (non-ST elevated myocardial infarction)    2. Chest pain           Wound 05/10/24 0030 Pressure Injury Left medial;posterior Buttocks (Active)   05/10/24 0030   Present on Original Admission: Y   Primary Wound Type: Pressure inj   Side: Left   Orientation: medial;posterior   Location: Buttocks   Wound Approximate Age at First Assessment (Weeks):    Wound Number:    Is this injury device related?:    Incision Type:    Closure Method:    Wound Description (Comments):    Type:    Additional Comments:    Ankle-Brachial Index:    Pulses:    Removal Indication and Assessment:    Wound Outcome:    Wound Image   06/19/24 1129   Dressing Appearance Open to air 06/19/24 1129   Drainage Amount None 06/19/24 1129   Appearance Pink;Beasley;Dry 06/19/24 1129   Tissue loss description Not applicable 06/19/24 1129   Care Cleansed with:;Wound cleanser;Applied: 06/19/24 1129       Plan:          77 y/o male in with NSTEMI-Awake alert oriented and turns self in bed--  Consulted on buttock ulcers-see photo-old granulated scar tissues.  Antifungal powder added to decrease the heat component.     Will check on him weekly while in hospital.

## 2024-06-19 NOTE — SUBJECTIVE & OBJECTIVE
Interval History:     Review of Systems   Constitutional:  Positive for activity change and fatigue.   HENT: Negative.     Eyes: Negative.    Respiratory:  Positive for shortness of breath.    Cardiovascular: Negative.    Gastrointestinal: Negative.    Endocrine: Negative.    Genitourinary: Negative.    Musculoskeletal:  Positive for gait problem.   Skin: Negative.    Allergic/Immunologic: Positive for immunocompromised state.   Neurological:  Positive for weakness.   Hematological:  Bruises/bleeds easily.   Psychiatric/Behavioral:  Positive for confusion.      Objective:     Vital Signs (Most Recent):  Temp: 98.4 °F (36.9 °C) (06/19/24 1130)  Pulse: (!) 59 (06/19/24 1130)  Resp: 20 (06/18/24 2012)  BP: (!) 147/68 (06/19/24 1130)  SpO2: (!) 94 % (06/19/24 1130) Vital Signs (24h Range):  Temp:  [97.4 °F (36.3 °C)-98.6 °F (37 °C)] 98.4 °F (36.9 °C)  Pulse:  [52-63] 59  Resp:  [13-20] 20  SpO2:  [92 %-100 %] 94 %  BP: (114-147)/(59-75) 147/68     Weight: 104.3 kg (230 lb)  Body mass index is 36.02 kg/m².    Intake/Output Summary (Last 24 hours) at 6/19/2024 1456  Last data filed at 6/19/2024 0619  Gross per 24 hour   Intake --   Output 1400 ml   Net -1400 ml         Physical Exam  Constitutional:       Appearance: Normal appearance. He is obese.   HENT:      Head: Normocephalic and atraumatic.      Nose: Nose normal.      Mouth/Throat:      Mouth: Mucous membranes are moist.      Pharynx: Oropharynx is clear.   Eyes:      Extraocular Movements: Extraocular movements intact.      Conjunctiva/sclera: Conjunctivae normal.      Pupils: Pupils are equal, round, and reactive to light.   Cardiovascular:      Rate and Rhythm: Normal rate and regular rhythm.      Pulses: Normal pulses.      Heart sounds: Normal heart sounds.   Pulmonary:      Effort: Pulmonary effort is normal.      Breath sounds: Rales present.   Abdominal:      General: Bowel sounds are normal.      Palpations: Abdomen is soft.   Musculoskeletal:          General: Normal range of motion.      Cervical back: Normal range of motion and neck supple.   Skin:     General: Skin is warm and dry.      Capillary Refill: Capillary refill takes 2 to 3 seconds.   Neurological:      Mental Status: He is alert. Mental status is at baseline. He is disoriented.   Psychiatric:         Attention and Perception: He is inattentive.         Speech: Speech is delayed.         Cognition and Memory: Cognition is impaired. Memory is impaired.             Significant Labs: All pertinent labs within the past 24 hours have been reviewed.  BMP:   Recent Labs   Lab 06/19/24  0440      K 3.6      CO2 25   BUN 12.8   CREATININE 0.84   CALCIUM 9.0   MG 1.80     CBC:   Recent Labs   Lab 06/18/24 0222 06/18/24  0617 06/19/24  0440   WBC 54.16* 44.92* 32.54  32.54*   HGB 15.6 13.6* 12.8*   HCT 48.6 43.0 38.5*    234 202     CMP:   Recent Labs   Lab 06/17/24 1900 06/18/24 0223 06/19/24  0440    140 140   K 4.0 4.1 3.6    105 107   CO2 25 20* 25   BUN 16.0 15.0 12.8   CREATININE 1.06 1.11 0.84   CALCIUM 9.8 9.8 9.0   ALBUMIN 3.7 3.7 3.0*   BILITOT 0.2 0.3 0.4   ALKPHOS 137 123 89   AST 18 22 18   ALT 24 23 14     Magnesium:   Recent Labs   Lab 06/17/24 1900 06/18/24 0223 06/19/24  0440   MG 2.00 2.00 1.80       Significant Imaging: I have reviewed all pertinent imaging results/findings within the past 24 hours.

## 2024-06-20 LAB
ABS NEUT (OLG): 8.34 X10(3)/MCL (ref 2.1–9.2)
ALBUMIN SERPL-MCNC: 3 G/DL (ref 3.4–4.8)
ALBUMIN/GLOB SERPL: 1.2 RATIO (ref 1.1–2)
ALP SERPL-CCNC: 83 UNIT/L (ref 40–150)
ALT SERPL-CCNC: 14 UNIT/L (ref 0–55)
ANION GAP SERPL CALC-SCNC: 6 MEQ/L
APTT PPP: 105.9 SECONDS (ref 23.2–33.7)
APTT PPP: 77.7 SECONDS (ref 23.2–33.7)
AST SERPL-CCNC: 16 UNIT/L (ref 5–34)
BACTERIA BLD CULT: ABNORMAL
BILIRUB SERPL-MCNC: 0.4 MG/DL
BUN SERPL-MCNC: 14.3 MG/DL (ref 8.4–25.7)
CALCIUM SERPL-MCNC: 8.8 MG/DL (ref 8.8–10)
CHLORIDE SERPL-SCNC: 105 MMOL/L (ref 98–107)
CO2 SERPL-SCNC: 28 MMOL/L (ref 23–31)
CREAT SERPL-MCNC: 0.97 MG/DL (ref 0.73–1.18)
CREAT/UREA NIT SERPL: 15
ERYTHROCYTE [DISTWIDTH] IN BLOOD BY AUTOMATED COUNT: 14.5 % (ref 11.5–17)
GFR SERPLBLD CREATININE-BSD FMLA CKD-EPI: >60 ML/MIN/1.73/M2
GLOBULIN SER-MCNC: 2.6 GM/DL (ref 2.4–3.5)
GLUCOSE SERPL-MCNC: 92 MG/DL (ref 82–115)
GRAM STN SPEC: ABNORMAL
HCT VFR BLD AUTO: 35.8 % (ref 42–52)
HGB BLD-MCNC: 12 G/DL (ref 14–18)
INSTRUMENT WBC (OLG): 29.8 X10(3)/MCL
LYMPHOCYTES NFR BLD MANUAL: 21.16 X10(3)/MCL
LYMPHOCYTES NFR BLD MANUAL: 71 %
MAGNESIUM SERPL-MCNC: 1.8 MG/DL (ref 1.6–2.6)
MCH RBC QN AUTO: 28.7 PG (ref 27–31)
MCHC RBC AUTO-ENTMCNC: 33.5 G/DL (ref 33–36)
MCV RBC AUTO: 85.6 FL (ref 80–94)
MONOCYTES NFR BLD MANUAL: 0.3 X10(3)/MCL (ref 0.1–1.3)
MONOCYTES NFR BLD MANUAL: 1 %
NEUTROPHILS NFR BLD MANUAL: 28 %
NRBC BLD AUTO-RTO: 0 %
PLATELET # BLD AUTO: 190 X10(3)/MCL (ref 130–400)
PLATELET # BLD EST: NORMAL 10*3/UL
PMV BLD AUTO: 10.6 FL (ref 7.4–10.4)
POTASSIUM SERPL-SCNC: 3.3 MMOL/L (ref 3.5–5.1)
PROT SERPL-MCNC: 5.6 GM/DL (ref 5.8–7.6)
RBC # BLD AUTO: 4.18 X10(6)/MCL (ref 4.7–6.1)
RBC MORPH BLD: NORMAL
SODIUM SERPL-SCNC: 139 MMOL/L (ref 136–145)
WBC # BLD AUTO: 29.74 X10(3)/MCL (ref 4.5–11.5)

## 2024-06-20 PROCEDURE — 85025 COMPLETE CBC W/AUTO DIFF WBC: CPT | Performed by: INTERNAL MEDICINE

## 2024-06-20 PROCEDURE — 25000003 PHARM REV CODE 250: Performed by: INTERNAL MEDICINE

## 2024-06-20 PROCEDURE — 25000242 PHARM REV CODE 250 ALT 637 W/ HCPCS: Performed by: INTERNAL MEDICINE

## 2024-06-20 PROCEDURE — 85730 THROMBOPLASTIN TIME PARTIAL: CPT | Performed by: INTERNAL MEDICINE

## 2024-06-20 PROCEDURE — 94760 N-INVAS EAR/PLS OXIMETRY 1: CPT

## 2024-06-20 PROCEDURE — 21400001 HC TELEMETRY ROOM

## 2024-06-20 PROCEDURE — 92526 ORAL FUNCTION THERAPY: CPT

## 2024-06-20 PROCEDURE — 27000221 HC OXYGEN, UP TO 24 HOURS

## 2024-06-20 PROCEDURE — 83735 ASSAY OF MAGNESIUM: CPT | Performed by: INTERNAL MEDICINE

## 2024-06-20 PROCEDURE — 63600175 PHARM REV CODE 636 W HCPCS: Performed by: INTERNAL MEDICINE

## 2024-06-20 PROCEDURE — 80053 COMPREHEN METABOLIC PANEL: CPT | Performed by: INTERNAL MEDICINE

## 2024-06-20 PROCEDURE — 11000001 HC ACUTE MED/SURG PRIVATE ROOM

## 2024-06-20 PROCEDURE — 36415 COLL VENOUS BLD VENIPUNCTURE: CPT | Performed by: INTERNAL MEDICINE

## 2024-06-20 RX ADMIN — FUROSEMIDE 40 MG: 10 INJECTION, SOLUTION INTRAVENOUS at 09:06

## 2024-06-20 RX ADMIN — PRIMIDONE 150 MG: 50 TABLET ORAL at 10:06

## 2024-06-20 RX ADMIN — ATORVASTATIN CALCIUM 40 MG: 40 TABLET, FILM COATED ORAL at 08:06

## 2024-06-20 RX ADMIN — MICONAZOLE NITRATE: 20 POWDER TOPICAL at 08:06

## 2024-06-20 RX ADMIN — TAMSULOSIN HYDROCHLORIDE 0.4 MG: 0.4 CAPSULE ORAL at 08:06

## 2024-06-20 RX ADMIN — PANTOPRAZOLE SODIUM 40 MG: 40 TABLET, DELAYED RELEASE ORAL at 05:06

## 2024-06-20 RX ADMIN — LISINOPRIL 5 MG: 5 TABLET ORAL at 05:06

## 2024-06-20 RX ADMIN — FUROSEMIDE 40 MG: 10 INJECTION, SOLUTION INTRAVENOUS at 08:06

## 2024-06-20 RX ADMIN — FINASTERIDE 5 MG: 5 TABLET, FILM COATED ORAL at 08:06

## 2024-06-20 RX ADMIN — PRIMIDONE 150 MG: 50 TABLET ORAL at 08:06

## 2024-06-20 RX ADMIN — PHENYTOIN SODIUM 100 MG: 100 CAPSULE ORAL at 08:06

## 2024-06-20 RX ADMIN — VENLAFAXINE 75 MG: 37.5 TABLET ORAL at 08:06

## 2024-06-20 RX ADMIN — CLOPIDOGREL BISULFATE 75 MG: 75 TABLET ORAL at 05:06

## 2024-06-20 RX ADMIN — LEVETIRACETAM 1000 MG: 500 TABLET, FILM COATED ORAL at 08:06

## 2024-06-20 RX ADMIN — ASPIRIN 81 MG: 81 TABLET, COATED ORAL at 08:06

## 2024-06-20 RX ADMIN — HEPARIN SODIUM 13 UNITS/KG/HR: 10000 INJECTION, SOLUTION INTRAVENOUS at 05:06

## 2024-06-20 RX ADMIN — PHENOBARBITAL 97.2 MG: 97.2 TABLET ORAL at 09:06

## 2024-06-20 RX ADMIN — AMIODARONE HYDROCHLORIDE 200 MG: 200 TABLET ORAL at 08:06

## 2024-06-20 NOTE — PROGRESS NOTES
[facility] Hospital Medicine Progress Note        Chief Complaint: Inpatient Follow-up for seizure, NSTEMI    HPI:     78-year-old male with medical history as detailed below and notable for CAD/multiple PCI last being November 20, 2022, ischemic cardiomyopathy/Chronic HFrEF, paroxysmal AFib, and epilepsy who currently lives in a nursing home present to the ED on 06/17/2024 initially with seizure-like activity followed by hypoxia and found to have subtherapeutic phenytoin level given extra dose of phenytoin and discharged back to the nursing home.  Returned again on early morning of 06/18/2024 with recurrent breakthrough seizure and hypoxemia, no further seizure activity in the ED but noted to have elevated rising troponin for which Cardiology consulted and recommended initiation of heparin and transferred to [facility] for Cardiology evaluation.     Upon my evaluation he is resting comfortably currently on OxyMask, troponin level downtrending denying any active chest pain but report being short of breath and on auscultation he has diffuse crackles chest x-ray reviewed and show cardiomegaly and pulmonary edema and EKG NSR, interval changes in lateral leads/possible lateral infarct.  TTE showed intact EF, normal diastolic function, aortic valve sclerosis.    Interval Hx:   Alert, comfortably resting.  Heart rate borderline bradycardia noted.  Stable from respiratory standpoint.  Denies any chest pain, shortness of breadth.  No family members were seen at bedside.  He currently is a SNF resident.    Labs this morning showing leukocytosis.  Platelets are stable.  Currently on heparin drip.  Mild hypokalemia seen this morning.      Objective/physical exam:  Vitals:    06/19/24 2015 06/19/24 2346 06/20/24 0700 06/20/24 0920   BP: (!) 144/63 (!) 118/55 129/65    Pulse: 61 (!) 58 (!) 58    Resp:       Temp:  98.9 °F (37.2 °C) 98 °F (36.7 °C)    TempSrc:  Oral     SpO2:  (!) 94% (!) 89% (!) 92%    Weight:       Height:         General: In no acute distress, afebrile  Respiratory: Clear to auscultation bilaterally  Cardiovascular: S1, S2, no appreciable murmur  Abdomen: Soft, nontender, BS +  MSK: Warm, lower extremity edema, no clubbing or cyanosis  Neurologic: Alert and oriented x4, moving all extremities with good strength     Lab Results   Component Value Date     06/20/2024    K 3.3 (L) 06/20/2024     06/20/2024    CO2 28 06/20/2024    BUN 14.3 06/20/2024    CREATININE 0.97 06/20/2024    CALCIUM 8.8 06/20/2024    ANIONGAP 10 12/11/2022    ESTGFRAFRICA 135 01/09/2021    EGFRNONAA >60 04/13/2022      Lab Results   Component Value Date    ALT 14 06/20/2024    AST 16 06/20/2024    ALKPHOS 83 06/20/2024    BILITOT 0.4 06/20/2024      Lab Results   Component Value Date    WBC 29.74 (H) 06/20/2024    HGB 12.0 (L) 06/20/2024    HCT 35.8 (L) 06/20/2024    MCV 85.6 06/20/2024     06/20/2024           Medications:   amiodarone  200 mg Oral Daily    aspirin  81 mg Oral Daily    atorvastatin  40 mg Oral QHS    clopidogreL  75 mg Oral Daily    finasteride  5 mg Oral Nightly    furosemide (LASIX) injection  40 mg Intravenous Q12H    levETIRAcetam  1,000 mg Oral BID    lisinopriL  5 mg Oral Daily    metoprolol succinate  25 mg Oral BID    miconazole NITRATE 2 %   Topical (Top) BID    pantoprazole  40 mg Oral Daily    PHENobarbitaL  97.2 mg Oral Daily    phenytoin  100 mg Oral BID    phenytoin  100 mg Oral Once    primidone  150 mg Oral BID    tamsulosin  0.4 mg Oral Nightly    venlafaxine  75 mg Oral BID        Current Facility-Administered Medications:     acetaminophen, 650 mg, Oral, Q4H PRN    aluminum-magnesium hydroxide-simethicone, 30 mL, Oral, QID PRN    heparin (PORCINE), 49.1 Units/kg (Adjusted), Intravenous, PRN    heparin (PORCINE), 30 Units/kg (Adjusted), Intravenous, PRN    lorazepam, 2 mg, Intravenous, Q10 Min PRN    melatonin, 6 mg, Oral, Nightly PRN    ondansetron, 4 mg, Intravenous,  Q4H PRN    polyethylene glycol, 17 g, Oral, BID PRN    prochlorperazine, 5 mg, Intravenous, Q6H PRN    senna-docusate 8.6-50 mg, 2 tablet, Oral, BID PRN    sodium chloride 0.9%, 10 mL, Intravenous, PRN     Assessment/Plan:    Breakthrough seizure-subtherapeutic level at admission  NSTEMI requiring heparin drip     HX:  CAD s/p PCI, chronic HFrEF, chronic AFib on Eliquis, CLL, HTN, HLD, GERD, PVD s/p stenting, anxiety, depression, history of TIA, chronic BPH, obesity    Plan:  -currently on heparin drip.  Eliquis on hold.  TTE reviewed.  Cardiology following.  Continue aspirin, Plavix, metoprolol and lisinopril with statin. Currently on Lasix   -continue telemetry and electrolyte monitoring.  Currently on amiodarone and metoprolol.  Cardiology following.  Eliquis will be resumed eventually.  -phenytoin resumed.  Continue Keppra, phenobarbital.  Keep seizure precautions.  Needs compliance  -other home medications were reviewed and renewed    Protonix    Critical care diagnosis:  NSTEMI requiring heparin drip, breakthrough seizure  Critical care time: 50 minutes           Maximus Plaza MD

## 2024-06-20 NOTE — PT/OT/SLP PROGRESS
[facility]  Speech Language Pathology Department  Dysphagia Therapy Progress Note    Patient Name:  John Wayne   MRN:  [id number]  Admitting Diagnosis: seizure like activity with rising troponin    Recommendations     General recommendations:  dysphagia therapy;  MD may consider GI consult to rule out esophageal dysphagia if symptoms perisist   Solid texture recommendation:  Soft & Bite Sized Diet - IDDSI Level 6  Liquid consistency recommendation: Mildly thick liquids - IDDSI Level 2   Medications: whole in mildly thick liquids   Aspiration precautions: small bites/sips, NO straws, upright for PO intake, and upright 30 minutes after intake     Discharge therapy intensity: Moderate Intensity Therapy   Barriers to safe discharge:  acuity of illness    Subjective     Patient awake, alert, calm, and cooperative.  Spiritual/Cultural/Yazdanism Beliefs/Practices that affect care: no    Pain/Comfort: Pain Rating 1: 0/10    Respiratory Status:  oxymask    Objective     Therapeutic Activities:  Pt completed laryngeal elevation exercises x20 with minimal-moderate cues.  Unable to complete armand.  Pt tolerated thermal stimulation to the anterior faucial pillars x20 with 100% swallow responses.  Delay 2-5 seconds.    Nursing reports no difficulty regarding diet tolerance.    Assessment     Pt continues to present with oropharyngeal dysphagia requiring diet modification to reduce the risk of aspiration.    Goals     Multidisciplinary Problems       SLP Goals          Problem: SLP    Goal Priority Disciplines Outcome   SLP Goal     SLP Progressing   Description: LTG:  Pt will tolerate the least restrictive PO diet with no clinical signs/sx of aspiration.  STGs:  1. Laryngeal elevation exercises and armand, min assist  2.  Thermal stimulation, 100% swallow response, with effortful swallow, min assist                     Patient Education     Patient provided with verbal education regarding POC.   Understanding was verbalized.    Plan     Will continue to follow and tx as appropriate.    SLP Follow-Up:  Yes   Patient to be seen:  5 x/week   Plan of Care expires:  07/03/24  Plan of Care reviewed with:  patient       Time Tracking     SLP Treatment Date:   06/20/24  Speech Start Time:  1150  Speech Stop Time:  1205     Speech Total Time (min):  15 min    Billable minutes:  Treatment of Swallow Dysfunction, 15 minutes       06/20/2024

## 2024-06-21 LAB
ALBUMIN SERPL-MCNC: 3.2 G/DL (ref 3.4–4.8)
ALBUMIN/GLOB SERPL: 1.1 RATIO (ref 1.1–2)
ALP SERPL-CCNC: 85 UNIT/L (ref 40–150)
ALT SERPL-CCNC: 15 UNIT/L (ref 0–55)
ANION GAP SERPL CALC-SCNC: 14 MEQ/L
ANION GAP SERPL CALC-SCNC: 16 MEQ/L
APTT PPP: 42.5 SECONDS (ref 23.2–33.7)
APTT PPP: 47.3 SECONDS (ref 23.2–33.7)
APTT PPP: 49.3 SECONDS (ref 23.2–33.7)
AST SERPL-CCNC: 16 UNIT/L (ref 5–34)
B PERT.PT PRMT NPH QL NAA+NON-PROBE: NOT DETECTED
BACTERIA BLD CULT: ABNORMAL
BASOPHILS # BLD AUTO: 0.13 X10(3)/MCL
BASOPHILS NFR BLD AUTO: 0.5 %
BILIRUB SERPL-MCNC: 0.4 MG/DL
BUN SERPL-MCNC: 16.6 MG/DL (ref 8.4–25.7)
BUN SERPL-MCNC: 16.7 MG/DL (ref 8.4–25.7)
C PNEUM DNA NPH QL NAA+NON-PROBE: NOT DETECTED
CALCIUM SERPL-MCNC: 9 MG/DL (ref 8.8–10)
CALCIUM SERPL-MCNC: 9.1 MG/DL (ref 8.8–10)
CHLORIDE SERPL-SCNC: 101 MMOL/L (ref 98–107)
CHLORIDE SERPL-SCNC: 104 MMOL/L (ref 98–107)
CO2 SERPL-SCNC: 19 MMOL/L (ref 23–31)
CO2 SERPL-SCNC: 26 MMOL/L (ref 23–31)
CREAT SERPL-MCNC: 0.91 MG/DL (ref 0.73–1.18)
CREAT SERPL-MCNC: 0.94 MG/DL (ref 0.73–1.18)
CREAT/UREA NIT SERPL: 18
CREAT/UREA NIT SERPL: 18
EOSINOPHIL # BLD AUTO: 0.36 X10(3)/MCL (ref 0–0.9)
EOSINOPHIL NFR BLD AUTO: 1.3 %
ERYTHROCYTE [DISTWIDTH] IN BLOOD BY AUTOMATED COUNT: 14.2 % (ref 11.5–17)
GFR SERPLBLD CREATININE-BSD FMLA CKD-EPI: >60 ML/MIN/1.73/M2
GFR SERPLBLD CREATININE-BSD FMLA CKD-EPI: >60 ML/MIN/1.73/M2
GLOBULIN SER-MCNC: 2.8 GM/DL (ref 2.4–3.5)
GLUCOSE SERPL-MCNC: 92 MG/DL (ref 82–115)
GLUCOSE SERPL-MCNC: 96 MG/DL (ref 82–115)
GRAM STN SPEC: ABNORMAL
HADV DNA NPH QL NAA+NON-PROBE: NOT DETECTED
HCOV 229E RNA NPH QL NAA+NON-PROBE: NOT DETECTED
HCOV HKU1 RNA NPH QL NAA+NON-PROBE: NOT DETECTED
HCOV NL63 RNA NPH QL NAA+NON-PROBE: NOT DETECTED
HCOV OC43 RNA NPH QL NAA+NON-PROBE: NOT DETECTED
HCT VFR BLD AUTO: 38.7 % (ref 42–52)
HEMATOLOGIST REVIEW: NORMAL
HGB BLD-MCNC: 12.7 G/DL (ref 14–18)
HMPV RNA NPH QL NAA+NON-PROBE: NOT DETECTED
HPIV1 RNA NPH QL NAA+NON-PROBE: NOT DETECTED
HPIV2 RNA NPH QL NAA+NON-PROBE: NOT DETECTED
HPIV3 RNA NPH QL NAA+NON-PROBE: NOT DETECTED
HPIV4 RNA NPH QL NAA+NON-PROBE: NOT DETECTED
IMM GRANULOCYTES # BLD AUTO: 0.1 X10(3)/MCL (ref 0–0.04)
IMM GRANULOCYTES NFR BLD AUTO: 0.4 %
LYMPHOCYTES # BLD AUTO: 19.36 X10(3)/MCL (ref 0.6–4.6)
LYMPHOCYTES NFR BLD AUTO: 68.5 %
M PNEUMO DNA NPH QL NAA+NON-PROBE: NOT DETECTED
MAGNESIUM SERPL-MCNC: 1.7 MG/DL (ref 1.6–2.6)
MAGNESIUM SERPL-MCNC: 1.9 MG/DL (ref 1.6–2.6)
MCH RBC QN AUTO: 28.5 PG (ref 27–31)
MCHC RBC AUTO-ENTMCNC: 32.8 G/DL (ref 33–36)
MCV RBC AUTO: 87 FL (ref 80–94)
MONOCYTES # BLD AUTO: 1.82 X10(3)/MCL (ref 0.1–1.3)
MONOCYTES NFR BLD AUTO: 6.4 %
NEUTROPHILS # BLD AUTO: 6.49 X10(3)/MCL (ref 2.1–9.2)
NEUTROPHILS NFR BLD AUTO: 22.9 %
NRBC BLD AUTO-RTO: 0 %
OHS QRS DURATION: 88 MS
OHS QRS DURATION: 92 MS
OHS QTC CALCULATION: 447 MS
OHS QTC CALCULATION: 508 MS
PLATELET # BLD AUTO: 195 X10(3)/MCL (ref 130–400)
PMV BLD AUTO: 10.6 FL (ref 7.4–10.4)
POTASSIUM SERPL-SCNC: 3.2 MMOL/L (ref 3.5–5.1)
POTASSIUM SERPL-SCNC: 3.7 MMOL/L (ref 3.5–5.1)
PROT SERPL-MCNC: 6 GM/DL (ref 5.8–7.6)
RBC # BLD AUTO: 4.45 X10(6)/MCL (ref 4.7–6.1)
RSV RNA NPH QL NAA+NON-PROBE: NOT DETECTED
RV+EV RNA NPH QL NAA+NON-PROBE: NOT DETECTED
SODIUM SERPL-SCNC: 139 MMOL/L (ref 136–145)
SODIUM SERPL-SCNC: 141 MMOL/L (ref 136–145)
WBC # BLD AUTO: 28.26 X10(3)/MCL (ref 4.5–11.5)

## 2024-06-21 PROCEDURE — 63600175 PHARM REV CODE 636 W HCPCS: Performed by: INTERNAL MEDICINE

## 2024-06-21 PROCEDURE — 92526 ORAL FUNCTION THERAPY: CPT

## 2024-06-21 PROCEDURE — 25000242 PHARM REV CODE 250 ALT 637 W/ HCPCS: Performed by: INTERNAL MEDICINE

## 2024-06-21 PROCEDURE — 85730 THROMBOPLASTIN TIME PARTIAL: CPT | Performed by: INTERNAL MEDICINE

## 2024-06-21 PROCEDURE — 80048 BASIC METABOLIC PNL TOTAL CA: CPT | Performed by: NURSE PRACTITIONER

## 2024-06-21 PROCEDURE — 93010 ELECTROCARDIOGRAM REPORT: CPT | Mod: 76,,, | Performed by: INTERNAL MEDICINE

## 2024-06-21 PROCEDURE — 80053 COMPREHEN METABOLIC PANEL: CPT | Performed by: NURSE PRACTITIONER

## 2024-06-21 PROCEDURE — 36415 COLL VENOUS BLD VENIPUNCTURE: CPT | Performed by: INTERNAL MEDICINE

## 2024-06-21 PROCEDURE — 25000003 PHARM REV CODE 250: Performed by: INTERNAL MEDICINE

## 2024-06-21 PROCEDURE — 25000003 PHARM REV CODE 250: Performed by: NURSE PRACTITIONER

## 2024-06-21 PROCEDURE — 36415 COLL VENOUS BLD VENIPUNCTURE: CPT | Performed by: NURSE PRACTITIONER

## 2024-06-21 PROCEDURE — 85025 COMPLETE CBC W/AUTO DIFF WBC: CPT | Performed by: INTERNAL MEDICINE

## 2024-06-21 PROCEDURE — 21400001 HC TELEMETRY ROOM

## 2024-06-21 PROCEDURE — 93005 ELECTROCARDIOGRAM TRACING: CPT

## 2024-06-21 PROCEDURE — 11000001 HC ACUTE MED/SURG PRIVATE ROOM

## 2024-06-21 PROCEDURE — 25500020 PHARM REV CODE 255: Performed by: INTERNAL MEDICINE

## 2024-06-21 PROCEDURE — 87632 RESP VIRUS 6-11 TARGETS: CPT | Performed by: INTERNAL MEDICINE

## 2024-06-21 PROCEDURE — 83735 ASSAY OF MAGNESIUM: CPT | Performed by: NURSE PRACTITIONER

## 2024-06-21 PROCEDURE — 93010 ELECTROCARDIOGRAM REPORT: CPT | Mod: ,,, | Performed by: INTERNAL MEDICINE

## 2024-06-21 RX ORDER — LANOLIN ALCOHOL/MO/W.PET/CERES
400 CREAM (GRAM) TOPICAL 2 TIMES DAILY
Status: COMPLETED | OUTPATIENT
Start: 2024-06-21 | End: 2024-06-23

## 2024-06-21 RX ORDER — POTASSIUM CHLORIDE 14.9 MG/ML
20 INJECTION INTRAVENOUS ONCE
Status: DISCONTINUED | OUTPATIENT
Start: 2024-06-21 | End: 2024-06-21

## 2024-06-21 RX ORDER — MAGNESIUM SULFATE HEPTAHYDRATE 40 MG/ML
4 INJECTION, SOLUTION INTRAVENOUS ONCE
Status: DISCONTINUED | OUTPATIENT
Start: 2024-06-21 | End: 2024-06-21

## 2024-06-21 RX ORDER — POTASSIUM CHLORIDE 20 MEQ/1
20 TABLET, EXTENDED RELEASE ORAL ONCE
Status: COMPLETED | OUTPATIENT
Start: 2024-06-21 | End: 2024-06-21

## 2024-06-21 RX ADMIN — PHENYTOIN SODIUM 100 MG: 100 CAPSULE ORAL at 09:06

## 2024-06-21 RX ADMIN — Medication 400 MG: at 08:06

## 2024-06-21 RX ADMIN — FUROSEMIDE 40 MG: 10 INJECTION, SOLUTION INTRAVENOUS at 08:06

## 2024-06-21 RX ADMIN — HEPARIN SODIUM 16 UNITS/KG/HR: 10000 INJECTION, SOLUTION INTRAVENOUS at 08:06

## 2024-06-21 RX ADMIN — LEVETIRACETAM 1000 MG: 500 TABLET, FILM COATED ORAL at 09:06

## 2024-06-21 RX ADMIN — AMIODARONE HYDROCHLORIDE 200 MG: 200 TABLET ORAL at 09:06

## 2024-06-21 RX ADMIN — PHENYTOIN SODIUM 100 MG: 100 CAPSULE ORAL at 08:06

## 2024-06-21 RX ADMIN — FUROSEMIDE 40 MG: 10 INJECTION, SOLUTION INTRAVENOUS at 09:06

## 2024-06-21 RX ADMIN — VENLAFAXINE 75 MG: 37.5 TABLET ORAL at 09:06

## 2024-06-21 RX ADMIN — TAMSULOSIN HYDROCHLORIDE 0.4 MG: 0.4 CAPSULE ORAL at 08:06

## 2024-06-21 RX ADMIN — PRIMIDONE 150 MG: 50 TABLET ORAL at 08:06

## 2024-06-21 RX ADMIN — IOHEXOL 100 ML: 350 INJECTION, SOLUTION INTRAVENOUS at 02:06

## 2024-06-21 RX ADMIN — ATORVASTATIN CALCIUM 40 MG: 40 TABLET, FILM COATED ORAL at 08:06

## 2024-06-21 RX ADMIN — PHENOBARBITAL 97.2 MG: 97.2 TABLET ORAL at 10:06

## 2024-06-21 RX ADMIN — LEVETIRACETAM 1000 MG: 500 TABLET, FILM COATED ORAL at 08:06

## 2024-06-21 RX ADMIN — FINASTERIDE 5 MG: 5 TABLET, FILM COATED ORAL at 08:06

## 2024-06-21 RX ADMIN — VENLAFAXINE 75 MG: 37.5 TABLET ORAL at 08:06

## 2024-06-21 RX ADMIN — LISINOPRIL 5 MG: 5 TABLET ORAL at 05:06

## 2024-06-21 RX ADMIN — HEPARIN SODIUM 19 UNITS/KG/HR: 10000 INJECTION, SOLUTION INTRAVENOUS at 05:06

## 2024-06-21 RX ADMIN — MICONAZOLE NITRATE: 20 POWDER TOPICAL at 08:06

## 2024-06-21 RX ADMIN — PANTOPRAZOLE SODIUM 40 MG: 40 TABLET, DELAYED RELEASE ORAL at 05:06

## 2024-06-21 RX ADMIN — MICONAZOLE NITRATE: 20 POWDER TOPICAL at 09:06

## 2024-06-21 RX ADMIN — ASPIRIN 81 MG: 81 TABLET, COATED ORAL at 09:06

## 2024-06-21 RX ADMIN — CLOPIDOGREL BISULFATE 75 MG: 75 TABLET ORAL at 05:06

## 2024-06-21 RX ADMIN — PRIMIDONE 150 MG: 50 TABLET ORAL at 10:06

## 2024-06-21 RX ADMIN — POTASSIUM CHLORIDE 20 MEQ: 1500 TABLET, EXTENDED RELEASE ORAL at 02:06

## 2024-06-21 NOTE — PROGRESS NOTES
[facility] Hospital Medicine Progress Note        Chief Complaint: Inpatient Follow-up for seizure, NSTEMI    HPI:     78-year-old male with medical history as detailed below and notable for CAD/multiple PCI last being November 20, 2022, ischemic cardiomyopathy/Chronic HFrEF, paroxysmal AFib, and epilepsy who currently lives in a nursing home present to the ED on 06/17/2024 initially with seizure-like activity followed by hypoxia and found to have subtherapeutic phenytoin level given extra dose of phenytoin and discharged back to the nursing home.  Returned again on early morning of 06/18/2024 with recurrent breakthrough seizure and hypoxemia, no further seizure activity in the ED but noted to have elevated rising troponin for which Cardiology consulted and recommended initiation of heparin and transferred to [facility] for Cardiology evaluation.     Upon my evaluation he is resting comfortably currently on OxyMask, troponin level downtrending denying any active chest pain but report being short of breath and on auscultation he has diffuse crackles chest x-ray reviewed and show cardiomegaly and pulmonary edema and EKG NSR, interval changes in lateral leads/possible lateral infarct.  TTE showed intact EF, normal diastolic function, aortic valve sclerosis.    Interval Hx:   Doing well on room air.  Reports poor sleep while in the hospital.  Bradycardia as low as 40 seen this morning.  He has no new complaints or concerns.  Denies any chest pain, shortness of breadth.  No family members at bedside.  Currently on full-dose heparin.  Continues to have leukocytosis.  Blood cultures negative thus far.    Objective/physical exam:  Vitals:    06/20/24 1948 06/20/24 2350 06/21/24 0428 06/21/24 0801   BP: (!) 147/70 121/64 120/62 (!) 143/68   Pulse: (!) 59 (!) 51 (!) 57 (!) 52   Resp:   16 18   Temp: 98.1 °F (36.7 °C) 98 °F (36.7 °C) 97.3 °F (36.3 °C) 98.1 °F (36.7 °C)   TempSrc: Oral Axillary Oral  Oral   SpO2: (!) 91% 96% 95% 97%   Weight:       Height:         General: In no acute distress, afebrile  Respiratory: Clear to auscultation bilaterally  Cardiovascular: S1, S2, no appreciable murmur  Abdomen: Soft, nontender, BS +  MSK: Warm, lower extremity edema, no clubbing or cyanosis  Neurologic: Alert and oriented x4, moving all extremities with good strength     Lab Results   Component Value Date     06/21/2024    K 3.2 (L) 06/21/2024     06/21/2024    CO2 26 06/21/2024    BUN 16.6 06/21/2024    CREATININE 0.91 06/21/2024    CALCIUM 9.0 06/21/2024    ANIONGAP 10 12/11/2022    ESTGFRAFRICA 135 01/09/2021    EGFRNONAA >60 04/13/2022      Lab Results   Component Value Date    ALT 15 06/21/2024    AST 16 06/21/2024    ALKPHOS 85 06/21/2024    BILITOT 0.4 06/21/2024      Lab Results   Component Value Date    WBC 28.26 (H) 06/21/2024    HGB 12.7 (L) 06/21/2024    HCT 38.7 (L) 06/21/2024    MCV 87.0 06/21/2024     06/21/2024           Medications:   amiodarone  200 mg Oral Daily    aspirin  81 mg Oral Daily    atorvastatin  40 mg Oral QHS    clopidogreL  75 mg Oral Daily    finasteride  5 mg Oral Nightly    furosemide (LASIX) injection  40 mg Intravenous Q12H    levETIRAcetam  1,000 mg Oral BID    lisinopriL  5 mg Oral Daily    magnesium sulfate IVPB  4 g Intravenous Once    metoprolol succinate  25 mg Oral BID    miconazole NITRATE 2 %   Topical (Top) BID    pantoprazole  40 mg Oral Daily    PHENobarbitaL  97.2 mg Oral Daily    phenytoin  100 mg Oral BID    phenytoin  100 mg Oral Once    potassium chloride  20 mEq Intravenous Once    primidone  150 mg Oral BID    tamsulosin  0.4 mg Oral Nightly    venlafaxine  75 mg Oral BID        Current Facility-Administered Medications:     acetaminophen, 650 mg, Oral, Q4H PRN    aluminum-magnesium hydroxide-simethicone, 30 mL, Oral, QID PRN    heparin (PORCINE), 49.1 Units/kg (Adjusted), Intravenous, PRN    heparin (PORCINE), 30 Units/kg (Adjusted),  Intravenous, PRN    lorazepam, 2 mg, Intravenous, Q10 Min PRN    melatonin, 6 mg, Oral, Nightly PRN    ondansetron, 4 mg, Intravenous, Q4H PRN    polyethylene glycol, 17 g, Oral, BID PRN    prochlorperazine, 5 mg, Intravenous, Q6H PRN    senna-docusate 8.6-50 mg, 2 tablet, Oral, BID PRN    sodium chloride 0.9%, 10 mL, Intravenous, PRN     Assessment/Plan:    Breakthrough seizure-subtherapeutic level at admission  NSTEMI requiring heparin drip   Bradycardia-?  medication induced    HX:  CAD s/p PCI, chronic HFrEF, chronic AFib on Eliquis, CLL, HTN, HLD, GERD, PVD s/p stenting, anxiety, depression, history of TIA, chronic BPH, obesity    Plan:  -continue to have leukocytosis.  Initial chest x-ray showed prominent interstitial markings and ground-glass attenuation in the bilateral lung zones which could be edema.  In the setting of persistent leukocytosis we will get a CT thorax for further evaluation.  will avoid contrast if cardiology plans for angiogram  -continue heparin drip.  Eliquis on hold.  TTE reviewed.  Cardiology following.  Currently on aspirin, Plavix, metoprolol, lisinopril with statin.  Continue Lasix  -repeat EKG due to bradycardia.  Hold metoprolol this morning.  regarding AFib we will continue telemetry and electrolyte monitoring.  Continue amiodarone.  Eliquis will be resumed eventually.  -replete potassium  -phenytoin resumed.  Continue Keppra, phenobarbital.  Keep seizure precautions.  Needs compliance  -other home medications were reviewed and renewed    Protonix    Critical care diagnosis:  NSTEMI requiring heparin drip, breakthrough seizure  Critical care time: 50 minutes           Maximus Plaza MD

## 2024-06-21 NOTE — CONSULTS
[facility] - [facility] Medical Telemetry    Cardiology  Consult Note    Patient Name: John Wayne  MRN: [id number]  Admission Date: 6/18/2024  Hospital Length of Stay: 3 days  Code Status: DNR   Attending Provider: Maximus Plaza MD   Consulting Provider: Alysha Dawkins NP  Primary Care Physician: ISAC Schmidt MD (Inactive)  Principal Problem:NSTEMI (non-ST elevated myocardial infarction)    Patient information was obtained from patient, past medical records, and ER records.     Subjective:     Reason for Consult: NSTEMI    HPI: 78-year-old male that is known to [facility]/Dr. Ramirez with a PMHx of PAF on Eliquis, LESTER, PVD, HHD, HLD, HTN, Obesity, CAD, GERD, COPD, CHF. He is a nursing home resident who was brought to ED on 6.17.24 with seizure like activity followed by hypoxia and found to subtherapeutic phenytoin levels, He was given an extra dose of Phenytoin and was discharged back to the nursing home. He was brought back to [facility] on 6.18.24 for recurrent breakthrough seizure and hypoxemia, no further seizure activity in the ED. VS on admit: HR 62, /61, SpO2 93%, Temp 98.6F. Labwork was significant EBC 44.92, H/H 13.6/43.0, , K 4.1, BUN/Cr 15.0/1.11, Mag 2.0, AST/ALT 22/23, .7, Trop: 0.066 --> 1.602 --> 2.5553 --> 2.283 --> 1.337 --> 0.95 --> 0.752. EKG shows no acute ST changes. [facility] was consulted for NSTEMI.     PMH: PAF on Eliquis, LESTER, PVD, HHD, HLD, HTN, Obesity, CAD, GERD, [place], CLL, COPD, TIA, CHF, Epilepsy  PSH: [facility] with PCI, Bare metal stent B illiac vessels  Family History: Father: CAD, Mother: HTN  Social History: Former tobacco use, denies ETOH or illict drug use     Previous Cardiac Diagnostics:   ECHO (6.19.24):  Left Ventricle: The left ventricle is normal in size. Mildly increased wall thickness. There is normal systolic function with a visually estimated ejection fraction of 55 - 60%. There is normal diastolic function.  Right Ventricle: Normal right  ventricular cavity size. Systolic function is moderately reduced.  Aortic Valve: There is aortic valve sclerosis. There is mild (1+) aortic regurgitation.  Mitral Valve: There is mild (1+) regurgitation.  Tricuspid Valve: There is mild (1+) regurgitation.  Pulmonic Valve: There is mild (1+) regurgitation.  The estimated pulmonary artery systolic pressure is 16 mmHg.    ECHO (12.8.22):  The left ventricle is normal in size with mild concentric hypertrophy and mildly decreased systolic function.  The estimated ejection fraction is 45%.  Grade I left ventricular diastolic dysfunction.  There are segmental left ventricular wall motion abnormalities.  Normal right ventricular size with normal right ventricular systolic function.  Mild tricuspid regurgitation.  Mild pulmonic regurgitation.  Elevated central venous pressure (15 mmHg).  The estimated PA systolic pressure is 41 mmHg.  There is pulmonary hypertension.    ECHO (2.17.22):  The study quality is below average.   The left ventricle is normal in size. Global left ventricular systolic function is normal. The left ventricular ejection fraction is 60%. Left ventricular diastolic function is normal. Noted left ventricular hypertrophy. Asymmetric septal left ventricular hypertrophy is present. It is mil  Mitral regurgitation is noted. Mild to moderate (1-2+) mitral regurgitation.  Evidence of pulmonic regurgitation is noted. Mild to moderate (1-2+) pulmonic regurgitation.  Evidence of tricuspid regurgitation is present. Mild (1+) tricuspid regurgitation.  The pulmonary artery systolic pressure is 37 mmHg. The pulmonary artery appears to be normal.    [facility] (11.2.21):  LM: patent with distal calcified stenosis with MLA 6.5mm  LAD: Significant stenosis noted from the ostium down down into the mid/distal area. Proximal/mid vessel has significant ISR noted with heavy fibrosis of proximal a 95%. Post laser and balloon angioplasty less than then 40% residual stenosis. Ostial  diagonal 1 with 99% lesion from the jailed previously placed LAD Stent  RI: Proximal 95% lesion treated with 2.5 mm balloon angioplasty with less than 30% residual  stenosis   RCA: widely patent with luminal irregularities   Normal left ventricular end-diastolic pressure  No aortic stenosis     ECHO (10.24.21):  Limited visualization defiantly used   Systolic function is severely reduced with EF of 25-30%. Regional wall motion abnormality. Anteroseptal and apical akinesis   Grade II diastolic dysfunction, elevated left atrial pressure  Normal right ventricule with preserved RV systolic function  PASP 55mmHg. Moderate pulmonary hypertension  Estimated CVP 15mmHg on inferior vena cava hemodynamics     Holter Monitor (1.14.20):  This is an average quality study.   Predominant rhythm is atrial flutter.   The minimum heart rate recorded was 41 beats / minute (1/10/2020). The maximum heart rate is 128 beats / minute (1/10/2020). The mean heart rate is 77 beats / minute.   Rare premature ventricular contractions noted.    No pauses were noted    BLE Venous US (2.21.17):  The study quality is below average.   The superficial venous vessels appear prominent and contains phasic flow throughout.  Severe edema also noted in lower right extremity.  There is no reflux noted in the right SSV.  There is 2.2 seconds of reflux noted in the left common femoral vein.    There is 3 seconds of reflux noted in the left popliteal vein.   The superficial venous vessels appear prominent and contains phasic flow throughout.  Severe edema also noted in lower left extremity.  There is no reflux noted in the left SSV.    Review of patient's allergies indicates:   Allergen Reactions    Ancef in dextrose (iso-osm)     Codeine     Penicillins      No current facility-administered medications on file prior to encounter.     Current Outpatient Medications on File Prior to Encounter   Medication Sig    albuterol (PROVENTIL) 2.5 mg /3 mL (0.083 %)  nebulizer solution     albuterol sulfate 2.5 mg/0.5 mL Nebu Take 2.5 mg by nebulization every 6 (six) hours as needed. Rescue    amiodarone (PACERONE) 200 MG Tab Take 200 mg by mouth once daily.    atorvastatin (LIPITOR) 40 MG tablet Take 40 mg by mouth every evening.    clopidogreL (PLAVIX) 75 mg tablet Take 1 tablet by mouth Daily.    ELIQUIS 5 mg Tab Take 1 tablet by mouth 2 (two) times a day.    finasteride (PROSCAR) 5 mg tablet Take 1 tablet (5 mg total) by mouth once daily. (Patient taking differently: Take 5 mg by mouth nightly.)    furosemide (LASIX) 40 MG tablet Take 40 mg by mouth once daily.    levETIRAcetam (KEPPRA) 1000 MG tablet Take 1 tablet by mouth 2 (two) times a day.    lisinopriL (PRINIVIL,ZESTRIL) 5 MG tablet Take 1 tablet by mouth Daily.    metoprolol succinate (TOPROL-XL) 25 MG 24 hr tablet Take 1 tablet by mouth 2 (two) times daily.    midazolam (NAYZILAM) 5 mg/spray (0.1 mL) Spry 1 spray by Nasal route daily as needed (seizure). To left nostril    pantoprazole (PROTONIX) 40 MG tablet Take 40 mg by mouth Daily.    PHENobarbitaL 32.4 MG tablet Take 3 tablets by mouth Daily.    phenytoin (DILANTIN) 100 MG ER capsule Take 100 mg by mouth 2 (two) times daily.    primidone (MYSOLINE) 50 MG Tab Take 150 mg by mouth 2 (two) times a day.    tamsulosin (FLOMAX) 0.4 mg Cap Take 1 tablet by mouth nightly.    venlafaxine (EFFEXOR) 75 MG tablet Take 1 tablet (75 mg total) by mouth 2 (two) times daily.     Review of Systems   Constitutional:  Negative for chills and fever.   Respiratory:  Negative for chest tightness and shortness of breath.    Cardiovascular:  Negative for chest pain and palpitations.   Gastrointestinal:  Negative for abdominal pain, nausea and vomiting.   Skin: Negative.    Neurological: Negative.    Psychiatric/Behavioral: Negative.         Objective:     Vital Signs (Most Recent):  Temp: 97.3 °F (36.3 °C) (06/21/24 0428)  Pulse: (!) 57 (06/21/24 0428)  Resp: 16 (06/21/24 0428)  BP:  120/62 (06/21/24 0428)  SpO2: 95 % (06/21/24 0428) Vital Signs (24h Range):  Temp:  [97.3 °F (36.3 °C)-98.1 °F (36.7 °C)] 97.3 °F (36.3 °C)  Pulse:  [51-59] 57  Resp:  [16] 16  SpO2:  [91 %-96 %] 95 %  BP: (120-147)/(55-70) 120/62   Weight: 104.3 kg (230 lb)  Body mass index is 36.02 kg/m².  SpO2: 95 %       Intake/Output Summary (Last 24 hours) at 6/21/2024 0744  Last data filed at 6/21/2024 0611  Gross per 24 hour   Intake --   Output 2300 ml   Net -2300 ml     Lines/Drains/Airways       Peripheral Intravenous Line  Duration                  Peripheral IV - Single Lumen 20 G Anterior;Left Upper Arm -- days                  Significant Labs:  Recent Results (from the past 72 hour(s))   APTT    Collection Time: 06/18/24  1:21 PM   Result Value Ref Range    PTT 52.7 (H) 24.6 - 37.2 seconds   Troponin I    Collection Time: 06/18/24  1:21 PM   Result Value Ref Range    Troponin-I 2.283 (H) 0.000 - 0.045 ng/mL   PTT Heparin Monitoring    Collection Time: 06/18/24  8:55 PM   Result Value Ref Range    PTT Heparin Monitor 78.7 (H) 23.2 - 33.7 seconds   Path Review, Peripheral Smear    Collection Time: 06/18/24  8:55 PM   Result Value Ref Range    Peripheral Smear Evaluation       PERIPHERAL BLOOD:  Moderate leukocytosis with absolute lymphocytosis and smudge cells, suspicious for chronic lymphocytic leukemia/small lymphocytic lymphoma.  Red blood cells are normochromic and normocytic with little variation in size and shape.  There is no anemia.  Platelets are normal in number and morphology.  Comment:  The patient's clinical history of CLL, on observation, is noted.  Fernando Villatoro MD   Troponin I    Collection Time: 06/18/24  8:55 PM   Result Value Ref Range    Troponin-I 1.337 (H) 0.000 - 0.045 ng/mL   Lactate Dehydrogenase    Collection Time: 06/18/24  8:55 PM   Result Value Ref Range    Lactate Dehydrogenase 269 (H) 125 - 220 U/L   Uric Acid    Collection Time: 06/18/24  8:55 PM   Result Value Ref Range    Uric Acid 5.2  3.5 - 7.2 mg/dL   Troponin I    Collection Time: 06/19/24  4:40 AM   Result Value Ref Range    Troponin-I 0.950 (H) 0.000 - 0.045 ng/mL   Comprehensive Metabolic Panel    Collection Time: 06/19/24  4:40 AM   Result Value Ref Range    Sodium 140 136 - 145 mmol/L    Potassium 3.6 3.5 - 5.1 mmol/L    Chloride 107 98 - 107 mmol/L    CO2 25 23 - 31 mmol/L    Glucose 110 82 - 115 mg/dL    Blood Urea Nitrogen 12.8 8.4 - 25.7 mg/dL    Creatinine 0.84 0.73 - 1.18 mg/dL    Calcium 9.0 8.8 - 10.0 mg/dL    Protein Total 6.0 5.8 - 7.6 gm/dL    Albumin 3.0 (L) 3.4 - 4.8 g/dL    Globulin 3.0 2.4 - 3.5 gm/dL    Albumin/Globulin Ratio 1.0 (L) 1.1 - 2.0 ratio    Bilirubin Total 0.4 <=1.5 mg/dL    ALP 89 40 - 150 unit/L    ALT 14 0 - 55 unit/L    AST 18 5 - 34 unit/L    eGFR >60 mL/min/1.73/m2    Anion Gap 8.0 mEq/L    BUN/Creatinine Ratio 15    Magnesium    Collection Time: 06/19/24  4:40 AM   Result Value Ref Range    Magnesium Level 1.80 1.60 - 2.60 mg/dL   Phosphorus    Collection Time: 06/19/24  4:40 AM   Result Value Ref Range    Phosphorus Level 2.2 (L) 2.3 - 4.7 mg/dL   Phenobarbital Level    Collection Time: 06/19/24  4:40 AM   Result Value Ref Range    Phenobarbital Level 26.4 15.0 - 40.0 ug/ml   Phenytoin Level, Total    Collection Time: 06/19/24  4:40 AM   Result Value Ref Range    Phenytoin 5.6 (L) 10.0 - 20.0 ug/ml   APTT    Collection Time: 06/19/24  4:40 AM   Result Value Ref Range    .2 (HH) 23.2 - 33.7 seconds   CBC with Differential    Collection Time: 06/19/24  4:40 AM   Result Value Ref Range    WBC 32.54 (H) 4.50 - 11.50 x10(3)/mcL    RBC 4.44 (L) 4.70 - 6.10 x10(6)/mcL    Hgb 12.8 (L) 14.0 - 18.0 g/dL    Hct 38.5 (L) 42.0 - 52.0 %    MCV 86.7 80.0 - 94.0 fL    MCH 28.8 27.0 - 31.0 pg    MCHC 33.2 33.0 - 36.0 g/dL    RDW 14.6 11.5 - 17.0 %    Platelet 202 130 - 400 x10(3)/mcL    MPV 10.5 (H) 7.4 - 10.4 fL    NRBC% 0.0 %   Manual Differential    Collection Time: 06/19/24  4:40 AM   Result Value Ref Range     WBC 32.54 x10(3)/mcL    Neutrophils % 67 %    Lymphs % 26 %    Monocytes % 5 %    Eosinophils % 1 %    Metamyelocytes % 1 (H) <=0 %    Neutrophils Abs 21.8018 (H) 2.1 - 9.2 x10(3)/mcL    Lymphs Abs 8.4604 (H) 0.6 - 4.6 x10(3)/mcL    Monocytes Abs 1.627 (H) 0.1 - 1.3 x10(3)/mcL    Eosinophils Abs 0.3254 0 - 0.9 x10(3)/mcL    Platelets Normal Normal, Adequate    RBC Morph Normal Normal   APTT    Collection Time: 06/19/24  6:55 AM   Result Value Ref Range    PTT 43.8 (H) 23.2 - 33.7 seconds   Troponin I    Collection Time: 06/19/24  8:42 AM   Result Value Ref Range    Troponin-I 0.752 (H) 0.000 - 0.045 ng/mL   Echo    Collection Time: 06/19/24  8:54 AM   Result Value Ref Range    BSA 2.22 m2    Miguel's Biplane MOD Ejection Fraction 40 %    A2C EF 23 %    A4C EF 54 %    LVOT stroke volume 96.93 cm3    LVIDd 4.83 3.5 - 6.0 cm    LV Systolic Volume 41.30 mL    LV Systolic Volume Index 19.2 mL/m2    LVIDs 3.21 2.1 - 4.0 cm    LV ESV A2C 51.20 mL    LV Diastolic Volume 109.00 mL    LV ESV A4C 35.60 mL    LV Diastolic Volume Index 50.70 mL/m2    LV EDV A2C 46.363687645557228 mL    LV EDV A4C 41.30 mL    Left Ventricular End Systolic Volume by Teichholz Method 41.30 mL    Left Ventricular End Diastolic Volume by Teichholz Method 109.00 mL    IVS 1.63 (A) 0.6 - 1.1 cm    LVOT diameter 2.10 cm    LVOT area 3.5 cm2    FS 34 28 - 44 %    Left Ventricle Relative Wall Thickness 0.57 cm    Posterior Wall 1.37 (A) 0.6 - 1.1 cm    LV mass 306.25 g    LV Mass Index 142 g/m2    MV Peak E Ruben 0.65 m/s    TDI LATERAL 0.09 m/s    TDI SEPTAL 0.05 m/s    E/E' ratio 9.29 m/s    MV Peak A Ruben 0.69 m/s    TR Max Ruben 1.80 m/s    E/A ratio 0.94     E wave deceleration time 229.00 msec    LV SEPTAL E/E' RATIO 13.00 m/s    LV LATERAL E/E' RATIO 7.22 m/s    LVOT peak ruben 1.08 m/s    Left Ventricular Outflow Tract Mean Velocity 0.75 cm/s    Left Ventricular Outflow Tract Mean Gradient 3.00 mmHg    TAPSE 1.26 cm    RV/LV Ratio 0.61 cm    LA size  3.80 cm    LA volume (mod) 47.30 cm3    LA Volume Index (Mod) 22.0 mL/m2    AV regurgitation pressure 1/2 time 470 ms    AR Max Ruben 3.48 m/s    AV mean gradient 3 mmHg    AV peak gradient 5 mmHg    Ao peak ruben 1.11 m/s    Ao VTI 28.40 cm    LVOT peak VTI 28.00 cm    AV valve area 3.41 cm²    AV Velocity Ratio 0.97     AV index (prosthetic) 0.99     PINEDA by Velocity Ratio 3.37 cm²    MV mean gradient 2 mmHg    MV peak gradient 4 mmHg    MV stenosis pressure 1/2 time 85.00 ms    MV valve area p 1/2 method 2.59 cm2    MV valve area by continuity eq 2.87 cm2    MV VTI 33.8 cm    Triscuspid Valve Regurgitation Peak Gradient 13 mmHg    PV PEAK VELOCITY 0.91 m/s    PV peak gradient 3 mmHg    Mean e' 0.07 m/s    ZLVIDS -2.22     ZLVIDD -3.68     LA area A4C 14.30 cm2    LA area A2C 18.30 cm2    RVDD 2.96 cm    TV resting pulmonary artery pressure 16 mmHg    RV TB RVSP 5 mmHg    Est. RA pres 3 mmHg   APTT    Collection Time: 06/19/24  3:38 PM   Result Value Ref Range    PTT 35.3 (H) 23.2 - 33.7 seconds   APTT    Collection Time: 06/20/24 12:19 AM   Result Value Ref Range    .9 (H) 23.2 - 33.7 seconds   Comprehensive Metabolic Panel    Collection Time: 06/20/24  6:02 AM   Result Value Ref Range    Sodium 139 136 - 145 mmol/L    Potassium 3.3 (L) 3.5 - 5.1 mmol/L    Chloride 105 98 - 107 mmol/L    CO2 28 23 - 31 mmol/L    Glucose 92 82 - 115 mg/dL    Blood Urea Nitrogen 14.3 8.4 - 25.7 mg/dL    Creatinine 0.97 0.73 - 1.18 mg/dL    Calcium 8.8 8.8 - 10.0 mg/dL    Protein Total 5.6 (L) 5.8 - 7.6 gm/dL    Albumin 3.0 (L) 3.4 - 4.8 g/dL    Globulin 2.6 2.4 - 3.5 gm/dL    Albumin/Globulin Ratio 1.2 1.1 - 2.0 ratio    Bilirubin Total 0.4 <=1.5 mg/dL    ALP 83 40 - 150 unit/L    ALT 14 0 - 55 unit/L    AST 16 5 - 34 unit/L    eGFR >60 mL/min/1.73/m2    Anion Gap 6.0 mEq/L    BUN/Creatinine Ratio 15    Magnesium    Collection Time: 06/20/24  6:02 AM   Result Value Ref Range    Magnesium Level 1.80 1.60 - 2.60 mg/dL   CBC with  Differential    Collection Time: 06/20/24  6:02 AM   Result Value Ref Range    WBC 29.74 (H) 4.50 - 11.50 x10(3)/mcL    RBC 4.18 (L) 4.70 - 6.10 x10(6)/mcL    Hgb 12.0 (L) 14.0 - 18.0 g/dL    Hct 35.8 (L) 42.0 - 52.0 %    MCV 85.6 80.0 - 94.0 fL    MCH 28.7 27.0 - 31.0 pg    MCHC 33.5 33.0 - 36.0 g/dL    RDW 14.5 11.5 - 17.0 %    Platelet 190 130 - 400 x10(3)/mcL    MPV 10.6 (H) 7.4 - 10.4 fL    NRBC% 0.0 %   PTT Heparin Monitoring    Collection Time: 06/20/24  6:02 AM   Result Value Ref Range    PTT Heparin Monitor 77.7 (H) 23.2 - 33.7 seconds   Manual Differential    Collection Time: 06/20/24  6:02 AM   Result Value Ref Range    WBC 29.8 x10(3)/mcL    Neutrophils % 28 %    Lymphs % 71 %    Monocytes % 1 %    Neutrophils Abs 8.344 2.1 - 9.2 x10(3)/mcL    Lymphs Abs 21.158 (H) 0.6 - 4.6 x10(3)/mcL    Monocytes Abs 0.298 0.1 - 1.3 x10(3)/mcL    Platelets Normal Normal, Adequate    RBC Morph Normal Normal   Path Review, Peripheral Smear    Collection Time: 06/20/24  6:02 AM   Result Value Ref Range    Peripheral Smear Evaluation       PERIPHERAL BLOOD:  Mild-to-moderate leukocytosis with absolute lymphocytosis and smudge cells, consistent with the patient's clinical history with SLL/CLL.  Fernando Villatoro MD   Basic Metabolic Panel    Collection Time: 06/21/24 12:58 AM   Result Value Ref Range    Sodium 139 136 - 145 mmol/L    Potassium 3.7 3.5 - 5.1 mmol/L    Chloride 104 98 - 107 mmol/L    CO2 19 (L) 23 - 31 mmol/L    Glucose 92 82 - 115 mg/dL    Blood Urea Nitrogen 16.7 8.4 - 25.7 mg/dL    Creatinine 0.94 0.73 - 1.18 mg/dL    BUN/Creatinine Ratio 18     Calcium 9.1 8.8 - 10.0 mg/dL    Anion Gap 16.0 mEq/L    eGFR >60 mL/min/1.73/m2   Magnesium    Collection Time: 06/21/24 12:58 AM   Result Value Ref Range    Magnesium Level 1.90 1.60 - 2.60 mg/dL   CBC with Differential    Collection Time: 06/21/24  4:17 AM   Result Value Ref Range    WBC 28.26 (H) 4.50 - 11.50 x10(3)/mcL    RBC 4.45 (L) 4.70 - 6.10 x10(6)/mcL    Hgb  12.7 (L) 14.0 - 18.0 g/dL    Hct 38.7 (L) 42.0 - 52.0 %    MCV 87.0 80.0 - 94.0 fL    MCH 28.5 27.0 - 31.0 pg    MCHC 32.8 (L) 33.0 - 36.0 g/dL    RDW 14.2 11.5 - 17.0 %    Platelet 195 130 - 400 x10(3)/mcL    MPV 10.6 (H) 7.4 - 10.4 fL    Neut % 22.9 %    Lymph % 68.5 %    Mono % 6.4 %    Eos % 1.3 %    Basophil % 0.5 %    Lymph # 19.36 (H) 0.6 - 4.6 x10(3)/mcL    Neut # 6.49 2.1 - 9.2 x10(3)/mcL    Mono # 1.82 (H) 0.1 - 1.3 x10(3)/mcL    Eos # 0.36 0 - 0.9 x10(3)/mcL    Baso # 0.13 <=0.2 x10(3)/mcL    IG# 0.10 (H) 0 - 0.04 x10(3)/mcL    IG% 0.4 %    NRBC% 0.0 %   PTT Heparin Monitoring    Collection Time: 06/21/24  4:17 AM   Result Value Ref Range    PTT Heparin Monitor 47.3 (H) 23.2 - 33.7 seconds   Comprehensive Metabolic Panel    Collection Time: 06/21/24  4:17 AM   Result Value Ref Range    Sodium 141 136 - 145 mmol/L    Potassium 3.2 (L) 3.5 - 5.1 mmol/L    Chloride 101 98 - 107 mmol/L    CO2 26 23 - 31 mmol/L    Glucose 96 82 - 115 mg/dL    Blood Urea Nitrogen 16.6 8.4 - 25.7 mg/dL    Creatinine 0.91 0.73 - 1.18 mg/dL    Calcium 9.0 8.8 - 10.0 mg/dL    Protein Total 6.0 5.8 - 7.6 gm/dL    Albumin 3.2 (L) 3.4 - 4.8 g/dL    Globulin 2.8 2.4 - 3.5 gm/dL    Albumin/Globulin Ratio 1.1 1.1 - 2.0 ratio    Bilirubin Total 0.4 <=1.5 mg/dL    ALP 85 40 - 150 unit/L    ALT 15 0 - 55 unit/L    AST 16 5 - 34 unit/L    eGFR >60 mL/min/1.73/m2    Anion Gap 14.0 mEq/L    BUN/Creatinine Ratio 18    Magnesium    Collection Time: 06/21/24  4:17 AM   Result Value Ref Range    Magnesium Level 1.70 1.60 - 2.60 mg/dL   PTT Heparin Monitoring    Collection Time: 06/21/24  6:18 AM   Result Value Ref Range    PTT Heparin Monitor 49.3 (H) 23.2 - 33.7 seconds     Significant Imaging:    EKG:           Telemetry:  NSR    Physical Exam  Constitutional:       Appearance: Normal appearance.   HENT:      Head: Normocephalic.      Nose: Nose normal.   Cardiovascular:      Rate and Rhythm: Regular rhythm. Bradycardia present.      Pulses:  Normal pulses.      Heart sounds: Normal heart sounds. No murmur heard.  Pulmonary:      Effort: Pulmonary effort is normal. No respiratory distress.      Breath sounds: Normal breath sounds.   Abdominal:      General: Abdomen is flat. There is no distension.      Palpations: Abdomen is soft.   Skin:     General: Skin is warm.   Neurological:      Mental Status: He is alert and oriented to person, place, and time.   Psychiatric:         Mood and Affect: Mood normal.         Behavior: Behavior normal.         Judgment: Judgment normal.       Home Medications:   No current facility-administered medications on file prior to encounter.     Current Outpatient Medications on File Prior to Encounter   Medication Sig Dispense Refill    albuterol (PROVENTIL) 2.5 mg /3 mL (0.083 %) nebulizer solution       albuterol sulfate 2.5 mg/0.5 mL Nebu Take 2.5 mg by nebulization every 6 (six) hours as needed. Rescue      amiodarone (PACERONE) 200 MG Tab Take 200 mg by mouth once daily.      atorvastatin (LIPITOR) 40 MG tablet Take 40 mg by mouth every evening.      clopidogreL (PLAVIX) 75 mg tablet Take 1 tablet by mouth Daily.      ELIQUIS 5 mg Tab Take 1 tablet by mouth 2 (two) times a day.      finasteride (PROSCAR) 5 mg tablet Take 1 tablet (5 mg total) by mouth once daily. (Patient taking differently: Take 5 mg by mouth nightly.) 30 tablet 0    furosemide (LASIX) 40 MG tablet Take 40 mg by mouth once daily.      levETIRAcetam (KEPPRA) 1000 MG tablet Take 1 tablet by mouth 2 (two) times a day.      lisinopriL (PRINIVIL,ZESTRIL) 5 MG tablet Take 1 tablet by mouth Daily.      metoprolol succinate (TOPROL-XL) 25 MG 24 hr tablet Take 1 tablet by mouth 2 (two) times daily.      midazolam (NAYZILAM) 5 mg/spray (0.1 mL) Spry 1 spray by Nasal route daily as needed (seizure). To left nostril      pantoprazole (PROTONIX) 40 MG tablet Take 40 mg by mouth Daily.      PHENobarbitaL 32.4 MG tablet Take 3 tablets by mouth Daily.      phenytoin  (DILANTIN) 100 MG ER capsule Take 100 mg by mouth 2 (two) times daily.      primidone (MYSOLINE) 50 MG Tab Take 150 mg by mouth 2 (two) times a day.      tamsulosin (FLOMAX) 0.4 mg Cap Take 1 tablet by mouth nightly.      venlafaxine (EFFEXOR) 75 MG tablet Take 1 tablet (75 mg total) by mouth 2 (two) times daily. 60 tablet 0     Current Inpatient Medications:    Current Facility-Administered Medications:     acetaminophen tablet 650 mg, 650 mg, Oral, Q4H PRN, Darrel Freire MD    aluminum-magnesium hydroxide-simethicone 200-200-20 mg/5 mL suspension 30 mL, 30 mL, Oral, QID PRN, Darrel Freire MD    amiodarone tablet 200 mg, 200 mg, Oral, Daily, Darrel Freire MD, 200 mg at 06/20/24 0830    aspirin EC tablet 81 mg, 81 mg, Oral, Daily, Darrel Freire MD, 81 mg at 06/20/24 0830    atorvastatin tablet 40 mg, 40 mg, Oral, QHS, Darrel Freire MD, 40 mg at 06/20/24 2046    clopidogreL tablet 75 mg, 75 mg, Oral, Daily, Darrel Freire MD, 75 mg at 06/20/24 1730    finasteride tablet 5 mg, 5 mg, Oral, Nightly, Darrel Freire MD, 5 mg at 06/20/24 2046    furosemide injection 40 mg, 40 mg, Intravenous, Q12H, Darrel Freire MD, 40 mg at 06/20/24 2047    heparin 25,000 units in dextrose 5% (100 units/ml) IV bolus from bag LOW INTENSITY nomogram - LAF, 49.1 Units/kg (Adjusted), Intravenous, PRN, Darrel Freire MD, 4,000 Units at 06/19/24 1731    heparin 25,000 units in dextrose 5% (100 units/ml) IV bolus from bag LOW INTENSITY nomogram - LAF, 30 Units/kg (Adjusted), Intravenous, PRN, Darrel Freire MD    heparin 25,000 units in dextrose 5% 250 mL (100 units/mL) infusion LOW INTENSITY nomogram - LAF, 0-40 Units/kg/hr (Adjusted), Intravenous, Continuous, Darrel Freire MD, Last Rate: 10.6 mL/hr at 06/20/24 1738, 13 Units/kg/hr at 06/20/24 1738    levETIRAcetam tablet 1,000 mg, 1,000 mg, Oral, BID, Darrel Freire MD, 1,000 mg at 06/20/24 2046    lisinopriL tablet 5 mg, 5 mg, Oral, Daily, Darrel Freire MD, 5 mg at 06/20/24 1730    LORazepam  (ATIVAN) injection 2 mg, 2 mg, Intravenous, Q10 Min PRN, Darrel Freire MD    magnesium sulfate 2g in water 50mL IVPB (premix), 4 g, Intravenous, Once, Kyra Machuca AGACNP-BC    melatonin tablet 6 mg, 6 mg, Oral, Nightly PRN, Darrel Freire MD    metoprolol succinate (TOPROL-XL) 24 hr tablet 25 mg, 25 mg, Oral, BID, Darrel Freire MD, 25 mg at 06/19/24 2029    miconazole NITRATE 2 % top powder, , Topical (Top), BID, Darrel Freire MD, Given at 06/20/24 2047    ondansetron injection 4 mg, 4 mg, Intravenous, Q4H PRN, Darrel Freire MD    pantoprazole EC tablet 40 mg, 40 mg, Oral, Daily, Darrel Freire MD, 40 mg at 06/20/24 1730    PHENobarbitaL tablet 97.2 mg, 97.2 mg, Oral, Daily, Darrel Freire MD, 97.2 mg at 06/20/24 0925    phenytoin (DILANTIN) ER capsule 100 mg, 100 mg, Oral, BID, Darrel Freire MD, 100 mg at 06/20/24 2046    phenytoin (DILANTIN) tablet 100 mg, 100 mg, Oral, Once, Darrel Freire MD    polyethylene glycol packet 17 g, 17 g, Oral, BID PRN, Darrel Freire MD    potassium chloride 20 mEq in 100 mL IVPB (FOR CENTRAL LINE ADMINISTRATION ONLY), 20 mEq, Intravenous, Once, Maximus Plaza MD    primidone tablet 150 mg, 150 mg, Oral, BID, Darrel Freire MD, 150 mg at 06/20/24 2206    prochlorperazine injection Soln 5 mg, 5 mg, Intravenous, Q6H PRN, Darrel Freire MD    senna-docusate 8.6-50 mg per tablet 2 tablet, 2 tablet, Oral, BID PRN, Darrel Freire MD    sodium chloride 0.9% flush 10 mL, 10 mL, Intravenous, PRN, Darrel Freire MD    tamsulosin 24 hr capsule 0.4 mg, 0.4 mg, Oral, Nightly, Darrel Freire MD, 0.4 mg at 06/20/24 2046    venlafaxine tablet 75 mg, 75 mg, Oral, BID, Darrel Freire MD, 75 mg at 06/20/24 2046  VTE Risk Mitigation (From admission, onward)           Ordered     heparin 25,000 units in dextrose 5% (100 units/ml) IV bolus from bag LOW INTENSITY nomogram - [facility]  As needed (PRN)        Question:  Heparin Infusion Adjustment (DO NOT MODIFY ANSWER)  Answer:   [email or web address]\Images\Pharmacy\HeparinInfusions\heparin LOW INTENSITY nomogram for [facility] [id number]    06/18/24 2044     heparin 25,000 units in dextrose 5% (100 units/ml) IV bolus from bag LOW INTENSITY nomogram - [facility]  As needed (PRN)        Question:  Heparin Infusion Adjustment (DO NOT MODIFY ANSWER)  Answer:  \\[email or web address]\epic\Images\Pharmacy\HeparinInfusions\heparin LOW INTENSITY nomogram for [facility] [id number]    06/18/24 2044     IP VTE HIGH RISK PATIENT  Once         06/18/24 2103     Place sequential compression device  Until discontinued         06/18/24 2103     heparin 25,000 units in dextrose 5% 250 mL (100 units/mL) infusion LOW INTENSITY nomogram - [facility]  Continuous        Question:  Begin at (units/kg/hr)  Answer:  12    06/18/24 2044                  Assessment:   NSTEMI likely type II s/t active seizure activity and hypoxia    - Trop: 0.066 --> 1.602 --> 2.5553 --> 2.283 --> 1.337 --> 0.95 --> 0.752.    - EKG shows no acute ST changes   - no complaints of Chest pain   CAD s/p PCI    - [facility] (11.2.21):PCI LAD & Ramus   PAF ~ currently SB   - [id number] = 7 points   - On Eliquis as an outpatient ~ last dose 6.17.24   - DCCV 10.2021  Chronic HFpEF   - ECHO (6.19.24): LVEF 55-60%   - ECHO (10.24.21): LVEF 25-30%  Hx of Ischemic CMO  Epilepsy  Hx of TIA  HTN  HLD   - LDL 58 on recent labs in clinic   GERD  Depression  Obesity         Plan:   EKG reviewed   Will continue heparin gtt for 48 hours  Cont. Plavix 75mg, Statin 40mg, and ASA 81mg   Hold Eliquis for now ~ will restart closer to D/C for CVA risk reduction in the setting of Afib  Cont. Metoprolol succinate 25mg & Amio 200mg   Will defer Seizure management to primary team  AM Labs: CBC, CMP, Mag        Thank you for your consult.     Alysha Dawkins NP  Cardiology  [facility] - [address] [place] [address]  06/21/2024

## 2024-06-21 NOTE — PT/OT/SLP PROGRESS
[facility]  Speech Language Pathology Department  Dysphagia Therapy Progress Note    Patient Name:  John Wayne   MRN:  [id number]  Admitting Diagnosis: seizure like activity with rising troponin     Recommendations     General recommendations:  dysphagia therapy;  MD may consider GI consult to rule out esophageal dysphagia if symptoms perisist   Solid texture recommendation:  Soft & Bite Sized Diet - IDDSI Level 6  Liquid consistency recommendation: Mildly thick liquids - IDDSI Level 2   Medications: whole in mildly thick liquids   Aspiration precautions: small bites/sips, NO straws, upright for PO intake, and upright 30 minutes after intake      Discharge therapy intensity: Moderate Intensity Therapy   Barriers to safe discharge:  acuity of illness    Subjective     Patient awake, alert, and cooperative.  Spiritual/Cultural/Cheondoism Beliefs/Practices that affect care: no    Pain/Comfort: Pain Rating 1: 0/10    Respiratory Status:  room air    Objective     Therapeutic Activities:  Pt completed laryngeal exercises x40 with minimal-moderate cues.      Assessment     Pt continues to present with oropharyngeal dysphagia requiring diet modification to reduce the risk of aspiration.    Goals     Multidisciplinary Problems       SLP Goals          Problem: SLP    Goal Priority Disciplines Outcome   SLP Goal     SLP Progressing   Description: LTG:  Pt will tolerate the least restrictive PO diet with no clinical signs/sx of aspiration.  STGs:  1. Laryngeal elevation exercises and armand, min assist  2.  Thermal stimulation, 100% swallow response, with effortful swallow, min assist                     Patient Education     Patient provided with verbal education regarding swallow function.  Understanding was verbalized.    Plan     Will continue to follow and tx as appropriate.    SLP Follow-Up:  Yes   Patient to be seen:  5 x/week   Plan of Care expires:  07/03/24  Plan of Care reviewed with:   patient       Time Tracking     SLP Treatment Date:   06/21/24  Speech Start Time:  1350  Speech Stop Time:  1400     Speech Total Time (min):  10 min    Billable minutes:  Treatment of Swallow Dysfunction, 10 minutes       06/21/2024

## 2024-06-22 LAB
ABS NEUT (OLG): 16.82 X10(3)/MCL (ref 2.1–9.2)
ALBUMIN SERPL-MCNC: 3.5 G/DL (ref 3.4–4.8)
ALBUMIN/GLOB SERPL: 1.1 RATIO (ref 1.1–2)
ALP SERPL-CCNC: 99 UNIT/L (ref 40–150)
ALT SERPL-CCNC: 18 UNIT/L (ref 0–55)
ANION GAP SERPL CALC-SCNC: 12 MEQ/L
APTT PPP: 103.8 SECONDS (ref 23.2–33.7)
APTT PPP: 78 SECONDS (ref 23.2–33.7)
AST SERPL-CCNC: 20 UNIT/L (ref 5–34)
BILIRUB SERPL-MCNC: 0.4 MG/DL
BUN SERPL-MCNC: 13.7 MG/DL (ref 8.4–25.7)
CALCIUM SERPL-MCNC: 9.6 MG/DL (ref 8.8–10)
CHLORIDE SERPL-SCNC: 103 MMOL/L (ref 98–107)
CO2 SERPL-SCNC: 26 MMOL/L (ref 23–31)
CREAT SERPL-MCNC: 0.94 MG/DL (ref 0.73–1.18)
CREAT/UREA NIT SERPL: 15
EOSINOPHIL NFR BLD MANUAL: 1.68 X10(3)/MCL (ref 0–0.9)
EOSINOPHIL NFR BLD MANUAL: 6 %
ERYTHROCYTE [DISTWIDTH] IN BLOOD BY AUTOMATED COUNT: 14.1 % (ref 11.5–17)
GFR SERPLBLD CREATININE-BSD FMLA CKD-EPI: >60 ML/MIN/1.73/M2
GLOBULIN SER-MCNC: 3.2 GM/DL (ref 2.4–3.5)
GLUCOSE SERPL-MCNC: 94 MG/DL (ref 82–115)
HCT VFR BLD AUTO: 42.4 % (ref 42–52)
HEMATOLOGIST REVIEW: NORMAL
HGB BLD-MCNC: 14.1 G/DL (ref 14–18)
INSTRUMENT WBC (OLG): 28.03 X10(3)/MCL
LYMPHOCYTES NFR BLD MANUAL: 26 %
LYMPHOCYTES NFR BLD MANUAL: 7.29 X10(3)/MCL
MAGNESIUM SERPL-MCNC: 1.9 MG/DL (ref 1.6–2.6)
MCH RBC QN AUTO: 28.8 PG (ref 27–31)
MCHC RBC AUTO-ENTMCNC: 33.3 G/DL (ref 33–36)
MCV RBC AUTO: 86.7 FL (ref 80–94)
MONOCYTES NFR BLD MANUAL: 1.96 X10(3)/MCL (ref 0.1–1.3)
MONOCYTES NFR BLD MANUAL: 7 %
NEUTROPHILS NFR BLD MANUAL: 60 %
NRBC BLD AUTO-RTO: 0 %
PLATELET # BLD AUTO: 217 X10(3)/MCL (ref 130–400)
PLATELET # BLD EST: NORMAL 10*3/UL
PMV BLD AUTO: 10.6 FL (ref 7.4–10.4)
POTASSIUM SERPL-SCNC: 3.4 MMOL/L (ref 3.5–5.1)
PROMYELOCYTES # BLD MANUAL: 1 %
PROT SERPL-MCNC: 6.7 GM/DL (ref 5.8–7.6)
RBC # BLD AUTO: 4.89 X10(6)/MCL (ref 4.7–6.1)
RBC MORPH BLD: NORMAL
SODIUM SERPL-SCNC: 141 MMOL/L (ref 136–145)
WBC # BLD AUTO: 28.03 X10(3)/MCL (ref 4.5–11.5)

## 2024-06-22 PROCEDURE — 36415 COLL VENOUS BLD VENIPUNCTURE: CPT | Performed by: INTERNAL MEDICINE

## 2024-06-22 PROCEDURE — 83735 ASSAY OF MAGNESIUM: CPT | Performed by: INTERNAL MEDICINE

## 2024-06-22 PROCEDURE — 21400001 HC TELEMETRY ROOM

## 2024-06-22 PROCEDURE — 80053 COMPREHEN METABOLIC PANEL: CPT | Performed by: INTERNAL MEDICINE

## 2024-06-22 PROCEDURE — 25000003 PHARM REV CODE 250: Performed by: INTERNAL MEDICINE

## 2024-06-22 PROCEDURE — 85730 THROMBOPLASTIN TIME PARTIAL: CPT | Performed by: INTERNAL MEDICINE

## 2024-06-22 PROCEDURE — 85007 BL SMEAR W/DIFF WBC COUNT: CPT | Performed by: INTERNAL MEDICINE

## 2024-06-22 PROCEDURE — 25000242 PHARM REV CODE 250 ALT 637 W/ HCPCS: Performed by: INTERNAL MEDICINE

## 2024-06-22 PROCEDURE — 63600175 PHARM REV CODE 636 W HCPCS: Performed by: INTERNAL MEDICINE

## 2024-06-22 RX ADMIN — FINASTERIDE 5 MG: 5 TABLET, FILM COATED ORAL at 08:06

## 2024-06-22 RX ADMIN — ASPIRIN 81 MG: 81 TABLET, COATED ORAL at 09:06

## 2024-06-22 RX ADMIN — MICONAZOLE NITRATE: 20 POWDER TOPICAL at 08:06

## 2024-06-22 RX ADMIN — LEVETIRACETAM 1000 MG: 500 TABLET, FILM COATED ORAL at 09:06

## 2024-06-22 RX ADMIN — TAMSULOSIN HYDROCHLORIDE 0.4 MG: 0.4 CAPSULE ORAL at 08:06

## 2024-06-22 RX ADMIN — FUROSEMIDE 40 MG: 10 INJECTION, SOLUTION INTRAVENOUS at 08:06

## 2024-06-22 RX ADMIN — Medication 400 MG: at 08:06

## 2024-06-22 RX ADMIN — Medication 400 MG: at 09:06

## 2024-06-22 RX ADMIN — ATORVASTATIN CALCIUM 40 MG: 40 TABLET, FILM COATED ORAL at 08:06

## 2024-06-22 RX ADMIN — METOPROLOL SUCCINATE 25 MG: 25 TABLET, EXTENDED RELEASE ORAL at 09:06

## 2024-06-22 RX ADMIN — AMIODARONE HYDROCHLORIDE 200 MG: 200 TABLET ORAL at 09:06

## 2024-06-22 RX ADMIN — PHENOBARBITAL 97.2 MG: 97.2 TABLET ORAL at 09:06

## 2024-06-22 RX ADMIN — LEVETIRACETAM 1000 MG: 500 TABLET, FILM COATED ORAL at 08:06

## 2024-06-22 RX ADMIN — PANTOPRAZOLE SODIUM 40 MG: 40 TABLET, DELAYED RELEASE ORAL at 05:06

## 2024-06-22 RX ADMIN — MICONAZOLE NITRATE: 20 POWDER TOPICAL at 09:06

## 2024-06-22 RX ADMIN — VENLAFAXINE 75 MG: 37.5 TABLET ORAL at 08:06

## 2024-06-22 RX ADMIN — METOPROLOL SUCCINATE 25 MG: 25 TABLET, EXTENDED RELEASE ORAL at 08:06

## 2024-06-22 RX ADMIN — CLOPIDOGREL BISULFATE 75 MG: 75 TABLET ORAL at 05:06

## 2024-06-22 RX ADMIN — LISINOPRIL 5 MG: 5 TABLET ORAL at 05:06

## 2024-06-22 RX ADMIN — PRIMIDONE 150 MG: 50 TABLET ORAL at 08:06

## 2024-06-22 RX ADMIN — HEPARIN SODIUM 19 UNITS/KG/HR: 10000 INJECTION, SOLUTION INTRAVENOUS at 09:06

## 2024-06-22 RX ADMIN — FUROSEMIDE 40 MG: 10 INJECTION, SOLUTION INTRAVENOUS at 09:06

## 2024-06-22 RX ADMIN — PRIMIDONE 150 MG: 50 TABLET ORAL at 09:06

## 2024-06-22 RX ADMIN — VENLAFAXINE 75 MG: 37.5 TABLET ORAL at 09:06

## 2024-06-22 RX ADMIN — PHENYTOIN SODIUM 100 MG: 100 CAPSULE ORAL at 09:06

## 2024-06-22 RX ADMIN — PHENYTOIN SODIUM 100 MG: 100 CAPSULE ORAL at 08:06

## 2024-06-22 NOTE — PROGRESS NOTES
[facility] Hospital Medicine Progress Note        Chief Complaint: Inpatient Follow-up for seizure, NSTEMI    HPI:     78-year-old male with medical history as detailed below and notable for CAD/multiple PCI last being November 20, 2022, ischemic cardiomyopathy/Chronic HFrEF, paroxysmal AFib, and epilepsy who currently lives in a nursing home present to the ED on 06/17/2024 initially with seizure-like activity followed by hypoxia and found to have subtherapeutic phenytoin level given extra dose of phenytoin and discharged back to the nursing home.  Returned again on early morning of 06/18/2024 with recurrent breakthrough seizure and hypoxemia, no further seizure activity in the ED but noted to have elevated rising troponin for which Cardiology consulted and recommended initiation of heparin and transferred to [facility] for Cardiology evaluation.     Upon my evaluation he is resting comfortably currently on OxyMask, troponin level downtrending denying any active chest pain but report being short of breath and on auscultation he has diffuse crackles chest x-ray reviewed and show cardiomegaly and pulmonary edema and EKG NSR, interval changes in lateral leads/possible lateral infarct.  TTE showed intact EF, normal diastolic function, aortic valve sclerosis.  Stay was complicated with bradycardia.  Full-dose anticoagulation was continued.  Blood cultures remained negative but leukocytosis was continued.    Interval Hx:     Afebrile.  Borderline asymptomatic bradycardia with mildly elevated blood pressure seen.  He appeared more comfortable today doing well on room air.  He has no new complaints or concerns.  Tolerating p.o..  Moving bowels.      CT chest with contrast obtained yesterday showed mild ground-glass favoring mid upper lungs was seen which could be infectious or inflammatory.  Mild pulmonary enema was possibility.  Aspiration was suspected.  Respiratory panel came back negative.       Labs this morning showing persistent neutrophilic leukocytosis with stable hemoglobin and platelets.  There is elevated lymphocytes monocytes and eosinophils..  Currently on heparin.  Mild hypokalemia noted.    Objective/physical exam:  Vitals:    06/22/24 0439 06/22/24 0500 06/22/24 0743 06/22/24 0903   BP: (!) 156/66  (!) 144/68 (!) 144/68   BP Location:   Right arm    Patient Position:   Lying    Pulse: (!) 57  (!) 56    Resp:   16    Temp: 97.8 °F (36.6 °C)  97.6 °F (36.4 °C)    TempSrc: Oral  Oral    SpO2: (!) 94%  (!) 94%    Weight:  115.4 kg (254 lb 6.6 oz)     Height:         General: In no acute distress, afebrile  Respiratory: Clear to auscultation bilaterally  Cardiovascular: S1, S2, no appreciable murmur  Abdomen: Soft, nontender, BS +  MSK: Warm, lower extremity edema, no clubbing or cyanosis  Neurologic: Alert and oriented x4, moving all extremities with good strength     Lab Results   Component Value Date     06/22/2024    K 3.4 (L) 06/22/2024     06/22/2024    CO2 26 06/22/2024    BUN 13.7 06/22/2024    CREATININE 0.94 06/22/2024    CALCIUM 9.6 06/22/2024    ANIONGAP 10 12/11/2022    ESTGFRAFRICA 135 01/09/2021    EGFRNONAA >60 04/13/2022      Lab Results   Component Value Date    ALT 18 06/22/2024    AST 20 06/22/2024    ALKPHOS 99 06/22/2024    BILITOT 0.4 06/22/2024      Lab Results   Component Value Date    WBC 28.03 (H) 06/22/2024    WBC 28.03 06/22/2024    HGB 14.1 06/22/2024    HCT 42.4 06/22/2024    MCV 86.7 06/22/2024     06/22/2024           Medications:   amiodarone  200 mg Oral Daily    aspirin  81 mg Oral Daily    atorvastatin  40 mg Oral QHS    clopidogreL  75 mg Oral Daily    finasteride  5 mg Oral Nightly    furosemide (LASIX) injection  40 mg Intravenous Q12H    levETIRAcetam  1,000 mg Oral BID    lisinopriL  5 mg Oral Daily    magnesium oxide  400 mg Oral BID    metoprolol succinate  25 mg Oral BID    miconazole NITRATE 2 %   Topical (Top) BID    pantoprazole   40 mg Oral Daily    PHENobarbitaL  97.2 mg Oral Daily    phenytoin  100 mg Oral BID    phenytoin  100 mg Oral Once    primidone  150 mg Oral BID    tamsulosin  0.4 mg Oral Nightly    venlafaxine  75 mg Oral BID        Current Facility-Administered Medications:     acetaminophen, 650 mg, Oral, Q4H PRN    aluminum-magnesium hydroxide-simethicone, 30 mL, Oral, QID PRN    heparin (PORCINE), 49.1 Units/kg (Adjusted), Intravenous, PRN    heparin (PORCINE), 30 Units/kg (Adjusted), Intravenous, PRN    lorazepam, 2 mg, Intravenous, Q10 Min PRN    melatonin, 6 mg, Oral, Nightly PRN    ondansetron, 4 mg, Intravenous, Q4H PRN    polyethylene glycol, 17 g, Oral, BID PRN    prochlorperazine, 5 mg, Intravenous, Q6H PRN    senna-docusate 8.6-50 mg, 2 tablet, Oral, BID PRN    sodium chloride 0.9%, 10 mL, Intravenous, PRN     Assessment/Plan:    Breakthrough seizure-subtherapeutic level at admission  NSTEMI requiring heparin drip   Bradycardia-?  medication induced-stable      HX:  CLL in observation, CAD s/p PCI, chronic HFrEF, chronic AFib on Eliquis, CLL, HTN, HLD, GERD, PVD s/p stenting, anxiety, depression, history of TIA, chronic BPH, obesity    Plan:  -CT chest findings reviewed.  Low suspicion for active inflammation but underlying prior aspiration is a possibility  -cardiology following.  Currently on heparin drip.  Eliquis on hold.  TTE reviewed.  Renew current management with aspirin, Plavix, metoprolol, lisinopril, statin with Lasix.    -continue telemetry.  Hold metoprolol for symptomatic bradycardia.  Continue amiodarone  -replete potassium  -phenytoin resumed.  Continue Keppra, phenobarbital.  Keep seizure precautions.  Needs compliance  -other home medications were reviewed and renewed    Protonix              Maximus Plaza MD

## 2024-06-22 NOTE — PROGRESS NOTES
[facility] - [facility] Medical Telemetry    Cardiology  Progress Note    Patient Name: John Wayne  MRN: [id number]  Admission Date: 6/18/2024  Hospital Length of Stay: 4 days  Code Status: DNR   Attending Physician: Maximus Plaza MD   Primary Care Physician: ISAC Schmidt MD (Inactive)  Expected Discharge Date:   Principal Problem:NSTEMI (non-ST elevated myocardial infarction)    Subjective:     Brief HPI/Hospital Course:  78-year-old male that is known to [facility]/Dr. Ramirez with a PMHx of PAF on Eliquis, LESTER, PVD, HHD, HLD, HTN, Obesity, CAD, GERD, COPD, CHF. He is a nursing home resident who was brought to ED on 6.17.24 with seizure like activity followed by hypoxia and found to subtherapeutic phenytoin levels, He was given an extra dose of Phenytoin and was discharged back to the nursing home. He was brought back to [facility] on 6.18.24 for recurrent breakthrough seizure and hypoxemia, no further seizure activity in the ED. VS on admit: HR 62, /61, SpO2 93%, Temp 98.6F. Labwork was significant EBC 44.92, H/H 13.6/43.0, , K 4.1, BUN/Cr 15.0/1.11, Mag 2.0, AST/ALT 22/23, .7, Trop: 0.066 --> 1.602 --> 2.5553 --> 2.283 --> 1.337 --> 0.95 --> 0.752. EKG shows no acute ST changes. [facility] was consulted for NSTEMI.     6.22.24: NAD. Denies CP, SOB, palpitations. Comfortably resting. Labs stable. VSS.      PMH: PAF on Eliquis, LESTER, PVD, HHD, HLD, HTN, Obesity, CAD, GERD, [place], CLL, COPD, TIA, CHF, Epilepsy  PSH: [facility] with PCI, Bare metal stent B illiac vessels  Family History: Father: CAD, Mother: HTN  Social History: Former tobacco use, denies ETOH or illict drug use      Previous Cardiac Diagnostics:   ECHO (6.19.24):  Left Ventricle: The left ventricle is normal in size. Mildly increased wall thickness. There is normal systolic function with a visually estimated ejection fraction of 55 - 60%. There is normal diastolic function.  Right Ventricle: Normal right ventricular cavity size.  Systolic function is moderately reduced.  Aortic Valve: There is aortic valve sclerosis. There is mild (1+) aortic regurgitation.  Mitral Valve: There is mild (1+) regurgitation.  Tricuspid Valve: There is mild (1+) regurgitation.  Pulmonic Valve: There is mild (1+) regurgitation.  The estimated pulmonary artery systolic pressure is 16 mmHg.     ECHO (12.8.22):  The left ventricle is normal in size with mild concentric hypertrophy and mildly decreased systolic function.  The estimated ejection fraction is 45%.  Grade I left ventricular diastolic dysfunction.  There are segmental left ventricular wall motion abnormalities.  Normal right ventricular size with normal right ventricular systolic function.  Mild tricuspid regurgitation.  Mild pulmonic regurgitation.  Elevated central venous pressure (15 mmHg).  The estimated PA systolic pressure is 41 mmHg.  There is pulmonary hypertension.     ECHO (2.17.22):  The study quality is below average.   The left ventricle is normal in size. Global left ventricular systolic function is normal. The left ventricular ejection fraction is 60%. Left ventricular diastolic function is normal. Noted left ventricular hypertrophy. Asymmetric septal left ventricular hypertrophy is present. It is mil  Mitral regurgitation is noted. Mild to moderate (1-2+) mitral regurgitation.  Evidence of pulmonic regurgitation is noted. Mild to moderate (1-2+) pulmonic regurgitation.  Evidence of tricuspid regurgitation is present. Mild (1+) tricuspid regurgitation.  The pulmonary artery systolic pressure is 37 mmHg. The pulmonary artery appears to be normal.     [facility] (11.2.21):  LM: patent with distal calcified stenosis with MLA 6.5mm  LAD: Significant stenosis noted from the ostium down down into the mid/distal area. Proximal/mid vessel has significant ISR noted with heavy fibrosis of proximal a 95%. Post laser and balloon angioplasty less than then 40% residual stenosis. Ostial diagonal 1 with 99%  lesion from the jailed previously placed LAD Stent  RI: Proximal 95% lesion treated with 2.5 mm balloon angioplasty with less than 30% residual  stenosis   RCA: widely patent with luminal irregularities   Normal left ventricular end-diastolic pressure  No aortic stenosis      ECHO (10.24.21):  Limited visualization defiantly used   Systolic function is severely reduced with EF of 25-30%. Regional wall motion abnormality. Anteroseptal and apical akinesis   Grade II diastolic dysfunction, elevated left atrial pressure  Normal right ventricule with preserved RV systolic function  PASP 55mmHg. Moderate pulmonary hypertension  Estimated CVP 15mmHg on inferior vena cava hemodynamics      Holter Monitor (1.14.20):  This is an average quality study.   Predominant rhythm is atrial flutter.   The minimum heart rate recorded was 41 beats / minute (1/10/2020). The maximum heart rate is 128 beats / minute (1/10/2020). The mean heart rate is 77 beats / minute.   Rare premature ventricular contractions noted.    No pauses were noted     BLE Venous US (2.21.17):  The study quality is below average.   The superficial venous vessels appear prominent and contains phasic flow throughout.  Severe edema also noted in lower right extremity.  There is no reflux noted in the right SSV.  There is 2.2 seconds of reflux noted in the left common femoral vein.    There is 3 seconds of reflux noted in the left popliteal vein.   The superficial venous vessels appear prominent and contains phasic flow throughout.  Severe edema also noted in lower left extremity.  There is no reflux noted in the left SSV.    Review of Systems   Constitutional: Negative for malaise/fatigue.   Cardiovascular:  Negative for chest pain, dyspnea on exertion, irregular heartbeat and palpitations.   Respiratory:  Negative for cough, shortness of breath and sputum production.    Musculoskeletal:  Negative for muscle weakness.   Gastrointestinal:  Negative for constipation.    Neurological:  Negative for focal weakness.   Psychiatric/Behavioral:  Negative for altered mental status.      Objective:     Vital Signs (Most Recent):  Temp: 97.5 °F (36.4 °C) (06/22/24 1130)  Pulse: 61 (06/22/24 1130)  Resp: 20 (06/22/24 1130)  BP: (!) 147/65 (06/22/24 1130)  SpO2: (!) 92 % (06/22/24 1130) Vital Signs (24h Range):  Temp:  [97.4 °F (36.3 °C)-98 °F (36.7 °C)] 97.5 °F (36.4 °C)  Pulse:  [53-64] 61  Resp:  [16-20] 20  SpO2:  [91 %-94 %] 92 %  BP: (134-156)/(60-72) 147/65   Weight: 115.4 kg (254 lb 6.6 oz)  Body mass index is 39.85 kg/m².  SpO2: (!) 92 %       Intake/Output Summary (Last 24 hours) at 6/22/2024 1135  Last data filed at 6/21/2024 2000  Gross per 24 hour   Intake --   Output 120 ml   Net -120 ml     Lines/Drains/Airways       Peripheral Intravenous Line  Duration                  Peripheral IV - Single Lumen 20 G Anterior;Left Upper Arm -- days                  Significant Labs:   Recent Results (from the past 72 hour(s))   APTT    Collection Time: 06/19/24  3:38 PM   Result Value Ref Range    PTT 35.3 (H) 23.2 - 33.7 seconds   APTT    Collection Time: 06/20/24 12:19 AM   Result Value Ref Range    .9 (H) 23.2 - 33.7 seconds   Comprehensive Metabolic Panel    Collection Time: 06/20/24  6:02 AM   Result Value Ref Range    Sodium 139 136 - 145 mmol/L    Potassium 3.3 (L) 3.5 - 5.1 mmol/L    Chloride 105 98 - 107 mmol/L    CO2 28 23 - 31 mmol/L    Glucose 92 82 - 115 mg/dL    Blood Urea Nitrogen 14.3 8.4 - 25.7 mg/dL    Creatinine 0.97 0.73 - 1.18 mg/dL    Calcium 8.8 8.8 - 10.0 mg/dL    Protein Total 5.6 (L) 5.8 - 7.6 gm/dL    Albumin 3.0 (L) 3.4 - 4.8 g/dL    Globulin 2.6 2.4 - 3.5 gm/dL    Albumin/Globulin Ratio 1.2 1.1 - 2.0 ratio    Bilirubin Total 0.4 <=1.5 mg/dL    ALP 83 40 - 150 unit/L    ALT 14 0 - 55 unit/L    AST 16 5 - 34 unit/L    eGFR >60 mL/min/1.73/m2    Anion Gap 6.0 mEq/L    BUN/Creatinine Ratio 15    Magnesium    Collection Time: 06/20/24  6:02 AM   Result Value  Ref Range    Magnesium Level 1.80 1.60 - 2.60 mg/dL   CBC with Differential    Collection Time: 06/20/24  6:02 AM   Result Value Ref Range    WBC 29.74 (H) 4.50 - 11.50 x10(3)/mcL    RBC 4.18 (L) 4.70 - 6.10 x10(6)/mcL    Hgb 12.0 (L) 14.0 - 18.0 g/dL    Hct 35.8 (L) 42.0 - 52.0 %    MCV 85.6 80.0 - 94.0 fL    MCH 28.7 27.0 - 31.0 pg    MCHC 33.5 33.0 - 36.0 g/dL    RDW 14.5 11.5 - 17.0 %    Platelet 190 130 - 400 x10(3)/mcL    MPV 10.6 (H) 7.4 - 10.4 fL    NRBC% 0.0 %   PTT Heparin Monitoring    Collection Time: 06/20/24  6:02 AM   Result Value Ref Range    PTT Heparin Monitor 77.7 (H) 23.2 - 33.7 seconds   Manual Differential    Collection Time: 06/20/24  6:02 AM   Result Value Ref Range    WBC 29.8 x10(3)/mcL    Neutrophils % 28 %    Lymphs % 71 %    Monocytes % 1 %    Neutrophils Abs 8.344 2.1 - 9.2 x10(3)/mcL    Lymphs Abs 21.158 (H) 0.6 - 4.6 x10(3)/mcL    Monocytes Abs 0.298 0.1 - 1.3 x10(3)/mcL    Platelets Normal Normal, Adequate    RBC Morph Normal Normal   Path Review, Peripheral Smear    Collection Time: 06/20/24  6:02 AM   Result Value Ref Range    Peripheral Smear Evaluation       PERIPHERAL BLOOD:  Mild-to-moderate leukocytosis with absolute lymphocytosis and smudge cells, consistent with the patient's clinical history with SLL/CLL.  Fernando Villatoro MD   EKG 12-lead    Collection Time: 06/21/24 12:07 AM   Result Value Ref Range    QRS Duration 92 ms    [facility] QTC Calculation 508 ms   Basic Metabolic Panel    Collection Time: 06/21/24 12:58 AM   Result Value Ref Range    Sodium 139 136 - 145 mmol/L    Potassium 3.7 3.5 - 5.1 mmol/L    Chloride 104 98 - 107 mmol/L    CO2 19 (L) 23 - 31 mmol/L    Glucose 92 82 - 115 mg/dL    Blood Urea Nitrogen 16.7 8.4 - 25.7 mg/dL    Creatinine 0.94 0.73 - 1.18 mg/dL    BUN/Creatinine Ratio 18     Calcium 9.1 8.8 - 10.0 mg/dL    Anion Gap 16.0 mEq/L    eGFR >60 mL/min/1.73/m2   Magnesium    Collection Time: 06/21/24 12:58 AM   Result Value Ref Range    Magnesium Level 1.90  1.60 - 2.60 mg/dL   CBC with Differential    Collection Time: 06/21/24  4:17 AM   Result Value Ref Range    WBC 28.26 (H) 4.50 - 11.50 x10(3)/mcL    RBC 4.45 (L) 4.70 - 6.10 x10(6)/mcL    Hgb 12.7 (L) 14.0 - 18.0 g/dL    Hct 38.7 (L) 42.0 - 52.0 %    MCV 87.0 80.0 - 94.0 fL    MCH 28.5 27.0 - 31.0 pg    MCHC 32.8 (L) 33.0 - 36.0 g/dL    RDW 14.2 11.5 - 17.0 %    Platelet 195 130 - 400 x10(3)/mcL    MPV 10.6 (H) 7.4 - 10.4 fL    Neut % 22.9 %    Lymph % 68.5 %    Mono % 6.4 %    Eos % 1.3 %    Basophil % 0.5 %    Lymph # 19.36 (H) 0.6 - 4.6 x10(3)/mcL    Neut # 6.49 2.1 - 9.2 x10(3)/mcL    Mono # 1.82 (H) 0.1 - 1.3 x10(3)/mcL    Eos # 0.36 0 - 0.9 x10(3)/mcL    Baso # 0.13 <=0.2 x10(3)/mcL    IG# 0.10 (H) 0 - 0.04 x10(3)/mcL    IG% 0.4 %    NRBC% 0.0 %   PTT Heparin Monitoring    Collection Time: 06/21/24  4:17 AM   Result Value Ref Range    PTT Heparin Monitor 47.3 (H) 23.2 - 33.7 seconds   Comprehensive Metabolic Panel    Collection Time: 06/21/24  4:17 AM   Result Value Ref Range    Sodium 141 136 - 145 mmol/L    Potassium 3.2 (L) 3.5 - 5.1 mmol/L    Chloride 101 98 - 107 mmol/L    CO2 26 23 - 31 mmol/L    Glucose 96 82 - 115 mg/dL    Blood Urea Nitrogen 16.6 8.4 - 25.7 mg/dL    Creatinine 0.91 0.73 - 1.18 mg/dL    Calcium 9.0 8.8 - 10.0 mg/dL    Protein Total 6.0 5.8 - 7.6 gm/dL    Albumin 3.2 (L) 3.4 - 4.8 g/dL    Globulin 2.8 2.4 - 3.5 gm/dL    Albumin/Globulin Ratio 1.1 1.1 - 2.0 ratio    Bilirubin Total 0.4 <=1.5 mg/dL    ALP 85 40 - 150 unit/L    ALT 15 0 - 55 unit/L    AST 16 5 - 34 unit/L    eGFR >60 mL/min/1.73/m2    Anion Gap 14.0 mEq/L    BUN/Creatinine Ratio 18    Magnesium    Collection Time: 06/21/24  4:17 AM   Result Value Ref Range    Magnesium Level 1.70 1.60 - 2.60 mg/dL   PTT Heparin Monitoring    Collection Time: 06/21/24  6:18 AM   Result Value Ref Range    PTT Heparin Monitor 49.3 (H) 23.2 - 33.7 seconds   EKG 12-lead    Collection Time: 06/21/24  9:15 AM   Result Value Ref Range    QRS  Duration 88 ms    [facility] QTC Calculation 447 ms   PTT Heparin Monitoring    Collection Time: 06/21/24  3:17 PM   Result Value Ref Range    PTT Heparin Monitor 42.5 (H) 23.2 - 33.7 seconds   Respiratory Panel    Collection Time: 06/21/24  4:20 PM   Result Value Ref Range    Adenovirus Not Detected Not Detected    Coronavirus 229E Not Detected Not Detected    Coronavirus HKU1 Not Detected Not Detected    Coronavirus NL63 Not Detected Not Detected    Coronavirus OC43 PCR, Common Cold Virus Not Detected Not Detected    Human Metapneumovirus Not Detected Not Detected    Parainfluenza Virus 1 Not Detected Not Detected    Parainfluenza Virus 2 Not Detected Not Detected    Parainfluenza Virus 3 Not Detected Not Detected    Parainfluenza Virus 4 Not Detected Not Detected    Bordetella pertussis (ptxP) Not Detected Not Detected    Chlamydia pneumoniae Not Detected Not Detected    Mycoplasma pneumoniae Not Detected Not Detected    Human Rhinovirus/Enterovirus Not Detected Not Detected    Bordetella parapertussis ([id number]) Not Detected Not Detected   PTT Heparin Monitoring    Collection Time: 06/21/24 11:28 PM   Result Value Ref Range    PTT Heparin Monitor 78.0 (H) 23.2 - 33.7 seconds   Comprehensive Metabolic Panel    Collection Time: 06/22/24  4:30 AM   Result Value Ref Range    Sodium 141 136 - 145 mmol/L    Potassium 3.4 (L) 3.5 - 5.1 mmol/L    Chloride 103 98 - 107 mmol/L    CO2 26 23 - 31 mmol/L    Glucose 94 82 - 115 mg/dL    Blood Urea Nitrogen 13.7 8.4 - 25.7 mg/dL    Creatinine 0.94 0.73 - 1.18 mg/dL    Calcium 9.6 8.8 - 10.0 mg/dL    Protein Total 6.7 5.8 - 7.6 gm/dL    Albumin 3.5 3.4 - 4.8 g/dL    Globulin 3.2 2.4 - 3.5 gm/dL    Albumin/Globulin Ratio 1.1 1.1 - 2.0 ratio    Bilirubin Total 0.4 <=1.5 mg/dL    ALP 99 40 - 150 unit/L    ALT 18 0 - 55 unit/L    AST 20 5 - 34 unit/L    eGFR >60 mL/min/1.73/m2    Anion Gap 12.0 mEq/L    BUN/Creatinine Ratio 15    CBC with Differential    Collection Time: 06/22/24  4:30 AM    Result Value Ref Range    WBC 28.03 (H) 4.50 - 11.50 x10(3)/mcL    RBC 4.89 4.70 - 6.10 x10(6)/mcL    Hgb 14.1 14.0 - 18.0 g/dL    Hct 42.4 42.0 - 52.0 %    MCV 86.7 80.0 - 94.0 fL    MCH 28.8 27.0 - 31.0 pg    MCHC 33.3 33.0 - 36.0 g/dL    RDW 14.1 11.5 - 17.0 %    Platelet 217 130 - 400 x10(3)/mcL    MPV 10.6 (H) 7.4 - 10.4 fL    NRBC% 0.0 %   Magnesium    Collection Time: 06/22/24  4:30 AM   Result Value Ref Range    Magnesium Level 1.90 1.60 - 2.60 mg/dL   PTT Heparin Monitoring    Collection Time: 06/22/24  4:30 AM   Result Value Ref Range    PTT Heparin Monitor 103.8 (H) 23.2 - 33.7 seconds   Manual Differential    Collection Time: 06/22/24  4:30 AM   Result Value Ref Range    WBC 28.03 x10(3)/mcL    Neutrophils % 60 %    Lymphs % 26 %    Monocytes % 7 %    Eosinophils % 6 %    Promyelocytes % 1 (H) <=0 %    Neutrophils Abs 16.818 (H) 2.1 - 9.2 x10(3)/mcL    Lymphs Abs 7.2878 (H) 0.6 - 4.6 x10(3)/mcL    Monocytes Abs 1.9621 (H) 0.1 - 1.3 x10(3)/mcL    Eosinophils Abs 1.6818 (H) 0 - 0.9 x10(3)/mcL    Platelets Normal Normal, Adequate    RBC Morph Normal Normal     Telemetry:  SR with 1st Degree AVB    Physical Exam  Vitals reviewed.   Constitutional:       General: He is not in acute distress.  HENT:      Head: Normocephalic and atraumatic.   Eyes:      Extraocular Movements: Extraocular movements intact.   Neck:      Vascular: No carotid bruit.   Cardiovascular:      Rate and Rhythm: Normal rate and regular rhythm.      Pulses: Normal pulses.      Heart sounds: Normal heart sounds.   Pulmonary:      Effort: Pulmonary effort is normal. No respiratory distress.   Abdominal:      Palpations: There is no mass.   Musculoskeletal:      Right lower leg: No edema.      Left lower leg: No edema.   Skin:     General: Skin is warm and dry.   Neurological:      Mental Status: He is alert.       Current Inpatient Medications:    Current Facility-Administered Medications:     acetaminophen tablet 650 mg, 650 mg, Oral, Q4H  PRN, Darrel Freire MD    aluminum-magnesium hydroxide-simethicone 200-200-20 mg/5 mL suspension 30 mL, 30 mL, Oral, QID PRN, Darrel Freire MD    amiodarone tablet 200 mg, 200 mg, Oral, Daily, Darrel Freire MD, 200 mg at 06/22/24 0903    aspirin EC tablet 81 mg, 81 mg, Oral, Daily, Darrel Freire MD, 81 mg at 06/22/24 0903    atorvastatin tablet 40 mg, 40 mg, Oral, QHS, Darrel Freire MD, 40 mg at 06/21/24 2033    clopidogreL tablet 75 mg, 75 mg, Oral, Daily, Darrel Freire MD, 75 mg at 06/21/24 1713    finasteride tablet 5 mg, 5 mg, Oral, Nightly, Darrel Freire MD, 5 mg at 06/21/24 2032    furosemide injection 40 mg, 40 mg, Intravenous, Q12H, Darrel Freire MD, 40 mg at 06/22/24 0919    heparin 25,000 units in dextrose 5% (100 units/ml) IV bolus from bag LOW INTENSITY nomogram - [facility], 49.1 Units/kg (Adjusted), Intravenous, PRN, Darrel Freire MD, 4,000 Units at 06/19/24 1731    heparin 25,000 units in dextrose 5% (100 units/ml) IV bolus from bag LOW INTENSITY nomogram - [facility], 30 Units/kg (Adjusted), Intravenous, PRN, Darrel Freire MD    heparin 25,000 units in dextrose 5% 250 mL (100 units/mL) infusion LOW INTENSITY nomogram - [facility], 0-40 Units/kg/hr (Adjusted), Intravenous, Continuous, Darrel Freire MD, Last Rate: 15.5 mL/hr at 06/22/24 0951, 19 Units/kg/hr at 06/22/24 0951    levETIRAcetam tablet 1,000 mg, 1,000 mg, Oral, BID, Darrel Freire MD, 1,000 mg at 06/22/24 0903    lisinopriL tablet 5 mg, 5 mg, Oral, Daily, Darrel Freire MD, 5 mg at 06/21/24 1713    LORazepam (ATIVAN) injection 2 mg, 2 mg, Intravenous, Q10 Min PRN, Darrel Freire MD    magnesium oxide tablet 400 mg, 400 mg, Oral, BID, Maximus Plaza MD, 400 mg at 06/22/24 0903    melatonin tablet 6 mg, 6 mg, Oral, Nightly PRN, Darrel Freire MD    metoprolol succinate (TOPROL-XL) 24 hr tablet 25 mg, 25 mg, Oral, BID, Darrel Freire MD, 25 mg at 06/22/24 0903    miconazole NITRATE 2 % top powder, , Topical (Top), BID, Darrel Freire MD, Given at 06/22/24 0904     ondansetron injection 4 mg, 4 mg, Intravenous, Q4H PRN, Darrel Freire MD    pantoprazole EC tablet 40 mg, 40 mg, Oral, Daily, Darrel Freire MD, 40 mg at 06/21/24 1713    PHENobarbitaL tablet 97.2 mg, 97.2 mg, Oral, Daily, Darrel Freire MD, 97.2 mg at 06/22/24 0924    phenytoin (DILANTIN) ER capsule 100 mg, 100 mg, Oral, BID, Darrel Freire MD, 100 mg at 06/22/24 0903    phenytoin (DILANTIN) tablet 100 mg, 100 mg, Oral, Once, Darrel Frerie MD    polyethylene glycol packet 17 g, 17 g, Oral, BID PRN, Darrel Freire MD    primidone tablet 150 mg, 150 mg, Oral, BID, Darrel Freire MD, 150 mg at 06/22/24 0903    prochlorperazine injection Soln 5 mg, 5 mg, Intravenous, Q6H PRN, Darrel Freire MD    senna-docusate 8.6-50 mg per tablet 2 tablet, 2 tablet, Oral, BID PRN, Darrel Freire MD    sodium chloride 0.9% flush 10 mL, 10 mL, Intravenous, PRN, Darrle Freire MD    tamsulosin 24 hr capsule 0.4 mg, 0.4 mg, Oral, Nightly, Darrel Freire MD, 0.4 mg at 06/21/24 2033    venlafaxine tablet 75 mg, 75 mg, Oral, BID, Darrel Freire MD, 75 mg at 06/22/24 0903  VTE Risk Mitigation (From admission, onward)           Ordered     heparin 25,000 units in dextrose 5% (100 units/ml) IV bolus from bag LOW INTENSITY nomogram - [facility]  As needed (PRN)        Question:  Heparin Infusion Adjustment (DO NOT MODIFY ANSWER)  Answer:  [email or web address]\Images\Pharmacy\HeparinInfusions\heparin LOW INTENSITY nomogram for [facility] [id number]    06/18/24 2044     heparin 25,000 units in dextrose 5% (100 units/ml) IV bolus from bag LOW INTENSITY nomogram - [facility]  As needed (PRN)        Question:  Heparin Infusion Adjustment (DO NOT MODIFY ANSWER)  Answer:  [email or web address]\Images\Pharmacy\HeparinInfusions\heparin LOW INTENSITY nomogram for [facility] [id number]    06/18/24 2044     IP VTE HIGH RISK PATIENT  Once         06/18/24 2103     Place sequential compression device  Until discontinued         06/18/24 2103     heparin 25,000 units in dextrose 5% 250 mL (100  units/mL) infusion LOW INTENSITY nomogram - [facility]  Continuous        Question:  Begin at (units/kg/hr)  Answer:  12    06/18/24 2044                  Assessment:   NSTEMI likely type II s/t active seizure activity and hypoxia    - Trop: 0.066 --> 1.602 --> 2.5553 --> 2.283 --> 1.337 --> 0.95 --> 0.752.    - EKG shows no acute ST changes   - no complaints of Chest pain   CAD s/p PCI    - [facility] (11.2.21):PCI LAD & Ramus   PAF ~ currently SB   - [id number] = 7 points   - On Eliquis as an outpatient ~ last dose 6.17.24   - DCCV 10.2021  Chronic HFpEF   - ECHO (6.19.24): LVEF 55-60%   - ECHO (10.24.21): LVEF 25-30%  Hx of Ischemic CMO  Epilepsy  Hx of TIA  HTN  HLD   - LDL 58 on recent labs in clinic   GERD  Depression  Obesity          Plan:   Discontinue Heparin gtt tomorrow morning  Cont. Plavix 75mg, Statin 40mg, and ASA 81mg   Recommend resuming Eliquis prior to D/C for CVA risk reduction in the setting of Afib  Patient will need outpatient stress test on discharge.   Cont. Metoprolol succinate 25mg & Amio 200mg   Will defer Seizure management to primary team  AM Labs: CBC, CMP, Ma  Patient to follow up with Dr. Ramirez after discharge    [facility] will sign off at this time. Please re-consult or call for any further questions or concerns.       Bell Alberto PA-C  Cardiology  [facility] - [facility] Medical Telemetry  06/22/2024     Physician addendum:  I have seen and examined this patient as a split-shared visit with the HERMAN d/t complicated medical management of above problems written in assessment and high acuity requiring physician expertise in medical decision-making. I performed the substantive portion of the history and exam. Above medical decision-making is also formulated by me.    Cardiovascular exam:  S1, S2  Lungs:  fine crackles at bases.  Extremities:  1+ edema bilaterally    Plan:  Start heparin in the morning tomorrow.  Rest of the medications  as above.  Continue supportive therapy.  We will  sign off.      Denny Couch MD  Cardiologist

## 2024-06-23 LAB
ALBUMIN SERPL-MCNC: 3.2 G/DL (ref 3.4–4.8)
ALBUMIN/GLOB SERPL: 1.1 RATIO (ref 1.1–2)
ALP SERPL-CCNC: 87 UNIT/L (ref 40–150)
ALT SERPL-CCNC: 17 UNIT/L (ref 0–55)
ANION GAP SERPL CALC-SCNC: 12 MEQ/L
APTT PPP: 66.2 SECONDS (ref 23.2–33.7)
AST SERPL-CCNC: 18 UNIT/L (ref 5–34)
BACTERIA BLD CULT: NORMAL
BACTERIA BLD CULT: NORMAL
BASOPHILS # BLD AUTO: 0.13 X10(3)/MCL
BASOPHILS NFR BLD AUTO: 0.5 %
BILIRUB SERPL-MCNC: 0.3 MG/DL
BUN SERPL-MCNC: 13.1 MG/DL (ref 8.4–25.7)
CALCIUM SERPL-MCNC: 9.3 MG/DL (ref 8.8–10)
CHLORIDE SERPL-SCNC: 101 MMOL/L (ref 98–107)
CO2 SERPL-SCNC: 26 MMOL/L (ref 23–31)
CREAT SERPL-MCNC: 0.87 MG/DL (ref 0.73–1.18)
CREAT/UREA NIT SERPL: 15
EOSINOPHIL # BLD AUTO: 0.49 X10(3)/MCL (ref 0–0.9)
EOSINOPHIL NFR BLD AUTO: 1.9 %
ERYTHROCYTE [DISTWIDTH] IN BLOOD BY AUTOMATED COUNT: 14.3 % (ref 11.5–17)
GFR SERPLBLD CREATININE-BSD FMLA CKD-EPI: >60 ML/MIN/1.73/M2
GLOBULIN SER-MCNC: 2.8 GM/DL (ref 2.4–3.5)
GLUCOSE SERPL-MCNC: 100 MG/DL (ref 82–115)
HCT VFR BLD AUTO: 39.1 % (ref 42–52)
HGB BLD-MCNC: 13.2 G/DL (ref 14–18)
IMM GRANULOCYTES # BLD AUTO: 0.11 X10(3)/MCL (ref 0–0.04)
IMM GRANULOCYTES NFR BLD AUTO: 0.4 %
LYMPHOCYTES # BLD AUTO: 18.16 X10(3)/MCL (ref 0.6–4.6)
LYMPHOCYTES NFR BLD AUTO: 70.3 %
MAGNESIUM SERPL-MCNC: 1.9 MG/DL (ref 1.6–2.6)
MCH RBC QN AUTO: 29.2 PG (ref 27–31)
MCHC RBC AUTO-ENTMCNC: 33.8 G/DL (ref 33–36)
MCV RBC AUTO: 86.5 FL (ref 80–94)
MONOCYTES # BLD AUTO: 1.54 X10(3)/MCL (ref 0.1–1.3)
MONOCYTES NFR BLD AUTO: 6 %
NEUTROPHILS # BLD AUTO: 5.41 X10(3)/MCL (ref 2.1–9.2)
NEUTROPHILS NFR BLD AUTO: 20.9 %
NRBC BLD AUTO-RTO: 0.1 %
PLATELET # BLD AUTO: 205 X10(3)/MCL (ref 130–400)
PMV BLD AUTO: 10.7 FL (ref 7.4–10.4)
POTASSIUM SERPL-SCNC: 3.3 MMOL/L (ref 3.5–5.1)
PROT SERPL-MCNC: 6 GM/DL (ref 5.8–7.6)
RBC # BLD AUTO: 4.52 X10(6)/MCL (ref 4.7–6.1)
SODIUM SERPL-SCNC: 139 MMOL/L (ref 136–145)
WBC # BLD AUTO: 25.84 X10(3)/MCL (ref 4.5–11.5)

## 2024-06-23 PROCEDURE — 83735 ASSAY OF MAGNESIUM: CPT | Performed by: INTERNAL MEDICINE

## 2024-06-23 PROCEDURE — 85025 COMPLETE CBC W/AUTO DIFF WBC: CPT | Performed by: INTERNAL MEDICINE

## 2024-06-23 PROCEDURE — 25000003 PHARM REV CODE 250: Performed by: INTERNAL MEDICINE

## 2024-06-23 PROCEDURE — 63600175 PHARM REV CODE 636 W HCPCS: Performed by: INTERNAL MEDICINE

## 2024-06-23 PROCEDURE — 36415 COLL VENOUS BLD VENIPUNCTURE: CPT | Performed by: INTERNAL MEDICINE

## 2024-06-23 PROCEDURE — 85730 THROMBOPLASTIN TIME PARTIAL: CPT | Performed by: INTERNAL MEDICINE

## 2024-06-23 PROCEDURE — 25000242 PHARM REV CODE 250 ALT 637 W/ HCPCS: Performed by: INTERNAL MEDICINE

## 2024-06-23 PROCEDURE — 21400001 HC TELEMETRY ROOM

## 2024-06-23 PROCEDURE — 80053 COMPREHEN METABOLIC PANEL: CPT | Performed by: INTERNAL MEDICINE

## 2024-06-23 RX ORDER — POTASSIUM CHLORIDE 20 MEQ/1
20 TABLET, EXTENDED RELEASE ORAL DAILY
Status: DISCONTINUED | OUTPATIENT
Start: 2024-06-23 | End: 2024-06-24 | Stop reason: HOSPADM

## 2024-06-23 RX ADMIN — PHENOBARBITAL 97.2 MG: 97.2 TABLET ORAL at 11:06

## 2024-06-23 RX ADMIN — PHENYTOIN SODIUM 100 MG: 100 CAPSULE ORAL at 09:06

## 2024-06-23 RX ADMIN — PANTOPRAZOLE SODIUM 40 MG: 40 TABLET, DELAYED RELEASE ORAL at 04:06

## 2024-06-23 RX ADMIN — LISINOPRIL 5 MG: 5 TABLET ORAL at 04:06

## 2024-06-23 RX ADMIN — METOPROLOL SUCCINATE 25 MG: 25 TABLET, EXTENDED RELEASE ORAL at 09:06

## 2024-06-23 RX ADMIN — MICONAZOLE NITRATE: 20 POWDER TOPICAL at 10:06

## 2024-06-23 RX ADMIN — VENLAFAXINE 75 MG: 37.5 TABLET ORAL at 09:06

## 2024-06-23 RX ADMIN — ASPIRIN 81 MG: 81 TABLET, COATED ORAL at 09:06

## 2024-06-23 RX ADMIN — FUROSEMIDE 40 MG: 10 INJECTION, SOLUTION INTRAVENOUS at 10:06

## 2024-06-23 RX ADMIN — PRIMIDONE 150 MG: 50 TABLET ORAL at 10:06

## 2024-06-23 RX ADMIN — AMIODARONE HYDROCHLORIDE 200 MG: 200 TABLET ORAL at 09:06

## 2024-06-23 RX ADMIN — POTASSIUM CHLORIDE 20 MEQ: 1500 TABLET, EXTENDED RELEASE ORAL at 11:06

## 2024-06-23 RX ADMIN — LEVETIRACETAM 1000 MG: 500 TABLET, FILM COATED ORAL at 09:06

## 2024-06-23 RX ADMIN — APIXABAN 5 MG: 5 TABLET, FILM COATED ORAL at 10:06

## 2024-06-23 RX ADMIN — FINASTERIDE 5 MG: 5 TABLET, FILM COATED ORAL at 10:06

## 2024-06-23 RX ADMIN — APIXABAN 5 MG: 5 TABLET, FILM COATED ORAL at 11:06

## 2024-06-23 RX ADMIN — PRIMIDONE 150 MG: 50 TABLET ORAL at 09:06

## 2024-06-23 RX ADMIN — PHENYTOIN SODIUM 100 MG: 100 CAPSULE ORAL at 10:06

## 2024-06-23 RX ADMIN — VENLAFAXINE 75 MG: 37.5 TABLET ORAL at 10:06

## 2024-06-23 RX ADMIN — ATORVASTATIN CALCIUM 40 MG: 40 TABLET, FILM COATED ORAL at 10:06

## 2024-06-23 RX ADMIN — LEVETIRACETAM 1000 MG: 500 TABLET, FILM COATED ORAL at 10:06

## 2024-06-23 RX ADMIN — TAMSULOSIN HYDROCHLORIDE 0.4 MG: 0.4 CAPSULE ORAL at 10:06

## 2024-06-23 RX ADMIN — HEPARIN SODIUM 19 UNITS/KG/HR: 10000 INJECTION, SOLUTION INTRAVENOUS at 07:06

## 2024-06-23 RX ADMIN — Medication 400 MG: at 09:06

## 2024-06-23 RX ADMIN — CLOPIDOGREL BISULFATE 75 MG: 75 TABLET ORAL at 04:06

## 2024-06-23 NOTE — PROGRESS NOTES
[facility] Hospital Medicine Progress Note        Chief Complaint: Inpatient Follow-up for seizure, NSTEMI    HPI:     78-year-old male with medical history as detailed below and notable for CAD/multiple PCI last being November 20, 2022, ischemic cardiomyopathy/Chronic HFrEF, paroxysmal AFib, and epilepsy who currently lives in a nursing home present to the ED on 06/17/2024 initially with seizure-like activity followed by hypoxia and found to have subtherapeutic phenytoin level given extra dose of phenytoin and discharged back to the nursing home.  Returned again on early morning of 06/18/2024 with recurrent breakthrough seizure and hypoxemia, no further seizure activity in the ED but noted to have elevated rising troponin for which Cardiology consulted and recommended initiation of heparin and transferred to [facility] for Cardiology evaluation.     Upon my evaluation he is resting comfortably currently on OxyMask, troponin level downtrending denying any active chest pain but report being short of breath and on auscultation he has diffuse crackles chest x-ray reviewed and show cardiomegaly and pulmonary edema and EKG NSR, interval changes in lateral leads/possible lateral infarct.  TTE showed intact EF, normal diastolic function, aortic valve sclerosis.  Stay was complicated with bradycardia.  Full-dose anticoagulation was continued.  Blood cultures remained negative. CT chest with contrast obtained yesterday showed mild ground-glass favoring mid upper lungs was seen which could be infectious or inflammatory.  Mild pulmonary enema was possibility.  Aspiration was suspected.  Respiratory panel came back negative.      Interval Hx:     Much more alert, comfortable this morning.  Doing well on room air.  He has no new complaints or concerns.  Mild borderline bradycardia noted otherwise no new events.  Denies any CP, shortness for breath.    Objective/physical exam:  Vitals:    06/23/24 0324  06/23/24 0740 06/23/24 0955 06/23/24 1017   BP: 117/70 (!) 129/58 (!) 129/58 129/66   Pulse: (!) 53 (!) 51  (!) 54   Resp:  14     Temp:  97.9 °F (36.6 °C)     TempSrc:  Oral     SpO2: (!) 92% (!) 92%     Weight:       Height:         General: In no acute distress, afebrile  Respiratory: Clear to auscultation bilaterally  Cardiovascular: S1, S2, no appreciable murmur  Abdomen: Soft, nontender, BS +  MSK: Warm, lower extremity edema, no clubbing or cyanosis  Neurologic: Alert and oriented x4, moving all extremities with good strength     Lab Results   Component Value Date     06/23/2024    K 3.3 (L) 06/23/2024     06/23/2024    CO2 26 06/23/2024    BUN 13.1 06/23/2024    CREATININE 0.87 06/23/2024    CALCIUM 9.3 06/23/2024    ANIONGAP 10 12/11/2022    ESTGFRAFRICA 135 01/09/2021    EGFRNONAA >60 04/13/2022      Lab Results   Component Value Date    ALT 17 06/23/2024    AST 18 06/23/2024    ALKPHOS 87 06/23/2024    BILITOT 0.3 06/23/2024      Lab Results   Component Value Date    WBC 25.84 (H) 06/23/2024    HGB 13.2 (L) 06/23/2024    HCT 39.1 (L) 06/23/2024    MCV 86.5 06/23/2024     06/23/2024           Medications:   amiodarone  200 mg Oral Daily    aspirin  81 mg Oral Daily    atorvastatin  40 mg Oral QHS    clopidogreL  75 mg Oral Daily    finasteride  5 mg Oral Nightly    furosemide (LASIX) injection  40 mg Intravenous Q12H    levETIRAcetam  1,000 mg Oral BID    lisinopriL  5 mg Oral Daily    metoprolol succinate  25 mg Oral BID    miconazole NITRATE 2 %   Topical (Top) BID    pantoprazole  40 mg Oral Daily    PHENobarbitaL  97.2 mg Oral Daily    phenytoin  100 mg Oral BID    phenytoin  100 mg Oral Once    primidone  150 mg Oral BID    tamsulosin  0.4 mg Oral Nightly    venlafaxine  75 mg Oral BID        Current Facility-Administered Medications:     acetaminophen, 650 mg, Oral, Q4H PRN    aluminum-magnesium hydroxide-simethicone, 30 mL, Oral, QID PRN    heparin (PORCINE), 49.1 Units/kg  (Adjusted), Intravenous, PRN    heparin (PORCINE), 30 Units/kg (Adjusted), Intravenous, PRN    lorazepam, 2 mg, Intravenous, Q10 Min PRN    melatonin, 6 mg, Oral, Nightly PRN    ondansetron, 4 mg, Intravenous, Q4H PRN    polyethylene glycol, 17 g, Oral, BID PRN    prochlorperazine, 5 mg, Intravenous, Q6H PRN    senna-docusate 8.6-50 mg, 2 tablet, Oral, BID PRN    sodium chloride 0.9%, 10 mL, Intravenous, PRN     Assessment/Plan:    Breakthrough seizure-subtherapeutic level at admission  NSTEMI requiring heparin drip   Bradycardia-?  medication induced-stable    HX:  CLL in observation, CAD s/p PCI, chronic HFrEF, chronic AFib on Eliquis, CLL, HTN, HLD, GERD, PVD s/p stenting, anxiety, depression, history of TIA, chronic BPH, obesity    Plan:  -CT chest findings reviewed.  Low suspicion for active inflammation but underlying prior aspiration is a possibility  - Switch to Eliquis.  DC heparin. TTE reviewed.  Renew current management with aspirin, Plavix, metoprolol, lisinopril, statin with Lasix.    -continue telemetry.  Hold metoprolol for symptomatic bradycardia.  Continue amiodarone  -phenytoin resumed.  Continue Keppra, phenobarbital.  Keep seizure precautions.  Needs compliance  -other home medications were reviewed and renewed  -DC back to facility tomorrow    Protonix              aMximus Plaza MD

## 2024-06-24 VITALS
HEIGHT: 67 IN | SYSTOLIC BLOOD PRESSURE: 147 MMHG | BODY MASS INDEX: 39.93 KG/M2 | DIASTOLIC BLOOD PRESSURE: 72 MMHG | RESPIRATION RATE: 18 BRPM | HEART RATE: 58 BPM | WEIGHT: 254.44 LBS | TEMPERATURE: 98 F | OXYGEN SATURATION: 90 %

## 2024-06-24 PROBLEM — R56.9 SEIZURES: Status: RESOLVED | Noted: 2022-10-06 | Resolved: 2024-06-24

## 2024-06-24 PROCEDURE — 25000003 PHARM REV CODE 250: Performed by: INTERNAL MEDICINE

## 2024-06-24 PROCEDURE — 63600175 PHARM REV CODE 636 W HCPCS: Performed by: INTERNAL MEDICINE

## 2024-06-24 PROCEDURE — 25000242 PHARM REV CODE 250 ALT 637 W/ HCPCS: Performed by: INTERNAL MEDICINE

## 2024-06-24 RX ORDER — METOPROLOL SUCCINATE 25 MG/1
12.5 TABLET, EXTENDED RELEASE ORAL 2 TIMES DAILY
Start: 2024-06-24 | End: 2025-06-24

## 2024-06-24 RX ORDER — ASPIRIN 81 MG/1
81 TABLET ORAL DAILY
Start: 2024-06-24 | End: 2025-06-24

## 2024-06-24 RX ADMIN — VENLAFAXINE 75 MG: 37.5 TABLET ORAL at 10:06

## 2024-06-24 RX ADMIN — PHENYTOIN SODIUM 100 MG: 100 CAPSULE ORAL at 10:06

## 2024-06-24 RX ADMIN — LEVETIRACETAM 1000 MG: 500 TABLET, FILM COATED ORAL at 10:06

## 2024-06-24 RX ADMIN — APIXABAN 5 MG: 5 TABLET, FILM COATED ORAL at 10:06

## 2024-06-24 RX ADMIN — FUROSEMIDE 40 MG: 10 INJECTION, SOLUTION INTRAVENOUS at 10:06

## 2024-06-24 RX ADMIN — AMIODARONE HYDROCHLORIDE 200 MG: 200 TABLET ORAL at 10:06

## 2024-06-24 RX ADMIN — PHENOBARBITAL 97.2 MG: 97.2 TABLET ORAL at 10:06

## 2024-06-24 RX ADMIN — ASPIRIN 81 MG: 81 TABLET, COATED ORAL at 10:06

## 2024-06-24 RX ADMIN — POTASSIUM CHLORIDE 20 MEQ: 1500 TABLET, EXTENDED RELEASE ORAL at 10:06

## 2024-06-24 RX ADMIN — PRIMIDONE 150 MG: 50 TABLET ORAL at 10:06

## 2024-06-24 RX ADMIN — METOPROLOL SUCCINATE 12.5 MG: 25 TABLET, EXTENDED RELEASE ORAL at 10:06

## 2024-06-24 RX ADMIN — MICONAZOLE NITRATE: 20 POWDER TOPICAL at 10:06

## 2024-06-24 NOTE — DISCHARGE SUMMARY
[facility] Hospital Medicine  Discharge Summary      Patient Name: John Wayne  MRN: [id number]  [facility]: [id number]  Patient Class: IP- Inpatient  Admission Date: 6/18/2024  Hospital Length of Stay: 6 days  Discharge Date and Time:  06/24/2024 9:48 AM  Attending Physician: Maximus Plaza MD   Discharging Provider: Maximus Plaza MD  Primary Care Provider: ISAC Schmidt MD (Inactive)    Primary Care Team: Networked reference to record PCT     78-year-old male with medical history as detailed below and notable for CAD/multiple PCI last being November 20, 2022, ischemic cardiomyopathy/Chronic HFrEF, paroxysmal AFib, and epilepsy who currently lives in a nursing home present to the ED on 06/17/2024 initially with seizure-like activity followed by hypoxia and found to have subtherapeutic phenytoin level given extra dose of phenytoin and discharged back to the nursing home.  Returned again on early morning of 06/18/2024 with recurrent breakthrough seizure and hypoxemia, no further seizure activity in the ED but noted to have elevated rising troponin for which Cardiology consulted and recommended initiation of heparin and transferred to [facility] for Cardiology evaluation.     Upon my evaluation he is resting comfortably currently on OxyMask, troponin level downtrending denying any active chest pain but report being short of breath and on auscultation he has diffuse crackles chest x-ray reviewed and show cardiomegaly and pulmonary edema and EKG NSR, interval changes in lateral leads/possible lateral infarct.  TTE showed intact EF, normal diastolic function, aortic valve sclerosis.  Stay was complicated with bradycardia and metoprolol dose was adjusted..  Full-dose anticoagulation was continued.  Blood cultures remained negative. CT chest with contrast showed mild ground-glass favoring mid upper lungs was seen which could be infectious or inflammatory.  Mild pulmonary edema  was possibility and diuresis was continued.  Respiratory panel came back negative.  Leukocytosis is likely from underlying CLL.  He remained stable from hemodynamic standpoint.  He remained seizure-free.  will be discharged back to his facility today.  All medications were reconciled.  Anticoagulation will be continued.      Breakthrough seizure-subtherapeutic level at admission  NSTEMI requiring heparin drip -resolved  Bradycardia-?  medication induced-stable     HX:  CLL in observation, CAD s/p PCI, chronic HFrEF, chronic AFib on Eliquis, CLL, HTN, HLD, GERD, PVD s/p stenting, anxiety, depression, history of TIA, chronic BPH, obesity    Goals of Care Treatment Preferences:  Code Status: DNR    Living Will: Yes  LaPOST: Yes       Vitals:    06/24/24 0815   BP: (!) 141/64   Pulse: (!) 57   Resp:    Temp: 98.2 °F (36.8 °C)       General: In no acute distress, afebrile  Respiratory: Clear to auscultation bilaterally  Cardiovascular: S1, S2, no appreciable murmur  Abdomen: Soft, nontender, BS +  MSK: Warm, lower extremity edema, no clubbing or cyanosis  Neurologic: Alert and oriented x4, moving all extremities with good strength     Consults:   Consults (From admission, onward)          Status Ordering Provider     Inpatient consult to Cardiology  Once        Provider:  Denny Couch MD    Completed ZAFAR NELSON            No new Assessment & Plan notes have been filed under this hospital service since the last note was generated.  Service: Hospital Medicine    Final Active Diagnoses:    Diagnosis Date Noted POA    PRINCIPAL PROBLEM:  NSTEMI (non-ST elevated myocardial infarction) [I21.4] 12/08/2022 Yes    HTN (hypertension) [I10] 12/08/2022 Yes    Seizures [R56.9] 10/06/2022 Yes      Problems Resolved During this Admission:       Discharged Condition: fair    Disposition: Skilled Nursing Facility    Follow Up:   Follow-up Information       ISAC Schmidt MD Follow up in 1 week(s).    Specialty: Family  Medicine  Contact information:  [address] [facility]  [place] [address] [phone]                           Patient Instructions:   No discharge procedures on file.    Significant Diagnostic Studies: Labs: [facility]   Recent Labs   Lab 06/23/24  0426      K 3.3*      CO2 26   BUN 13.1   CREATININE 0.87   CALCIUM 9.3   ALBUMIN 3.2*   BILITOT 0.3   ALKPHOS 87   AST 18   ALT 17       Pending Diagnostic Studies:       None           Medications:  Reconciled Home Medications:      Medication List        START taking these medications      aspirin 81 MG EC tablet  Commonly known as: ECOTRIN  Take 1 tablet (81 mg total) by mouth once daily.            CHANGE how you take these medications      finasteride 5 mg tablet  Commonly known as: PROSCAR  Take 1 tablet (5 mg total) by mouth once daily.  What changed: when to take this     metoprolol succinate 25 MG 24 hr tablet  Commonly known as: TOPROL-XL  Take 0.5 tablets (12.5 mg total) by mouth 2 (two) times daily.  What changed: how much to take            CONTINUE taking these medications      * albuterol sulfate 2.5 mg/0.5 mL Nebu  Take 2.5 mg by nebulization every 6 (six) hours as needed. Rescue     * albuterol 2.5 mg /3 mL (0.083 %) nebulizer solution  Commonly known as: PROVENTIL     amiodarone 200 MG Tab  Commonly known as: PACERONE  Take 200 mg by mouth once daily.     atorvastatin 40 MG tablet  Commonly known as: LIPITOR  Take 40 mg by mouth every evening.     clopidogreL 75 mg tablet  Commonly known as: PLAVIX  Take 1 tablet by mouth Daily.     ELIQUIS 5 mg Tab  Generic drug: apixaban  Take 1 tablet by mouth 2 (two) times a day.     furosemide 40 MG tablet  Commonly known as: LASIX  Take 40 mg by mouth once daily.     levETIRAcetam 1000 MG tablet  Commonly known as: KEPPRA  Take 1 tablet by mouth 2 (two) times a day.     lisinopriL 5 MG tablet  Commonly known as: PRINIVIL,ZESTRIL  Take 1 tablet by mouth Daily.     NAYZILAM 5 mg/spray (0.1 mL) Spry  Generic drug:  midazolam  1 spray by Nasal route daily as needed (seizure). To left nostril     pantoprazole 40 MG tablet  Commonly known as: PROTONIX  Take 40 mg by mouth Daily.     PHENobarbitaL 32.4 MG tablet  Take 3 tablets by mouth Daily.     phenytoin 100 MG ER capsule  Commonly known as: DILANTIN  Take 100 mg by mouth 2 (two) times daily.     primidone 50 MG Tab  Commonly known as: MYSOLINE  Take 150 mg by mouth 2 (two) times a day.     tamsulosin 0.4 mg Cap  Commonly known as: FLOMAX  Take 1 tablet by mouth nightly.     venlafaxine 75 MG tablet  Commonly known as: EFFEXOR  Take 1 tablet (75 mg total) by mouth 2 (two) times daily.           * This list has 2 medication(s) that are the same as other medications prescribed for you. Read the directions carefully, and ask your doctor or other care provider to review them with you.                  Indwelling Lines/Drains at time of discharge:   Lines/Drains/Airways       None                   Time spent on the discharge of patient: 35 minutes         Maximus Plaza MD  Department of Hospital Medicine  [facility]

## 2024-06-24 NOTE — PLAN OF CARE
06/24/24 1151   Final Note   Assessment Type Final Discharge Note   Anticipated Discharge Disposition [facility]  (FOC: The patient will return to [facility] as he is along-term resident of this [facility]. I spoke with Brother: Fredrick Wayne: [phone] and informed him of the above discharge plans.)   Hospital Resources/Appts/Education Provided Appointments scheduled and added to AVS   Post-Acute Status   Post-Acute Authorization Placement   Post-Acute Placement Status Set-up Complete/Auth obtained   Coverage Payor: MEDICARE - MEDICARE PART A & B -   Patient choice form signed by patient/caregiver List with quality metrics by geographic area provided   Discharge Delays None known at this time     The patient will be discharged from [facility] to [facility] as he is a long-term resident of this facility. Clinical notes/updates and AVS and D/C Summary were uploaded and sent by [facility] via [employer]. The patient and his brother: Fredrick Wayne: [phone] were informed of the above discharge plans and both are in agreement. Transportation will be arranged by [facility] bhanu hoffman

## 2024-06-24 NOTE — PLAN OF CARE
SSC notified Ellen at [place] of [place] pt will dc today    SSC sent updates via [facility]    Ellen agree to provide transport

## 2024-06-24 NOTE — NURSING
Rn called and spoke with Eneida from [facility] and she confirmed that patient has been seen and evaluated by them. Spoke to Helen from [facility]. RN gave full report to Helen regarding patient's admission and gave my call back # for any questions. Patient aox4 as per baseline. Patient received by [facility] transport personnel and has been discharged in [facility] wheelchair

## 2024-06-24 NOTE — PLAN OF CARE
SSC sent AVS & dc summary via [employer] to Dustin    11:22am  SSC sent mbs via [employer], notified Ellen diet is the same as before patient admitted to hospital    11:49am notified nurse to call report to Hailey @ [phone] ext [phone]    SSC will hand dc packet to nurse-will be delivered

## 2024-07-12 ENCOUNTER — HOSPITAL ENCOUNTER (EMERGENCY)
Facility: HOSPITAL | Age: 79
Discharge: HOME | End: 2024-07-13
Attending: EMERGENCY MEDICINE
Payer: MEDICARE

## 2024-07-12 VITALS
BODY MASS INDEX: 36.1 KG/M2 | OXYGEN SATURATION: 96 % | SYSTOLIC BLOOD PRESSURE: 141 MMHG | HEIGHT: 67 IN | WEIGHT: 230 LBS | RESPIRATION RATE: 16 BRPM | TEMPERATURE: 97 F | DIASTOLIC BLOOD PRESSURE: 57 MMHG | HEART RATE: 80 BPM

## 2024-07-12 DIAGNOSIS — R78.89 SUBTHERAPEUTIC SERUM DILANTIN LEVEL: ICD-10-CM

## 2024-07-12 DIAGNOSIS — N30.00 ACUTE CYSTITIS WITHOUT HEMATURIA: Primary | ICD-10-CM

## 2024-07-12 DIAGNOSIS — Z86.69 HISTORY OF EPILEPSY: ICD-10-CM

## 2024-07-12 DIAGNOSIS — R56.9 GENERALIZED SEIZURE: ICD-10-CM

## 2024-07-12 LAB
ALBUMIN SERPL-MCNC: 3.4 G/DL (ref 3.4–4.8)
ALBUMIN/GLOB SERPL: 1.2 RATIO (ref 1.1–2)
ALP SERPL-CCNC: 105 UNIT/L (ref 40–150)
ALT SERPL-CCNC: 17 UNIT/L (ref 0–55)
ANION GAP SERPL CALC-SCNC: 11 MEQ/L
AST SERPL-CCNC: 15 UNIT/L (ref 5–34)
BILIRUB SERPL-MCNC: 0.2 MG/DL
BUN SERPL-MCNC: 12.3 MG/DL (ref 8.4–25.7)
CALCIUM SERPL-MCNC: 9.5 MG/DL (ref 8.8–10)
CHLORIDE SERPL-SCNC: 107 MMOL/L (ref 98–107)
CO2 SERPL-SCNC: 24 MMOL/L (ref 23–31)
CREAT SERPL-MCNC: 0.91 MG/DL (ref 0.73–1.18)
CREAT/UREA NIT SERPL: 14
ERYTHROCYTE [DISTWIDTH] IN BLOOD BY AUTOMATED COUNT: 14.1 % (ref 11.5–17)
GFR SERPLBLD CREATININE-BSD FMLA CKD-EPI: >60 ML/MIN/1.73/M2
GLOBULIN SER-MCNC: 2.9 GM/DL (ref 2.4–3.5)
GLUCOSE SERPL-MCNC: 113 MG/DL (ref 82–115)
HCT VFR BLD AUTO: 41.3 % (ref 42–52)
HGB BLD-MCNC: 13.6 G/DL (ref 14–18)
MCH RBC QN AUTO: 28.8 PG (ref 27–31)
MCHC RBC AUTO-ENTMCNC: 32.9 G/DL (ref 33–36)
MCV RBC AUTO: 87.5 FL (ref 80–94)
NRBC BLD AUTO-RTO: 0 %
PHENYTOIN SERPL-MCNC: 7.8 UG/ML (ref 10–20)
PLATELET # BLD AUTO: 167 X10(3)/MCL (ref 130–400)
PMV BLD AUTO: 10.1 FL (ref 7.4–10.4)
POTASSIUM SERPL-SCNC: 4.1 MMOL/L (ref 3.5–5.1)
PROT SERPL-MCNC: 6.3 GM/DL (ref 5.8–7.6)
RBC # BLD AUTO: 4.72 X10(6)/MCL (ref 4.7–6.1)
SODIUM SERPL-SCNC: 142 MMOL/L (ref 136–145)
WBC # BLD AUTO: 29.73 X10(3)/MCL (ref 4.5–11.5)

## 2024-07-12 PROCEDURE — 99284 EMERGENCY DEPT VISIT MOD MDM: CPT | Mod: 25

## 2024-07-12 PROCEDURE — 85025 COMPLETE CBC W/AUTO DIFF WBC: CPT

## 2024-07-12 PROCEDURE — 85007 BL SMEAR W/DIFF WBC COUNT: CPT

## 2024-07-12 PROCEDURE — 96361 HYDRATE IV INFUSION ADD-ON: CPT

## 2024-07-12 PROCEDURE — 25000003 PHARM REV CODE 250: Performed by: EMERGENCY MEDICINE

## 2024-07-12 PROCEDURE — 80053 COMPREHEN METABOLIC PANEL: CPT

## 2024-07-12 PROCEDURE — 80185 ASSAY OF PHENYTOIN TOTAL: CPT

## 2024-07-12 RX ADMIN — SODIUM CHLORIDE 1000 ML: 9 INJECTION, SOLUTION INTRAVENOUS at 11:07

## 2024-07-13 LAB
ABS NEUT (OLG): 6.54 X10(3)/MCL (ref 2.1–9.2)
BACTERIA #/AREA URNS AUTO: ABNORMAL /HPF
BILIRUB UR QL STRIP.AUTO: NEGATIVE
BURR CELLS (OLG): ABNORMAL
CLARITY UR: CLEAR
COLOR UR AUTO: ABNORMAL
GLUCOSE UR QL STRIP: NORMAL
HGB UR QL STRIP: NEGATIVE
INSTRUMENT WBC (OLG): 29.73 X10(3)/MCL
KETONES UR QL STRIP: NEGATIVE
LEUKOCYTE ESTERASE UR QL STRIP: 25
LYMPHOCYTES NFR BLD MANUAL: 21.7 X10(3)/MCL
LYMPHOCYTES NFR BLD MANUAL: 73 %
MONOCYTES NFR BLD MANUAL: 1.49 X10(3)/MCL (ref 0.1–1.3)
MONOCYTES NFR BLD MANUAL: 5 %
NEUTROPHILS NFR BLD MANUAL: 22 %
NITRITE UR QL STRIP: ABNORMAL
PH UR STRIP: 7.5 [PH]
PLATELET # BLD EST: NORMAL 10*3/UL
POIKILOCYTOSIS BLD QL SMEAR: ABNORMAL
PROT UR QL STRIP: NEGATIVE
RBC #/AREA URNS AUTO: ABNORMAL /HPF
RBC MORPH BLD: ABNORMAL
SP GR UR STRIP.AUTO: 1.02 (ref 1–1.03)
SQUAMOUS #/AREA URNS LPF: ABNORMAL /HPF
UROBILINOGEN UR STRIP-ACNC: NORMAL
WBC #/AREA URNS AUTO: ABNORMAL /HPF

## 2024-07-13 PROCEDURE — 25000003 PHARM REV CODE 250: Performed by: EMERGENCY MEDICINE

## 2024-07-13 PROCEDURE — 96374 THER/PROPH/DIAG INJ IV PUSH: CPT

## 2024-07-13 PROCEDURE — 81001 URINALYSIS AUTO W/SCOPE: CPT

## 2024-07-13 PROCEDURE — 63600175 PHARM REV CODE 636 W HCPCS: Mod: JZ,JG | Performed by: EMERGENCY MEDICINE

## 2024-07-13 RX ORDER — CIPROFLOXACIN 500 MG/1
500 TABLET ORAL
Status: COMPLETED | OUTPATIENT
Start: 2024-07-13 | End: 2024-07-13

## 2024-07-13 RX ORDER — CIPROFLOXACIN 500 MG/1
500 TABLET ORAL 2 TIMES DAILY
Qty: 14 TABLET | Refills: 0 | Status: SHIPPED | OUTPATIENT
Start: 2024-07-13 | End: 2024-07-20

## 2024-07-13 RX ADMIN — DEXTROSE MONOHYDRATE 990 MG PE: 50 INJECTION, SOLUTION INTRAVENOUS at 01:07

## 2024-07-13 RX ADMIN — CIPROFLOXACIN HYDROCHLORIDE 500 MG: 500 TABLET, FILM COATED ORAL at 01:07

## 2024-07-13 NOTE — ED PROVIDER NOTES
Encounter Date: 7/12/2024       History     Chief Complaint   Patient presents with    Seizures     From [facility] with witnessed seizure. 5mg versed IN at NH. Pt post ictal now. NH note reports previous low dilantin levels. Recent NSTEMI after seizure     Patient is a 78-year-old male transferred from nursing home secondary to generalized seizure.  Patient does have history of seizure disorder and is apparently on phenobarbital and Dilantin.  Patient states he has a proximally 2 seizures a week.  Patient denies headache.  He denies any neck pain.  Patient denies chest pain or shortness of breath.  No fever or chills.  No abdominal pain.  No nausea vomiting.      Review of patient's allergies indicates:   Allergen Reactions    Ancef in dextrose (iso-osm)     Codeine     Penicillins      Past Medical History:   Diagnosis Date    Anticoagulant long-term use     CHF (congestive heart failure) 10/2021    EF of 25-30% on ECHO    CLL (chronic lymphocytic leukemia)     COPD (chronic obstructive pulmonary disease)     Coronary artery disease     Depression     Difficult intubation     GERD (gastroesophageal reflux disease)     Hypertension     Mixed hyperlipidemia     PVD (peripheral vascular disease)     Seizures     Sleep apnea, unspecified     TIA (transient ischemic attack)      Past Surgical History:   Procedure Laterality Date    CORONARY ANGIOPLASTY WITH STENT PLACEMENT  10/18/2016    pLAD, pLAD, dLAD, mCX,    INSERTION OF BARE METAL STENT INTO PERIPHERAL ARTERY  2018    B/L [place] Vessels    PERCUTANEOUS BALLOON VALVULOPLASTY  04/04/2018     Family History   Problem Relation Name Age of Onset    Hypertension Mother      Hypertension Father      Coronary artery disease Father      Heart attack Father       Social History     Tobacco Use    Smoking status: Former    Smokeless tobacco: Never   Substance Use Topics    Alcohol use: Not Currently    Drug use: Never     Review of Systems   Constitutional: Negative.     HENT: Negative.     Respiratory: Negative.     Cardiovascular: Negative.    Gastrointestinal: Negative.    Genitourinary: Negative.    Musculoskeletal: Negative.    Neurological:  Positive for seizures. Negative for dizziness, weakness, light-headedness and headaches.       Physical Exam     Initial Vitals [07/12/24 2001]   BP Pulse Resp Temp SpO2   (!) 141/57 80 16 97 °F (36.1 °C) 96 %      MAP       --         Physical Exam    Nursing note and vitals reviewed.  Constitutional:   Obese male, he has in no apparent distress.  He appears comfortable.  He is alert and oriented and answers questions appropriately   HENT:   Head: Normocephalic and atraumatic.   Eyes: EOM are normal. Pupils are equal, round, and reactive to light.   Neck: Neck supple.   Normal range of motion.  Cardiovascular:  Normal rate, regular rhythm and normal heart sounds.           Pulmonary/Chest: Breath sounds normal. No respiratory distress. He has no wheezes. He has no rhonchi.   Abdominal: Abdomen is soft. There is no abdominal tenderness. There is no guarding.   Musculoskeletal:         General: Normal range of motion.      Cervical back: Normal range of motion and neck supple.     Neurological: He is alert and oriented to person, place, and time. He has normal strength.   Psychiatric: He has a normal mood and affect. Thought content normal.         ED Course   Procedures  Labs Reviewed   URINALYSIS, REFLEX TO URINE CULTURE - Abnormal; Notable for the following components:       Result Value    Nitrites, UA 2+ (*)     Leukocyte Esterase, UA 25 (*)     WBC, UA 6-10 (*)     Bacteria, UA Many (*)     All other components within normal limits   PHENYTOIN LEVEL, TOTAL - Abnormal; Notable for the following components:    Phenytoin 7.8 (*)     All other components within normal limits   CBC WITH DIFFERENTIAL - Abnormal; Notable for the following components:    WBC 29.73 (*)     Hgb 13.6 (*)     Hct 41.3 (*)     MCHC 32.9 (*)     All other  components within normal limits   MANUAL DIFFERENTIAL - Abnormal; Notable for the following components:    Lymphs Abs 21.7029 (*)     Monocytes Abs 1.4865 (*)     RBC Morph Abnormal (*)     Poikilocytosis 1+ (*)     Sarmad Cells 1+ (*)     All other components within normal limits   CBC W/ AUTO DIFFERENTIAL    Narrative:     The following orders were created for panel order CBC Auto Differential.  Procedure                               Abnormality         Status                     ---------                               -----------         ------                     CBC with Differential[[id number]]       Abnormal            Final result               Manual Differential[[id number]]         Abnormal            Final result                 Please view results for these tests on the individual orders.   COMPREHENSIVE METABOLIC PANEL          Imaging Results    None          Medications   sodium chloride 0.9% bolus 1,000 mL 1,000 mL (0 mLs Intravenous Stopped 7/13/24 0056)   FOSphenytoin (CEREBYX) 990 mg PE in D5W 100 mL IVPB (0 mg PE Intravenous Stopped 7/13/24 0123)   ciprofloxacin HCl tablet 500 mg (500 mg Oral Given 7/13/24 0148)     Medical Decision Making  Differential diagnosis:  Generalized seizure, history of epilepsy    Amount and/or Complexity of Data Reviewed  Labs: ordered.     Details: Patient has mild UTI, [place] [address] with a lymphocytosis.  Dilantin level was low at 7.8.  Discussion of management or test interpretation with external provider(s): While in the ER, patient was given a dose of Cipro and a L of fluids.  He was also given 1 g of Cerebyx secondary to low Dilantin level.  There have been no seizures while in the ER.  Will discharge to home    Risk  Prescription drug management.               ED Course as of 07/13/24 0429   Sat Jul 13, 2024   0105 Patient remains alert and oriented, in no apparent distress.  Denies any complaints [KG]   0158 Patient remains in no apparent  distress.  No complaints.  No seizure activity while in the ER [KG]      ED Course User Index  [KG] Buddy Crain MD                           Clinical Impression:  Final diagnoses:  [N30.00] Acute cystitis without hematuria (Primary)  [R78.89] Subtherapeutic serum dilantin level  [R56.9] Generalized seizure  [Z86.69] History of epilepsy          ED Disposition Condition    Discharge Stable          ED Prescriptions       Medication Sig Dispense Start Date End Date Auth. Provider    ciprofloxacin HCl (CIPRO) 500 MG tablet Take 1 tablet (500 mg total) by mouth 2 (two) times daily. for 7 days 14 tablet 7/13/2024 7/20/2024 Buddy Crain MD          Follow-up Information       Follow up With Specialties Details Why Contact Info    ISAC Schmidt MD Family Medicine In 1 day  [address] [place] [address] [phone]      [facility] Emergency Dept Emergency Medicine  If symptoms worsen, As needed [address] [place] [id number] [phone]             Buddy Crain MD  07/13/24 0159       Buddy Crain MD  07/13/24 0426       Buddy Crain MD  07/13/24 0427       Buddy Crain MD  07/13/24 0428

## 2024-07-13 NOTE — FIRST PROVIDER EVALUATION
"Medical screening examination initiated.  I have conducted a focused provider triage encounter, findings are as follows:    Brief history of present illness:  78 year old male presents to the ER for evaluation of seizure PTA. Patient was told he had a seziure. Hx of seizure on Dilantin. He thinks he has been compliant with his medications. Denies any symptoms.    Vitals:    07/12/24 2001   BP: (!) 141/57   Pulse: 80   Resp: 16   Temp: 97 °F (36.1 °C)   TempSrc: Temporal   SpO2: 96%   Weight: 104.3 kg (230 lb)   Height: 5' 7" (1.702 m)       Pertinent physical exam:  AAOx4, non-labored, nasal cannula in place, on stretcher    Brief workup plan:  labs, urine, eval    Preliminary workup initiated; this workup will be continued and followed by the physician or advanced practice provider that is assigned to the patient when roomed.  "

## 2024-07-18 NOTE — PHYSICIAN QUERY
"Question: Please clarify the Cardiac diagnosis.  To respond, click "New Note" select your response press enter then sign to complete the query     Provider Query Response:  NSTEMI      "

## 2024-07-18 NOTE — PHYSICIAN QUERY
"Please clarify the conflicting documentation in regards to the TYPE and ACUITY of the heart failure   To respond, click "New Note" select your response press enter then sign to complete the query           Chronic Diastolic Heart Failure (HFpEF)      "

## 2024-07-29 NOTE — PROGRESS NOTES
Subjective:       Patient ID: John Wayne is a 78 y.o. male.      Chief Complaint: CLL (chronic lymphocytic leukemia) (Pt continues with leg weakness and had a fall on 5/5/24.)      Patient is a 78 y.o. year old male with oncology and medical history as below.     Interval history:   He returns to clinic today for a three-month follow up regarding his CLL.  He continues to reside in a nursing home.  He is able to answer questions appropriately.  He was last hospitalized 6/18/2024 for seizure and NSTEMI. He has had multiple ED visits due to recurrent seizures. He denies symptoms such as a voluntary weight loss, fever, recurring infections, abdominal pain, diarrhea or constipation or reflux. He denies abnormal bruising/bleeding, abnormal lumps or masses.  Labs were reviewed in detail with him.    Oncology History    No history exists.     Past Medical History:   Diagnosis Date    Anticoagulant long-term use     CHF (congestive heart failure) 10/2021    EF of 25-30% on ECHO    CLL (chronic lymphocytic leukemia)     COPD (chronic obstructive pulmonary disease)     Coronary artery disease     Depression     Difficult intubation     GERD (gastroesophageal reflux disease)     Hypertension     Mixed hyperlipidemia     PVD (peripheral vascular disease)     Seizures     Sleep apnea, unspecified     TIA (transient ischemic attack)       Review of patient's allergies indicates:   Allergen Reactions    Ancef in dextrose (iso-osm)     Codeine     Penicillins       Current Outpatient Medications on File Prior to Visit   Medication Sig Dispense Refill    albuterol (PROVENTIL) 2.5 mg /3 mL (0.083 %) nebulizer solution       albuterol sulfate 2.5 mg/0.5 mL Nebu Take 2.5 mg by nebulization every 6 (six) hours as needed. Rescue      amiodarone (PACERONE) 200 MG Tab Take 200 mg by mouth once daily.      aspirin (ECOTRIN) 81 MG EC tablet Take 1 tablet (81 mg total) by mouth once daily.      atorvastatin (LIPITOR) 40 MG tablet Take 40 mg  by mouth every evening.      clopidogreL (PLAVIX) 75 mg tablet Take 1 tablet by mouth Daily.      ELIQUIS 5 mg Tab Take 1 tablet by mouth 2 (two) times a day.      ergocalciferol (ERGOCALCIFEROL) 50,000 unit Cap       finasteride (PROSCAR) 5 mg tablet Take 1 tablet (5 mg total) by mouth once daily. (Patient taking differently: Take 5 mg by mouth nightly.) 30 tablet 0    furosemide (LASIX) 40 MG tablet Take 40 mg by mouth once daily.      levETIRAcetam (KEPPRA) 1000 MG tablet Take 1 tablet by mouth 2 (two) times a day.      lisinopriL (PRINIVIL,ZESTRIL) 5 MG tablet Take 1 tablet by mouth Daily.      metoprolol succinate (TOPROL-XL) 25 MG 24 hr tablet Take 0.5 tablets (12.5 mg total) by mouth 2 (two) times daily.      midazolam (NAYZILAM) 5 mg/spray (0.1 mL) Spry 1 spray by Nasal route daily as needed (seizure). To left nostril      pantoprazole (PROTONIX) 40 MG tablet Take 40 mg by mouth Daily.      PHENobarbitaL 32.4 MG tablet Take 3 tablets by mouth Daily.      phenytoin (DILANTIN) 100 MG ER capsule Take 100 mg by mouth 2 (two) times daily.      primidone (MYSOLINE) 50 MG Tab Take 150 mg by mouth 2 (two) times a day.      tamsulosin (FLOMAX) 0.4 mg Cap Take 1 tablet by mouth nightly.      venlafaxine (EFFEXOR) 75 MG tablet Take 1 tablet (75 mg total) by mouth 2 (two) times daily. 60 tablet 0     No current facility-administered medications on file prior to visit.                 ECOG: 3    Review of Systems  Review of Systems   Constitutional:  Negative for appetite change, chills, fatigue, fever and unexpected weight change.   HENT:  Negative for ear discharge, facial swelling, mouth sores, nosebleeds, sinus pressure/congestion and sore throat.    Eyes:  Negative for pain and visual disturbance.   Respiratory:  Negative for cough, chest tightness, shortness of breath and wheezing.    Cardiovascular:  Negative for chest pain, palpitations and leg swelling.   Gastrointestinal:  Negative for abdominal pain, blood in  stool, change in bowel habit, constipation, diarrhea, nausea and vomiting.   Endocrine: Negative.    Genitourinary:  Negative for dysuria, frequency, hematuria and urgency.   Musculoskeletal:  Negative for arthralgias, gait problem, joint swelling and myalgias.   Integumentary:  Negative for color change, pallor, rash and wound.   Allergic/Immunologic: Negative.  Negative for frequent infections.   Neurological:  Negative for dizziness, vertigo, syncope, weakness and numbness.   Hematological:  Negative for adenopathy. Does not bruise/bleed easily.   Psychiatric/Behavioral:  Negative for confusion and dysphoric mood. The patient is not nervous/anxious.    All other systems reviewed and are negative.       Physical Exam  Physical Exam  Vitals reviewed.   Constitutional:       General: He is not in acute distress.     Appearance: He is morbidly obese.   HENT:      Head: Normocephalic and atraumatic.      Nose: Nose normal.      Mouth/Throat:      Mouth: Mucous membranes are moist.      Pharynx: Oropharynx is clear. No oropharyngeal exudate or posterior oropharyngeal erythema.   Eyes:      Extraocular Movements: Extraocular movements intact.      Conjunctiva/sclera: Conjunctivae normal.      Pupils: Pupils are equal, round, and reactive to light.   Cardiovascular:      Rate and Rhythm: Normal rate and regular rhythm.      Pulses: Normal pulses.      Heart sounds: No murmur heard.     No friction rub. No gallop.   Pulmonary:      Effort: Pulmonary effort is normal. No respiratory distress.      Breath sounds: No wheezing, rhonchi or rales.   Abdominal:      General: Abdomen is flat. Bowel sounds are normal.      Palpations: Abdomen is soft. There is no mass.      Tenderness: There is no abdominal tenderness. There is no guarding.   Musculoskeletal:         General: Swelling present. No tenderness or deformity. Normal range of motion.      Cervical back: Normal range of motion and neck supple.      Right lower leg: Edema  present.      Left lower leg: Edema present.      Comments: Presents in wheelchair   Lymphadenopathy:      Cervical: No cervical adenopathy.   Skin:     General: Skin is warm and dry.      Findings: No erythema, lesion or rash.   Neurological:      General: No focal deficit present.      Mental Status: He is alert and oriented to person, place, and time. Mental status is at baseline.      Cranial Nerves: Cranial nerves 2-12 are intact.      Motor: Weakness present.      Gait: Gait abnormal (unsteady).      Comments: On wheelchair today   Psychiatric:         Mood and Affect: Mood normal.         Behavior: Behavior normal. Behavior is cooperative.         Thought Content: Thought content normal.         Judgment: Judgment normal.            Lab Visit on 07/30/2024   Component Date Value    Sodium 07/30/2024 139     Potassium 07/30/2024 4.0     Chloride 07/30/2024 107     CO2 07/30/2024 25     Glucose 07/30/2024 119 (H)     Blood Urea Nitrogen 07/30/2024 15.0     Creatinine 07/30/2024 0.91     Calcium 07/30/2024 9.8     Protein Total 07/30/2024 7.2     Albumin 07/30/2024 3.7     Globulin 07/30/2024 3.5     Albumin/Globulin Ratio 07/30/2024 1.1     Bilirubin Total 07/30/2024 0.2     ALP 07/30/2024 105     ALT 07/30/2024 19     AST 07/30/2024 15     eGFR 07/30/2024 >60     Anion Gap 07/30/2024 7.0     BUN/Creatinine Ratio 07/30/2024 16     Magnesium Level 07/30/2024 1.90     Phosphorus Level 07/30/2024 2.9     WBC 07/30/2024 30.62 (H)     RBC 07/30/2024 5.07     Hgb 07/30/2024 14.2     Hct 07/30/2024 45.1     MCV 07/30/2024 89.0     MCH 07/30/2024 28.0     MCHC 07/30/2024 31.5 (L)     RDW 07/30/2024 14.4     Platelet 07/30/2024 213     MPV 07/30/2024 10.4     Neut % 07/30/2024 16.7     Lymph % 07/30/2024 77.9     Mono % 07/30/2024 3.8     Eos % 07/30/2024 0.9     Basophil % 07/30/2024 0.4     Lymph # 07/30/2024 23.86 (H)     Neut # 07/30/2024 5.09     Mono # 07/30/2024 1.17     Eos # 07/30/2024 0.29     Baso #  07/30/2024 0.13     IG# 07/30/2024 0.08 (H)     IG% 07/30/2024 0.3           Problem list:  1. CLL (chronic lymphocytic leukemia)    2. Seizures               Assessment:   CLL   He has borderline cytopenias. His leukocytosis is stable at 30.62. His anemia is mild and resolved today and no lymphadenopathy or B symptoms.   Given his other comoridities such as increasing neurological impairment, seizures, COPD, risks outweight benefits of starting any therapy at this time.   Will continue monitoring unless CLL is creating significant tumor burden    Plan:   Patient with stable disease.   Labs stable, immunoglobulins pending.   Continue observation.    Return to clinic in 3 months with NP for follow up/lab  Cbc, cmp, ldh, immunoglobulins- 1 week prior @ [facility]         I personally reviewed all pertinent imaging, lab results, explained to the patient the pertinent information in detail including radiographic imaging, abnormal lab results. All questions were answered thoroughly. Total time spent on this visit was >30 minutes.       BONI Garcia  Oncology/Hematology   [facility]

## 2024-07-30 ENCOUNTER — LAB VISIT (OUTPATIENT)
Dept: LAB | Facility: HOSPITAL | Age: 79
End: 2024-07-30
Payer: MEDICARE

## 2024-07-30 ENCOUNTER — OFFICE VISIT (OUTPATIENT)
Dept: HEMATOLOGY/ONCOLOGY | Facility: CLINIC | Age: 79
End: 2024-07-30
Payer: MEDICARE

## 2024-07-30 VITALS
SYSTOLIC BLOOD PRESSURE: 139 MMHG | OXYGEN SATURATION: 96 % | TEMPERATURE: 98 F | RESPIRATION RATE: 20 BRPM | HEIGHT: 67 IN | BODY MASS INDEX: 40.34 KG/M2 | HEART RATE: 53 BPM | WEIGHT: 257 LBS | DIASTOLIC BLOOD PRESSURE: 73 MMHG

## 2024-07-30 DIAGNOSIS — C91.10 CLL (CHRONIC LYMPHOCYTIC LEUKEMIA): Primary | ICD-10-CM

## 2024-07-30 DIAGNOSIS — C91.10 CLL (CHRONIC LYMPHOCYTIC LEUKEMIA): ICD-10-CM

## 2024-07-30 DIAGNOSIS — R56.9 SEIZURES: ICD-10-CM

## 2024-07-30 LAB
ALBUMIN SERPL-MCNC: 3.7 G/DL (ref 3.4–4.8)
ALBUMIN/GLOB SERPL: 1.1 RATIO (ref 1.1–2)
ALP SERPL-CCNC: 105 UNIT/L (ref 40–150)
ALT SERPL-CCNC: 19 UNIT/L (ref 0–55)
ANION GAP SERPL CALC-SCNC: 7 MEQ/L
AST SERPL-CCNC: 15 UNIT/L (ref 5–34)
BASOPHILS # BLD AUTO: 0.13 X10(3)/MCL
BASOPHILS NFR BLD AUTO: 0.4 %
BILIRUB SERPL-MCNC: 0.2 MG/DL
BUN SERPL-MCNC: 15 MG/DL (ref 8.4–25.7)
CALCIUM SERPL-MCNC: 9.8 MG/DL (ref 8.8–10)
CHLORIDE SERPL-SCNC: 107 MMOL/L (ref 98–107)
CO2 SERPL-SCNC: 25 MMOL/L (ref 23–31)
CREAT SERPL-MCNC: 0.91 MG/DL (ref 0.73–1.18)
CREAT/UREA NIT SERPL: 16
EOSINOPHIL # BLD AUTO: 0.29 X10(3)/MCL (ref 0–0.9)
EOSINOPHIL NFR BLD AUTO: 0.9 %
ERYTHROCYTE [DISTWIDTH] IN BLOOD BY AUTOMATED COUNT: 14.4 % (ref 11.5–17)
GFR SERPLBLD CREATININE-BSD FMLA CKD-EPI: >60 ML/MIN/1.73/M2
GLOBULIN SER-MCNC: 3.5 GM/DL (ref 2.4–3.5)
GLUCOSE SERPL-MCNC: 119 MG/DL (ref 82–115)
HCT VFR BLD AUTO: 45.1 % (ref 42–52)
HGB BLD-MCNC: 14.2 G/DL (ref 14–18)
IGA SERPL-MCNC: 191 MG/DL (ref 101–645)
IGG SERPL-MCNC: 1098 MG/DL (ref 540–1822)
IGM SERPL-MCNC: 86 MG/DL (ref 22–240)
IMM GRANULOCYTES # BLD AUTO: 0.08 X10(3)/MCL (ref 0–0.04)
IMM GRANULOCYTES NFR BLD AUTO: 0.3 %
LYMPHOCYTES # BLD AUTO: 23.86 X10(3)/MCL (ref 0.6–4.6)
LYMPHOCYTES NFR BLD AUTO: 77.9 %
MAGNESIUM SERPL-MCNC: 1.9 MG/DL (ref 1.6–2.6)
MCH RBC QN AUTO: 28 PG (ref 27–31)
MCHC RBC AUTO-ENTMCNC: 31.5 G/DL (ref 33–36)
MCV RBC AUTO: 89 FL (ref 80–94)
MONOCYTES # BLD AUTO: 1.17 X10(3)/MCL (ref 0.1–1.3)
MONOCYTES NFR BLD AUTO: 3.8 %
NEUTROPHILS # BLD AUTO: 5.09 X10(3)/MCL (ref 2.1–9.2)
NEUTROPHILS NFR BLD AUTO: 16.7 %
PHOSPHATE SERPL-MCNC: 2.9 MG/DL (ref 2.3–4.7)
PLATELET # BLD AUTO: 213 X10(3)/MCL (ref 130–400)
PMV BLD AUTO: 10.4 FL (ref 7.4–10.4)
POTASSIUM SERPL-SCNC: 4 MMOL/L (ref 3.5–5.1)
PROT SERPL-MCNC: 7.2 GM/DL (ref 5.8–7.6)
RBC # BLD AUTO: 5.07 X10(6)/MCL (ref 4.7–6.1)
SODIUM SERPL-SCNC: 139 MMOL/L (ref 136–145)
WBC # BLD AUTO: 30.62 X10(3)/MCL (ref 4.5–11.5)

## 2024-07-30 PROCEDURE — 83735 ASSAY OF MAGNESIUM: CPT

## 2024-07-30 PROCEDURE — 82784 ASSAY IGA/IGD/IGG/IGM EACH: CPT | Mod: 59

## 2024-07-30 PROCEDURE — 84100 ASSAY OF PHOSPHORUS: CPT

## 2024-07-30 PROCEDURE — 99214 OFFICE O/P EST MOD 30 MIN: CPT | Mod: S$PBB,,,

## 2024-07-30 PROCEDURE — 85025 COMPLETE CBC W/AUTO DIFF WBC: CPT

## 2024-07-30 PROCEDURE — 36415 COLL VENOUS BLD VENIPUNCTURE: CPT

## 2024-07-30 PROCEDURE — 80053 COMPREHEN METABOLIC PANEL: CPT

## 2024-07-30 PROCEDURE — 99215 OFFICE O/P EST HI 40 MIN: CPT | Mod: PBBFAC

## 2024-07-30 PROCEDURE — 99999 PR PBB SHADOW E&M-EST. PATIENT-LVL V: CPT | Mod: PBBFAC,,,

## 2024-07-30 RX ORDER — ERGOCALCIFEROL 1.25 MG/1
CAPSULE ORAL
COMMUNITY
Start: 2024-07-15

## 2024-09-06 ENCOUNTER — LAB REQUISITION (OUTPATIENT)
Dept: LAB | Facility: HOSPITAL | Age: 79
End: 2024-09-06
Payer: MEDICARE

## 2024-09-06 DIAGNOSIS — I48.91 UNSPECIFIED ATRIAL FIBRILLATION: ICD-10-CM

## 2024-09-06 LAB
INR PPP: 4.7
PROTHROMBIN TIME: 46.1 SECONDS (ref 12.5–14.5)

## 2024-09-06 PROCEDURE — 85610 PROTHROMBIN TIME: CPT

## 2024-09-23 PROBLEM — I21.4 NSTEMI (NON-ST ELEVATED MYOCARDIAL INFARCTION): Status: RESOLVED | Noted: 2022-12-08 | Resolved: 2024-09-23

## 2024-12-13 ENCOUNTER — HOSPITAL ENCOUNTER (OUTPATIENT)
Facility: HOSPITAL | Age: 79
Discharge: NURSING, REHAB OR LONG-TERM CARE | End: 2024-12-15
Attending: EMERGENCY MEDICINE | Admitting: INTERNAL MEDICINE
Payer: MEDICARE

## 2024-12-13 DIAGNOSIS — C95.91 LEUKEMIA IN REMISSION, UNSPECIFIED LEUKEMIA TYPE: ICD-10-CM

## 2024-12-13 DIAGNOSIS — K52.9 ENTEROCOLITIS: Primary | ICD-10-CM

## 2024-12-13 DIAGNOSIS — R19.7 DIARRHEA, UNSPECIFIED TYPE: ICD-10-CM

## 2024-12-13 DIAGNOSIS — R07.9 CHEST PAIN: ICD-10-CM

## 2024-12-13 DIAGNOSIS — R11.10 VOMITING: ICD-10-CM

## 2024-12-13 LAB
ABS NEUT CALC (OHS): 13.08 X10(3)/MCL (ref 2.1–9.2)
ALBUMIN SERPL-MCNC: 3.3 G/DL (ref 3.4–4.8)
ALBUMIN/GLOB SERPL: 0.9 RATIO (ref 1.1–2)
ALP SERPL-CCNC: 84 UNIT/L (ref 40–150)
ALT SERPL-CCNC: 28 UNIT/L (ref 0–55)
ANION GAP SERPL CALC-SCNC: 9 MEQ/L
AST SERPL-CCNC: 27 UNIT/L (ref 5–34)
BILIRUB SERPL-MCNC: 0.2 MG/DL
BUN SERPL-MCNC: 8 MG/DL (ref 8.4–25.7)
CALCIUM SERPL-MCNC: 8.4 MG/DL (ref 8.8–10)
CHLORIDE SERPL-SCNC: 110 MMOL/L (ref 98–107)
CO2 SERPL-SCNC: 20 MMOL/L (ref 23–31)
CREAT SERPL-MCNC: 0.8 MG/DL (ref 0.72–1.25)
CREAT/UREA NIT SERPL: 10
EOSINOPHIL NFR BLD MANUAL: 0.64 X10(3)/MCL (ref 0–0.9)
EOSINOPHIL NFR BLD MANUAL: 2 % (ref 0–8)
ERYTHROCYTE [DISTWIDTH] IN BLOOD BY AUTOMATED COUNT: 16.4 % (ref 11.5–17)
FLUAV AG UPPER RESP QL IA.RAPID: NOT DETECTED
FLUBV AG UPPER RESP QL IA.RAPID: NOT DETECTED
GFR SERPLBLD CREATININE-BSD FMLA CKD-EPI: >60 ML/MIN/1.73/M2
GLOBULIN SER-MCNC: 3.5 GM/DL (ref 2.4–3.5)
GLUCOSE SERPL-MCNC: 99 MG/DL (ref 82–115)
HCT VFR BLD AUTO: 40.6 % (ref 42–52)
HGB BLD-MCNC: 13.1 G/DL (ref 14–18)
LACTATE SERPL-SCNC: 0.7 MMOL/L (ref 0.5–2.2)
LIPASE SERPL-CCNC: 9 U/L
LYMPHOCYTES NFR BLD MANUAL: 16.91 X10(3)/MCL
LYMPHOCYTES NFR BLD MANUAL: 53 % (ref 13–40)
MAGNESIUM SERPL-MCNC: 1.9 MG/DL (ref 1.6–2.6)
MCH RBC QN AUTO: 27.3 PG (ref 27–31)
MCHC RBC AUTO-ENTMCNC: 32.3 G/DL (ref 33–36)
MCV RBC AUTO: 84.8 FL (ref 80–94)
MONOCYTES NFR BLD MANUAL: 1.28 X10(3)/MCL (ref 0.1–1.3)
MONOCYTES NFR BLD MANUAL: 4 % (ref 2–11)
NEUTROPHILS NFR BLD MANUAL: 36 % (ref 47–80)
NEUTS BAND NFR BLD MANUAL: 5 % (ref 0–11)
PLATELET # BLD AUTO: 215 X10(3)/MCL (ref 130–400)
PLATELET # BLD EST: ADEQUATE 10*3/UL
PMV BLD AUTO: 10.2 FL (ref 7.4–10.4)
POTASSIUM SERPL-SCNC: 3.7 MMOL/L (ref 3.5–5.1)
PROT SERPL-MCNC: 6.8 GM/DL (ref 5.8–7.6)
RBC # BLD AUTO: 4.79 X10(6)/MCL (ref 4.7–6.1)
SARS-COV-2 RNA RESP QL NAA+PROBE: NOT DETECTED
SODIUM SERPL-SCNC: 139 MMOL/L (ref 136–145)
TROPONIN I SERPL-MCNC: 0.07 NG/ML (ref 0–0.04)
WBC # BLD AUTO: 31.91 X10(3)/MCL (ref 4.5–11.5)

## 2024-12-13 PROCEDURE — 36410 VNPNXR 3YR/> PHY/QHP DX/THER: CPT

## 2024-12-13 PROCEDURE — 96361 HYDRATE IV INFUSION ADD-ON: CPT

## 2024-12-13 PROCEDURE — G0378 HOSPITAL OBSERVATION PER HR: HCPCS

## 2024-12-13 PROCEDURE — 93005 ELECTROCARDIOGRAM TRACING: CPT

## 2024-12-13 PROCEDURE — 63600175 PHARM REV CODE 636 W HCPCS: Performed by: INTERNAL MEDICINE

## 2024-12-13 PROCEDURE — 83735 ASSAY OF MAGNESIUM: CPT | Performed by: EMERGENCY MEDICINE

## 2024-12-13 PROCEDURE — 96375 TX/PRO/DX INJ NEW DRUG ADDON: CPT

## 2024-12-13 PROCEDURE — 36415 COLL VENOUS BLD VENIPUNCTURE: CPT | Performed by: EMERGENCY MEDICINE

## 2024-12-13 PROCEDURE — 80053 COMPREHEN METABOLIC PANEL: CPT | Performed by: EMERGENCY MEDICINE

## 2024-12-13 PROCEDURE — 63600175 PHARM REV CODE 636 W HCPCS: Performed by: EMERGENCY MEDICINE

## 2024-12-13 PROCEDURE — 87040 BLOOD CULTURE FOR BACTERIA: CPT | Performed by: EMERGENCY MEDICINE

## 2024-12-13 PROCEDURE — 85007 BL SMEAR W/DIFF WBC COUNT: CPT | Performed by: EMERGENCY MEDICINE

## 2024-12-13 PROCEDURE — 93010 ELECTROCARDIOGRAM REPORT: CPT | Mod: ,,, | Performed by: INTERNAL MEDICINE

## 2024-12-13 PROCEDURE — C1751 CATH, INF, PER/CENT/MIDLINE: HCPCS

## 2024-12-13 PROCEDURE — 83690 ASSAY OF LIPASE: CPT | Performed by: EMERGENCY MEDICINE

## 2024-12-13 PROCEDURE — 99285 EMERGENCY DEPT VISIT HI MDM: CPT | Mod: 25

## 2024-12-13 PROCEDURE — 96365 THER/PROPH/DIAG IV INF INIT: CPT

## 2024-12-13 PROCEDURE — 84484 ASSAY OF TROPONIN QUANT: CPT | Performed by: EMERGENCY MEDICINE

## 2024-12-13 PROCEDURE — 83605 ASSAY OF LACTIC ACID: CPT | Performed by: EMERGENCY MEDICINE

## 2024-12-13 PROCEDURE — 0240U COVID/FLU A&B PCR: CPT | Performed by: EMERGENCY MEDICINE

## 2024-12-13 PROCEDURE — 63600175 PHARM REV CODE 636 W HCPCS: Mod: JZ,JG | Performed by: INTERNAL MEDICINE

## 2024-12-13 RX ORDER — ATORVASTATIN CALCIUM 40 MG/1
40 TABLET, FILM COATED ORAL NIGHTLY
Status: DISCONTINUED | OUTPATIENT
Start: 2024-12-13 | End: 2024-12-15 | Stop reason: HOSPADM

## 2024-12-13 RX ORDER — SODIUM CHLORIDE 0.9 % (FLUSH) 0.9 %
10 SYRINGE (ML) INJECTION EVERY 12 HOURS PRN
Status: DISCONTINUED | OUTPATIENT
Start: 2024-12-13 | End: 2024-12-15 | Stop reason: HOSPADM

## 2024-12-13 RX ORDER — TAMSULOSIN HYDROCHLORIDE 0.4 MG/1
0.4 CAPSULE ORAL NIGHTLY
Status: DISCONTINUED | OUTPATIENT
Start: 2024-12-14 | End: 2024-12-15 | Stop reason: HOSPADM

## 2024-12-13 RX ORDER — LISINOPRIL 5 MG/1
5 TABLET ORAL DAILY
Status: DISCONTINUED | OUTPATIENT
Start: 2024-12-14 | End: 2024-12-15 | Stop reason: HOSPADM

## 2024-12-13 RX ORDER — SODIUM CHLORIDE 0.9 % (FLUSH) 0.9 %
10 SYRINGE (ML) INJECTION
Status: DISCONTINUED | OUTPATIENT
Start: 2024-12-13 | End: 2024-12-15 | Stop reason: HOSPADM

## 2024-12-13 RX ORDER — ONDANSETRON HYDROCHLORIDE 2 MG/ML
8 INJECTION, SOLUTION INTRAVENOUS
Status: COMPLETED | OUTPATIENT
Start: 2024-12-13 | End: 2024-12-13

## 2024-12-13 RX ORDER — ONDANSETRON HYDROCHLORIDE 2 MG/ML
8 INJECTION, SOLUTION INTRAVENOUS
Status: DISCONTINUED | OUTPATIENT
Start: 2024-12-13 | End: 2024-12-13

## 2024-12-13 RX ORDER — TALC
6 POWDER (GRAM) TOPICAL NIGHTLY PRN
Status: DISCONTINUED | OUTPATIENT
Start: 2024-12-13 | End: 2024-12-15 | Stop reason: HOSPADM

## 2024-12-13 RX ORDER — METRONIDAZOLE 500 MG/100ML
500 INJECTION, SOLUTION INTRAVENOUS
Status: DISCONTINUED | OUTPATIENT
Start: 2024-12-13 | End: 2024-12-15 | Stop reason: HOSPADM

## 2024-12-13 RX ORDER — LEVETIRACETAM 500 MG/1
1000 TABLET ORAL 2 TIMES DAILY
Status: DISCONTINUED | OUTPATIENT
Start: 2024-12-13 | End: 2024-12-15 | Stop reason: HOSPADM

## 2024-12-13 RX ORDER — WARFARIN SODIUM 5 MG/1
5 TABLET ORAL DAILY
Status: DISCONTINUED | OUTPATIENT
Start: 2024-12-14 | End: 2024-12-14

## 2024-12-13 RX ORDER — WARFARIN SODIUM 5 MG/1
TABLET ORAL DAILY
COMMUNITY
Start: 2024-11-26

## 2024-12-13 RX ORDER — IBUPROFEN 200 MG
24 TABLET ORAL
Status: DISCONTINUED | OUTPATIENT
Start: 2024-12-13 | End: 2024-12-15 | Stop reason: HOSPADM

## 2024-12-13 RX ORDER — WARFARIN 7.5 MG/1
TABLET ORAL WEEKLY
COMMUNITY
Start: 2024-11-26

## 2024-12-13 RX ORDER — FINASTERIDE 5 MG/1
5 TABLET, FILM COATED ORAL NIGHTLY
Status: DISCONTINUED | OUTPATIENT
Start: 2024-12-13 | End: 2024-12-15 | Stop reason: HOSPADM

## 2024-12-13 RX ORDER — NALOXONE HCL 0.4 MG/ML
0.02 VIAL (ML) INJECTION
Status: DISCONTINUED | OUTPATIENT
Start: 2024-12-13 | End: 2024-12-15 | Stop reason: HOSPADM

## 2024-12-13 RX ORDER — LOPERAMIDE HCL 2 MG
2 TABLET ORAL DAILY
COMMUNITY

## 2024-12-13 RX ORDER — PHENYTOIN SODIUM 100 MG/1
100 CAPSULE, EXTENDED RELEASE ORAL 3 TIMES DAILY
Status: DISCONTINUED | OUTPATIENT
Start: 2024-12-14 | End: 2024-12-15 | Stop reason: HOSPADM

## 2024-12-13 RX ORDER — WARFARIN 7.5 MG/1
7.5 TABLET ORAL
Status: DISCONTINUED | OUTPATIENT
Start: 2024-12-14 | End: 2024-12-14

## 2024-12-13 RX ORDER — ACETAMINOPHEN 325 MG/1
650 TABLET ORAL EVERY 4 HOURS PRN
Status: DISCONTINUED | OUTPATIENT
Start: 2024-12-13 | End: 2024-12-15 | Stop reason: HOSPADM

## 2024-12-13 RX ORDER — ASPIRIN 81 MG/1
81 TABLET ORAL DAILY
Status: DISCONTINUED | OUTPATIENT
Start: 2024-12-14 | End: 2024-12-15 | Stop reason: HOSPADM

## 2024-12-13 RX ORDER — AMIODARONE HYDROCHLORIDE 200 MG/1
200 TABLET ORAL DAILY
Status: DISCONTINUED | OUTPATIENT
Start: 2024-12-14 | End: 2024-12-15 | Stop reason: HOSPADM

## 2024-12-13 RX ORDER — GLUCAGON 1 MG
1 KIT INJECTION
Status: DISCONTINUED | OUTPATIENT
Start: 2024-12-13 | End: 2024-12-15 | Stop reason: HOSPADM

## 2024-12-13 RX ORDER — IBUPROFEN 200 MG
16 TABLET ORAL
Status: DISCONTINUED | OUTPATIENT
Start: 2024-12-13 | End: 2024-12-15 | Stop reason: HOSPADM

## 2024-12-13 RX ORDER — ONDANSETRON HYDROCHLORIDE 2 MG/ML
4 INJECTION, SOLUTION INTRAVENOUS EVERY 6 HOURS PRN
Status: DISCONTINUED | OUTPATIENT
Start: 2024-12-13 | End: 2024-12-15 | Stop reason: HOSPADM

## 2024-12-13 RX ADMIN — SODIUM CHLORIDE, POTASSIUM CHLORIDE, SODIUM LACTATE AND CALCIUM CHLORIDE 1000 ML: 600; 310; 30; 20 INJECTION, SOLUTION INTRAVENOUS at 07:12

## 2024-12-13 RX ADMIN — ONDANSETRON 8 MG: 2 INJECTION INTRAMUSCULAR; INTRAVENOUS at 08:12

## 2024-12-13 RX ADMIN — METRONIDAZOLE 500 MG: 5 INJECTION, SOLUTION INTRAVENOUS at 11:12

## 2024-12-13 NOTE — Clinical Note
Diagnosis: Enterocolitis [178544]   Future Attending Provider: JENNA ROJAS [[id number]]   Special Needs:: Fall Risk [15]   Special Needs:: Weight >350 lbs [28]

## 2024-12-13 NOTE — ED PROVIDER NOTES
"Encounter Date: 12/13/2024       History     Chief Complaint   Patient presents with    Vomiting    Diarrhea     BIBA from NH for diarrhea for 3 days and now today vomitting brown stuff       79-year-old gentleman with a history of hypertension, long-term anticoagulant use, check congestive heart failure (EF of 25-30% on echo) as well as a history of chronic lymphocytic leukemia and COPD presents upon referral from the nursing home in which he lives for recurrent, non melenic diarrheal stools and a single emetic episode which they said it was "brown vomitus. "  No prior history of same.  No fever or chills.  No steatorrhea; no melena.  The patient does report some mild, generalized abdominal, colicky abdominal pain which is nonradiating and limit self-limited.        Review of patient's allergies indicates:   Allergen Reactions    Ancef in dextrose (iso-osm)     Codeine     Penicillins      Past Medical History:   Diagnosis Date    Anticoagulant long-term use     CHF (congestive heart failure) 10/2021    EF of 25-30% on ECHO    CLL (chronic lymphocytic leukemia)     COPD (chronic obstructive pulmonary disease)     Coronary artery disease     Depression     Difficult intubation     GERD (gastroesophageal reflux disease)     Hypertension     Mixed hyperlipidemia     PVD (peripheral vascular disease)     Seizures     Sleep apnea, unspecified     TIA (transient ischemic attack)      Past Surgical History:   Procedure Laterality Date    CORONARY ANGIOPLASTY WITH STENT PLACEMENT  10/18/2016    pLAD, pLAD, dLAD, mCX,    INSERTION OF BARE METAL STENT INTO PERIPHERAL ARTERY  2018    B/L [place] Vessels    PERCUTANEOUS BALLOON VALVULOPLASTY  04/04/2018     Family History   Problem Relation Name Age of Onset    Hypertension Mother      Hypertension Father      Coronary artery disease Father      Heart attack Father       Social History     Tobacco Use    Smoking status: Former    Smokeless tobacco: Never   Substance Use Topics "    Alcohol use: Not Currently    Drug use: Never     Review of Systems   Constitutional:  Negative for fever.   Eyes:  Negative for visual disturbance.   Respiratory:  Negative for shortness of breath and wheezing.    Cardiovascular:  Negative for chest pain.   Gastrointestinal:  Positive for diarrhea and vomiting. Negative for nausea.   Endocrine: Negative for polydipsia, polyphagia and polyuria.   Genitourinary:  Negative for dysuria.   Musculoskeletal:  Negative for back pain.   Skin:  Negative for wound.   Allergic/Immunologic: Negative for immunocompromised state.   Neurological:  Negative for headaches.   Hematological:  Does not bruise/bleed easily.       Physical Exam     Initial Vitals [12/13/24 1331]   BP Pulse Resp Temp SpO2   (!) 169/68 71 16 98.5 °F (36.9 °C) (!) 94 %      MAP       --         Physical Exam    Nursing note and vitals reviewed.  Constitutional: He appears well-developed and well-nourished. He is not diaphoretic. No distress.   Obese; estimated BMI of greater than 50   HENT:   Head: Normocephalic and atraumatic. Mouth/Throat: Uvula is midline, oropharynx is clear and moist and mucous membranes are normal.   Eyes: Conjunctivae and EOM are normal. Pupils are equal, round, and reactive to light.   Neck: Neck supple. No Brudzinski's sign and no Kernig's sign noted. Carotid bruit is not present. No JVD present.   Normal range of motion.  Cardiovascular:  Normal rate, regular rhythm, normal heart sounds and intact distal pulses.     Exam reveals no gallop and no friction rub.       No murmur heard.  Pulmonary/Chest: Breath sounds normal. No respiratory distress. He has no wheezes. He has no rhonchi. He has no rales.   Abdominal: Abdomen is soft. Bowel sounds are normal. He exhibits no distension and no mass. There is no abdominal tenderness.   Musculoskeletal:         General: Normal range of motion.      Cervical back: Normal range of motion and neck supple.     Neurological: He is alert and  oriented to person, place, and time.   Skin: Skin is warm and dry. No rash noted.   Psychiatric: He has a normal mood and affect. His behavior is normal. Judgment and thought content normal.         ED Course   Procedures  Labs Reviewed   COMPREHENSIVE METABOLIC PANEL - Abnormal       Result Value    Sodium 139      Potassium 3.7      Chloride 110 (*)     CO2 20 (*)     Glucose 99      Blood Urea Nitrogen 8.0 (*)     Creatinine 0.80      Calcium 8.4 (*)     Protein Total 6.8      Albumin 3.3 (*)     Globulin 3.5      Albumin/Globulin Ratio 0.9 (*)     Bilirubin Total 0.2      ALP 84      ALT 28      AST 27      eGFR >60      Anion Gap 9.0      BUN/Creatinine Ratio 10     TROPONIN I - Abnormal    Troponin-I 0.074 (*)    CBC WITH DIFFERENTIAL - Abnormal    WBC 31.91 (*)     RBC 4.79      Hgb 13.1 (*)     Hct 40.6 (*)     MCV 84.8      MCH 27.3      MCHC 32.3 (*)     RDW 16.4      Platelet 215      MPV 10.2     MANUAL DIFFERENTIAL - Abnormal    Neutrophils % 36 (*)     Bands % 5      Lymphs % 53 (*)     Monocytes % 4      Eosinophils % 2      Neutrophils Abs Calc 13.0831 (*)     Lymphs Abs 16.9123 (*)     Eosinophils Abs 0.6382      Monocytes Abs 1.2764      Platelets Adequate     LIPASE - Normal    Lipase Level 9     MAGNESIUM - Normal    Magnesium Level 1.90     LACTIC ACID, PLASMA - Normal    Lactic Acid Level 0.7     COVID/FLU A&B PCR - Normal    Influenza A PCR Not Detected      Influenza B PCR Not Detected      SARS-CoV-2 PCR Not Detected      Narrative:     The Xpert Xpress SARS-CoV-2/FLU/RSV plus is a rapid, multiplexed real-time PCR test intended for the simultaneous qualitative detection and differentiation of SARS-CoV-2, Influenza A, Influenza B, and respiratory syncytial virus (RSV) viral RNA in either nasopharyngeal swab or nasal swab specimens.         BLOOD CULTURE [facility]   BLOOD CULTURE [facility]   CBC W/ AUTO DIFFERENTIAL    Narrative:     The following orders were created for panel order CBC auto  differential.  Procedure                               Abnormality         Status                     ---------                               -----------         ------                     CBC with Differential[[id number]]       Abnormal            Final result               Manual Differential[[id number]]         Abnormal            Final result                 Please view results for these tests on the individual orders.          Imaging Results              CT Abdomen Pelvis  Without Contrast (Final result)  Result time 12/13/24 16:59:50   Procedure changed from CT Abdomen Pelvis With IV Contrast NO Oral Contrast     Final result by Moshe Tyson MD (12/13/24 16:59:50)                   Impression:      1. Mildly dilated small bowel loops with air-fluid levels.  Scattered air-fluid levels in the colon as well.  Suspect a mild enterocolitis or ileus. No convincing obstruction.  2. Subcutaneous stranding along the left groin, question recent procedure here.  Blood products possible here with no organized hematoma.  3. Other findings above.      Electronically signed by: Moshe Tyson  Date:    12/13/2024  Time:    16:59               Narrative:    EXAMINATION:  CT ABDOMEN PELVIS WITHOUT CONTRAST    CLINICAL HISTORY:  GI bleed;    TECHNIQUE:  Helical acquisition through the abdomen and pelvis without IV contrast.  Lack of contrast limits evaluation of solid organs and vascular structures .  Three plane reconstructions were provided for review.  mGycm. Automatic exposure control, adjustment of mA/kV or iterative reconstruction technique was used to reduce radiation.    COMPARISON:  9 November 2021    FINDINGS:  There is bibasilar atelectasis or scarring.  Heart size upper limit normal.  There are dense coronary artery calcifications.  There is gynecomastia.    A calcified gallstone is seen.  No pericholecystic inflammation.  No significant abnormality of the noncontrast liver, spleen, pancreas or adrenals.   There is no hydronephrosis.  No renal calculi are seen.    No bowel obstruction.  There are mildly dilated small bowel loops with air-fluid levels.  There are air-fluid levels in the colon as well.  No convincing small bowel obstruction.  No free air.    Urinary bladder unremarkable.  No pelvic free fluid.  Abdominal aorta normal in caliber.  Moderate atherosclerotic disease.  No retroperitoneal adenopathy.  There are bilateral iliac vein stents.    Moderate degenerative change of the spine.  There is some subcutaneous stranding along the left groin image 175 series 2.                                       Medications   lactated ringers bolus 1,000 mL (has no administration in time range)   ondansetron injection 8 mg (has no administration in time range)   sodium chloride 0.9% flush 10 mL (has no administration in time range)     Medical Decision Making             ED Course as of 12/13/24 1757   Fri Dec 13, 2024   1641 WBC(!): 31.91 [KJ]   1642 Hemoglobin(!): 13.1 [KJ]   1642 Hematocrit(!): 40.6 [KJ]   1720 CT Impression:     1. Mildly dilated small bowel loops with air-fluid levels.  Scattered air-fluid levels in the colon as well.  Suspect a mild enterocolitis or ileus. No convincing obstruction.  2. Subcutaneous stranding along the left groin, question recent procedure here.  Blood products possible here with no organized hematoma.  3. Other findings above.        Electronically signed by:Moshe Tyson  Date:                                            12/13/2024  Time:                                           16:59   [KJ]   1730 Troponin I(!): 0.074 [KJ]   1738 Case discussed with Dr. Brennan, Hospital Medicine;   Patient will benefit, I believe, from a period of bowel rest, hydration, and PICC line placement given extreme difficulty in obtaining a peripheral line, and phlebotomy, for this patient.  Given the diarrhea history, evaluation for Clostridium difficile is also warranted. [KJ]      ED Course User  Index  [KJ] Mihir Prasad MD                           Clinical Impression:  Final diagnoses:  [R11.10] Vomiting  [K52.9] Enterocolitis (Primary)  [R19.7] Diarrhea, unspecified type  [C95.91] Leukemia in remission, unspecified leukemia type          ED Disposition Condition    Observation Stable                Mihir Prasad MD  12/13/24 0462

## 2024-12-14 LAB
ABS NEUT CALC (OHS): 12.76 X10(3)/MCL (ref 2.1–9.2)
ANION GAP SERPL CALC-SCNC: 8 MEQ/L
ANION GAP SERPL CALC-SCNC: 9 MEQ/L
ANISOCYTOSIS BLD QL SMEAR: ABNORMAL
BASOPHILS # BLD AUTO: 0.13 X10(3)/MCL
BASOPHILS NFR BLD AUTO: 0.3 %
BUN SERPL-MCNC: 9 MG/DL (ref 8.4–25.7)
BUN SERPL-MCNC: 9 MG/DL (ref 8.4–25.7)
CALCIUM SERPL-MCNC: 8.1 MG/DL (ref 8.8–10)
CALCIUM SERPL-MCNC: 8.3 MG/DL (ref 8.8–10)
CHLORIDE SERPL-SCNC: 109 MMOL/L (ref 98–107)
CHLORIDE SERPL-SCNC: 110 MMOL/L (ref 98–107)
CO2 SERPL-SCNC: 24 MMOL/L (ref 23–31)
CO2 SERPL-SCNC: 25 MMOL/L (ref 23–31)
CREAT SERPL-MCNC: 0.83 MG/DL (ref 0.72–1.25)
CREAT SERPL-MCNC: 0.86 MG/DL (ref 0.72–1.25)
CREAT/UREA NIT SERPL: 10
CREAT/UREA NIT SERPL: 11
EOSINOPHIL # BLD AUTO: 0.16 X10(3)/MCL (ref 0–0.9)
EOSINOPHIL NFR BLD AUTO: 0.4 %
ERYTHROCYTE [DISTWIDTH] IN BLOOD BY AUTOMATED COUNT: 16.5 % (ref 11.5–17)
ERYTHROCYTE [DISTWIDTH] IN BLOOD BY AUTOMATED COUNT: 16.6 % (ref 11.5–17)
GFR SERPLBLD CREATININE-BSD FMLA CKD-EPI: >60 ML/MIN/1.73/M2
GFR SERPLBLD CREATININE-BSD FMLA CKD-EPI: >60 ML/MIN/1.73/M2
GLUCOSE SERPL-MCNC: 113 MG/DL (ref 82–115)
GLUCOSE SERPL-MCNC: 118 MG/DL (ref 82–115)
HCT VFR BLD AUTO: 31.9 % (ref 42–52)
HCT VFR BLD AUTO: 35.2 % (ref 42–52)
HGB BLD-MCNC: 10.2 G/DL (ref 14–18)
HGB BLD-MCNC: 11.1 G/DL (ref 14–18)
HYPOCHROMIA BLD QL SMEAR: ABNORMAL
IMM GRANULOCYTES # BLD AUTO: 0.13 X10(3)/MCL (ref 0–0.04)
IMM GRANULOCYTES NFR BLD AUTO: 0.3 %
INR PPP: 4.4
LYMPHOCYTES # BLD AUTO: 29.37 X10(3)/MCL (ref 0.6–4.6)
LYMPHOCYTES NFR BLD AUTO: 70.7 %
LYMPHOCYTES NFR BLD MANUAL: 24.36 X10(3)/MCL
LYMPHOCYTES NFR BLD MANUAL: 63 % (ref 13–40)
MCH RBC QN AUTO: 27.5 PG (ref 27–31)
MCH RBC QN AUTO: 27.9 PG (ref 27–31)
MCHC RBC AUTO-ENTMCNC: 31.5 G/DL (ref 33–36)
MCHC RBC AUTO-ENTMCNC: 32 G/DL (ref 33–36)
MCV RBC AUTO: 87.1 FL (ref 80–94)
MCV RBC AUTO: 87.2 FL (ref 80–94)
MONOCYTES # BLD AUTO: 3.51 X10(3)/MCL (ref 0.1–1.3)
MONOCYTES NFR BLD AUTO: 8.5 %
MONOCYTES NFR BLD MANUAL: 1.55 X10(3)/MCL (ref 0.1–1.3)
MONOCYTES NFR BLD MANUAL: 4 % (ref 2–11)
NEUTROPHILS # BLD AUTO: 8.23 X10(3)/MCL (ref 2.1–9.2)
NEUTROPHILS NFR BLD AUTO: 19.8 %
NEUTROPHILS NFR BLD MANUAL: 33 % (ref 47–80)
OHS QRS DURATION: 86 MS
OHS QTC CALCULATION: 443 MS
PLATELET # BLD AUTO: 218 X10(3)/MCL (ref 130–400)
PLATELET # BLD AUTO: 234 X10(3)/MCL (ref 130–400)
PLATELET # BLD EST: NORMAL 10*3/UL
PMV BLD AUTO: 10.1 FL (ref 7.4–10.4)
PMV BLD AUTO: 9.7 FL (ref 7.4–10.4)
POIKILOCYTOSIS BLD QL SMEAR: SLIGHT
POTASSIUM SERPL-SCNC: 3.5 MMOL/L (ref 3.5–5.1)
POTASSIUM SERPL-SCNC: 3.7 MMOL/L (ref 3.5–5.1)
PROTHROMBIN TIME: 44 SECONDS (ref 12.5–14.5)
RBC # BLD AUTO: 3.66 X10(6)/MCL (ref 4.7–6.1)
RBC # BLD AUTO: 4.04 X10(6)/MCL (ref 4.7–6.1)
SODIUM SERPL-SCNC: 142 MMOL/L (ref 136–145)
SODIUM SERPL-SCNC: 143 MMOL/L (ref 136–145)
WBC # BLD AUTO: 38.67 X10(3)/MCL (ref 4.5–11.5)
WBC # BLD AUTO: 41.53 X10(3)/MCL (ref 4.5–11.5)

## 2024-12-14 PROCEDURE — 80048 BASIC METABOLIC PNL TOTAL CA: CPT | Performed by: INTERNAL MEDICINE

## 2024-12-14 PROCEDURE — 85007 BL SMEAR W/DIFF WBC COUNT: CPT | Performed by: INTERNAL MEDICINE

## 2024-12-14 PROCEDURE — 63600175 PHARM REV CODE 636 W HCPCS: Mod: JZ,JG | Performed by: INTERNAL MEDICINE

## 2024-12-14 PROCEDURE — 96366 THER/PROPH/DIAG IV INF ADDON: CPT

## 2024-12-14 PROCEDURE — C1751 CATH, INF, PER/CENT/MIDLINE: HCPCS

## 2024-12-14 PROCEDURE — 96365 THER/PROPH/DIAG IV INF INIT: CPT | Mod: 59

## 2024-12-14 PROCEDURE — 36410 VNPNXR 3YR/> PHY/QHP DX/THER: CPT

## 2024-12-14 PROCEDURE — 76937 US GUIDE VASCULAR ACCESS: CPT

## 2024-12-14 PROCEDURE — 25000003 PHARM REV CODE 250: Performed by: INTERNAL MEDICINE

## 2024-12-14 PROCEDURE — 36415 COLL VENOUS BLD VENIPUNCTURE: CPT | Performed by: INTERNAL MEDICINE

## 2024-12-14 PROCEDURE — 96361 HYDRATE IV INFUSION ADD-ON: CPT

## 2024-12-14 PROCEDURE — G0378 HOSPITAL OBSERVATION PER HR: HCPCS

## 2024-12-14 PROCEDURE — 85610 PROTHROMBIN TIME: CPT | Performed by: INTERNAL MEDICINE

## 2024-12-14 PROCEDURE — 94761 N-INVAS EAR/PLS OXIMETRY MLT: CPT

## 2024-12-14 PROCEDURE — 80048 BASIC METABOLIC PNL TOTAL CA: CPT | Mod: 91 | Performed by: INTERNAL MEDICINE

## 2024-12-14 PROCEDURE — 99900035 HC TECH TIME PER 15 MIN (STAT)

## 2024-12-14 RX ORDER — SODIUM CHLORIDE 9 MG/ML
INJECTION, SOLUTION INTRAVENOUS CONTINUOUS
Status: DISCONTINUED | OUTPATIENT
Start: 2024-12-14 | End: 2024-12-15 | Stop reason: HOSPADM

## 2024-12-14 RX ORDER — WARFARIN SODIUM 5 MG/1
5 TABLET ORAL
Status: DISCONTINUED | OUTPATIENT
Start: 2024-12-14 | End: 2024-12-14

## 2024-12-14 RX ORDER — SODIUM CHLORIDE 0.9 % (FLUSH) 0.9 %
10 SYRINGE (ML) INJECTION EVERY 12 HOURS PRN
Status: DISCONTINUED | OUTPATIENT
Start: 2024-12-14 | End: 2024-12-15 | Stop reason: HOSPADM

## 2024-12-14 RX ORDER — BISMUTH SUBSALICYLATE 525 MG/30ML
30 LIQUID ORAL EVERY 6 HOURS PRN
Status: DISCONTINUED | OUTPATIENT
Start: 2024-12-14 | End: 2024-12-15 | Stop reason: HOSPADM

## 2024-12-14 RX ADMIN — METOPROLOL SUCCINATE 12.5 MG: 25 TABLET, EXTENDED RELEASE ORAL at 09:12

## 2024-12-14 RX ADMIN — ASPIRIN 81 MG: 81 TABLET, COATED ORAL at 09:12

## 2024-12-14 RX ADMIN — LEVETIRACETAM 1000 MG: 500 TABLET, FILM COATED ORAL at 09:12

## 2024-12-14 RX ADMIN — FINASTERIDE 5 MG: 5 TABLET, FILM COATED ORAL at 12:12

## 2024-12-14 RX ADMIN — METOPROLOL SUCCINATE 12.5 MG: 25 TABLET, EXTENDED RELEASE ORAL at 12:12

## 2024-12-14 RX ADMIN — METRONIDAZOLE 500 MG: 5 INJECTION, SOLUTION INTRAVENOUS at 06:12

## 2024-12-14 RX ADMIN — METRONIDAZOLE 500 MG: 5 INJECTION, SOLUTION INTRAVENOUS at 02:12

## 2024-12-14 RX ADMIN — LEVETIRACETAM 1000 MG: 500 TABLET, FILM COATED ORAL at 12:12

## 2024-12-14 RX ADMIN — ATORVASTATIN CALCIUM 40 MG: 40 TABLET, FILM COATED ORAL at 12:12

## 2024-12-14 RX ADMIN — PHENYTOIN SODIUM 100 MG: 100 CAPSULE ORAL at 09:12

## 2024-12-14 RX ADMIN — SODIUM CHLORIDE: 9 INJECTION, SOLUTION INTRAVENOUS at 03:12

## 2024-12-14 RX ADMIN — LISINOPRIL 5 MG: 5 TABLET ORAL at 05:12

## 2024-12-14 RX ADMIN — METRONIDAZOLE 500 MG: 5 INJECTION, SOLUTION INTRAVENOUS at 10:12

## 2024-12-14 RX ADMIN — PHENYTOIN SODIUM 100 MG: 100 CAPSULE ORAL at 02:12

## 2024-12-14 RX ADMIN — AMIODARONE HYDROCHLORIDE 200 MG: 200 TABLET ORAL at 09:12

## 2024-12-14 NOTE — PLAN OF CARE
Problem: Adult Inpatient Plan of Care  Goal: Plan of Care Review  Outcome: Progressing  Goal: Patient-Specific Goal (Individualized)  Outcome: Progressing  Goal: Absence of Hospital-Acquired Illness or Injury  Outcome: Progressing  Goal: Optimal Comfort and Wellbeing  Outcome: Progressing  Goal: Readiness for Transition of Care  Outcome: Progressing     Problem: Skin Injury Risk Increased  Goal: Skin Health and Integrity  Outcome: Progressing     Problem: Fall Injury Risk  Goal: Absence of Fall and Fall-Related Injury  Outcome: Progressing     Problem: Bariatric Environmental Safety  Goal: Safety Maintained with Care  Outcome: Progressing     Problem: Wound  Goal: Optimal Coping  Outcome: Progressing  Goal: Optimal Functional Ability  Outcome: Progressing  Goal: Absence of Infection Signs and Symptoms  Outcome: Progressing  Goal: Improved Oral Intake  Outcome: Progressing  Goal: Optimal Pain Control and Function  Outcome: Progressing  Goal: Skin Health and Integrity  Outcome: Progressing  Goal: Optimal Wound Healing  Outcome: Progressing

## 2024-12-14 NOTE — PROCEDURES
"John Wayne is a 79 y.o. male patient.    Temp: 98.3 °F (36.8 °C) (12/14/24 1118)  Pulse: (!) 59 (12/14/24 1118)  Resp: 20 (12/14/24 0430)  BP: (!) 150/53 (12/14/24 1118)  SpO2: 95 % (12/14/24 1118)  Weight: 121.2 kg (267 lb 3.2 oz) (12/14/24 0300)  Height: 5' 7" (170.2 cm) (12/14/24 0300)    PICC  Date/Time: 12/14/2024 12:57 PM  Performed by: Vicky Castellano RN  Consent Done: Yes  Time out: Immediately prior to procedure a time out was called to verify the correct patient, procedure, equipment, support staff and site/side marked as required  Indications: med administration and vascular access  Anesthesia: local infiltration  Local anesthetic: lidocaine 1% without epinephrine    Preparation: skin prepped with ChloraPrep  Skin prep agent dried: skin prep agent completely dried prior to procedure  Sterile barriers: all five maximum sterile barriers used - cap, mask, sterile gown, sterile gloves, and large sterile sheet  Hand hygiene: hand hygiene performed prior to central venous catheter insertion  Location details: right brachial  Catheter type: single lumen  Catheter size: 4 Fr  Catheter Length: 17cm    Ultrasound guidance: yes  Vessel Caliber: patent, compressibility normal  Needle advanced into vessel with real time Ultrasound guidance.  Guidewire confirmed in vessel.  Sterile sheath used.  Number of attempts: 3  Post-procedure: blood return through all ports, chlorhexidine patch and sterile dressing applied            Name SAPPHIRE Castellano RN   12/14/2024    "

## 2024-12-14 NOTE — PROGRESS NOTES
Hospital Medicine Progress Note        Chief Complaint: Inpatient Follow-up for     HPI:    79-year-old male past medical history significant for chronic lymphocytic leukemia, congestive heart failure with the last known EF 25-30%, coronary artery disease status post remote stent, hypertension, COPD presented from nursing home facility via EMS with complaints of diarrhea over least 3 days' duration and noted today to have associated nausea and vomiting that was reported as brown vomitus.  Patient denied any known sick contacts, fever or chills.  He does complain of some generalized abdominal pains.     December 14, 2024-the patient had diarrhea this morning, no fever, no shortness for breath, no nausea, no vomiting    Case was discussed with patient's nurse and  on the floor.    Objective/physical exam:  General: In no acute distress, afebrile  Chest: Clear to auscultation bilaterally  Heart: RRR, +S1, S2, no appreciable murmur  Abdomen: Soft, nontender, BS +  MSK: Warm, no lower extremity edema, no clubbing or cyanosis  Neurologic: Alert and oriented x4, Cranial nerve II-XII intact, Strength 5/5 in all 4 extremities    VITAL SIGNS: 24 HRS MIN & MAX LAST   Temp  Min: 97.6 °F (36.4 °C)  Max: 98.5 °F (36.9 °C) 97.6 °F (36.4 °C)   BP  Min: 124/56  Max: 169/68 (!) 159/53   Pulse  Min: 44  Max: 82  65   Resp  Min: 2  Max: 21 20   SpO2  Min: 93 %  Max: 100 % (!) 93 %     I have reviewed the following labs:  Recent Labs   Lab 12/13/24  1549 12/14/24  0207   WBC 31.91* 38.67*   RBC 4.79 4.04*   HGB 13.1* 11.1*   HCT 40.6* 35.2*   MCV 84.8 87.1   MCH 27.3 27.5   MCHC 32.3* 31.5*   RDW 16.4 16.6    234   MPV 10.2 10.1     Recent Labs   Lab 12/13/24  1549 12/14/24  0207    142   K 3.7 3.7   * 110*   CO2 20* 24   BUN 8.0* 9.0   CREATININE 0.80 0.83   CALCIUM 8.4* 8.3*   MG 1.90  --    ALBUMIN 3.3*  --    ALKPHOS 84  --    ALT 28  --    AST 27  --    BILITOT 0.2  --      Microbiology Results  (last 7 days)       Procedure Component Value Units Date/Time    C. Difficile By Rapid Pcr [[id number]]     Order Status: Sent Specimen: Stool     Blood Culture #1 **CANNOT BE ORDERED STAT** [[id number]] Collected: 12/13/24 1549    Order Status: Sent Specimen: Blood Updated: 12/13/24 1554    Blood Culture #2 **CANNOT BE ORDERED STAT** [[id number]] Collected: 12/13/24 1549    Order Status: Sent Specimen: Blood Updated: 12/13/24 1554             See below for Radiology    Assessment/Plan:  Entero colitis   Diarrhea   Chronic systolic CHF-ejection fraction 25%   Chronic lymphocytic leukemia   Chronic atrial fibrillation   Seizure disorder   Hypertension   History of pulmonary embolism   Nursing home resident    Check C diff   Continue IV Flagyl   Continue Pepto-Bismol   Check CBC BMP in a.m.    VTE prophylaxis:     Patient condition:  Stable/Fair/Guarded/ Serious/ Critical    Anticipated discharge and Disposition:         All diagnosis and differential diagnosis have been reviewed; assessment and plan has been documented; I have personally reviewed the labs and test results that are presently available; I have reviewed the patients medication list; I have reviewed the consulting providers response and recommendations. I have reviewed or attempted to review medical records based upon their availability    All of the patient's questions have been  addressed and answered. Patient's is agreeable to the above stated plan. I will continue to monitor closely and make adjustments to medical management as needed.    Portions of this note dictated using EMR integrated voice recognition software, and may be subject to voice recognition errors not corrected at proofreading. Please contact writer for clarification if needed.   _____________________________________________________________________    Malnutrition Status:  Nutrition consulted. Most recent weight and BMI monitored-     Measurements:  Wt Readings from Last 1 Encounters:    12/14/24 121.2 kg (267 lb 3.2 oz)   Body mass index is 41.85 kg/m².    Patient has been screened and assessed by RD.    Malnutrition Type:  Context:    Level:      Malnutrition Characteristic Summary:       Interventions/Recommendations (treatment strategy):        Scheduled Med:   amiodarone  200 mg Oral Daily    aspirin  81 mg Oral Daily    atorvastatin  40 mg Oral QHS    finasteride  5 mg Oral Nightly    levETIRAcetam  1,000 mg Oral BID    lisinopriL  5 mg Oral Daily    metoprolol succinate  12.5 mg Oral BID    metroNIDAZOLE IV (PEDS and ADULTS)  500 mg Intravenous Q8H    phenytoin  100 mg Oral TID    tamsulosin  0.4 mg Oral Nightly    warfarin  5 mg Oral Once per day on Sunday Monday Wednesday Thursday Friday Saturday    [START ON 12/17/2024] warfarin  7.5 mg Oral Every Tues      Continuous Infusions:     PRN Meds:    Current Facility-Administered Medications:     acetaminophen, 650 mg, Oral, Q4H PRN    bismuth subsalicylate, 30 mL, Oral, Q6H PRN    dextrose 50%, 12.5 g, Intravenous, PRN    dextrose 50%, 25 g, Intravenous, PRN    glucagon (human recombinant), 1 mg, Intramuscular, PRN    glucose, 16 g, Oral, PRN    glucose, 24 g, Oral, PRN    melatonin, 6 mg, Oral, Nightly PRN    naloxone, 0.02 mg, Intravenous, PRN    ondansetron, 4 mg, Intravenous, Q6H PRN    Flushing PICC/Midline Protocol, , , Until Discontinued **AND** sodium chloride 0.9%, 10 mL, Intravenous, Q12H PRN    sodium chloride 0.9%, 10 mL, Intravenous, PRN    sodium chloride 0.9%, 10 mL, Intravenous, Q12H PRN     Radiology:  I have personally reviewed the following imaging and agree with the radiologist.     CT Abdomen Pelvis  Without Contrast  Narrative: EXAMINATION:  CT ABDOMEN PELVIS WITHOUT CONTRAST    CLINICAL HISTORY:  GI bleed;    TECHNIQUE:  Helical acquisition through the abdomen and pelvis without IV contrast.  Lack of contrast limits evaluation of solid organs and vascular structures .  Three plane reconstructions were provided for  review.  mGycm. Automatic exposure control, adjustment of mA/kV or iterative reconstruction technique was used to reduce radiation.    COMPARISON:  9 November 2021    FINDINGS:  There is bibasilar atelectasis or scarring.  Heart size upper limit normal.  There are dense coronary artery calcifications.  There is gynecomastia.    A calcified gallstone is seen.  No pericholecystic inflammation.  No significant abnormality of the noncontrast liver, spleen, pancreas or adrenals.  There is no hydronephrosis.  No renal calculi are seen.    No bowel obstruction.  There are mildly dilated small bowel loops with air-fluid levels.  There are air-fluid levels in the colon as well.  No convincing small bowel obstruction.  No free air.    Urinary bladder unremarkable.  No pelvic free fluid.  Abdominal aorta normal in caliber.  Moderate atherosclerotic disease.  No retroperitoneal adenopathy.  There are bilateral iliac vein stents.    Moderate degenerative change of the spine.  There is some subcutaneous stranding along the left groin image 175 series 2.  Impression: 1. Mildly dilated small bowel loops with air-fluid levels.  Scattered air-fluid levels in the colon as well.  Suspect a mild enterocolitis or ileus. No convincing obstruction.  2. Subcutaneous stranding along the left groin, question recent procedure here.  Blood products possible here with no organized hematoma.  3. Other findings above.    Electronically signed by: Moshe Tyson  Date:    12/13/2024  Time:    16:59      Emily Brennan MD  Department of Hospital Medicine   [facility]   12/14/2024

## 2024-12-14 NOTE — PROCEDURES
"John Wayne is a 79 y.o. male patient.    Temp: 98.5 °F (36.9 °C) (12/13/24 1331)  Pulse: 62 (12/13/24 1816)  Resp: 16 (12/13/24 1331)  BP: (!) 164/67 (12/13/24 1531)  SpO2: 96 % (12/13/24 1816)  Weight: 113.4 kg (250 lb) (12/13/24 1331)  Height: 5' 7" (170.2 cm) (12/13/24 1331)    PICC  Date/Time: 12/13/2024 7:00 PM  Consent Done: Yes  Time out: Immediately prior to procedure a time out was called to verify the correct patient, procedure, equipment, support staff and site/side marked as required  Indications: vascular access  Anesthesia: local infiltration  Local anesthetic: lidocaine 1% without epinephrine  Anesthetic Total (mL): 3  Preparation: skin prepped with ChloraPrep  Skin prep agent dried: skin prep agent completely dried prior to procedure  Sterile barriers: all five maximum sterile barriers used - cap, mask, sterile gown, sterile gloves, and large sterile sheet  Hand hygiene: hand hygiene performed prior to central venous catheter insertion  Location details: left basilic  Catheter type: single lumen  Catheter size: 4 Fr  Catheter Length: 15cm    Ultrasound guidance: yes  Vessel Caliber: medium and patent, compressibility normal  Needle advanced into vessel with real time Ultrasound guidance.  Guidewire confirmed in vessel.  Image recorded and saved.  Sterile sheath used.  Number of attempts: 1  Post-procedure: blood return through all ports and sterile dressing applied    Complications: none          Name amberly amato  12/13/2024    "

## 2024-12-14 NOTE — H&P
[facility] - Emergency Dept    History & Physical      Patient Name: John Wayne  MRN: [id number]  Admission Date: 12/13/2024  Attending Physician: Emily Brennan MD   Primary Care Provider: ISAC Schmidt MD (Inactive)         Patient information was obtained from patient and ER records.     Subjective:     Principal Problem:Enterocolitis    Chief Complaint:   Chief Complaint   Patient presents with    Vomiting    Diarrhea     BIBA from NH for diarrhea for 3 days and now today vomitting brown stuff          HPI:  79-year-old male past medical history significant for chronic lymphocytic leukemia, congestive heart failure with the last known EF 25-30%, coronary artery disease status post remote stent, hypertension, COPD presented from nursing home facility via EMS with complaints of diarrhea over least 3 days' duration and noted today to have associated nausea and vomiting that was reported as brown vomitus.  Patient denied any known sick contacts, fever or chills.  He does complain of some generalized abdominal pains.    Past Medical History:   Diagnosis Date    Anticoagulant long-term use     CHF (congestive heart failure) 10/2021    EF of 25-30% on ECHO    CLL (chronic lymphocytic leukemia)     COPD (chronic obstructive pulmonary disease)     Coronary artery disease     Depression     Difficult intubation     GERD (gastroesophageal reflux disease)     Hypertension     Mixed hyperlipidemia     PVD (peripheral vascular disease)     Seizures     Sleep apnea, unspecified     TIA (transient ischemic attack)        Past Surgical History:   Procedure Laterality Date    CORONARY ANGIOPLASTY WITH STENT PLACEMENT  10/18/2016    pLAD, pLAD, dLAD, mCX,    INSERTION OF BARE METAL STENT INTO PERIPHERAL ARTERY  2018    B/L [place] Vessels    PERCUTANEOUS BALLOON VALVULOPLASTY  04/04/2018       Review of patient's allergies indicates:   Allergen Reactions    Ancef in dextrose (iso-osm)     Codeine     Penicillins         No current facility-administered medications on file prior to encounter.     Current Outpatient Medications on File Prior to Encounter   Medication Sig    albuterol (PROVENTIL) 2.5 mg /3 mL (0.083 %) nebulizer solution     albuterol sulfate 2.5 mg/0.5 mL Nebu Take 2.5 mg by nebulization every 6 (six) hours as needed. Rescue    amiodarone (PACERONE) 200 MG Tab Take 200 mg by mouth once daily.    atorvastatin (LIPITOR) 40 MG tablet Take 40 mg by mouth every evening.    clopidogreL (PLAVIX) 75 mg tablet Take 1 tablet by mouth Daily.    ergocalciferol (ERGOCALCIFEROL) 50,000 unit Cap     finasteride (PROSCAR) 5 mg tablet Take 1 tablet (5 mg total) by mouth once daily. (Patient taking differently: Take 5 mg by mouth nightly.)    furosemide (LASIX) 40 MG tablet Take 40 mg by mouth once daily.    levETIRAcetam (KEPPRA) 1000 MG tablet Take 1 tablet by mouth 2 (two) times a day.    lisinopriL (PRINIVIL,ZESTRIL) 5 MG tablet Take 1 tablet by mouth Daily.    loperamide (IMODIUM A-D) 2 mg Tab Take 2 mg by mouth Daily. Prn    metoprolol succinate (TOPROL-XL) 25 MG 24 hr tablet Take 0.5 tablets (12.5 mg total) by mouth 2 (two) times daily.    midazolam (NAYZILAM) 5 mg/spray (0.1 mL) Spry 1 spray by Nasal route daily as needed (seizure). To left nostril    pantoprazole (PROTONIX) 40 MG tablet Take 40 mg by mouth Daily.    PHENobarbitaL 32.4 MG tablet Take 3 tablets by mouth Daily.    phenytoin (DILANTIN) 100 MG ER capsule Take 100 mg by mouth 3 (three) times daily.    primidone (MYSOLINE) 50 MG Tab Take 150 mg by mouth 2 (two) times a day.    tamsulosin (FLOMAX) 0.4 mg Cap Take 1 tablet by mouth nightly.    venlafaxine (EFFEXOR) 75 MG tablet Take 1 tablet (75 mg total) by mouth 2 (two) times daily.    warfarin (COUMADIN) 5 MG tablet Daily. Daily except tuesday    warfarin (COUMADIN) 7.5 MG tablet once a week. Tuesday afternoon    aspirin (ECOTRIN) 81 MG EC tablet Take 1 tablet (81 mg total) by mouth once daily.     [DISCONTINUED] ELIQUIS 5 mg Tab Take 1 tablet by mouth 2 (two) times a day.     Family History       Problem Relation (Age of Onset)    Coronary artery disease Father    Heart attack Father    Hypertension Mother, Father          Tobacco Use    Smoking status: Former    Smokeless tobacco: Never   Substance and Sexual Activity    Alcohol use: Not Currently    Drug use: Never    Sexual activity: Not on file     Review of Systems   Constitutional:  Positive for fatigue.   Gastrointestinal:  Positive for abdominal pain, diarrhea and vomiting.   Neurological:  Positive for weakness.     Objective:     Vital Signs (Most Recent):  Temp: 98.5 °F (36.9 °C) (12/13/24 1331)  Pulse: 62 (12/13/24 1816)  Resp: 16 (12/13/24 1331)  BP: (!) 164/67 (12/13/24 1531)  SpO2: 96 % (12/13/24 1816) Vital Signs (24h Range):  Temp:  [98.5 °F (36.9 °C)] 98.5 °F (36.9 °C)  Pulse:  [62-71] 62  Resp:  [16] 16  SpO2:  [94 %-96 %] 96 %  BP: (164-169)/(67-68) 164/67     Weight: 113.4 kg (250 lb)  Body mass index is 39.16 kg/m².    Physical Exam  Constitutional:       Appearance: He is obese. He is ill-appearing.   HENT:      Head: Normocephalic and atraumatic.      Nose: Nose normal.      Mouth/Throat:      Mouth: Mucous membranes are dry.   Eyes:      Extraocular Movements: Extraocular movements intact.      Pupils: Pupils are equal, round, and reactive to light.   Cardiovascular:      Rate and Rhythm: Normal rate and regular rhythm.      Pulses: Normal pulses.      Heart sounds: Normal heart sounds.   Pulmonary:      Breath sounds: Normal breath sounds.   Abdominal:      Palpations: Abdomen is soft.      Tenderness: There is abdominal tenderness.   Musculoskeletal:         General: Normal range of motion.      Cervical back: Neck supple.   Skin:     General: Skin is warm and dry.      Capillary Refill: Capillary refill takes less than 2 seconds.   Neurological:      General: No focal deficit present.      Mental Status: He is alert. Mental status  is at baseline.      Motor: Weakness present.           CRANIAL NERVES     CN III, IV, VI   Pupils are equal, round, and reactive to light.      Significant Labs: All pertinent labs within the past 24 hours have been reviewed.  Recent Lab Results         12/13/24  1554   12/13/24  1549   12/13/24  1424        Influenza A, Molecular     Not Detected       Influenza B, Molecular     Not Detected       Albumin/Globulin Ratio   0.9         Neutrophils Abs Calc   13.0831         Albumin   3.3         ALP   84         ALT   28         Anion Gap   9.0         AST   27         Bands   5         BILIRUBIN TOTAL   0.2         BUN   8.0         BUN/CREAT RATIO   10         Calcium   8.4         Chloride   110         CO2   20         Creatinine   0.80         eGFR   >60         Eos #   0.6382         Eos %   2         Globulin, Total   3.5         Glucose   99         Hematocrit   40.6         Hemoglobin   13.1         Lactic Acid Level 0.7           Lipase   9         Lymph #   16.9123         Lymphocyte %   53         Magnesium    1.90         MCH   27.3         MCHC   32.3         MCV   84.8         Mono #   1.2764         Mono %   4         MPV   10.2         Neutrophils Relative   36         Platelet Estimate   Adequate         Platelet Count   215         Potassium   3.7         PROTEIN TOTAL   6.8         RBC   4.79         RDW   16.4         SARS-CoV2 (COVID-19) Qualitative PCR     Not Detected       Sodium   139         Troponin I   0.074         WBC   31.91                 Significant Imaging: I have reviewed all pertinent imaging results/findings within the past 24 hours.    Assessment/Plan:     Active Diagnoses:    Diagnosis Date Noted POA    PRINCIPAL PROBLEM:  Enterocolitis [K52.9] 12/13/2024 Yes    CHF (congestive heart failure) [I50.9] 12/08/2022 Yes    COPD (chronic obstructive pulmonary disease) [J44.9] 12/08/2022 Yes    HTN (hypertension) [I10] 12/08/2022 Yes    Class 3 severe obesity due to excess calories  with serious comorbidity and body mass index (BMI) of 40.0 to 44.9 in adult [E66.813, E66.01, Z68.41] 12/08/2022 Not Applicable    Atrial fibrillation [I48.91] 12/08/2022 Yes    Seizures [R56.9] 10/06/2022 Yes      Problems Resolved During this Admission:     VTE Risk Mitigation (From admission, onward)      None          Enterocolitis:   Patient presented with generalized abdominal pain, nausea with vomiting today and diarrhea for 3 days  CT abdomen and pelvis showed mildly dilated small bowel loops with air-fluid levels.  Suspecting mild enterocolitis or ileus no convincing obstruction was noted.  IV fluid bolus given in ED  We will follow response given patient's underlying CHF for the last known EF of 25-30% he will be less inclined to be aggressive with the fluids   Blood cultures x2 obtained   Broad-spectrum antibiotic initiated in the form of Flagyl 500 mg IV q.8  Keep patient NPO except meds    Chronic lymphocytic leukemia:   Currently in remission   Noted leukocytosis with WBC of 31.9 in settings of likely acute infection  Continue close monitoring    Chronic A. Fib:   Resume necessary meds   Resume Coumadin  Follow up PT/INR    Seizure disorder:   Resume all necessary anticonvulsants    Hypertension:   Resume antihypertensive   IV hydralazine p.r.n. with parameters    Congestive heart failure:   Not decompensated   Last known EF 25-30%   Avoid volume overload    H/O PE:  Resumed Coumadin   Follow PT/INR and adjust accordingly  Pharmacy to follow    Code status: DNR   PPX:  Coumadin          The patient is expected to have a LOS less than 2 midnights and will be admitted to OBSERVATION status.      Service was provided using HIPAA compliant web platform using SOC for audio/visual equipment.  Patient location: Hospital  Provider Location: [place], [place]  Participants on call: Bedside RN, Patient  Consent was obtained and the patient was seen with nurse assiting from the bedside.       Christine Cobos  MD  Department of Hospital Medicine   [facility] - Emergency Dept

## 2024-12-15 VITALS
WEIGHT: 267.19 LBS | HEIGHT: 67 IN | TEMPERATURE: 99 F | DIASTOLIC BLOOD PRESSURE: 53 MMHG | RESPIRATION RATE: 20 BRPM | HEART RATE: 73 BPM | BODY MASS INDEX: 41.94 KG/M2 | SYSTOLIC BLOOD PRESSURE: 150 MMHG | OXYGEN SATURATION: 95 %

## 2024-12-15 PROCEDURE — 96366 THER/PROPH/DIAG IV INF ADDON: CPT

## 2024-12-15 PROCEDURE — 63600175 PHARM REV CODE 636 W HCPCS: Mod: JZ,JG | Performed by: INTERNAL MEDICINE

## 2024-12-15 PROCEDURE — 94761 N-INVAS EAR/PLS OXIMETRY MLT: CPT

## 2024-12-15 PROCEDURE — G0378 HOSPITAL OBSERVATION PER HR: HCPCS

## 2024-12-15 PROCEDURE — 25000003 PHARM REV CODE 250: Performed by: INTERNAL MEDICINE

## 2024-12-15 RX ORDER — CIPROFLOXACIN 500 MG/1
500 TABLET ORAL EVERY 12 HOURS
Qty: 14 TABLET | Refills: 0 | Status: SHIPPED | OUTPATIENT
Start: 2024-12-15

## 2024-12-15 RX ORDER — METRONIDAZOLE 500 MG/1
500 TABLET ORAL 3 TIMES DAILY
Qty: 21 TABLET | Refills: 0 | Status: SHIPPED | OUTPATIENT
Start: 2024-12-15

## 2024-12-15 RX ADMIN — PHENYTOIN SODIUM 100 MG: 100 CAPSULE ORAL at 08:12

## 2024-12-15 RX ADMIN — METOPROLOL SUCCINATE 12.5 MG: 25 TABLET, EXTENDED RELEASE ORAL at 08:12

## 2024-12-15 RX ADMIN — AMIODARONE HYDROCHLORIDE 200 MG: 200 TABLET ORAL at 08:12

## 2024-12-15 RX ADMIN — ASPIRIN 81 MG: 81 TABLET, COATED ORAL at 08:12

## 2024-12-15 RX ADMIN — LEVETIRACETAM 1000 MG: 500 TABLET, FILM COATED ORAL at 08:12

## 2024-12-15 RX ADMIN — METRONIDAZOLE 500 MG: 5 INJECTION, SOLUTION INTRAVENOUS at 06:12

## 2024-12-15 NOTE — PROGRESS NOTES
Hospital Medicine Progress Note        Chief Complaint: Inpatient Follow-up for     HPI:    79-year-old male past medical history significant for chronic lymphocytic leukemia, congestive heart failure with the last known EF 25-30%, coronary artery disease status post remote stent, hypertension, COPD presented from nursing home facility via EMS with complaints of diarrhea over least 3 days' duration and noted today to have associated nausea and vomiting that was reported as brown vomitus.  Patient denied any known sick contacts, fever or chills.  He does complain of some generalized abdominal pains.     December 14, 2024-the patient had diarrhea this morning, no fever, no shortness for breath, no nausea, no vomiting    December 15, 2024-the patient did not have a diarrhea overnight, no nausea, no vomiting no fever    The patient can be transferred back to nursing home    I gave the patient Cipro and Flagyl prescription    Case was discussed with patient's nurse and  on the floor.    Objective/physical exam:  General: In no acute distress, afebrile  Chest: Clear to auscultation bilaterally  Heart: RRR, +S1, S2, no appreciable murmur  Abdomen: Soft, nontender, BS +  MSK: Warm, no lower extremity edema, no clubbing or cyanosis  Neurologic: Alert and oriented x4, Cranial nerve II-XII intact, Strength 5/5 in all 4 extremities    VITAL SIGNS: 24 HRS MIN & MAX LAST   Temp  Min: 97.4 °F (36.3 °C)  Max: 99.8 °F (37.7 °C) 99.3 °F (37.4 °C)   BP  Min: 112/50  Max: 150/53 (!) 150/53   Pulse  Min: 59  Max: 74  73   Resp  Min: 20  Max: 20 20   SpO2  Min: 90 %  Max: 98 % 95 %     I have reviewed the following labs:  Recent Labs   Lab 12/13/24  1549 12/14/24  0207 12/14/24  1323   WBC 31.91* 38.67* 41.53*   RBC 4.79 4.04* 3.66*   HGB 13.1* 11.1* 10.2*   HCT 40.6* 35.2* 31.9*   MCV 84.8 87.1 87.2   MCH 27.3 27.5 27.9   MCHC 32.3* 31.5* 32.0*   RDW 16.4 16.6 16.5    234 218   MPV 10.2 10.1 9.7     Recent Labs    Lab 12/13/24  1549 12/14/24  0207 12/14/24  1323    142 143   K 3.7 3.7 3.5   * 110* 109*   CO2 20* 24 25   BUN 8.0* 9.0 9.0   CREATININE 0.80 0.83 0.86   CALCIUM 8.4* 8.3* 8.1*   MG 1.90  --   --    ALBUMIN 3.3*  --   --    ALKPHOS 84  --   --    ALT 28  --   --    AST 27  --   --    BILITOT 0.2  --   --      Microbiology Results (last 7 days)       Procedure Component Value Units Date/Time    C. Difficile By Rapid Pcr [[id number]]     Order Status: Sent Specimen: Stool     Blood Culture #1 **CANNOT BE ORDERED STAT** [[id number]] Collected: 12/13/24 1549    Order Status: Resulted Specimen: Blood Updated: 12/13/24 1554    Blood Culture #2 **CANNOT BE ORDERED STAT** [[id number]] Collected: 12/13/24 1549    Order Status: Resulted Specimen: Blood Updated: 12/13/24 1554             See below for Radiology    Discharge diagnosis  Entero colitis   Diarrhea   Chronic systolic CHF-ejection fraction 25%   Chronic lymphocytic leukemia   Chronic atrial fibrillation   Seizure disorder   Hypertension   History of pulmonary embolism   Nursing home resident    Discharge condition-stable     Discharge destination-nursing home-discharge time 30 minutes        VTE prophylaxis:     Patient condition:  Stable/Fair/Guarded/ Serious/ Critical    Anticipated discharge and Disposition:         All diagnosis and differential diagnosis have been reviewed; assessment and plan has been documented; I have personally reviewed the labs and test results that are presently available; I have reviewed the patients medication list; I have reviewed the consulting providers response and recommendations. I have reviewed or attempted to review medical records based upon their availability    All of the patient's questions have been  addressed and answered. Patient's is agreeable to the above stated plan. I will continue to monitor closely and make adjustments to medical management as needed.    Portions of this note dictated using EMR  integrated voice recognition software, and may be subject to voice recognition errors not corrected at proofreading. Please contact writer for clarification if needed.   _____________________________________________________________________    Malnutrition Status:  Nutrition consulted. Most recent weight and BMI monitored-     Measurements:  Wt Readings from Last 1 Encounters:   12/14/24 121.2 kg (267 lb 3.2 oz)   Body mass index is 41.85 kg/m².    Patient has been screened and assessed by RD.    Malnutrition Type:  Context:    Level:      Malnutrition Characteristic Summary:       Interventions/Recommendations (treatment strategy):        Scheduled Med:   amiodarone  200 mg Oral Daily    aspirin  81 mg Oral Daily    atorvastatin  40 mg Oral QHS    finasteride  5 mg Oral Nightly    levETIRAcetam  1,000 mg Oral BID    lisinopriL  5 mg Oral Daily    metoprolol succinate  12.5 mg Oral BID    metroNIDAZOLE IV (PEDS and ADULTS)  500 mg Intravenous Q8H    phenytoin  100 mg Oral TID    tamsulosin  0.4 mg Oral Nightly      Continuous Infusions:   0.9% NaCl   Intravenous Continuous 100 mL/hr at 12/14/24 1818 Rate Verify at 12/14/24 1818      PRN Meds:    Current Facility-Administered Medications:     acetaminophen, 650 mg, Oral, Q4H PRN    bismuth subsalicylate, 30 mL, Oral, Q6H PRN    dextrose 50%, 12.5 g, Intravenous, PRN    dextrose 50%, 25 g, Intravenous, PRN    glucagon (human recombinant), 1 mg, Intramuscular, PRN    glucose, 16 g, Oral, PRN    glucose, 24 g, Oral, PRN    melatonin, 6 mg, Oral, Nightly PRN    naloxone, 0.02 mg, Intravenous, PRN    ondansetron, 4 mg, Intravenous, Q6H PRN    Flushing PICC/Midline Protocol, , , Until Discontinued **AND** sodium chloride 0.9%, 10 mL, Intravenous, Q12H PRN    sodium chloride 0.9%, 10 mL, Intravenous, PRN    sodium chloride 0.9%, 10 mL, Intravenous, Q12H PRN    Flushing PICC/Midline Protocol, , , Until Discontinued **AND** sodium chloride 0.9%, 10 mL, Intravenous, Q12H PRN      Radiology:  I have personally reviewed the following imaging and agree with the radiologist.     CT Abdomen Pelvis  Without Contrast  Narrative: EXAMINATION:  CT ABDOMEN PELVIS WITHOUT CONTRAST    CLINICAL HISTORY:  GI bleed;    TECHNIQUE:  Helical acquisition through the abdomen and pelvis without IV contrast.  Lack of contrast limits evaluation of solid organs and vascular structures .  Three plane reconstructions were provided for review.  mGycm. Automatic exposure control, adjustment of mA/kV or iterative reconstruction technique was used to reduce radiation.    COMPARISON:  9 November 2021    FINDINGS:  There is bibasilar atelectasis or scarring.  Heart size upper limit normal.  There are dense coronary artery calcifications.  There is gynecomastia.    A calcified gallstone is seen.  No pericholecystic inflammation.  No significant abnormality of the noncontrast liver, spleen, pancreas or adrenals.  There is no hydronephrosis.  No renal calculi are seen.    No bowel obstruction.  There are mildly dilated small bowel loops with air-fluid levels.  There are air-fluid levels in the colon as well.  No convincing small bowel obstruction.  No free air.    Urinary bladder unremarkable.  No pelvic free fluid.  Abdominal aorta normal in caliber.  Moderate atherosclerotic disease.  No retroperitoneal adenopathy.  There are bilateral iliac vein stents.    Moderate degenerative change of the spine.  There is some subcutaneous stranding along the left groin image 175 series 2.  Impression: 1. Mildly dilated small bowel loops with air-fluid levels.  Scattered air-fluid levels in the colon as well.  Suspect a mild enterocolitis or ileus. No convincing obstruction.  2. Subcutaneous stranding along the left groin, question recent procedure here.  Blood products possible here with no organized hematoma.  3. Other findings above.    Electronically signed by: Moshe  Jah  Date:    12/13/2024  Time:    16:59      Emily Brennan MD  Department of [facility] Medicine   [facility]   12/15/2024

## 2024-12-15 NOTE — DISCHARGE SUMMARY
Hospital Medicine discharge summary      Chief Complaint: Inpatient Follow-up for     HPI:    79-year-old male past medical history significant for chronic lymphocytic leukemia, congestive heart failure with the last known EF 25-30%, coronary artery disease status post remote stent, hypertension, COPD presented from nursing home facility via EMS with complaints of diarrhea over least 3 days' duration and noted today to have associated nausea and vomiting that was reported as brown vomitus.  Patient denied any known sick contacts, fever or chills.  He does complain of some generalized abdominal pains.     December 14, 2024-the patient had diarrhea this morning, no fever, no shortness for breath, no nausea, no vomiting    December 15, 2024-the patient did not have a diarrhea overnight, no nausea, no vomiting no fever    The patient can be transferred back to nursing home    I gave the patient Cipro and Flagyl prescription    Case was discussed with patient's nurse and  on the floor.    Objective/physical exam:  General: In no acute distress, afebrile  Chest: Clear to auscultation bilaterally  Heart: RRR, +S1, S2, no appreciable murmur  Abdomen: Soft, nontender, BS +  MSK: Warm, no lower extremity edema, no clubbing or cyanosis  Neurologic: Alert and oriented x4, Cranial nerve II-XII intact, Strength 5/5 in all 4 extremities    VITAL SIGNS: 24 HRS MIN & MAX LAST   Temp  Min: 97.4 °F (36.3 °C)  Max: 99.8 °F (37.7 °C) 99.3 °F (37.4 °C)   BP  Min: 112/50  Max: 150/53 (!) 150/53   Pulse  Min: 68  Max: 74  73   Resp  Min: 20  Max: 20 20   SpO2  Min: 90 %  Max: 98 % 95 %     I have reviewed the following labs:  Recent Labs   Lab 12/13/24  1549 12/14/24  0207 12/14/24  1323   WBC 31.91* 38.67* 41.53*   RBC 4.79 4.04* 3.66*   HGB 13.1* 11.1* 10.2*   HCT 40.6* 35.2* 31.9*   MCV 84.8 87.1 87.2   MCH 27.3 27.5 27.9   MCHC 32.3* 31.5* 32.0*   RDW 16.4 16.6 16.5    234 218   MPV 10.2 10.1 9.7     Recent Labs    Lab 12/13/24  1549 12/14/24  0207 12/14/24  1323    142 143   K 3.7 3.7 3.5   * 110* 109*   CO2 20* 24 25   BUN 8.0* 9.0 9.0   CREATININE 0.80 0.83 0.86   CALCIUM 8.4* 8.3* 8.1*   MG 1.90  --   --    ALBUMIN 3.3*  --   --    ALKPHOS 84  --   --    ALT 28  --   --    AST 27  --   --    BILITOT 0.2  --   --      Microbiology Results (last 7 days)       Procedure Component Value Units Date/Time    C. Difficile By Rapid Pcr [[id number]]     Order Status: Sent Specimen: Stool              See below for Radiology    Discharge diagnosis  Entero colitis   Diarrhea   Chronic systolic CHF-ejection fraction 25%   Chronic lymphocytic leukemia   Chronic atrial fibrillation   Seizure disorder   Hypertension   History of pulmonary embolism   Nursing home resident    Discharge condition-stable     Discharge destination-nursing home-discharge time 30 minutes        VTE prophylaxis:     Patient condition:  Stable/Fair/Guarded/ Serious/ Critical    Anticipated discharge and Disposition:         All diagnosis and differential diagnosis have been reviewed; assessment and plan has been documented; I have personally reviewed the labs and test results that are presently available; I have reviewed the patients medication list; I have reviewed the consulting providers response and recommendations. I have reviewed or attempted to review medical records based upon their availability    All of the patient's questions have been  addressed and answered. Patient's is agreeable to the above stated plan. I will continue to monitor closely and make adjustments to medical management as needed.    Portions of this note dictated using EMR integrated voice recognition software, and may be subject to voice recognition errors not corrected at proofreading. Please contact writer for clarification if needed.   _____________________________________________________________________    Malnutrition Status:  Nutrition consulted. Most recent weight  and BMI monitored-     Measurements:  Wt Readings from Last 1 Encounters:   12/14/24 121.2 kg (267 lb 3.2 oz)   Body mass index is 41.85 kg/m².    Patient has been screened and assessed by RD.    Malnutrition Type:  Context:    Level:      Malnutrition Characteristic Summary:       Interventions/Recommendations (treatment strategy):        Scheduled Med:   amiodarone  200 mg Oral Daily    aspirin  81 mg Oral Daily    atorvastatin  40 mg Oral QHS    finasteride  5 mg Oral Nightly    levETIRAcetam  1,000 mg Oral BID    lisinopriL  5 mg Oral Daily    metoprolol succinate  12.5 mg Oral BID    metroNIDAZOLE IV (PEDS and ADULTS)  500 mg Intravenous Q8H    phenytoin  100 mg Oral TID    tamsulosin  0.4 mg Oral Nightly      Continuous Infusions:   0.9% NaCl   Intravenous Continuous 100 mL/hr at 12/14/24 1818 Rate Verify at 12/14/24 1818      PRN Meds:    Current Facility-Administered Medications:     acetaminophen, 650 mg, Oral, Q4H PRN    bismuth subsalicylate, 30 mL, Oral, Q6H PRN    dextrose 50%, 12.5 g, Intravenous, PRN    dextrose 50%, 25 g, Intravenous, PRN    glucagon (human recombinant), 1 mg, Intramuscular, PRN    glucose, 16 g, Oral, PRN    glucose, 24 g, Oral, PRN    melatonin, 6 mg, Oral, Nightly PRN    naloxone, 0.02 mg, Intravenous, PRN    ondansetron, 4 mg, Intravenous, Q6H PRN    Flushing PICC/Midline Protocol, , , Until Discontinued **AND** sodium chloride 0.9%, 10 mL, Intravenous, Q12H PRN    sodium chloride 0.9%, 10 mL, Intravenous, PRN    sodium chloride 0.9%, 10 mL, Intravenous, Q12H PRN    Flushing PICC/Midline Protocol, , , Until Discontinued **AND** sodium chloride 0.9%, 10 mL, Intravenous, Q12H PRN     Radiology:  I have personally reviewed the following imaging and agree with the radiologist.     CT Abdomen Pelvis  Without Contrast  Narrative: EXAMINATION:  CT ABDOMEN PELVIS WITHOUT CONTRAST    CLINICAL HISTORY:  GI bleed;    TECHNIQUE:  Helical acquisition through the abdomen and pelvis without IV  contrast.  Lack of contrast limits evaluation of solid organs and vascular structures .  Three plane reconstructions were provided for review.  mGycm. Automatic exposure control, adjustment of mA/kV or iterative reconstruction technique was used to reduce radiation.    COMPARISON:  9 November 2021    FINDINGS:  There is bibasilar atelectasis or scarring.  Heart size upper limit normal.  There are dense coronary artery calcifications.  There is gynecomastia.    A calcified gallstone is seen.  No pericholecystic inflammation.  No significant abnormality of the noncontrast liver, spleen, pancreas or adrenals.  There is no hydronephrosis.  No renal calculi are seen.    No bowel obstruction.  There are mildly dilated small bowel loops with air-fluid levels.  There are air-fluid levels in the colon as well.  No convincing small bowel obstruction.  No free air.    Urinary bladder unremarkable.  No pelvic free fluid.  Abdominal aorta normal in caliber.  Moderate atherosclerotic disease.  No retroperitoneal adenopathy.  There are bilateral iliac vein stents.    Moderate degenerative change of the spine.  There is some subcutaneous stranding along the left groin image 175 series 2.  Impression: 1. Mildly dilated small bowel loops with air-fluid levels.  Scattered air-fluid levels in the colon as well.  Suspect a mild enterocolitis or ileus. No convincing obstruction.  2. Subcutaneous stranding along the left groin, question recent procedure here.  Blood products possible here with no organized hematoma.  3. Other findings above.    Electronically signed by: Moshe Tyson  Date:    12/13/2024  Time:    16:59      Emily Brennan MD  Department of Hospital Medicine   [facility]   12/15/2024

## 2024-12-15 NOTE — PLAN OF CARE
Problem: Adult Inpatient Plan of Care  Goal: Plan of Care Review  Outcome: Progressing  Goal: Patient-Specific Goal (Individualized)  Outcome: Progressing  Goal: Absence of Hospital-Acquired Illness or Injury  Outcome: Progressing  Goal: Optimal Comfort and Wellbeing  Outcome: Progressing  Goal: Readiness for Transition of Care  Outcome: Progressing     Problem: Skin Injury Risk Increased  Goal: Skin Health and Integrity  Outcome: Progressing     Problem: Fall Injury Risk  Goal: Absence of Fall and Fall-Related Injury  Outcome: Progressing     Problem: Fall Injury Risk  Goal: Absence of Fall and Fall-Related Injury  Outcome: Progressing     Problem: Bariatric Environmental Safety  Goal: Safety Maintained with Care  Outcome: Progressing     Problem: Infection  Goal: Absence of Infection Signs and Symptoms  Outcome: Progressing     Problem: Wound  Goal: Optimal Coping  Outcome: Progressing  Goal: Optimal Functional Ability  Outcome: Progressing  Goal: Absence of Infection Signs and Symptoms  Outcome: Progressing  Goal: Improved Oral Intake  Outcome: Progressing  Goal: Optimal Pain Control and Function  Outcome: Progressing  Goal: Skin Health and Integrity  Outcome: Progressing  Goal: Optimal Wound Healing  Outcome: Progressing     Problem: Wound  Goal: Optimal Coping  Outcome: Progressing  Goal: Optimal Functional Ability  Outcome: Progressing  Goal: Absence of Infection Signs and Symptoms  Outcome: Progressing  Goal: Improved Oral Intake  Outcome: Progressing  Goal: Optimal Pain Control and Function  Outcome: Progressing  Goal: Skin Health and Integrity  Outcome: Progressing  Goal: Optimal Wound Healing  Outcome: Progressing

## 2024-12-15 NOTE — PLAN OF CARE
Problem: Adult Inpatient Plan of Care  Goal: Plan of Care Review  Outcome: Met  Goal: Patient-Specific Goal (Individualized)  Outcome: Met  Goal: Absence of Hospital-Acquired Illness or Injury  Outcome: Met  Goal: Optimal Comfort and Wellbeing  Outcome: Met  Goal: Readiness for Transition of Care  Outcome: Met     Problem: Skin Injury Risk Increased  Goal: Skin Health and Integrity  Outcome: Met     Problem: Fall Injury Risk  Goal: Absence of Fall and Fall-Related Injury  Outcome: Met     Problem: Bariatric Environmental Safety  Goal: Safety Maintained with Care  Outcome: Met     Problem: Infection  Goal: Absence of Infection Signs and Symptoms  Outcome: Met     Problem: Wound  Goal: Optimal Coping  Outcome: Met  Goal: Optimal Functional Ability  Outcome: Met  Goal: Absence of Infection Signs and Symptoms  Outcome: Met  Goal: Improved Oral Intake  Outcome: Met  Goal: Optimal Pain Control and Function  Outcome: Met  Goal: Skin Health and Integrity  Outcome: Met  Goal: Optimal Wound Healing  Outcome: Met

## 2024-12-15 NOTE — PLAN OF CARE
Problem: Adult Inpatient Plan of Care  Goal: Plan of Care Review  Outcome: Progressing  Goal: Patient-Specific Goal (Individualized)  Outcome: Progressing  Goal: Absence of Hospital-Acquired Illness or Injury  Outcome: Progressing  Goal: Optimal Comfort and Wellbeing  Outcome: Progressing  Goal: Readiness for Transition of Care  Outcome: Progressing     Problem: Skin Injury Risk Increased  Goal: Skin Health and Integrity  Outcome: Progressing     Problem: Fall Injury Risk  Goal: Absence of Fall and Fall-Related Injury  Outcome: Progressing     Problem: Bariatric Environmental Safety  Goal: Safety Maintained with Care  Outcome: Progressing     Problem: Infection  Goal: Absence of Infection Signs and Symptoms  Outcome: Progressing     Problem: Wound  Goal: Optimal Coping  Outcome: Progressing  Goal: Optimal Functional Ability  Outcome: Progressing  Goal: Absence of Infection Signs and Symptoms  Outcome: Progressing  Goal: Improved Oral Intake  Outcome: Progressing  Goal: Optimal Pain Control and Function  Outcome: Progressing  Goal: Skin Health and Integrity  Outcome: Progressing  Goal: Optimal Wound Healing  Outcome: Progressing

## 2024-12-19 ENCOUNTER — LAB REQUISITION (OUTPATIENT)
Dept: LAB | Facility: HOSPITAL | Age: 79
End: 2024-12-19
Payer: MEDICARE

## 2024-12-19 DIAGNOSIS — Z79.01 LONG TERM (CURRENT) USE OF ANTICOAGULANTS: ICD-10-CM

## 2024-12-19 LAB
BACTERIA BLD CULT: NORMAL
BACTERIA BLD CULT: NORMAL
INR PPP: 1.2
PROTHROMBIN TIME: 15.7 SECONDS (ref 12.5–14.5)

## 2024-12-19 PROCEDURE — 85610 PROTHROMBIN TIME: CPT | Performed by: FAMILY MEDICINE

## 2024-12-24 ENCOUNTER — HOSPITAL ENCOUNTER (EMERGENCY)
Facility: HOSPITAL | Age: 79
Discharge: HOME | End: 2024-12-24
Attending: EMERGENCY MEDICINE
Payer: MEDICARE

## 2024-12-24 VITALS
BODY MASS INDEX: 36.57 KG/M2 | SYSTOLIC BLOOD PRESSURE: 152 MMHG | WEIGHT: 270 LBS | RESPIRATION RATE: 19 BRPM | HEART RATE: 65 BPM | DIASTOLIC BLOOD PRESSURE: 63 MMHG | TEMPERATURE: 98 F | OXYGEN SATURATION: 96 % | HEIGHT: 72 IN

## 2024-12-24 DIAGNOSIS — R53.1 WEAKNESS: ICD-10-CM
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
## 2024-12-24 DIAGNOSIS — Z87.898 H/O FEVER: ICD-10-CM

## 2024-12-24 LAB
ALBUMIN SERPL-MCNC: 2.8 G/DL (ref 3.4–4.8)
ALBUMIN/GLOB SERPL: 0.8 RATIO (ref 1.1–2)
ALP SERPL-CCNC: 64 UNIT/L (ref 40–150)
ALT SERPL-CCNC: 8 UNIT/L (ref 0–55)
ANION GAP SERPL CALC-SCNC: 7 MEQ/L
AST SERPL-CCNC: 15 UNIT/L (ref 5–34)
BACTERIA #/AREA URNS AUTO: ABNORMAL /HPF
BASOPHILS # BLD AUTO: 0.05 X10(3)/MCL
BASOPHILS NFR BLD AUTO: 0.1 %
BILIRUB SERPL-MCNC: 0.6 MG/DL
BILIRUB UR QL STRIP.AUTO: NEGATIVE
BUN SERPL-MCNC: 14 MG/DL (ref 8.4–25.7)
CALCIUM SERPL-MCNC: 9.2 MG/DL (ref 8.8–10)
CHLORIDE SERPL-SCNC: 104 MMOL/L (ref 98–107)
CLARITY UR: CLEAR
CO2 SERPL-SCNC: 26 MMOL/L (ref 23–31)
COLOR UR AUTO: YELLOW
CREAT SERPL-MCNC: 0.85 MG/DL (ref 0.72–1.25)
CREAT/UREA NIT SERPL: 16
EOSINOPHIL # BLD AUTO: 0.18 X10(3)/MCL (ref 0–0.9)
EOSINOPHIL NFR BLD AUTO: 0.5 %
ERYTHROCYTE [DISTWIDTH] IN BLOOD BY AUTOMATED COUNT: 17.5 % (ref 11.5–17)
FLUAV AG UPPER RESP QL IA.RAPID: NOT DETECTED
FLUBV AG UPPER RESP QL IA.RAPID: NOT DETECTED
GFR SERPLBLD CREATININE-BSD FMLA CKD-EPI: >60 ML/MIN/1.73/M2
GLOBULIN SER-MCNC: 3.5 GM/DL (ref 2.4–3.5)
GLUCOSE SERPL-MCNC: 136 MG/DL (ref 82–115)
GLUCOSE UR QL STRIP: NEGATIVE
HCT VFR BLD AUTO: 29.2 % (ref 42–52)
HGB BLD-MCNC: 8.9 G/DL (ref 14–18)
HGB UR QL STRIP: ABNORMAL
IMM GRANULOCYTES # BLD AUTO: 0.35 X10(3)/MCL (ref 0–0.04)
IMM GRANULOCYTES NFR BLD AUTO: 0.9 %
KETONES UR QL STRIP: NEGATIVE
LACTATE SERPL-SCNC: 1.7 MMOL/L (ref 0.5–2.2)
LEUKOCYTE ESTERASE UR QL STRIP: ABNORMAL
LIPASE SERPL-CCNC: 11 U/L
LYMPHOCYTES # BLD AUTO: 28.36 X10(3)/MCL (ref 0.6–4.6)
LYMPHOCYTES NFR BLD AUTO: 73.1 %
MCH RBC QN AUTO: 27.4 PG (ref 27–31)
MCHC RBC AUTO-ENTMCNC: 30.5 G/DL (ref 33–36)
MCV RBC AUTO: 89.8 FL (ref 80–94)
MONOCYTES # BLD AUTO: 1.93 X10(3)/MCL (ref 0.1–1.3)
MONOCYTES NFR BLD AUTO: 5 %
NEUTROPHILS # BLD AUTO: 7.95 X10(3)/MCL (ref 2.1–9.2)
NEUTROPHILS NFR BLD AUTO: 20.4 %
NITRITE UR QL STRIP: NEGATIVE
PH UR STRIP: 5.5 [PH]
PLATELET # BLD AUTO: 426 X10(3)/MCL (ref 130–400)
PMV BLD AUTO: 9.8 FL (ref 7.4–10.4)
POTASSIUM SERPL-SCNC: 3.5 MMOL/L (ref 3.5–5.1)
PROT SERPL-MCNC: 6.3 GM/DL (ref 5.8–7.6)
PROT UR QL STRIP: ABNORMAL
RBC # BLD AUTO: 3.25 X10(6)/MCL (ref 4.7–6.1)
RBC #/AREA URNS AUTO: ABNORMAL /HPF
RSV A 5' UTR RNA NPH QL NAA+PROBE: NOT DETECTED
SARS-COV-2 RNA RESP QL NAA+PROBE: NOT DETECTED
SODIUM SERPL-SCNC: 137 MMOL/L (ref 136–145)
SP GR UR STRIP.AUTO: 1.02 (ref 1–1.03)
SQUAMOUS #/AREA URNS AUTO: ABNORMAL /HPF
TROPONIN I SERPL-MCNC: 0.08 NG/ML (ref 0–0.04)
TROPONIN I SERPL-MCNC: 0.08 NG/ML (ref 0–0.04)
UROBILINOGEN UR STRIP-ACNC: 0.2
WBC # BLD AUTO: 38.82 X10(3)/MCL (ref 4.5–11.5)
WBC #/AREA URNS AUTO: ABNORMAL /HPF
YEAST UR QL AUTO: ABNORMAL /HPF

## 2024-12-24 PROCEDURE — 99285 EMERGENCY DEPT VISIT HI MDM: CPT | Mod: 25

## 2024-12-24 PROCEDURE — 93010 ELECTROCARDIOGRAM REPORT: CPT | Mod: ,,, | Performed by: INTERNAL MEDICINE

## 2024-12-24 PROCEDURE — 83605 ASSAY OF LACTIC ACID: CPT | Performed by: EMERGENCY MEDICINE

## 2024-12-24 PROCEDURE — 25000003 PHARM REV CODE 250: Performed by: EMERGENCY MEDICINE

## 2024-12-24 PROCEDURE — 84484 ASSAY OF TROPONIN QUANT: CPT | Performed by: EMERGENCY MEDICINE

## 2024-12-24 PROCEDURE — 81003 URINALYSIS AUTO W/O SCOPE: CPT | Performed by: EMERGENCY MEDICINE

## 2024-12-24 PROCEDURE — 87040 BLOOD CULTURE FOR BACTERIA: CPT | Performed by: EMERGENCY MEDICINE

## 2024-12-24 PROCEDURE — 83690 ASSAY OF LIPASE: CPT | Performed by: EMERGENCY MEDICINE

## 2024-12-24 PROCEDURE — 80053 COMPREHEN METABOLIC PANEL: CPT | Performed by: EMERGENCY MEDICINE

## 2024-12-24 PROCEDURE — 85025 COMPLETE CBC W/AUTO DIFF WBC: CPT | Performed by: EMERGENCY MEDICINE

## 2024-12-24 PROCEDURE — 93005 ELECTROCARDIOGRAM TRACING: CPT

## 2024-12-24 PROCEDURE — 0241U COVID/RSV/FLU A&B PCR: CPT | Performed by: EMERGENCY MEDICINE

## 2024-12-24 RX ORDER — SODIUM CHLORIDE 9 MG/ML
500 INJECTION, SOLUTION INTRAVENOUS
Status: COMPLETED | OUTPATIENT
Start: 2024-12-24 | End: 2024-12-24

## 2024-12-24 RX ADMIN — SODIUM CHLORIDE 500 ML: 9 INJECTION, SOLUTION INTRAVENOUS at 10:12

## 2024-12-24 NOTE — ED PROVIDER NOTES
Encounter Date: 12/24/2024       History     Chief Complaint   Patient presents with    Fatigue     C/o generalized weakness, slip out of bed, denies hitting head, does take thinners, & fever.     HPI  79-year-old male history of CHF, COPD, CAD, GERD, HLD, seizure disorder, PVD, previous TIA, CLL on anticoagulants brought in from nursing home for generalized weakness and low-grade fever x1 day.  Nursing home also reports patient slid out of his bed earlier today but patient denies hitting his head.  At this time patient with no complaints of headache, neck stiffness, neck pain, photophobia, sore throat, cough, substernal chest pain, shortness of breath, abdominal pain, dysuria, increasing leg edema or calf pain.  Review of patient's allergies indicates:   Allergen Reactions    Ancef in dextrose (iso-osm)     Codeine     Penicillins      Past Medical History:   Diagnosis Date    Anticoagulant long-term use     CHF (congestive heart failure) 10/2021    EF of 25-30% on ECHO    CLL (chronic lymphocytic leukemia)     COPD (chronic obstructive pulmonary disease)     Coronary artery disease     Depression     Difficult intubation     GERD (gastroesophageal reflux disease)     Hypertension     Mixed hyperlipidemia     PVD (peripheral vascular disease)     Seizures     Sleep apnea, unspecified     TIA (transient ischemic attack)      Past Surgical History:   Procedure Laterality Date    CORONARY ANGIOPLASTY WITH STENT PLACEMENT  10/18/2016    pLAD, pLAD, dLAD, mCX,    INSERTION OF BARE METAL STENT INTO PERIPHERAL ARTERY  2018    B/L [place] [facility]els    PERCUTANEOUS BALLOON VALVULOPLASTY  04/04/2018     Family History   Problem Relation Name Age of Onset    Hypertension Mother      Hypertension Father      Coronary artery disease Father      Heart attack Father       Social History     Tobacco Use    Smoking status: Former    Smokeless tobacco: Never   Substance Use Topics    Alcohol use: Not Currently    Drug use: Never      Review of Systems   All other systems reviewed and are negative.      Physical Exam     Initial Vitals [12/24/24 0858]   BP Pulse Resp Temp SpO2   (!) 153/109 73 20 98 °F (36.7 °C) 97 %      MAP       --         Physical Exam  Vitals and nursing note reviewed.   Constitutional:       General: He is not in acute distress.     Appearance: Normal appearance.   HENT:      Head: Normocephalic and atraumatic.      Nose: Nose normal.      Mouth/Throat:      Mouth: Mucous membranes are moist.   Eyes:      General: No scleral icterus.        Right eye: No discharge.         Left eye: No discharge.      Extraocular Movements: Extraocular movements intact.      Conjunctiva/sclera: Conjunctivae normal.   Cardiovascular:      Rate and Rhythm: Normal rate and regular rhythm.      Pulses: Normal pulses.      Heart sounds: Normal heart sounds.   Pulmonary:      Effort: Pulmonary effort is normal. No respiratory distress.      Breath sounds: Normal breath sounds. No wheezing, rhonchi or rales.   Chest:      Chest wall: No tenderness.   Abdominal:      General: Abdomen is flat. Bowel sounds are normal.      Palpations: Abdomen is soft.      Tenderness: There is no abdominal tenderness. There is no guarding or rebound.   Musculoskeletal:         General: No deformity or signs of injury. Normal range of motion.      Cervical back: Normal range of motion and neck supple.      Right lower leg: Edema present.      Left lower leg: Edema present.   Skin:     General: Skin is warm and dry.      Findings: No rash.   Neurological:      General: No focal deficit present.      Mental Status: He is alert and oriented to person, place, and time.      Cranial Nerves: No cranial nerve deficit.      Sensory: No sensory deficit.      Motor: No weakness.   Psychiatric:         Mood and Affect: Mood normal.         Behavior: Behavior normal.         Thought Content: Thought content normal.         Judgment: Judgment normal.         ED Course    Procedures  Labs Reviewed   COMPREHENSIVE METABOLIC PANEL - Abnormal       Result Value    Sodium 137      Potassium 3.5      Chloride 104      CO2 26      Glucose 136 (*)     Blood Urea Nitrogen 14.0      Creatinine 0.85      Calcium 9.2      Protein Total 6.3      Albumin 2.8 (*)     Globulin 3.5      Albumin/Globulin Ratio 0.8 (*)     Bilirubin Total 0.6      ALP 64      ALT 8      AST 15      eGFR >60      Anion Gap 7.0      BUN/Creatinine Ratio 16     CBC WITH DIFFERENTIAL - Abnormal    WBC 38.82 (*)     RBC 3.25 (*)     Hgb 8.9 (*)     Hct 29.2 (*)     MCV 89.8      MCH 27.4      MCHC 30.5 (*)     RDW 17.5 (*)     Platelet 426 (*)     MPV 9.8      Neut % 20.4      Lymph % 73.1      Mono % 5.0      Eos % 0.5      Basophil % 0.1      Lymph # 28.36 (*)     Neut # 7.95      Mono # 1.93 (*)     Eos # 0.18      Baso # 0.05      IG# 0.35 (*)     IG% 0.9     URINALYSIS - Abnormal    Color, UA Yellow      Appearance, UA Clear      Specific Gravity, UA 1.020      pH, UA 5.5      Protein, UA 2+ (*)     Glucose, UA Negative      Ketones, UA Negative      Blood, UA 3+ (*)     Bilirubin, UA Negative      Urobilinogen, UA 0.2      Nitrites, UA Negative      Leukocyte Esterase, UA Trace (*)    TROPONIN I - Abnormal    Troponin-I 0.081 (*)    URINALYSIS, MICROSCOPIC - Abnormal    Bacteria, UA Rare      Yeast, UA Few (*)     RBC, UA 11-20 (*)     WBC, UA 3-5      Squamous Epithelial Cells, UA Few (*)    TROPONIN I - Abnormal    Troponin-I 0.078 (*)    COVID/RSV/FLU A&B PCR - Normal    Influenza A PCR Not Detected      Influenza B PCR Not Detected      Respiratory Syncytial Virus PCR Not Detected      SARS-CoV-2 PCR Not Detected      Narrative:     The Xpert Xpress SARS-CoV-2/FLU/RSV plus is a rapid, multiplexed real-time PCR test intended for the simultaneous qualitative detection and differentiation of SARS-CoV-2, Influenza A, Influenza B, and respiratory syncytial virus (RSV) viral RNA in either nasopharyngeal swab or nasal  swab specimens.         LIPASE - Normal    Lipase Level 11     LACTIC ACID, PLASMA - Normal    Lactic Acid Level 1.7     BLOOD CULTURE [facility]   CBC W/ AUTO DIFFERENTIAL    Narrative:     The following orders were created for panel order CBC auto differential.  Procedure                               Abnormality         Status                     ---------                               -----------         ------                     CBC with Differential[[id number]]       Abnormal            Final result                 Please view results for these tests on the individual orders.     EKG Readings: (Independently Interpreted)   Normal sinus rhythm 71, no acute ST elevation or ST depressions, poor R-wave progression anteriorly.       Imaging Results              CT Head Without Contrast (Final result)  Result time 12/24/24 11:47:45      Final result by Campos Dobson MD (12/24/24 11:47:45)                   Impression:      1. No acute intracranial abnormality identified  2. Generalized cerebral and cerebellar atrophy  3. Findings and other details as above      Electronically signed by: Campos Dobson  Date:    12/24/2024  Time:    11:47               Narrative:    EXAMINATION:  CT HEAD WITHOUT CONTRAST    CLINICAL HISTORY:  fall;, .    TECHNIQUE:  PATIENT RADIATION DOSE: DLP(mGycm) 1156    As per PQRS measures, all CT scans at this facility used dose modulation, iterative reconstruction, and/or weight based dose adjustment when appropriate to reduce radiation dose to as low as reasonably achievable.    COMPARISON:  06/18/2024    FINDINGS:  Serial axial images were obtained of the head without the administration of IV contrast. Both brain and bone parenchymal windows were obtained.  Additional coronal and sagittal reconstructions were obtained.  Ventricles, cisterns, and sulci are prominent in size.  There is no evidence of intracranial hemorrhage, midline shift, mass effect, or abnormal extra-axial fluid collections.   Scattered hypodensities are seen within the periventricular white matter suspicious for small vessel ischemic changes.  Intracranial atherosclerosis is identified.  Cerebellar tonsils extend caudally to the level of foramen magnum.  There is beam hardening artifact at the skull base.  There is mild scattered mucosal thickening at the ethmoid sinuses.  Mastoid air cells are aerated bilaterally.  A few small filling defects seen within the external auditory canals bilaterally suggesting cerumen.  Clinical correlation is indicated.  Bony structures are osteopenic.  No acute calvarial fracture is identified.  There is deformity again noted to the anterior left maxilla.                                       X-Ray Chest AP Portable (Final result)  Result time 12/24/24 11:33:13      Final result by Campos Dobson MD (12/24/24 11:33:13)                   Impression:      1. Mild cardiomegaly  2. Atherosclerosis  3. Thoracic spondylosis and scoliosis      Electronically signed by: Campos Dobson  Date:    12/24/2024  Time:    11:33               Narrative:    EXAMINATION:  XR CHEST AP PORTABLE    CLINICAL HISTORY:  , Weakness.    COMPARISON:  06/18/2024    FINDINGS:  An AP view or more reveals the heart to be mildly enlarged.  The trachea is to the right of midline.  Atherosclerosis seen within the aorta.  No infiltrate or effusion is seen.  Degenerative changes and curvature are noted to the thoracic spine.                                       Medications   0.9% NaCl infusion (500 mLs Intravenous New Bag 12/24/24 1035)     Medical Decision Making  Amount and/or Complexity of Data Reviewed  Labs: ordered.  Radiology: ordered.    Risk  Prescription drug management.    79-year-old male history of CHF, COPD, CAD, GERD, HLD, seizure disorder, PVD, previous TIA, CLL on anticoagulants brought in from nursing home for generalized weakness, low-grade fever yesterday as well as fall out of bed earlier today.  Vital signs stable on  arrival to ED, afebrile, O2 sat 96% on room air.  Patient is alert oriented with nonfocal neuro exam.  There was no evidence of head trauma.  Lungs are clear to auscultation, abdomen is soft nontender, patient does have large amount of peripheral edema.  Labs remarkable for an elevated WBC count of 38.8, last CBC on 12/14/2024 was 41 consistent with the patient's history of CLL, hemoglobin 8.9, last hemoglobin was 10.2 on 12/14/2024, glucose 136, initial troponin 0.08 and repeated at 2 hours was 0.07.  Patient continues to deny any chest pain.  COVID, RSV, influenza all negative.  UA with no evidence of infection.  Head CT, chest x-ray with no acute findings.  Patient received normal saline 250 cc fluid bolus and is feeling much better at this time will be discharged back to nursing home with close follow up with PCP in 1 day for recheck, plenty of p.o. fluids, Tylenol for pain or fever, return for worsening symptoms or any other concerns.                                  Clinical Impression:  Final diagnoses:  [R53.1] Weakness - Improved  [W19.XXXA] Fall, initial encounter (Primary)  [Z87.898] H/O fever - per nursing home          ED Disposition Condition    Discharge           ED Prescriptions    None       Follow-up Information       Follow up With Specialties Details Why Contact Info    ISAC Schmidt MD Family Medicine Schedule an appointment as soon as possible for a visit in 1 day  [facility] [place] [facility] [id number]  [phone]               Almas Fritz MD  12/24/24 5881

## 2024-12-26 LAB
OHS QRS DURATION: 86 MS
OHS QTC CALCULATION: 423 MS

## 2024-12-29 LAB — BACTERIA BLD CULT: NORMAL

## 2025-01-10 ENCOUNTER — OFFICE VISIT (OUTPATIENT)
Dept: HEMATOLOGY/ONCOLOGY | Facility: CLINIC | Age: 80
End: 2025-01-10
Payer: MEDICARE

## 2025-01-10 VITALS
HEIGHT: 72 IN | TEMPERATURE: 98 F | HEART RATE: 63 BPM | BODY MASS INDEX: 36.61 KG/M2 | OXYGEN SATURATION: 100 % | RESPIRATION RATE: 16 BRPM | DIASTOLIC BLOOD PRESSURE: 64 MMHG | SYSTOLIC BLOOD PRESSURE: 120 MMHG

## 2025-01-10 DIAGNOSIS — D64.9 ACUTE ON CHRONIC ANEMIA: ICD-10-CM

## 2025-01-10 DIAGNOSIS — C91.10 CLL (CHRONIC LYMPHOCYTIC LEUKEMIA): Primary | ICD-10-CM

## 2025-01-10 DIAGNOSIS — R56.9 SEIZURES: ICD-10-CM

## 2025-01-10 DIAGNOSIS — E66.9 OBESITY (BMI 30-39.9): ICD-10-CM

## 2025-01-10 DIAGNOSIS — J44.9 CHRONIC OBSTRUCTIVE PULMONARY DISEASE, UNSPECIFIED COPD TYPE: ICD-10-CM

## 2025-01-10 DIAGNOSIS — Z79.899 LONG-TERM USE OF HIGH-RISK MEDICATION: ICD-10-CM

## 2025-01-10 PROCEDURE — 99215 OFFICE O/P EST HI 40 MIN: CPT | Mod: PBBFAC | Performed by: NURSE PRACTITIONER

## 2025-01-10 PROCEDURE — 99999 PR PBB SHADOW E&M-EST. PATIENT-LVL V: CPT | Mod: PBBFAC,,, | Performed by: NURSE PRACTITIONER

## 2025-01-10 NOTE — PROGRESS NOTES
Subjective:       Patient ID: John Wayne is a 79 y.o. male.      Chief Complaint: CLL (chronic lymphocytic leukemia) (No complaints. )      Patient is a 79 y.o. year old male with oncology and medical history as below.     Interval history:   He returns to clinic today for a three-month follow up regarding his CLL.  He continues to reside in a nursing home.  He is able to answer questions appropriately.  He was last hospitalized 12/13/2024 and ER visit 12/24/24 for a reported fall/weakness then sent back to NH. He has hx of seizure and NSTEMI. He has multiple co-morbid conditions as below affecting his quality of life and general health. He currently denies symptoms such as unexplained weight loss, fever, recurring infections, abdominal pain, diarrhea or constipation or reflux. He denies abnormal bruising/bleeding, abnormal lumps or masses.  Labs 12/26/24 from NH note WBC at baseline for him 30.8, Plts normal 383K, normal renal function and normal immunoglobulins. These were discussed with him in detail.  Patient take high risk medications warfarin and Plavix.     He is not aware of any GI or  blood loss however, aid with him from NH reports blood in fletcher bag. I do not see any at this time. I have left voice mail with DON at NH to return my call to discuss further.     Oncology History    No history exists.     Past Medical History:   Diagnosis Date    Anticoagulant long-term use     CHF (congestive heart failure) 10/2021    EF of 25-30% on ECHO    CLL (chronic lymphocytic leukemia)     COPD (chronic obstructive pulmonary disease)     Coronary artery disease     Depression     Difficult intubation     GERD (gastroesophageal reflux disease)     Hypertension     Mixed hyperlipidemia     PVD (peripheral vascular disease)     Seizures     Sleep apnea, unspecified     TIA (transient ischemic attack)       Review of patient's allergies indicates:   Allergen Reactions    Ancef in dextrose (iso-osm)     Codeine      Penicillins       Current Outpatient Medications on File Prior to Visit   Medication Sig Dispense Refill    albuterol (PROVENTIL) 2.5 mg /3 mL (0.083 %) nebulizer solution Take 2.5 mg by nebulization every 6 (six) hours as needed for Shortness of Breath.      albuterol sulfate 2.5 mg/0.5 mL Nebu Take 2.5 mg by nebulization every 6 (six) hours as needed. Rescue      amiodarone (PACERONE) 200 MG Tab Take 200 mg by mouth once daily.      aspirin (ECOTRIN) 81 MG EC tablet Take 1 tablet (81 mg total) by mouth once daily.      atorvastatin (LIPITOR) 40 MG tablet Take 40 mg by mouth every evening.      ciprofloxacin HCl (CIPRO) 500 MG tablet Take 1 tablet (500 mg total) by mouth every 12 (twelve) hours. 14 tablet 0    clopidogreL (PLAVIX) 75 mg tablet Take 1 tablet by mouth Daily.      ergocalciferol (ERGOCALCIFEROL) 50,000 unit Cap Take 50,000 Units by mouth every Monday. Every Monday and Thursdays      finasteride (PROSCAR) 5 mg tablet Take 1 tablet (5 mg total) by mouth once daily. 30 tablet 0    furosemide (LASIX) 40 MG tablet Take 40 mg by mouth once daily.      levETIRAcetam (KEPPRA) 1000 MG tablet Take 1 tablet by mouth 2 (two) times a day.      lisinopriL (PRINIVIL,ZESTRIL) 5 MG tablet Take 1 tablet by mouth every evening.      loperamide (IMODIUM A-D) 2 mg Tab Take 2 mg by mouth every 24 hours as needed. Prn      metoprolol succinate (TOPROL-XL) 25 MG 24 hr tablet Take 0.5 tablets (12.5 mg total) by mouth 2 (two) times daily.      metroNIDAZOLE (FLAGYL) 500 MG tablet Take 1 tablet (500 mg total) by mouth 3 (three) times daily. 21 tablet 0    midazolam (NAYZILAM) 5 mg/spray (0.1 mL) Spry 1 spray by Left Nostril route daily as needed (seizure). To left nostril      pantoprazole (PROTONIX) 40 MG tablet Take 40 mg by mouth once daily.      PHENobarbitaL 32.4 MG tablet Take 3 tablets by mouth Daily.      phenytoin (DILANTIN) 100 MG ER capsule Take 100 mg by mouth 3 (three) times daily.      primidone (MYSOLINE) 50 MG  "Tab Take 3 tablets by mouth 2 (two) times a day.      tamsulosin (FLOMAX) 0.4 mg Cap Take 1 tablet by mouth nightly.      venlafaxine (EFFEXOR) 75 MG tablet Take 1 tablet (75 mg total) by mouth 2 (two) times daily. 60 tablet 0    warfarin (COUMADIN) 5 MG tablet Take 5 mg by mouth Daily. Daily except tuesday      warfarin (COUMADIN) 7.5 MG tablet Take 7.5 mg by mouth every Tuesday. Tuesday afternoon       No current facility-administered medications on file prior to visit.      ECOG: 3    Review of Systems  Review of Systems   Constitutional:  Positive for fatigue. Negative for appetite change, chills, fever, night sweats and unexpected weight change.   HENT:  Positive for dental problem (chronic). Negative for nosebleeds.    Eyes:  Negative for pain and visual disturbance.   Respiratory:  Negative for cough, chest tightness, shortness of breath and wheezing.    Cardiovascular:  Negative for palpitations and leg swelling.   Gastrointestinal:  Negative for abdominal pain, blood in stool, change in bowel habit, constipation, diarrhea, nausea and vomiting.   Endocrine: Negative.    Genitourinary:  Positive for hematuria. Negative for urgency.   Musculoskeletal:  Negative for arthralgias and myalgias.   Integumentary:  Positive for color change (bilateral lower extremities-chronic) and pallor. Negative for rash.   Allergic/Immunologic: Negative.    Neurological:  Negative for dizziness.   Hematological:  Negative for adenopathy. Does not bruise/bleed easily.   Psychiatric/Behavioral:  Negative for confusion. The patient is not nervous/anxious.    All other systems reviewed and are negative.     /64 (BP Location: Left forearm, Patient Position: Sitting)   Pulse 63   Temp 98.1 °F (36.7 °C) (Oral)   Resp 16   Ht 6' 0.01" (1.829 m)   SpO2 100%   BMI 36.61 kg/m²     Physical Exam  Vitals reviewed.   Constitutional:       General: He is not in acute distress.     Appearance: He is obese. He is ill-appearing " (chronically).   HENT:      Head: Normocephalic and atraumatic.      Nose: Nose normal.      Mouth/Throat:      Mouth: Mucous membranes are moist.      Pharynx: Oropharynx is clear. No oropharyngeal exudate or posterior oropharyngeal erythema.      Comments: Missing teeth, poor dentition   Eyes:      General: No scleral icterus.     Conjunctiva/sclera: Conjunctivae normal.   Cardiovascular:      Rate and Rhythm: Normal rate and regular rhythm.      Pulses: Normal pulses.      Heart sounds: No murmur heard.  Pulmonary:      Effort: Pulmonary effort is normal. No respiratory distress.      Breath sounds: No wheezing or rhonchi.   Abdominal:      General: Bowel sounds are normal.      Palpations: Abdomen is soft.      Tenderness: There is no abdominal tenderness. There is no guarding.   Musculoskeletal:         General: Swelling (BLE) present. No tenderness or deformity. Normal range of motion.      Cervical back: Normal range of motion and neck supple.      Right lower leg: Edema present.      Left lower leg: Edema present.      Comments: Presents in wheelchair   Lymphadenopathy:      Cervical: No cervical adenopathy.   Skin:     General: Skin is warm and dry.      Findings: No erythema, lesion or rash.   Neurological:      Mental Status: He is alert and oriented to person, place, and time. Mental status is at baseline.      Cranial Nerves: Cranial nerves 2-12 are intact.      Motor: Weakness present.      Gait: Gait abnormal (doesnt walk).      Comments: On wheelchair today   Psychiatric:         Mood and Affect: Mood normal.         Behavior: Behavior normal. Behavior is cooperative.        No visits with results within 2 Week(s) from this visit.   Latest known visit with results is:   Admission on 12/24/2024, Discharged on 12/24/2024   Component Date Value    Influenza A PCR 12/24/2024 Not Detected     Influenza B PCR 12/24/2024 Not Detected     Respiratory Syncytial Vi* 12/24/2024 Not Detected     SARS-CoV-2 PCR  12/24/2024 Not Detected     QRS Duration 12/24/2024 86     [facility] QTC Calculation 12/24/2024 423     Sodium 12/24/2024 137     Potassium 12/24/2024 3.5     Chloride 12/24/2024 104     CO2 12/24/2024 26     Glucose 12/24/2024 136 (H)     Blood Urea Nitrogen 12/24/2024 14.0     Creatinine 12/24/2024 0.85     Calcium 12/24/2024 9.2     Protein Total 12/24/2024 6.3     Albumin 12/24/2024 2.8 (L)     Globulin 12/24/2024 3.5     Albumin/Globulin Ratio 12/24/2024 0.8 (L)     Bilirubin Total 12/24/2024 0.6     ALP 12/24/2024 64     ALT 12/24/2024 8     AST 12/24/2024 15     eGFR 12/24/2024 >60     Anion Gap 12/24/2024 7.0     BUN/Creatinine Ratio 12/24/2024 16     Lipase Level 12/24/2024 11     WBC 12/24/2024 38.82 (H)     RBC 12/24/2024 3.25 (L)     Hgb 12/24/2024 8.9 (L)     Hct 12/24/2024 29.2 (L)     MCV 12/24/2024 89.8     MCH 12/24/2024 27.4     MCHC 12/24/2024 30.5 (L)     RDW 12/24/2024 17.5 (H)     Platelet 12/24/2024 426 (H)     MPV 12/24/2024 9.8     Neut % 12/24/2024 20.4     Lymph % 12/24/2024 73.1     Mono % 12/24/2024 5.0     Eos % 12/24/2024 0.5     Basophil % 12/24/2024 0.1     Lymph # 12/24/2024 28.36 (H)     Neut # 12/24/2024 7.95     Mono # 12/24/2024 1.93 (H)     Eos # 12/24/2024 0.18     Baso # 12/24/2024 0.05     Imm Gran # 12/24/2024 0.35 (H)     Imm Grans % 12/24/2024 0.9     Color, UA 12/24/2024 Yellow     Appearance, UA 12/24/2024 Clear     Specific Gravity, UA 12/24/2024 1.020     pH, UA 12/24/2024 5.5     Protein, UA 12/24/2024 2+ (A)     Glucose, UA 12/24/2024 Negative     Ketones, UA 12/24/2024 Negative     Blood, UA 12/24/2024 3+ (A)     Bilirubin, UA 12/24/2024 Negative     Urobilinogen, UA 12/24/2024 0.2     Nitrites, UA 12/24/2024 Negative     Leukocyte Esterase, UA 12/24/2024 Trace (A)     Troponin-I 12/24/2024 0.081 (H)     Lactic Acid Level 12/24/2024 1.7     Blood Culture 12/24/2024 No Growth at 5 days     Bacteria, UA 12/24/2024 Rare     Yeast, UA 12/24/2024 Few (A)     RBC, UA  12/24/2024 11-20 (A)     WBC, UA 12/24/2024 3-5     Squamous Epithelial Cell* 12/24/2024 Few (A)     Troponin-I 12/24/2024 0.078 (H)           Problem list:  1. CLL (chronic lymphocytic leukemia)    2. Acute on chronic anemia    3. Long-term use of high-risk medication    4. Seizures    5. Chronic obstructive pulmonary disease, unspecified COPD type    6. Obesity (BMI 30-39.9)           Assessment:   CLL   Has worsening asymptomatic normocytic anemia. Normal PLT's and creatinine. His leukocytosis is stable 12/26/24 at 30.8. No lymphadenopathy or B symptoms. Patient takes high risk medications for acute blood loss warfarin and Plavix.   Given his other comoridities such as increasing neurological impairment, seizures, COPD, risks outweight benefits of starting any therapy at this time.   Will continue monitoring unless CLL is creating significant tumor burden.    Plan:   -Due to worsening anemia-added today Iron, TIBC, Ferritin, B12 and folate.   -WBC stable at baseline for his CLL at 30.8, Plts normal 383K, normal renal function and normal immunoglobulins.    -RTC in 3 months instead of 6 due to worsening anemia with CBC, CMP, immunoglobulins, LDH week prior at [facility].  -Needs GI and  assessment for blood loss at NH. I have voice mail with DON of NH to call me.          I personally reviewed all pertinent imaging, lab results, explained to the patient the pertinent information in detail including radiographic imaging, abnormal lab results. Called NH DON to discuss new findings and concerns with additional labs to be collected today. All questions were answered thoroughly. Total time spent on this visit was 43 minutes.    GARRETT Alexander-C  Oncology/Hematology  [facility]

## 2025-01-16 ENCOUNTER — LAB REQUISITION (OUTPATIENT)
Dept: LAB | Facility: HOSPITAL | Age: 80
End: 2025-01-16
Payer: MEDICARE

## 2025-01-16 DIAGNOSIS — C91.10 CHRONIC LYMPHOCYTIC LEUKEMIA OF B-CELL TYPE NOT HAVING ACHIEVED REMISSION: ICD-10-CM

## 2025-01-16 LAB
BASOPHILS # BLD AUTO: 0.04 X10(3)/MCL
BASOPHILS NFR BLD AUTO: 0.2 %
EOSINOPHIL # BLD AUTO: 0.36 X10(3)/MCL (ref 0–0.9)
EOSINOPHIL NFR BLD AUTO: 1.4 %
ERYTHROCYTE [DISTWIDTH] IN BLOOD BY AUTOMATED COUNT: 15.9 % (ref 11.5–17)
FERRITIN SERPL-MCNC: 58.81 NG/ML (ref 21.81–274.66)
HCT VFR BLD AUTO: 39.1 % (ref 42–52)
HGB BLD-MCNC: 12.1 G/DL (ref 14–18)
IMM GRANULOCYTES # BLD AUTO: 0.06 X10(3)/MCL (ref 0–0.04)
IMM GRANULOCYTES NFR BLD AUTO: 0.2 %
IRON SERPL-MCNC: 36 UG/DL (ref 65–175)
LYMPHOCYTES # BLD AUTO: 19.22 X10(3)/MCL (ref 0.6–4.6)
LYMPHOCYTES NFR BLD AUTO: 72.6 %
MCH RBC QN AUTO: 25.5 PG (ref 27–31)
MCHC RBC AUTO-ENTMCNC: 30.9 G/DL (ref 33–36)
MCV RBC AUTO: 82.5 FL (ref 80–94)
MONOCYTES # BLD AUTO: 1.68 X10(3)/MCL (ref 0.1–1.3)
MONOCYTES NFR BLD AUTO: 6.3 %
NEUTROPHILS # BLD AUTO: 5.12 X10(3)/MCL (ref 2.1–9.2)
NEUTROPHILS NFR BLD AUTO: 19.3 %
PLATELET # BLD AUTO: 337 X10(3)/MCL (ref 130–400)
PMV BLD AUTO: 9.6 FL (ref 7.4–10.4)
RBC # BLD AUTO: 4.74 X10(6)/MCL (ref 4.7–6.1)
WBC # BLD AUTO: 26.48 X10(3)/MCL (ref 4.5–11.5)

## 2025-01-16 PROCEDURE — 85025 COMPLETE CBC W/AUTO DIFF WBC: CPT | Performed by: FAMILY MEDICINE

## 2025-01-16 PROCEDURE — 83540 ASSAY OF IRON: CPT | Performed by: FAMILY MEDICINE

## 2025-01-16 PROCEDURE — 82728 ASSAY OF FERRITIN: CPT | Performed by: FAMILY MEDICINE

## 2025-01-16 PROCEDURE — 82607 VITAMIN B-12: CPT | Performed by: FAMILY MEDICINE

## 2025-01-17 LAB — VIT B12 SERPL-MCNC: 282 PG/ML (ref 213–816)

## 2025-01-24 ENCOUNTER — LAB REQUISITION (OUTPATIENT)
Dept: LAB | Facility: HOSPITAL | Age: 80
End: 2025-01-24
Payer: MEDICARE

## 2025-01-24 DIAGNOSIS — G40.909 EPILEPSY, UNSPECIFIED, NOT INTRACTABLE, WITHOUT STATUS EPILEPTICUS: ICD-10-CM

## 2025-01-24 DIAGNOSIS — C91.10 CHRONIC LYMPHOCYTIC LEUKEMIA OF B-CELL TYPE NOT HAVING ACHIEVED REMISSION: ICD-10-CM

## 2025-01-24 LAB
BASOPHILS # BLD AUTO: 0.13 X10(3)/MCL
BASOPHILS NFR BLD AUTO: 0.6 %
CHOLEST SERPL-MCNC: 125 MG/DL
CHOLEST/HDLC SERPL: 4 {RATIO} (ref 0–5)
EOSINOPHIL # BLD AUTO: 0.39 X10(3)/MCL (ref 0–0.9)
EOSINOPHIL NFR BLD AUTO: 1.7 %
ERYTHROCYTE [DISTWIDTH] IN BLOOD BY AUTOMATED COUNT: 15.9 % (ref 11.5–17)
FERRITIN SERPL-MCNC: 50.21 NG/ML (ref 21.81–274.66)
HCT VFR BLD AUTO: 36.1 % (ref 42–52)
HDLC SERPL-MCNC: 34 MG/DL (ref 35–60)
HGB BLD-MCNC: 11.4 G/DL (ref 14–18)
IMM GRANULOCYTES # BLD AUTO: 0.07 X10(3)/MCL (ref 0–0.04)
IMM GRANULOCYTES NFR BLD AUTO: 0.3 %
IRON SATN MFR SERPL: 15 % (ref 20–50)
IRON SERPL-MCNC: 43 UG/DL (ref 65–175)
LDH SERPL-CCNC: 250 U/L (ref 125–220)
LDLC SERPL CALC-MCNC: 75 MG/DL (ref 50–140)
LYMPHOCYTES # BLD AUTO: 15.5 X10(3)/MCL (ref 0.6–4.6)
LYMPHOCYTES NFR BLD AUTO: 68.5 %
MCH RBC QN AUTO: 25.5 PG (ref 27–31)
MCHC RBC AUTO-ENTMCNC: 31.6 G/DL (ref 33–36)
MCV RBC AUTO: 80.8 FL (ref 80–94)
MONOCYTES # BLD AUTO: 1.06 X10(3)/MCL (ref 0.1–1.3)
MONOCYTES NFR BLD AUTO: 4.7 %
NEUTROPHILS # BLD AUTO: 5.48 X10(3)/MCL (ref 2.1–9.2)
NEUTROPHILS NFR BLD AUTO: 24.2 %
NRBC BLD AUTO-RTO: 0 %
PLATELET # BLD AUTO: 217 X10(3)/MCL (ref 130–400)
PMV BLD AUTO: 10.1 FL (ref 7.4–10.4)
RBC # BLD AUTO: 4.47 X10(6)/MCL (ref 4.7–6.1)
TIBC SERPL-MCNC: 250 UG/DL (ref 60–240)
TIBC SERPL-MCNC: 293 UG/DL (ref 250–450)
TRIGL SERPL-MCNC: 80 MG/DL (ref 34–140)
VIT B12 SERPL-MCNC: 322 PG/ML (ref 213–816)
VLDLC SERPL CALC-MCNC: 16 MG/DL
WBC # BLD AUTO: 22.63 X10(3)/MCL (ref 4.5–11.5)

## 2025-01-24 PROCEDURE — 85025 COMPLETE CBC W/AUTO DIFF WBC: CPT | Performed by: FAMILY MEDICINE

## 2025-01-24 PROCEDURE — 82728 ASSAY OF FERRITIN: CPT | Performed by: FAMILY MEDICINE

## 2025-01-24 PROCEDURE — 83540 ASSAY OF IRON: CPT | Performed by: FAMILY MEDICINE

## 2025-01-24 PROCEDURE — 82607 VITAMIN B-12: CPT | Performed by: FAMILY MEDICINE

## 2025-01-24 PROCEDURE — 83615 LACTATE (LD) (LDH) ENZYME: CPT | Performed by: FAMILY MEDICINE

## 2025-01-24 PROCEDURE — 80061 LIPID PANEL: CPT | Performed by: FAMILY MEDICINE

## 2025-02-12 ENCOUNTER — ANESTHESIA EVENT (OUTPATIENT)
Dept: SURGERY | Facility: HOSPITAL | Age: 80
End: 2025-02-12
Payer: MEDICARE

## 2025-02-12 NOTE — ANESTHESIA PREPROCEDURE EVALUATION
02/12/2025  John Wayne is a 79 y.o., male.      Pre-op Assessment    I have reviewed the Patient Summary Reports.     I have reviewed the Nursing Notes. I have reviewed the NPO Status.   I have reviewed the Medications.     Review of Systems  Anesthesia Hx:             Denies Family Hx of Anesthesia complications.    Denies Personal Hx of Anesthesia complications.                    Social:  Former Smoker, No Alcohol Use       Hematology/Oncology:  Hematology Normal   Oncology Normal                                   EENT/Dental:  EENT/Dental Normal           Cardiovascular:     Hypertension, well controlled   CAD  asymptomatic     CHF                                   Pulmonary:   COPD, moderate     Sleep Apnea                Renal/:  Renal/ Normal                 Hepatic/GI:     GERD, well controlled                Musculoskeletal:  Musculoskeletal Normal                Neurological:  TIA,     Seizures, well controlled                                Endocrine:  Endocrine Normal            Dermatological:  Skin Normal    Psych:  Psychiatric History                  Physical Exam  General: Cooperative, Alert and Oriented    Airway:  Mallampati: II   Mouth Opening: Normal  TM Distance: Normal  Tongue: Normal  Neck ROM: Normal ROM    Dental:  Intact        Anesthesia Plan  Type of Anesthesia, risks & benefits discussed:    Anesthesia Type: Gen Natural Airway  Intra-op Monitoring Plan: Standard ASA Monitors  Post Op Pain Control Plan: multimodal analgesia  Induction:  IV  Informed Consent: Informed consent signed with the Patient and all parties understand the risks and agree with anesthesia plan.  All questions answered. Patient consented to blood products? Yes  ASA Score: 3    Ready For Surgery From Anesthesia Perspective.     .

## 2025-02-13 ENCOUNTER — ANESTHESIA (OUTPATIENT)
Dept: SURGERY | Facility: HOSPITAL | Age: 80
End: 2025-02-13
Payer: MEDICARE

## 2025-02-18 ENCOUNTER — LAB REQUISITION (OUTPATIENT)
Dept: LAB | Facility: HOSPITAL | Age: 80
End: 2025-02-18
Payer: MEDICARE

## 2025-02-18 DIAGNOSIS — R79.1 ABNORMAL COAGULATION PROFILE: ICD-10-CM

## 2025-02-18 LAB
INR PPP: 2.8
PROTHROMBIN TIME: 30.5 SECONDS (ref 12.4–14.9)

## 2025-02-18 PROCEDURE — 85610 PROTHROMBIN TIME: CPT | Performed by: FAMILY MEDICINE

## 2025-03-07 ENCOUNTER — HOSPITAL ENCOUNTER (INPATIENT)
Facility: HOSPITAL | Age: 80
LOS: 2 days | Discharge: NURSING, REHAB OR LONG-TERM CARE | DRG: 690 | End: 2025-03-09
Attending: EMERGENCY MEDICINE | Admitting: INTERNAL MEDICINE
Payer: MEDICARE

## 2025-03-07 ENCOUNTER — LAB REQUISITION (OUTPATIENT)
Dept: LAB | Facility: HOSPITAL | Age: 80
End: 2025-03-07
Payer: MEDICARE

## 2025-03-07 DIAGNOSIS — R41.82 ALTERED MENTAL STATUS, UNSPECIFIED: ICD-10-CM

## 2025-03-07 DIAGNOSIS — T83.511A URINARY TRACT INFECTION ASSOCIATED WITH INDWELLING URETHRAL CATHETER, INITIAL ENCOUNTER: Primary | ICD-10-CM

## 2025-03-07 DIAGNOSIS — N39.0 URINARY TRACT INFECTION ASSOCIATED WITH INDWELLING URETHRAL CATHETER, INITIAL ENCOUNTER: Primary | ICD-10-CM

## 2025-03-07 DIAGNOSIS — R53.1 WEAKNESS GENERALIZED: ICD-10-CM

## 2025-03-07 DIAGNOSIS — R56.9 SEIZURES: ICD-10-CM

## 2025-03-07 DIAGNOSIS — G40.909 EPILEPSY, UNSPECIFIED, NOT INTRACTABLE, WITHOUT STATUS EPILEPTICUS: ICD-10-CM

## 2025-03-07 DIAGNOSIS — E86.9 VOLUME DEPLETION, UNSPECIFIED: ICD-10-CM

## 2025-03-07 PROBLEM — N17.9 AKI (ACUTE KIDNEY INJURY): Status: ACTIVE | Noted: 2025-03-07

## 2025-03-07 LAB
ACCEPTIBLE SP GR UR QL: >=1.03 (ref 1–1.03)
ALBUMIN SERPL-MCNC: 3.9 G/DL (ref 3.4–4.8)
ALBUMIN/GLOB SERPL: 0.7 RATIO (ref 1.1–2)
ALP SERPL-CCNC: 136 UNIT/L (ref 40–150)
ALT SERPL-CCNC: 19 UNIT/L (ref 0–55)
AMORPH URATE CRY URNS QL MICRO: ABNORMAL /HPF
AMPHET UR QL SCN: NEGATIVE
ANION GAP SERPL CALC-SCNC: 13 MEQ/L
AST SERPL-CCNC: 20 UNIT/L (ref 5–34)
BACTERIA #/AREA URNS AUTO: ABNORMAL /HPF
BARBITURATE SCN PRESENT UR: POSITIVE
BASOPHILS # BLD AUTO: 0.15 X10(3)/MCL
BASOPHILS NFR BLD AUTO: 0.6 %
BENZODIAZ UR QL SCN: NEGATIVE
BILIRUB SERPL-MCNC: 0.2 MG/DL
BILIRUB UR QL STRIP.AUTO: NEGATIVE
BUN SERPL-MCNC: 18 MG/DL (ref 8.4–25.7)
CALCIUM SERPL-MCNC: 10.7 MG/DL (ref 8.8–10)
CANNABINOIDS UR QL SCN: NEGATIVE
CHLORIDE SERPL-SCNC: 108 MMOL/L (ref 98–107)
CLARITY UR: ABNORMAL
CO2 SERPL-SCNC: 24 MMOL/L (ref 23–31)
COCAINE UR QL SCN: NEGATIVE
COLOR UR AUTO: YELLOW
CREAT SERPL-MCNC: 1.28 MG/DL (ref 0.72–1.25)
CREAT/UREA NIT SERPL: 14
D DIMER PPP IA.FEU-MCNC: <0.27 UG/ML FEU (ref 0–0.5)
EOSINOPHIL # BLD AUTO: 0.2 X10(3)/MCL (ref 0–0.9)
EOSINOPHIL NFR BLD AUTO: 0.8 %
ERYTHROCYTE [DISTWIDTH] IN BLOOD BY AUTOMATED COUNT: 17.6 % (ref 11.5–17)
FENTANYL UR QL SCN: NEGATIVE
FLUAV AG UPPER RESP QL IA.RAPID: NOT DETECTED
FLUBV AG UPPER RESP QL IA.RAPID: NOT DETECTED
GFR SERPLBLD CREATININE-BSD FMLA CKD-EPI: 57 ML/MIN/1.73/M2
GLOBULIN SER-MCNC: 5.4 GM/DL (ref 2.4–3.5)
GLUCOSE SERPL-MCNC: 138 MG/DL (ref 82–115)
GLUCOSE UR QL STRIP: NEGATIVE
HCT VFR BLD AUTO: 47.5 % (ref 42–52)
HGB BLD-MCNC: 14 G/DL (ref 14–18)
HGB UR QL STRIP: ABNORMAL
IMM GRANULOCYTES # BLD AUTO: 0.08 X10(3)/MCL (ref 0–0.04)
IMM GRANULOCYTES NFR BLD AUTO: 0.3 %
INR PPP: 4
KETONES UR QL STRIP: NEGATIVE
LACTATE SERPL-SCNC: 1.3 MMOL/L (ref 0.5–2.2)
LEUKOCYTE ESTERASE UR QL STRIP: ABNORMAL
LYMPHOCYTES # BLD AUTO: 16.65 X10(3)/MCL (ref 0.6–4.6)
LYMPHOCYTES NFR BLD AUTO: 67.3 %
MCH RBC QN AUTO: 23.5 PG (ref 27–31)
MCHC RBC AUTO-ENTMCNC: 29.5 G/DL (ref 33–36)
MCV RBC AUTO: 79.8 FL (ref 80–94)
MDMA UR QL SCN: NEGATIVE
MONOCYTES # BLD AUTO: 1.27 X10(3)/MCL (ref 0.1–1.3)
MONOCYTES NFR BLD AUTO: 5.1 %
MUCOUS THREADS URNS QL MICRO: ABNORMAL /LPF
NEUTROPHILS # BLD AUTO: 6.39 X10(3)/MCL (ref 2.1–9.2)
NEUTROPHILS NFR BLD AUTO: 25.9 %
NITRITE UR QL STRIP: POSITIVE
NRBC BLD AUTO-RTO: 0 %
OPIATES UR QL SCN: NEGATIVE
PCP UR QL: NEGATIVE
PH UR STRIP: 5.5 [PH]
PH UR: 5.5 [PH] (ref 3–11)
PHENOBARB SERPL-MCNC: 48.9 UG/ML (ref 15–40)
PHENYTOIN SERPL-MCNC: 22.1 UG/ML (ref 10–20)
PLATELET # BLD AUTO: 395 X10(3)/MCL (ref 130–400)
PMV BLD AUTO: 9.6 FL (ref 7.4–10.4)
POTASSIUM SERPL-SCNC: 4.3 MMOL/L (ref 3.5–5.1)
PROT SERPL-MCNC: 9.3 GM/DL (ref 5.8–7.6)
PROT UR QL STRIP: ABNORMAL
PROTHROMBIN TIME: 40.8 SECONDS (ref 12.4–14.9)
RBC # BLD AUTO: 5.95 X10(6)/MCL (ref 4.7–6.1)
RBC #/AREA URNS AUTO: ABNORMAL /HPF
RSV A 5' UTR RNA NPH QL NAA+PROBE: NOT DETECTED
SARS-COV-2 RNA RESP QL NAA+PROBE: NOT DETECTED
SODIUM SERPL-SCNC: 145 MMOL/L (ref 136–145)
SP GR UR STRIP.AUTO: >=1.03 (ref 1–1.03)
SQUAMOUS #/AREA URNS AUTO: ABNORMAL /HPF
TROPONIN I SERPL-MCNC: 0.05 NG/ML (ref 0–0.04)
UROBILINOGEN UR STRIP-ACNC: 0.2
WBC # BLD AUTO: 24.74 X10(3)/MCL (ref 4.5–11.5)
WBC #/AREA URNS AUTO: ABNORMAL /HPF

## 2025-03-07 PROCEDURE — 80185 ASSAY OF PHENYTOIN TOTAL: CPT | Performed by: FAMILY MEDICINE

## 2025-03-07 PROCEDURE — 25000003 PHARM REV CODE 250: Performed by: EMERGENCY MEDICINE

## 2025-03-07 PROCEDURE — 93005 ELECTROCARDIOGRAM TRACING: CPT

## 2025-03-07 PROCEDURE — 85610 PROTHROMBIN TIME: CPT | Performed by: EMERGENCY MEDICINE

## 2025-03-07 PROCEDURE — 96361 HYDRATE IV INFUSION ADD-ON: CPT

## 2025-03-07 PROCEDURE — 63600175 PHARM REV CODE 636 W HCPCS: Performed by: EMERGENCY MEDICINE

## 2025-03-07 PROCEDURE — 96365 THER/PROPH/DIAG IV INF INIT: CPT

## 2025-03-07 PROCEDURE — 83605 ASSAY OF LACTIC ACID: CPT | Performed by: EMERGENCY MEDICINE

## 2025-03-07 PROCEDURE — 80053 COMPREHEN METABOLIC PANEL: CPT | Performed by: EMERGENCY MEDICINE

## 2025-03-07 PROCEDURE — 87040 BLOOD CULTURE FOR BACTERIA: CPT | Performed by: EMERGENCY MEDICINE

## 2025-03-07 PROCEDURE — 81001 URINALYSIS AUTO W/SCOPE: CPT | Mod: XB | Performed by: EMERGENCY MEDICINE

## 2025-03-07 PROCEDURE — 85025 COMPLETE CBC W/AUTO DIFF WBC: CPT | Performed by: EMERGENCY MEDICINE

## 2025-03-07 PROCEDURE — 11000001 HC ACUTE MED/SURG PRIVATE ROOM

## 2025-03-07 PROCEDURE — 85379 FIBRIN DEGRADATION QUANT: CPT | Performed by: EMERGENCY MEDICINE

## 2025-03-07 PROCEDURE — 80184 ASSAY OF PHENOBARBITAL: CPT | Performed by: FAMILY MEDICINE

## 2025-03-07 PROCEDURE — 93010 ELECTROCARDIOGRAM REPORT: CPT | Mod: ,,, | Performed by: INTERNAL MEDICINE

## 2025-03-07 PROCEDURE — 99285 EMERGENCY DEPT VISIT HI MDM: CPT | Mod: 25

## 2025-03-07 PROCEDURE — 87186 SC STD MICRODIL/AGAR DIL: CPT | Performed by: EMERGENCY MEDICINE

## 2025-03-07 PROCEDURE — 80307 DRUG TEST PRSMV CHEM ANLYZR: CPT | Performed by: EMERGENCY MEDICINE

## 2025-03-07 PROCEDURE — 84484 ASSAY OF TROPONIN QUANT: CPT | Performed by: EMERGENCY MEDICINE

## 2025-03-07 PROCEDURE — 0241U COVID/RSV/FLU A&B PCR: CPT | Performed by: EMERGENCY MEDICINE

## 2025-03-07 RX ORDER — CEFTRIAXONE 2 G/1
2 INJECTION, POWDER, FOR SOLUTION INTRAMUSCULAR; INTRAVENOUS
Status: DISCONTINUED | OUTPATIENT
Start: 2025-03-07 | End: 2025-03-07

## 2025-03-07 RX ORDER — CIPROFLOXACIN 2 MG/ML
400 INJECTION, SOLUTION INTRAVENOUS
Status: COMPLETED | OUTPATIENT
Start: 2025-03-07 | End: 2025-03-07

## 2025-03-07 RX ORDER — LISINOPRIL 5 MG/1
5 TABLET ORAL NIGHTLY
Status: DISCONTINUED | OUTPATIENT
Start: 2025-03-08 | End: 2025-03-09 | Stop reason: HOSPADM

## 2025-03-07 RX ORDER — PRIMIDONE 50 MG/1
150 TABLET ORAL 2 TIMES DAILY
Status: DISCONTINUED | OUTPATIENT
Start: 2025-03-07 | End: 2025-03-09 | Stop reason: HOSPADM

## 2025-03-07 RX ORDER — LEVETIRACETAM 500 MG/1
1000 TABLET ORAL 2 TIMES DAILY
Status: DISCONTINUED | OUTPATIENT
Start: 2025-03-07 | End: 2025-03-09 | Stop reason: HOSPADM

## 2025-03-07 RX ORDER — WARFARIN 7.5 MG/1
7.5 TABLET ORAL
Status: DISCONTINUED | OUTPATIENT
Start: 2025-03-11 | End: 2025-03-07

## 2025-03-07 RX ORDER — SODIUM CHLORIDE 0.9 % (FLUSH) 0.9 %
10 SYRINGE (ML) INJECTION
Status: DISCONTINUED | OUTPATIENT
Start: 2025-03-07 | End: 2025-03-09 | Stop reason: HOSPADM

## 2025-03-07 RX ORDER — PANTOPRAZOLE SODIUM 40 MG/1
40 TABLET, DELAYED RELEASE ORAL DAILY
Status: DISCONTINUED | OUTPATIENT
Start: 2025-03-08 | End: 2025-03-09 | Stop reason: HOSPADM

## 2025-03-07 RX ORDER — ONDANSETRON HYDROCHLORIDE 2 MG/ML
4 INJECTION, SOLUTION INTRAVENOUS EVERY 6 HOURS PRN
Status: DISCONTINUED | OUTPATIENT
Start: 2025-03-08 | End: 2025-03-09 | Stop reason: HOSPADM

## 2025-03-07 RX ORDER — VENLAFAXINE 75 MG/1
75 TABLET ORAL 2 TIMES DAILY
COMMUNITY

## 2025-03-07 RX ORDER — FUROSEMIDE 40 MG/1
40 TABLET ORAL EVERY MORNING
Status: DISCONTINUED | OUTPATIENT
Start: 2025-03-08 | End: 2025-03-09 | Stop reason: HOSPADM

## 2025-03-07 RX ORDER — FINASTERIDE 5 MG/1
5 TABLET, FILM COATED ORAL NIGHTLY
Status: DISCONTINUED | OUTPATIENT
Start: 2025-03-07 | End: 2025-03-09 | Stop reason: HOSPADM

## 2025-03-07 RX ORDER — PHENYTOIN SODIUM 100 MG/1
100 CAPSULE, EXTENDED RELEASE ORAL 3 TIMES DAILY
Status: DISCONTINUED | OUTPATIENT
Start: 2025-03-08 | End: 2025-03-09 | Stop reason: HOSPADM

## 2025-03-07 RX ORDER — MUPIROCIN 20 MG/G
OINTMENT TOPICAL 2 TIMES DAILY
Status: DISCONTINUED | OUTPATIENT
Start: 2025-03-08 | End: 2025-03-09 | Stop reason: HOSPADM

## 2025-03-07 RX ORDER — IPRATROPIUM BROMIDE AND ALBUTEROL SULFATE 2.5; .5 MG/3ML; MG/3ML
3 SOLUTION RESPIRATORY (INHALATION) EVERY 4 HOURS PRN
Status: DISCONTINUED | OUTPATIENT
Start: 2025-03-08 | End: 2025-03-09 | Stop reason: HOSPADM

## 2025-03-07 RX ORDER — ATORVASTATIN CALCIUM 40 MG/1
40 TABLET, FILM COATED ORAL NIGHTLY
Status: DISCONTINUED | OUTPATIENT
Start: 2025-03-08 | End: 2025-03-09 | Stop reason: HOSPADM

## 2025-03-07 RX ORDER — CIPROFLOXACIN 500 MG/1
500 TABLET ORAL
Status: COMPLETED | OUTPATIENT
Start: 2025-03-07 | End: 2025-03-07

## 2025-03-07 RX ORDER — TALC
6 POWDER (GRAM) TOPICAL NIGHTLY PRN
Status: DISCONTINUED | OUTPATIENT
Start: 2025-03-08 | End: 2025-03-07

## 2025-03-07 RX ORDER — AMIODARONE HYDROCHLORIDE 200 MG/1
200 TABLET ORAL EVERY MORNING
Status: DISCONTINUED | OUTPATIENT
Start: 2025-03-08 | End: 2025-03-09 | Stop reason: HOSPADM

## 2025-03-07 RX ORDER — CLOPIDOGREL BISULFATE 75 MG/1
75 TABLET ORAL EVERY MORNING
Status: DISCONTINUED | OUTPATIENT
Start: 2025-03-08 | End: 2025-03-09 | Stop reason: HOSPADM

## 2025-03-07 RX ORDER — VENLAFAXINE HYDROCHLORIDE 37.5 MG/1
75 CAPSULE, EXTENDED RELEASE ORAL 2 TIMES DAILY
Status: DISCONTINUED | OUTPATIENT
Start: 2025-03-07 | End: 2025-03-09 | Stop reason: HOSPADM

## 2025-03-07 RX ORDER — TALC
6 POWDER (GRAM) TOPICAL NIGHTLY PRN
Status: DISCONTINUED | OUTPATIENT
Start: 2025-03-07 | End: 2025-03-09 | Stop reason: HOSPADM

## 2025-03-07 RX ORDER — WARFARIN SODIUM 5 MG/1
5 TABLET ORAL DAILY
Status: DISCONTINUED | OUTPATIENT
Start: 2025-03-08 | End: 2025-03-07

## 2025-03-07 RX ORDER — CIPROFLOXACIN 2 MG/ML
400 INJECTION, SOLUTION INTRAVENOUS
Status: DISCONTINUED | OUTPATIENT
Start: 2025-03-08 | End: 2025-03-09 | Stop reason: HOSPADM

## 2025-03-07 RX ORDER — PHENOBARBITAL 32.4 MG/1
97.2 TABLET ORAL DAILY
Status: DISCONTINUED | OUTPATIENT
Start: 2025-03-08 | End: 2025-03-09 | Stop reason: HOSPADM

## 2025-03-07 RX ADMIN — CIPROFLOXACIN 500 MG: 500 TABLET ORAL at 11:03

## 2025-03-07 RX ADMIN — SODIUM CHLORIDE, POTASSIUM CHLORIDE, SODIUM LACTATE AND CALCIUM CHLORIDE 500 ML: 600; 310; 30; 20 INJECTION, SOLUTION INTRAVENOUS at 09:03

## 2025-03-07 RX ADMIN — CIPROFLOXACIN 400 MG: 2 INJECTION, SOLUTION INTRAVENOUS at 09:03

## 2025-03-08 LAB
ANION GAP SERPL CALC-SCNC: 8 MEQ/L
BASOPHILS # BLD AUTO: 0.12 X10(3)/MCL
BASOPHILS NFR BLD AUTO: 0.7 %
BUN SERPL-MCNC: 16 MG/DL (ref 8.4–25.7)
CALCIUM SERPL-MCNC: 9.5 MG/DL (ref 8.8–10)
CHLORIDE SERPL-SCNC: 108 MMOL/L (ref 98–107)
CO2 SERPL-SCNC: 29 MMOL/L (ref 23–31)
CREAT SERPL-MCNC: 0.96 MG/DL (ref 0.72–1.25)
CREAT/UREA NIT SERPL: 17
EOSINOPHIL # BLD AUTO: 0.26 X10(3)/MCL (ref 0–0.9)
EOSINOPHIL NFR BLD AUTO: 1.4 %
ERYTHROCYTE [DISTWIDTH] IN BLOOD BY AUTOMATED COUNT: 16.8 % (ref 11.5–17)
GFR SERPLBLD CREATININE-BSD FMLA CKD-EPI: >60 ML/MIN/1.73/M2
GLUCOSE SERPL-MCNC: 104 MG/DL (ref 82–115)
HCT VFR BLD AUTO: 39.6 % (ref 42–52)
HGB BLD-MCNC: 11.8 G/DL (ref 14–18)
IMM GRANULOCYTES # BLD AUTO: 0.05 X10(3)/MCL (ref 0–0.04)
IMM GRANULOCYTES NFR BLD AUTO: 0.3 %
LYMPHOCYTES # BLD AUTO: 12.87 X10(3)/MCL (ref 0.6–4.6)
LYMPHOCYTES NFR BLD AUTO: 69.8 %
MCH RBC QN AUTO: 23.7 PG (ref 27–31)
MCHC RBC AUTO-ENTMCNC: 29.8 G/DL (ref 33–36)
MCV RBC AUTO: 79.7 FL (ref 80–94)
MONOCYTES # BLD AUTO: 1.13 X10(3)/MCL (ref 0.1–1.3)
MONOCYTES NFR BLD AUTO: 6.1 %
NEUTROPHILS # BLD AUTO: 4.02 X10(3)/MCL (ref 2.1–9.2)
NEUTROPHILS NFR BLD AUTO: 21.7 %
NRBC BLD AUTO-RTO: 0 %
OHS QRS DURATION: 86 MS
OHS QTC CALCULATION: 422 MS
PLATELET # BLD AUTO: 268 X10(3)/MCL (ref 130–400)
PMV BLD AUTO: 9.3 FL (ref 7.4–10.4)
POTASSIUM SERPL-SCNC: 3.3 MMOL/L (ref 3.5–5.1)
RBC # BLD AUTO: 4.97 X10(6)/MCL (ref 4.7–6.1)
SODIUM SERPL-SCNC: 145 MMOL/L (ref 136–145)
WBC # BLD AUTO: 18.45 X10(3)/MCL (ref 4.5–11.5)

## 2025-03-08 PROCEDURE — 25000003 PHARM REV CODE 250: Performed by: INTERNAL MEDICINE

## 2025-03-08 PROCEDURE — 76937 US GUIDE VASCULAR ACCESS: CPT

## 2025-03-08 PROCEDURE — 02HV33Z INSERTION OF INFUSION DEVICE INTO SUPERIOR VENA CAVA, PERCUTANEOUS APPROACH: ICD-10-PCS | Performed by: INTERNAL MEDICINE

## 2025-03-08 PROCEDURE — 99900035 HC TECH TIME PER 15 MIN (STAT)

## 2025-03-08 PROCEDURE — 25000003 PHARM REV CODE 250: Performed by: EMERGENCY MEDICINE

## 2025-03-08 PROCEDURE — 80048 BASIC METABOLIC PNL TOTAL CA: CPT | Performed by: INTERNAL MEDICINE

## 2025-03-08 PROCEDURE — 36415 COLL VENOUS BLD VENIPUNCTURE: CPT | Performed by: INTERNAL MEDICINE

## 2025-03-08 PROCEDURE — 94761 N-INVAS EAR/PLS OXIMETRY MLT: CPT

## 2025-03-08 PROCEDURE — 63600175 PHARM REV CODE 636 W HCPCS: Performed by: INTERNAL MEDICINE

## 2025-03-08 PROCEDURE — 36410 VNPNXR 3YR/> PHY/QHP DX/THER: CPT

## 2025-03-08 PROCEDURE — 11000001 HC ACUTE MED/SURG PRIVATE ROOM

## 2025-03-08 PROCEDURE — C1751 CATH, INF, PER/CENT/MIDLINE: HCPCS

## 2025-03-08 PROCEDURE — 85025 COMPLETE CBC W/AUTO DIFF WBC: CPT | Performed by: INTERNAL MEDICINE

## 2025-03-08 RX ORDER — SODIUM CHLORIDE 0.9 % (FLUSH) 0.9 %
10 SYRINGE (ML) INJECTION EVERY 12 HOURS PRN
Status: DISCONTINUED | OUTPATIENT
Start: 2025-03-08 | End: 2025-03-09 | Stop reason: HOSPADM

## 2025-03-08 RX ADMIN — MUPIROCIN 1 G: 20 OINTMENT TOPICAL at 10:03

## 2025-03-08 RX ADMIN — AMIODARONE HYDROCHLORIDE 200 MG: 200 TABLET ORAL at 06:03

## 2025-03-08 RX ADMIN — METOPROLOL SUCCINATE 12.5 MG: 25 TABLET, EXTENDED RELEASE ORAL at 10:03

## 2025-03-08 RX ADMIN — PANTOPRAZOLE SODIUM 40 MG: 40 TABLET, DELAYED RELEASE ORAL at 10:03

## 2025-03-08 RX ADMIN — VENLAFAXINE HYDROCHLORIDE 75 MG: 37.5 CAPSULE, EXTENDED RELEASE ORAL at 10:03

## 2025-03-08 RX ADMIN — LISINOPRIL 5 MG: 5 TABLET ORAL at 10:03

## 2025-03-08 RX ADMIN — PRIMIDONE 150 MG: 50 TABLET ORAL at 12:03

## 2025-03-08 RX ADMIN — PRIMIDONE 150 MG: 50 TABLET ORAL at 10:03

## 2025-03-08 RX ADMIN — FINASTERIDE 5 MG: 5 TABLET, FILM COATED ORAL at 12:03

## 2025-03-08 RX ADMIN — ATORVASTATIN CALCIUM 40 MG: 40 TABLET, FILM COATED ORAL at 10:03

## 2025-03-08 RX ADMIN — CLOPIDOGREL BISULFATE 75 MG: 75 TABLET ORAL at 06:03

## 2025-03-08 RX ADMIN — FINASTERIDE 5 MG: 5 TABLET, FILM COATED ORAL at 10:03

## 2025-03-08 RX ADMIN — LEVETIRACETAM 1000 MG: 500 TABLET, FILM COATED ORAL at 12:03

## 2025-03-08 RX ADMIN — PHENYTOIN SODIUM 100 MG: 100 CAPSULE, EXTENDED RELEASE ORAL at 10:03

## 2025-03-08 RX ADMIN — LEVETIRACETAM 1000 MG: 500 TABLET, FILM COATED ORAL at 10:03

## 2025-03-08 RX ADMIN — FUROSEMIDE 40 MG: 40 TABLET ORAL at 06:03

## 2025-03-08 RX ADMIN — CIPROFLOXACIN 400 MG: 2 INJECTION, SOLUTION INTRAVENOUS at 10:03

## 2025-03-08 RX ADMIN — VENLAFAXINE HYDROCHLORIDE 75 MG: 37.5 CAPSULE, EXTENDED RELEASE ORAL at 12:03

## 2025-03-08 NOTE — H&P
[facility] - Emergency Dept    History & Physical      Patient Name: John Wayne  MRN: [id number]  Admission Date: 3/7/2025  Attending Physician: Mihir Prasad MD   Primary Care Provider: ISAC Schmidt MD (Inactive)         Patient information was obtained from patient and ER records.     Subjective:     Principal Problem:Acute UTI    Chief Complaint:   Chief Complaint   Patient presents with    Fall     BIBA from NH for frequent falls    appears weaker than normal   strong urine odor   fletcher in place          HPI: The patient is a 79-year-old man from the nursing home who presents with altered middle status, weakness and multiple falls at the nursing home. He does have evidence of urinary tract infection. 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 His viral serologist are negative. White blood cell count is 24.7. He does have an indwelling fully catheter and the tip has been sent for culture. He has an extensive allergy list, including penicillin allergy and he currently is on Ciprofloxacin.    Past Medical History:   Diagnosis Date    Anticoagulant long-term use     CHF (congestive heart failure) 10/2021    EF of 25-30% on ECHO    CLL (chronic lymphocytic leukemia)     COPD (chronic obstructive pulmonary disease)     Coronary artery disease     Depression     Difficult intubation     GERD (gastroesophageal reflux disease)     Hypertension     Mixed hyperlipidemia     PVD (peripheral vascular disease)     Seizures     Sleep apnea, unspecified     CPAP    TIA (transient ischemic attack)        Past Surgical History:   Procedure Laterality Date    CORONARY ANGIOPLASTY WITH STENT PLACEMENT  10/18/2016    pLAD, pLAD, dLAD, mCX,    INSERTION OF BARE METAL STENT INTO PERIPHERAL ARTERY  2018    B/L Illia Vessels    PERCUTANEOUS BALLOON  VALVULOPLASTY  04/04/2018       Review of patient's allergies indicates:   Allergen Reactions    Ancef in dextrose (iso-osm)     Codeine     Penicillins        No current facility-administered medications on file prior to encounter.     Current Outpatient Medications on File Prior to Encounter   Medication Sig    albuterol sulfate 2.5 mg/0.5 mL Nebu Take 2.5 mg by nebulization every 6 (six) hours as needed. Rescue    amiodarone (PACERONE) 200 MG Tab Take 200 mg by mouth every morning.    atorvastatin (LIPITOR) 40 MG tablet Take 40 mg by mouth every evening.    clopidogreL (PLAVIX) 75 mg tablet Take 1 tablet by mouth every morning.    ergocalciferol (ERGOCALCIFEROL) 50,000 unit Cap Take 50,000 Units by mouth every Monday. Every Monday and Thursdays    finasteride (PROSCAR) 5 mg tablet Take 1 tablet (5 mg total) by mouth once daily. (Patient taking differently: Take 5 mg by mouth every evening.)    furosemide (LASIX) 40 MG tablet Take 40 mg by mouth every morning.    levETIRAcetam (KEPPRA) 1000 MG tablet Take 1 tablet by mouth 2 (two) times a day.    lisinopriL (PRINIVIL,ZESTRIL) 5 MG tablet Take 1 tablet by mouth every evening.    loperamide (IMODIUM A-D) 2 mg Tab Take 2 mg by mouth every 24 hours as needed. Prn    metoprolol succinate (TOPROL-XL) 25 MG 24 hr tablet Take 0.5 tablets (12.5 mg total) by mouth 2 (two) times daily.    midazolam (NAYZILAM) 5 mg/spray (0.1 mL) Spry 1 spray by Left Nostril route daily as needed (seizure). To left nostril    pantoprazole (PROTONIX) 40 MG tablet Take 40 mg by mouth once daily.    PHENobarbitaL 32.4 MG tablet Take 3 tablets by mouth Daily.    phenytoin (DILANTIN) 100 MG ER capsule Take 100 mg by mouth 3 (three) times daily.    primidone (MYSOLINE) 50 MG Tab Take 3 tablets by mouth 2 (two) times a day.    tamsulosin (FLOMAX) 0.4 mg Cap Take 1 tablet by mouth nightly.    venlafaxine (EFFEXOR) 75 MG tablet Take 75 mg by mouth 2 (two) times daily.    warfarin (COUMADIN) 5 MG  tablet Take 5 mg by mouth Daily. Daily except tuesday    albuterol (PROVENTIL) 2.5 mg /3 mL (0.083 %) nebulizer solution Take 2.5 mg by nebulization every 6 (six) hours as needed for Shortness of Breath.    metroNIDAZOLE (FLAGYL) 500 MG tablet Take 1 tablet (500 mg total) by mouth 3 (three) times daily.    warfarin (COUMADIN) 7.5 MG tablet Take 7.5 mg by mouth every Tuesday. Tuesday afternoon     Family History       Problem Relation (Age of Onset)    Coronary artery disease Father    Heart attack Father    Hypertension Mother, Father          Tobacco Use    Smoking status: Former    Smokeless tobacco: Never   Substance and Sexual Activity    Alcohol use: Not Currently    Drug use: Never    Sexual activity: Not on file     Review of Systems Unable to obtain 2/2 pt condition    Social Drivers of Health     Tobacco Use: Medium Risk (3/7/2025)    Patient History     Smoking Tobacco Use: Former     Smokeless Tobacco Use: Never     Passive Exposure: Not on file   Alcohol Use: Not At Risk (6/19/2024)    AUDIT-C     Frequency of Alcohol Consumption: Never     Average Number of Drinks: Patient does not drink     Frequency of Binge Drinking: Never   Financial Resource Strain: Patient Declined (12/14/2024)    Overall Financial Resource Strain (CARDIA)     Difficulty of Paying Living Expenses: Patient declined   Food Insecurity: Patient Declined (12/14/2024)    Hunger Vital Sign     Worried About Running Out of Food in the Last Year: Patient declined     Ran Out of Food in the Last Year: Patient declined   Transportation Needs: Patient Declined (12/14/2024)    TRANSPORTATION NEEDS     Transportation : Patient declined   Physical Activity: Sufficiently Active (6/19/2024)    Exercise Vital Sign     Days of Exercise per Week: 5 days     Minutes of Exercise per Session: 30 min   Stress: Patient Declined (12/14/2024)    [employer] - Occupational Stress Questionnaire     Feeling of Stress : Patient  "declined   Housing Stability: Patient Declined (12/14/2024)    Housing Stability Vital Sign     Unable to Pay for Housing in the Last Year: Patient declined     Number of Times Moved in the Last Year: Not on file     Homeless in the Last Year: Patient declined   Depression: Not on file   Utilities: Patient Declined (12/14/2024)    [facility] Utilities     Threatened with loss of utilities: Patient declined   Health Literacy: Patient Unable To Answer (12/14/2024)     Health Literacy     Frequency of need for help with medical instructions: Patient unable to respond   Social Isolation: Moderately Integrated (6/19/2024)    Social Isolation     Social Isolation: 2       Objective:     Vital Signs (Most Recent):  Temp: 99.9 °F (37.7 °C) (03/07/25 1842)  Pulse: (!) 59 (03/07/25 2052)  Resp: 16 (03/07/25 1844)  BP: (!) 152/77 (03/07/25 2052)  SpO2: 98 % (03/07/25 2052) Vital Signs (24h Range):  Temp:  [99.9 °F (37.7 °C)] 99.9 °F (37.7 °C)  Pulse:  [57-62] 59  Resp:  [16] 16  SpO2:  [98 %-100 %] 98 %  BP: (144-162)/(57-77) 152/77     Weight: 106.6 kg (235 lb)  Body mass index is 33.72 kg/m².    Physical Exam   BP (!) 152/77   Pulse (!) 59   Temp 99.9 °F (37.7 °C)   Resp 16   Ht 5' 10" (1.778 m)   Wt 106.6 kg (235 lb)   SpO2 98%   BMI 33.72 kg/m²     General Appearance:  Alert, cooperative, no distress, appears stated age   Head:  Normocephalic, without obvious abnormality, atraumatic   Eyes:  PERRL, conjunctiva/corneas clear, EOM's intact, fundi benign, both eyes   Ears:  Normal TM's and external ear canals, both ears   Nose: Nares normal, septum midline, mucosa normal, no drainage or sinus tenderness   Throat: Lips, mucosa, and tongue normal; teeth and gums normal   Neck: Supple, symmetrical, trachea midline, no adenopathy, thyroid: not enlarged, symmetric, no tenderness/mass/nodules, no carotid bruit or JVD   Back:   Symmetric, no curvature, ROM normal, no CVA tenderness   Lungs:   Clear to auscultation bilaterally, " respirations unlabored   Chest Wall:  No tenderness or deformity   Heart:  Regular rate and rhythm, S1, S2 normal, no murmur, rub or gallop   Abdomen:   Soft, non-tender, bowel sounds active all four quadrants,  no masses, no organomegaly   Genitalia:  Normal male   Rectal:  Normal tone, normal prostate, no masses or tenderness;  guaiac negative stool   Extremities: Extremities normal, atraumatic, no cyanosis or edema   Pulses: 2+ and symmetric   Skin: Skin color, texture, turgor normal, no rashes or lesions   Lymph nodes: Cervical, supraclavicular, and axillary nodes normal   Neurologic: Normal         Significant Labs: All pertinent labs within the past 24 hours have been reviewed.  Recent Lab Results         03/07/25 2042 03/07/25 1959 03/07/25 1946 03/07/25  1255        RSV Ag by Molecular Method     Not Detected         Influenza A, Molecular     Not Detected         Influenza B, Molecular     Not Detected         Phencyclidine Negative             Albumin/Globulin Ratio   0.7           Albumin   3.9           ALP   136           ALT   19           Amphetamines, Urine Negative             Amporphous Urate Crystals, UA Few             Anion Gap   13.0           Appearance, UA Cloudy             AST   20           Bacteria, UA Many             Barbituates, Urine Positive             Baso #   0.15           Basophil %   0.6           Benzodiazepine, Urine Negative             Bilirubin (UA) Negative             BILIRUBIN TOTAL   0.2           BUN   18.0           BUN/CREAT RATIO   14           Calcium   10.7           Cannabinoids, Urine Negative             Chloride   108           CO2   24           Cocaine, Urine Negative             Color, UA Yellow             Creatinine   1.28           D-Dimer   <0.27  Comment: D-dimer values of less than 0.5ug/mL (mg/L) FEU in adult patients with a clinically low pre-test probability of developing a DVT yield  a 99% negative predictive value.  D-dimer increases  naturally with age, therefore the negative predictive value in patients older than 80 is 21 - 31%.    D-Dimer testing is used as an aid in the diagnosis of VTE/PE. The D-Dimer test must be used with one or more additional tests such as imaging studies to evaluate VTE/PE           eGFR   57  Comment: Estimated GFR calculated using the CKD-EPI creatinine (2021) equation.           Eos #   0.20           Eos %   0.8           Fentanyl, Urine Negative             Globulin, Total   5.4           Glucose   138           Glucose, UA Negative             Hematocrit   47.5           Hemoglobin   14.0           Immature Grans (Abs)   0.08           Immature Granulocytes   0.3           Ketones, UA Negative             Lactic Acid Level   1.3           Leukocyte Esterase, UA Trace             Lymph #   16.65           LYMPH %   67.3           MCH   23.5           MCHC   29.5           MCV   79.8           MDMA, Urine Negative             Mono #   1.27           Mono %   5.1           MPV   9.6           Mucous, UA Trace             Neut #   6.39           Neut %   25.9           NITRITE UA Positive             nRBC   0.0           Blood, UA 3+             Opiates, Urine Negative             pH, UA 5.5             pH, Urine 5.5             Phenobarbital       48.9       Phenytoin Level Total       22.1       Platelet Count   395           Potassium   4.3           PROTEIN TOTAL   9.3           Protein, UA 3+             RBC   5.95           RBC, UA 3-5             RDW   17.6           SARS-CoV2 (COVID-19) Qualitative PCR     Not Detected         Sodium   145           Specific Gravity,UA >=1.030             Specific Gravity, Urine Auto >=1.030             Squam Epithel, UA Few             Troponin I   0.048           Urobilinogen, UA 0.2             WBC, UA 21-50             WBC   24.74                   Significant Imaging: I have reviewed all pertinent imaging results/findings within the past 24 hours.  I have reviewed and  interpreted all pertinent imaging results/findings within the past 24 hours.    No results found in the last 48 hours.       Assessment/Plan:     Active Diagnoses:    Diagnosis Date Noted POA    PRINCIPAL PROBLEM:  Acute UTI [N39.0] 03/07/2025 Unknown    ALTAGRACIA (acute kidney injury) [N17.9] 03/07/2025 Unknown    Atrial fibrillation [I48.91] 12/08/2022 Yes    CHF (congestive heart failure) [I50.9] 12/08/2022 Yes    COPD (chronic obstructive pulmonary disease) [J44.9] 12/08/2022 Yes    Depression [F32.A] 12/08/2022 Yes    HTN (hypertension) [I10] 12/08/2022 Yes     * Seizures [R56.9]  Supratherapeutic INR 10/06/2022 Yes     - Continue IV Ciprofloxacin  - Follow urine cx and tailor abx therapy as warranted  - Pain, nausea control, IVF ongoing  - Follow daily INR  - Pt has not been taking several medications including Plavix and reportedly his coumadin  - Restart coumadin once INR < 3  - Check CXR  - PRN duonebs  - Seizure precautions    This encounter was completed via telemedicine (audio/video) w/ nursing at bedside ot assist w/ clinical exam.  SOC Audio/Visual Equipment is using HIPPA Compliant Web Platform. The patient is located in the hospital and the provider is located off site. This patient is placed in inpatient status under my care.       Participants on Call: Bedside RN, Patient, Physician on Call.   Problems Resolved During this Admission:     VTE Risk Mitigation (From admission, onward)           Ordered     warfarin (COUMADIN) tablet 7.5 mg  Every Tuesday 03/07/25 2333     warfarin (COUMADIN) tablet 5 mg  Daily         03/07/25 2333     IP VTE HIGH RISK PATIENT  Once         03/07/25 2333     Place sequential compression device  Until discontinued         03/07/25 2333                      Conner Roche MD  Department of Hospital Medicine   [facility] - Emergency Dept

## 2025-03-08 NOTE — ED PROVIDER NOTES
"Encounter Date: 3/7/2025       History     Chief Complaint   Patient presents with    Fall     BIBA from NH for frequent falls    appears weaker than normal   strong urine odor   fletcher in place       This is a 79-year-old male (a history of long-term anticoagulation use, congestive heart failure (ejection fraction of 25-30% on echo), CLL, COPD, coronary artery disease, depression, urinary retention with Fletcher catheter in place, and epilepsy) referred to us from a local nursing care facility for a steady decrease in overall strength, and activity level over the last 2-3 days.  Nursing home says that he is generally weak, and "keeps falling."  No history of hallucinations but some intermittent confusion is reported.  It is also reported that his urine is stronger smelling than usual.  No vomiting, diarrhea, melena or hematochezia reported.        Review of patient's allergies indicates:   Allergen Reactions    Ancef in dextrose (iso-osm)     Codeine     Penicillins      Past Medical History:   Diagnosis Date    Anticoagulant long-term use     CHF (congestive heart failure) 10/2021    EF of 25-30% on ECHO    CLL (chronic lymphocytic leukemia)     COPD (chronic obstructive pulmonary disease)     Coronary artery disease     Depression     Difficult intubation     GERD (gastroesophageal reflux disease)     Hypertension     Mixed hyperlipidemia     PVD (peripheral vascular disease)     Seizures     Sleep apnea, unspecified     CPAP    TIA (transient ischemic attack)      Past Surgical History:   Procedure Laterality Date    CORONARY ANGIOPLASTY WITH STENT PLACEMENT  10/18/2016    pLAD, pLAD, dLAD, mCX,    INSERTION OF BARE METAL STENT INTO PERIPHERAL ARTERY  2018    B/L [place] Vessels    PERCUTANEOUS BALLOON VALVULOPLASTY  04/04/2018     Family History   Problem Relation Name Age of Onset    Hypertension Mother      Hypertension Father      Coronary artery disease Father      Heart attack Father       Social " History[1]  Review of Systems   Constitutional:  Positive for fatigue. Negative for chills, diaphoresis and fever.   Eyes:  Negative for visual disturbance.   Respiratory:  Negative for shortness of breath and wheezing.    Cardiovascular:  Negative for chest pain and palpitations.   Gastrointestinal:  Negative for abdominal distention, abdominal pain, diarrhea, nausea and vomiting.   Endocrine: Negative for polydipsia, polyphagia and polyuria.   Genitourinary:  Negative for decreased urine volume, dysuria, flank pain, hematuria and testicular pain.   Musculoskeletal:  Negative for back pain.   Skin:  Negative for wound.   Allergic/Immunologic: Positive for immunocompromised state (Chronic lymphocytic leukemia).   Neurological:  Negative for headaches.   Hematological:  Does not bruise/bleed easily.   All other systems reviewed and are negative.      Physical Exam     Initial Vitals   BP Pulse Resp Temp SpO2   03/07/25 1842 03/07/25 1842 03/07/25 1844 03/07/25 1842 03/07/25 1842   (!) 144/57 62 16 99.9 °F (37.7 °C) 98 %      MAP       --                Physical Exam    Constitutional: Vital signs are normal. He appears well-developed and well-nourished. He is active and cooperative.  Non-toxic appearance. He does not have a sickly appearance. He does not appear ill. No distress.   HENT:   Head: Normocephalic and atraumatic.   Nose: Nose normal. Mouth/Throat: Oropharynx is clear and moist.   Eyes: Conjunctivae and EOM are normal. Pupils are equal, round, and reactive to light. No scleral icterus.   Neck: Neck supple. No thyromegaly present.   Normal range of motion.  Cardiovascular:  Normal rate, regular rhythm and intact distal pulses.           No murmur heard.  Pulmonary/Chest: He has rhonchi.   Good ventilatory effort with good air exchange noted.       Abdominal: Abdomen is soft. Bowel sounds are normal. He exhibits no mass. There is no abdominal tenderness.   Musculoskeletal:         General: No tenderness or  edema. Normal range of motion.      Cervical back: Normal range of motion and neck supple.     Lymphadenopathy:     He has no cervical adenopathy.   Neurological: He is alert and oriented to person, place, and time. He has normal strength and normal reflexes. He displays normal reflexes. No cranial nerve deficit or sensory deficit.   Skin: Skin is warm and dry. Capillary refill takes less than 2 seconds.   Psychiatric: He has a normal mood and affect. His behavior is normal. Thought content normal.         ED Course   Procedures  Labs Reviewed   COMPREHENSIVE METABOLIC PANEL - Abnormal       Result Value    Sodium 145      Potassium 4.3      Chloride 108 (*)     CO2 24      Glucose 138 (*)     Blood Urea Nitrogen 18.0      Creatinine 1.28 (*)     Calcium 10.7 (*)     Protein Total 9.3 (*)     Albumin 3.9      Globulin 5.4 (*)     Albumin/Globulin Ratio 0.7 (*)     Bilirubin Total 0.2            ALT 19      AST 20      eGFR 57      Anion Gap 13.0      BUN/Creatinine Ratio 14     DRUG SCREEN, URINE (BEAKER) - Abnormal    Amphetamines, Urine Negative      Barbiturates, Urine Positive (*)     Benzodiazepine, Urine Negative      Cannabinoids, Urine Negative      Cocaine, Urine Negative      Fentanyl, Urine Negative      MDMA, Urine Negative      Opiates, Urine Negative      Phencyclidine, Urine Negative      pH, Urine 5.5      Specific Gravity, Urine Auto >=1.030      Narrative:     Cut off concentrations:    Amphetamines - 1000 ng/ml  Barbiturates - 200 ng/ml  Benzodiazepine - 200 ng/ml  Cannabinoids (THC) - 50 ng/ml  Cocaine - 300 ng/ml  Fentanyl - 1.0 ng/ml  MDMA - 500 ng/ml  Opiates - 300 ng/ml   Phencyclidine (PCP) - 25 ng/ml    Specimen submitted for drug analysis and tested for pH and specific gravity in order to evaluate sample integrity. Suspect tampering if specific gravity is <1.003 and/or pH is not within the range of 4.5 - 8.0  False negatives may result form substances such as bleach added to  urine.  False positives may result for the presence of a substance with similar chemical structure to the drug or its metabolite.    This test provides only a PRELIMINARY analytical test result. A more specific alternate chemical method must be used in order to obtain a confirmed analytical result. Gas chromatography/mass spectrometry (GC/MS) is the preferred confirmatory method. Other chemical confirmation methods are available. Clinical consideration and professional judgement should be applied to any drug of abuse test result, particularly when preliminary positive results are used.    Positive results will be confirmed only at the physicians request. Unconfirmed screening results are to be used only for medical purposes (treatment).        TROPONIN I - Abnormal    Troponin-I 0.048 (*)    URINALYSIS, REFLEX TO URINE CULTURE - Abnormal    Color, UA Yellow      Appearance, UA Cloudy (*)     Specific Gravity, UA >=1.030      pH, UA 5.5      Protein, UA 3+ (*)     Glucose, UA Negative      Ketones, UA Negative      Blood, UA 3+ (*)     Bilirubin, UA Negative      Urobilinogen, UA 0.2      Nitrites, UA Positive (*)     Leukocyte Esterase, UA Trace (*)    CBC WITH DIFFERENTIAL - Abnormal    WBC 24.74 (*)     RBC 5.95      Hgb 14.0      Hct 47.5      MCV 79.8 (*)     MCH 23.5 (*)     MCHC 29.5 (*)     RDW 17.6 (*)     Platelet 395      MPV 9.6      Neut % 25.9      Lymph % 67.3      Mono % 5.1      Eos % 0.8      Basophil % 0.6      Imm Grans % 0.3      Neut # 6.39      Lymph # 16.65 (*)     Mono # 1.27      Eos # 0.20      Baso # 0.15      Imm Gran # 0.08 (*)     NRBC% 0.0     URINALYSIS, MICROSCOPIC - Abnormal    Bacteria, UA Many (*)     Mucous, UA Trace (*)     Amporphous Urate Crystals, UA Few (*)     RBC, UA 3-5      WBC, UA 21-50 (*)     Squamous Epithelial Cells, UA Few (*)    PROTIME-INR - Abnormal    PT 40.8 (*)     INR 4.0 (*)     Narrative:     Protimes are used to monitor anticoagulant agents such as  warfarin. PT INR values are based on the current patient normal mean and the MATTHIAS value for the specific instrument reagent used.  **Routine theraputic target values for the INR are 2.0-3.0**   COVID/RSV/FLU A&B PCR - Normal    Influenza A PCR Not Detected      Influenza B PCR Not Detected      Respiratory Syncytial Virus PCR Not Detected      SARS-CoV-2 PCR Not Detected      Narrative:     The Xpert Xpress SARS-CoV-2/FLU/RSV plus is a rapid, multiplexed real-time PCR test intended for the simultaneous qualitative detection and differentiation of SARS-CoV-2, Influenza A, Influenza B, and respiratory syncytial virus (RSV) viral RNA in either nasopharyngeal swab or nasal swab specimens.         D DIMER, QUANTITATIVE - Normal    D-Dimer <0.27     LACTIC ACID, PLASMA - Normal    Lactic Acid Level 1.3     BLOOD CULTURE [facility]   BLOOD CULTURE [facility]   CULTURE, URINE   CBC W/ AUTO DIFFERENTIAL    Narrative:     The following orders were created for panel order CBC auto differential.  Procedure                               Abnormality         Status                     ---------                               -----------         ------                     CBC with Differential[[id number]]       Abnormal            Final result                 Please view results for these tests on the individual orders.        ECG Results              EKG 12-lead (In process)        Collection Time Result Time QRS Duration [facility] QTC Calculation    03/07/25 19:22:47 03/07/25 23:34:31 86 422                     In process by Interface, Lab In [facility] (03/07/25 23:34:35)                   Narrative:    Test Reason : R53.1,    Vent. Rate :  60 BPM     Atrial Rate :  60 BPM     P-R Int : 236 ms          QRS Dur :  86 ms      QT Int : 422 ms       P-R-T Axes :  73 -11  60 degrees    QTcB Int : 422 ms    Sinus rhythm with 1st degree A-V block  Low voltage QRS  Cannot rule out Anterior infarct (cited on or before 24-Dec-2024)  Abnormal ECG  When compared  with ECG of 24-Dec-2024 09:06,  CA interval has increased  Questionable change in initial forces of Septal leads    Referred By: AAAREFERRAL SELF           Confirmed By:                                   Imaging Results              X-Ray Chest 1 View (In process)                      CT Head Without Contrast (Preliminary result)  Result time 03/07/25 21:13:12      Wet Read by Mihir Prasad MD (03/07/25 21:13:12, [facility] - Emergency Dept, Emergency Medicine)    EDMD Interpretation:    I evaluated the studies ordered in the emergency department primarily as well as reviewing the final radiologist's interpretation.  No acute, intracranial abnormalities noted.    -ktj-                      Preliminary result by Boo Smith MD (03/07/25 20:48:29)                   Narrative:    START OF REPORT:  Technique: CT of the head was performed without intravenous contrast with axial as well as coronal and sagittal images.    Comparison: Comparison is with study dated 2024-12-24 10:58:17.    Dosage Information: Automated Exposure Control was utilized 989.46 mGy.cm.    Clinical history: Altered mental status.    Findings:  Hemorrhage: No acute intracranial hemorrhage is seen.  CSF spaces: The ventricles, sulci and basal cisterns all appear moderately prominent consistent with global cerebral atrophy.  Brain parenchyma: There is preservation of the grey white junction throughout. Moderate stable appearing scattered microvascular change is seen in portions of the periventricular and deep white matter tracts.  Cerebellum: There is stable moderate cerebral volume loss seen.  Vascular: Unremarkable venous sinuses. Severe stable appearing atheromatous calcification of the intracranial arteries is seen.  Sella and skull base: The sella appears to be within normal limits for age.  Intracranial calcifications: Incidental note is made of bilateral choroid plexus calcification. Incidental note is made of some pineal  region calcification.  Calvarium: No acute linear or depressed skull fracture is seen.    Maxillofacial Structures:  Orbits: The orbits appear unremarkable.  Zygomatic arches: The zygomatic arches are intact and unremarkable.  Temporal bones and mastoids: The temporal bones and mastoids appear unremarkable.  TMJ: The mandibular condyles appear normally placed with respect to the mandibular fossa.    Visualized upper cervical spine: The visualized cervical spine appears unremarkable.      Impression:  1. No acute intracranial process identified. Details and findings as noted above.                                         Medications   sodium chloride 0.9% flush 10 mL (has no administration in time range)   melatonin tablet 6 mg (has no administration in time range)   mupirocin 2 % ointment (has no administration in time range)   amiodarone tablet 200 mg (has no administration in time range)   atorvastatin tablet 40 mg (has no administration in time range)   clopidogreL tablet 75 mg (has no administration in time range)   finasteride tablet 5 mg (5 mg Oral Given 3/8/25 0014)   furosemide tablet 40 mg (has no administration in time range)   levETIRAcetam tablet 1,000 mg (1,000 mg Oral Given 3/8/25 0014)   lisinopriL tablet 5 mg (has no administration in time range)   metoprolol succinate (TOPROL-XL) 24 hr split tablet 12.5 mg (has no administration in time range)   pantoprazole EC tablet 40 mg (has no administration in time range)   PHENobarbitaL tablet 97.2 mg (has no administration in time range)   phenytoin (DILANTIN) ER capsule 100 mg (has no administration in time range)   primidone tablet 150 mg (150 mg Oral Given 3/8/25 0013)   venlafaxine 24 hr capsule 75 mg (75 mg Oral Given 3/8/25 0013)   ciprofloxacin (CIPRO)400mg/200ml D5W IVPB 400 mg (has no administration in time range)   albuterol-ipratropium 2.5 mg-0.5 mg/3 mL nebulizer solution 3 mL (has no administration in time range)   ondansetron injection 4 mg  "(has no administration in time range)   ciprofloxacin (CIPRO)400mg/200ml D5W IVPB 400 mg (0 mg Intravenous Stopped 3/7/25 2202)   lactated ringers bolus 500 mL (500 mLs Intravenous New Bag 3/7/25 2141)   ciprofloxacin HCl tablet 500 mg (500 mg Oral Given 3/7/25 2318)     Medical Decision Making  Amount and/or Complexity of Data Reviewed  Labs: ordered. Decision-making details documented in ED Course.  Radiology: ordered and independent interpretation performed.    Risk  OTC drugs.  Prescription drug management.  Decision regarding hospitalization.               ED Course as of 03/08/25 0020   Fri Mar 07, 2025   1852 BP(!): 144/57 [KJ]   2020 WBC(!): 24.74 [KJ]   2020 Lactic Acid Level: 1.3 [KJ]   2020 Lactic Acid Level: 1.3 [KJ]   2224 Creatinine(!): 1.28 [KJ]   2230 poke with Dr Kurtz: agrees to accept on service.  Issue with IV access after initial 20g jelco "blew."  We both agree that access is needed on this gentleman; however, I'm hesitant to place a central line wikth attempting a MID or PICC line first. Order placed.  [KJ]   Sat Mar 08, 2025   0019 poke with Dr Kurtz: agrees to accept on service.  Issue with IV access after initial 20g jelco "blew."  We both agree that access is needed on this gentleman; however, I'm hesitant to place a central line wikth attempting a MID or PICC line first. Order placed.  [KJ]      ED Course User Index  [KJ] Mihir Prasad MD                           Clinical Impression:  Final diagnoses:  [R53.1] Weakness generalized  [R41.82] Altered mental status, unspecified  [T83.511A, N39.0] Urinary tract infection associated with indwelling urethral catheter, initial encounter (Primary)  [E86.9] Volume depletion, unspecified          ED Disposition Condition    Admit Stable                    [1]   Social History  Tobacco Use    Smoking status: Former    Smokeless tobacco: Never   Substance Use Topics    Alcohol use: Not Currently    Drug use: Never        Mihir Prasad, " MD  03/08/25 0020

## 2025-03-08 NOTE — PROGRESS NOTES
Hospital Medicine Progress Note        Chief Complaint: Inpatient Follow-up for     HPI:   The patient is a 79-year-old man from the nursing home who presents with altered middle status, weakness and multiple falls at the nursing home. He does have evidence of urinary tract infection. 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 His viral serologist are negative. White blood cell count is 24.7. He does have an indwelling fully catheter and the tip has been sent for culture. He has an extensive allergy list, including penicillin allergy and he currently is on Ciprofloxacin.     March 8, 2025-UTI was treated with IV Cipro, WBC improved to 18 from 24, the patient is still lethargic, creatinine improved 0.9 from 1.2    Case was discussed with patient's nurse and  on the floor.    Objective/physical exam:  General: In no acute distress, afebrile  Chest: Clear to auscultation bilaterally  Heart: RRR, +S1, S2, no appreciable murmur  Abdomen: Soft, nontender, BS +  MSK: Warm, no lower extremity edema, no clubbing or cyanosis  Neurologic:  Lethargic    VITAL SIGNS: 24 HRS MIN & MAX LAST   Temp  Min: 97.4 °F (36.3 °C)  Max: 99.9 °F (37.7 °C) 97.4 °F (36.3 °C)   BP  Min: 94/53  Max: 162/72 (!) 119/55   Pulse  Min: 52  Max: 62  (!) 58   Resp  Min: 16  Max: 20 20   SpO2  Min: 97 %  Max: 100 % 97 %     I have reviewed the following labs:  Recent Labs   Lab 03/07/25 1959 03/08/25  0404   WBC 24.74* 18.45*   RBC 5.95 4.97   HGB 14.0 11.8*   HCT 47.5 39.6*   MCV 79.8* 79.7*   MCH 23.5* 23.7*   MCHC 29.5* 29.8*   RDW 17.6* 16.8    268   MPV 9.6 9.3     Recent Labs   Lab 03/07/25 1959 03/08/25  0404    145   K 4.3 3.3*   * 108*   CO2 24 29   BUN 18.0 16.0   CREATININE 1.28* 0.96   CALCIUM 10.7* 9.5   ALBUMIN 3.9  --    ALKPHOS 136  --    ALT  19  --    AST 20  --    BILITOT 0.2  --      Microbiology Results (last 7 days)       Procedure Component Value Units Date/Time    Urine culture [[id number]] Collected: 03/07/25 2042    Order Status: Sent Specimen: Urine Updated: 03/07/25 2056    Blood Culture #1 **CANNOT BE ORDERED STAT** [[id number]] Collected: 03/07/25 2020    Order Status: Sent Specimen: Blood Updated: 03/07/25 2020    Blood Culture #2 **CANNOT BE ORDERED STAT** [[id number]] Collected: 03/07/25 2020    Order Status: Sent Specimen: Blood Updated: 03/07/25 2020             See below for Radiology    Assessment/Plan:    Altered mental status secondary to UTI  Acute UTI   Acute renal failure--resolved   Atrial fibrillation   Chronic CHF-ejection fraction 25%   Chronic COPD   Depression   Hypertension   Seizure disorder  Supratherapeutic INR     Continue IV Cipro   Check urine culture   Continue amiodarone and metoprolol   Continue Lasix 40 mg p.o. q.d.  Check CBC BMP in a.m.    VTE prophylaxis:     Patient condition:  Stable/Fair/Guarded/ Serious/ Critical    Anticipated discharge and Disposition:         All diagnosis and differential diagnosis have been reviewed; assessment and plan has been documented; I have personally reviewed the labs and test results that are presently available; I have reviewed the patients medication list; I have reviewed the consulting providers response and recommendations. I have reviewed or attempted to review medical records based upon their availability    All of the patient's questions have been  addressed and answered. Patient's is agreeable to the above stated plan. I will continue to monitor closely and make adjustments to medical management as needed.    Portions of this note dictated using EMR integrated voice recognition software, and may be subject to voice recognition errors not corrected at proofreading. Please contact writer for clarification if needed.    _____________________________________________________________________    Malnutrition Status:  Nutrition consulted. Most recent weight and BMI monitored-     Measurements:  Wt Readings from Last 1 Encounters:   03/08/25 106.6 kg (235 lb)   Body mass index is 33.72 kg/m².    Patient has been screened and assessed by RD.    Malnutrition Type:  Context:    Level:      Malnutrition Characteristic Summary:       Interventions/Recommendations (treatment strategy):        Scheduled Med:   amiodarone  200 mg Oral QAM    atorvastatin  40 mg Oral QHS    ciprofloxacin  400 mg Intravenous Q12H    clopidogreL  75 mg Oral QAM    finasteride  5 mg Oral QHS    furosemide  40 mg Oral QAM    levETIRAcetam  1,000 mg Oral BID    lisinopriL  5 mg Oral QHS    metoprolol succinate  12.5 mg Oral BID    mupirocin   Nasal BID    pantoprazole  40 mg Oral Daily    PHENobarbitaL  97.2 mg Oral Daily    phenytoin  100 mg Oral TID    primidone  150 mg Oral BID    venlafaxine  75 mg Oral BID      Continuous Infusions:     PRN Meds:    Current Facility-Administered Medications:     albuterol-ipratropium, 3 mL, Nebulization, Q4H PRN    melatonin, 6 mg, Oral, Nightly PRN    ondansetron, 4 mg, Intravenous, Q6H PRN    sodium chloride 0.9%, 10 mL, Intravenous, PRN    Flushing PICC/Midline Protocol, , , Until Discontinued **AND** sodium chloride 0.9%, 10 mL, Intravenous, Q12H PRN     Radiology:  I have personally reviewed the following imaging and agree with the radiologist.     X-Ray Chest 1 View  Narrative: EXAMINATION:  XR CHEST 1 VIEW    CLINICAL HISTORY:  Generalized weakness, altered mental status.    TECHNIQUE:  1 view of the chest.    COMPARISON:  12/24/2024    FINDINGS:  The lungs demonstrate no evidence of lobar type consolidation, visible pneumothorax, or pleural fluid.  Vascular calcifications are present.  The cardiac silhouette is stable.    No acute displaced fracture is seen.  Impression: 1. No evidence of lobar type consolidation or  acute cardiac decompensation noted.    Electronically signed by: Darien Kumar MD  Date:    03/08/2025  Time:    08:54  CT Head Without Contrast  Narrative: EXAMINATION:  CT HEAD WITHOUT CONTRAST    CLINICAL HISTORY:  Frequent falls.  Altered mental status.    TECHNIQUE:  Axial CT images were obtained of the brain without intravenous contrast.  Coronal and sagittal reformations were obtained.  Automated exposure control utilized to reduce radiation dose.  Total exam DLP is 989 mGy cm.    COMPARISON:  12/24/2024    FINDINGS:  Gray-white matter differentiation is within normal limits. There is chronic involutional change.  There is chronic white matter microischemic change.  There is intracranial atherosclerosis.  No acute intracranial hemorrhage, extra-axial fluid collection, hydrocephalus, mass effect, or midline shift is noted.  No large vessel territory acute ischemia is identified.  Visualized paranasal sinuses are clear.  Visualized mastoid air cells are clear.  No acute displaced calvarial fracture is identified.  Impression: 1. No acute intracranial abnormalities identified.  2. [employer] concordance.    Electronically signed by: Darien Kumar MD  Date:    03/08/2025  Time:    08:14      Emily Brennan MD  Department of Hospital Medicine   [facility]   03/08/2025

## 2025-03-09 VITALS
DIASTOLIC BLOOD PRESSURE: 68 MMHG | RESPIRATION RATE: 20 BRPM | WEIGHT: 235 LBS | SYSTOLIC BLOOD PRESSURE: 147 MMHG | TEMPERATURE: 98 F | OXYGEN SATURATION: 94 % | HEIGHT: 70 IN | HEART RATE: 54 BPM | BODY MASS INDEX: 33.64 KG/M2

## 2025-03-09 LAB
ANION GAP SERPL CALC-SCNC: 10 MEQ/L
BASOPHILS # BLD AUTO: 0.11 X10(3)/MCL
BASOPHILS NFR BLD AUTO: 0.6 %
BUN SERPL-MCNC: 16 MG/DL (ref 8.4–25.7)
CALCIUM SERPL-MCNC: 10 MG/DL (ref 8.8–10)
CHLORIDE SERPL-SCNC: 105 MMOL/L (ref 98–107)
CO2 SERPL-SCNC: 27 MMOL/L (ref 23–31)
CREAT SERPL-MCNC: 0.84 MG/DL (ref 0.72–1.25)
CREAT/UREA NIT SERPL: 19
EOSINOPHIL # BLD AUTO: 0.27 X10(3)/MCL (ref 0–0.9)
EOSINOPHIL NFR BLD AUTO: 1.5 %
ERYTHROCYTE [DISTWIDTH] IN BLOOD BY AUTOMATED COUNT: 16.8 % (ref 11.5–17)
GFR SERPLBLD CREATININE-BSD FMLA CKD-EPI: >60 ML/MIN/1.73/M2
GLUCOSE SERPL-MCNC: 97 MG/DL (ref 82–115)
HCT VFR BLD AUTO: 43.5 % (ref 42–52)
HGB BLD-MCNC: 12.8 G/DL (ref 14–18)
IMM GRANULOCYTES # BLD AUTO: 0.07 X10(3)/MCL (ref 0–0.04)
IMM GRANULOCYTES NFR BLD AUTO: 0.4 %
LYMPHOCYTES # BLD AUTO: 12.88 X10(3)/MCL (ref 0.6–4.6)
LYMPHOCYTES NFR BLD AUTO: 69.3 %
MCH RBC QN AUTO: 23.2 PG (ref 27–31)
MCHC RBC AUTO-ENTMCNC: 29.4 G/DL (ref 33–36)
MCV RBC AUTO: 78.9 FL (ref 80–94)
MONOCYTES # BLD AUTO: 0.93 X10(3)/MCL (ref 0.1–1.3)
MONOCYTES NFR BLD AUTO: 5 %
NEUTROPHILS # BLD AUTO: 4.32 X10(3)/MCL (ref 2.1–9.2)
NEUTROPHILS NFR BLD AUTO: 23.2 %
NRBC BLD AUTO-RTO: 0 %
PHENOBARB SERPL-MCNC: 43.3 UG/ML (ref 15–40)
PHENYTOIN SERPL-MCNC: 16.2 UG/ML (ref 10–20)
PLATELET # BLD AUTO: 278 X10(3)/MCL (ref 130–400)
PMV BLD AUTO: 9.6 FL (ref 7.4–10.4)
POTASSIUM SERPL-SCNC: 3.7 MMOL/L (ref 3.5–5.1)
RBC # BLD AUTO: 5.51 X10(6)/MCL (ref 4.7–6.1)
SODIUM SERPL-SCNC: 142 MMOL/L (ref 136–145)
WBC # BLD AUTO: 18.58 X10(3)/MCL (ref 4.5–11.5)

## 2025-03-09 PROCEDURE — 63600175 PHARM REV CODE 636 W HCPCS: Performed by: INTERNAL MEDICINE

## 2025-03-09 PROCEDURE — 36415 COLL VENOUS BLD VENIPUNCTURE: CPT | Performed by: INTERNAL MEDICINE

## 2025-03-09 PROCEDURE — 94761 N-INVAS EAR/PLS OXIMETRY MLT: CPT

## 2025-03-09 PROCEDURE — 85025 COMPLETE CBC W/AUTO DIFF WBC: CPT | Performed by: INTERNAL MEDICINE

## 2025-03-09 PROCEDURE — 80185 ASSAY OF PHENYTOIN TOTAL: CPT | Performed by: INTERNAL MEDICINE

## 2025-03-09 PROCEDURE — 25000003 PHARM REV CODE 250: Performed by: INTERNAL MEDICINE

## 2025-03-09 PROCEDURE — 80184 ASSAY OF PHENOBARBITAL: CPT | Performed by: INTERNAL MEDICINE

## 2025-03-09 PROCEDURE — 27000221 HC OXYGEN, UP TO 24 HOURS

## 2025-03-09 PROCEDURE — 80048 BASIC METABOLIC PNL TOTAL CA: CPT | Performed by: INTERNAL MEDICINE

## 2025-03-09 PROCEDURE — 25000003 PHARM REV CODE 250: Performed by: EMERGENCY MEDICINE

## 2025-03-09 RX ORDER — WARFARIN SODIUM 5 MG/1
5 TABLET ORAL DAILY
Qty: 30 TABLET | Refills: 0 | Status: SHIPPED | OUTPATIENT
Start: 2025-03-09 | End: 2025-04-08

## 2025-03-09 RX ORDER — PHENOBARBITAL 32.4 MG/1
64.8 TABLET ORAL DAILY
Qty: 60 TABLET | Refills: 3 | Status: SHIPPED | OUTPATIENT
Start: 2025-03-09

## 2025-03-09 RX ORDER — PHENYTOIN SODIUM 100 MG/1
100 CAPSULE, EXTENDED RELEASE ORAL 2 TIMES DAILY
Qty: 60 CAPSULE | Refills: 11 | Status: SHIPPED | OUTPATIENT
Start: 2025-03-09

## 2025-03-09 RX ORDER — CIPROFLOXACIN 500 MG/1
500 TABLET ORAL EVERY 12 HOURS
Qty: 14 TABLET | Refills: 0 | Status: SHIPPED | OUTPATIENT
Start: 2025-03-09

## 2025-03-09 RX ADMIN — FUROSEMIDE 40 MG: 40 TABLET ORAL at 06:03

## 2025-03-09 RX ADMIN — PRIMIDONE 150 MG: 50 TABLET ORAL at 09:03

## 2025-03-09 RX ADMIN — PANTOPRAZOLE SODIUM 40 MG: 40 TABLET, DELAYED RELEASE ORAL at 09:03

## 2025-03-09 RX ADMIN — AMIODARONE HYDROCHLORIDE 200 MG: 200 TABLET ORAL at 06:03

## 2025-03-09 RX ADMIN — CLOPIDOGREL BISULFATE 75 MG: 75 TABLET ORAL at 06:03

## 2025-03-09 RX ADMIN — CIPROFLOXACIN 400 MG: 2 INJECTION, SOLUTION INTRAVENOUS at 12:03

## 2025-03-09 RX ADMIN — PHENYTOIN SODIUM 100 MG: 100 CAPSULE, EXTENDED RELEASE ORAL at 09:03

## 2025-03-09 RX ADMIN — LEVETIRACETAM 1000 MG: 500 TABLET, FILM COATED ORAL at 09:03

## 2025-03-09 RX ADMIN — VENLAFAXINE HYDROCHLORIDE 75 MG: 37.5 CAPSULE, EXTENDED RELEASE ORAL at 09:03

## 2025-03-09 RX ADMIN — MUPIROCIN 2 G: 20 OINTMENT TOPICAL at 09:03

## 2025-03-09 RX ADMIN — METOPROLOL SUCCINATE 12.5 MG: 25 TABLET, EXTENDED RELEASE ORAL at 09:03

## 2025-03-09 NOTE — PROGRESS NOTES
Hospital Medicine Progress Note        Chief Complaint: Inpatient Follow-up for     HPI:   The patient is a 79-year-old man from the nursing home who presents with altered middle status, weakness and multiple falls at the nursing home. He does have evidence of urinary tract infection. 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 His viral serologist are negative. White blood cell count is 24.7. He does have an indwelling fully catheter and the tip has been sent for culture. He has an extensive allergy list, including penicillin allergy and he currently is on Ciprofloxacin.     March 8, 2025-UTI was treated with IV Cipro, WBC improved to 18 from 24, the patient is still lethargic, creatinine improved 0.9 from 1.2    March 9, 2025-urine culture shows Gram-negative joy, the patient mental status improved significantly overnight, no fever, no shortness for breath, the patient had chronic leukocytosis last 5 years probable Hematology disorder-CLL?    The patient can be transferred back to nursing home today     I gave the patient Cipro prescription     I lowered Coumadin, phenytoin, phenobarbital dosage on discharge med rec    Case was discussed with patient's nurse and  on the floor.    Objective/physical exam:  General: In no acute distress, afebrile  Chest: Clear to auscultation bilaterally  Heart: RRR, +S1, S2, no appreciable murmur  Abdomen: Soft, nontender, BS +  MSK: Warm, no lower extremity edema, no clubbing or cyanosis  Neurologic:  Alert and oriented    VITAL SIGNS: 24 HRS MIN & MAX LAST   Temp  Min: 97.4 °F (36.3 °C)  Max: 98.1 °F (36.7 °C) 97.7 °F (36.5 °C)   BP  Min: 137/75  Max: 173/73 (!) 147/68   Pulse  Min: 52  Max: 71  (!) 54   Resp  Min: 20  Max: 20 20   SpO2  Min: 90 %  Max: 96 % (!) 94 %     I have reviewed the following  labs:  Recent Labs   Lab 03/07/25 1959 03/08/25 0404 03/09/25  0944   WBC 24.74* 18.45* 18.58*   RBC 5.95 4.97 5.51   HGB 14.0 11.8* 12.8*   HCT 47.5 39.6* 43.5   MCV 79.8* 79.7* 78.9*   MCH 23.5* 23.7* 23.2*   MCHC 29.5* 29.8* 29.4*   RDW 17.6* 16.8 16.8    268 278   MPV 9.6 9.3 9.6     Recent Labs   Lab 03/07/25 1959 03/08/25 0404 03/09/25  0944    145 142   K 4.3 3.3* 3.7   * 108* 105   CO2 24 29 27   BUN 18.0 16.0 16.0   CREATININE 1.28* 0.96 0.84   CALCIUM 10.7* 9.5 10.0   ALBUMIN 3.9  --   --    ALKPHOS 136  --   --    ALT 19  --   --    AST 20  --   --    BILITOT 0.2  --   --      Microbiology Results (last 7 days)       Procedure Component Value Units Date/Time    Urine culture [[id number]]  (Abnormal) Collected: 03/07/25 2042    Order Status: Completed Specimen: Urine Updated: 03/09/25 0710     Urine Culture >/= 100,000 colonies/ml Gram-negative Rods    Blood Culture #1 **CANNOT BE ORDERED STAT** [[id number]] Collected: 03/07/25 2020    Order Status: Sent Specimen: Blood Updated: 03/07/25 2020    Blood Culture #2 **CANNOT BE ORDERED STAT** [[id number]] Collected: 03/07/25 2020    Order Status: Sent Specimen: Blood Updated: 03/07/25 2020             See below for Radiology    Discharge diagnosis    Altered mental status secondary to UTI  Acute UTI   Acute renal failure--resolved   Atrial fibrillation   Chronic CHF-ejection fraction 25%   Chronic COPD   Depression   Hypertension   Seizure disorder--elevated phenytoin and phenobarbital level  Supratherapeutic INR     Discharge condition-stable     Discharge destination-nursing home-discharge time 30 minutes        VTE prophylaxis:     Patient condition:  Stable/Fair/Guarded/ Serious/ Critical    Anticipated discharge and Disposition:         All diagnosis and differential diagnosis have been reviewed; assessment and plan has been documented; I have personally reviewed the labs and test results that are presently available; I have  reviewed the patients medication list; I have reviewed the consulting providers response and recommendations. I have reviewed or attempted to review medical records based upon their availability    All of the patient's questions have been  addressed and answered. Patient's is agreeable to the above stated plan. I will continue to monitor closely and make adjustments to medical management as needed.    Portions of this note dictated using EMR integrated voice recognition software, and may be subject to voice recognition errors not corrected at proofreading. Please contact writer for clarification if needed.   _____________________________________________________________________    Malnutrition Status:  Nutrition consulted. Most recent weight and BMI monitored-     Measurements:  Wt Readings from Last 1 Encounters:   03/08/25 106.6 kg (235 lb)   Body mass index is 33.72 kg/m².    Patient has been screened and assessed by RD.    Malnutrition Type:  Context:    Level:      Malnutrition Characteristic Summary:       Interventions/Recommendations (treatment strategy):        Scheduled Med:   amiodarone  200 mg Oral QAM    atorvastatin  40 mg Oral QHS    ciprofloxacin  400 mg Intravenous Q12H    clopidogreL  75 mg Oral QAM    finasteride  5 mg Oral QHS    furosemide  40 mg Oral QAM    levETIRAcetam  1,000 mg Oral BID    lisinopriL  5 mg Oral QHS    metoprolol succinate  12.5 mg Oral BID    mupirocin   Nasal BID    pantoprazole  40 mg Oral Daily    PHENobarbitaL  97.2 mg Oral Daily    phenytoin  100 mg Oral TID    primidone  150 mg Oral BID    venlafaxine  75 mg Oral BID      Continuous Infusions:     PRN Meds:    Current Facility-Administered Medications:     albuterol-ipratropium, 3 mL, Nebulization, Q4H PRN    melatonin, 6 mg, Oral, Nightly PRN    ondansetron, 4 mg, Intravenous, Q6H PRN    sodium chloride 0.9%, 10 mL, Intravenous, PRN    Flushing PICC/Midline Protocol, , , Until Discontinued **AND** sodium chloride 0.9%,  10 mL, Intravenous, Q12H PRN     Radiology:  I have personally reviewed the following imaging and agree with the radiologist.     X-Ray Chest 1 View  Narrative: EXAMINATION:  XR CHEST 1 VIEW    CLINICAL HISTORY:  Generalized weakness, altered mental status.    TECHNIQUE:  1 view of the chest.    COMPARISON:  12/24/2024    FINDINGS:  The lungs demonstrate no evidence of lobar type consolidation, visible pneumothorax, or pleural fluid.  Vascular calcifications are present.  The cardiac silhouette is stable.    No acute displaced fracture is seen.  Impression: 1. No evidence of lobar type consolidation or acute cardiac decompensation noted.    Electronically signed by: Darien Kumar MD  Date:    03/08/2025  Time:    08:54  CT Head Without Contrast  Narrative: EXAMINATION:  CT HEAD WITHOUT CONTRAST    CLINICAL HISTORY:  Frequent falls.  Altered mental status.    TECHNIQUE:  Axial CT images were obtained of the brain without intravenous contrast.  Coronal and sagittal reformations were obtained.  Automated exposure control utilized to reduce radiation dose.  Total exam DLP is 989 mGy cm.    COMPARISON:  12/24/2024    FINDINGS:  Gray-white matter differentiation is within normal limits. There is chronic involutional change.  There is chronic white matter microischemic change.  There is intracranial atherosclerosis.  No acute intracranial hemorrhage, extra-axial fluid collection, hydrocephalus, mass effect, or midline shift is noted.  No large vessel territory acute ischemia is identified.  Visualized paranasal sinuses are clear.  Visualized mastoid air cells are clear.  No acute displaced calvarial fracture is identified.  Impression: 1. No acute intracranial abnormalities identified.  2. [place] concordance.    Electronically signed by: Darien Kumar MD  Date:    03/08/2025  Time:    08:14      Emily Brennan MD  Department of Hospital Medicine   [facility]   03/09/2025

## 2025-03-10 ENCOUNTER — LAB VISIT (OUTPATIENT)
Dept: LAB | Facility: HOSPITAL | Age: 80
End: 2025-03-10
Attending: UROLOGY
Payer: MEDICARE

## 2025-03-10 DIAGNOSIS — R79.1 ABNORMAL COAGULATION PROFILE: ICD-10-CM

## 2025-03-10 DIAGNOSIS — Z01.818 PRE-OP EVALUATION: Primary | ICD-10-CM

## 2025-03-10 LAB
ANION GAP SERPL CALC-SCNC: 10 MEQ/L
APTT PPP: 38.2 SECONDS (ref 24.2–35.9)
BUN SERPL-MCNC: 18 MG/DL (ref 8.4–25.7)
CALCIUM SERPL-MCNC: 10 MG/DL (ref 8.8–10)
CHLORIDE SERPL-SCNC: 107 MMOL/L (ref 98–107)
CO2 SERPL-SCNC: 22 MMOL/L (ref 23–31)
CREAT SERPL-MCNC: 1.26 MG/DL (ref 0.72–1.25)
CREAT/UREA NIT SERPL: 14
ERYTHROCYTE [DISTWIDTH] IN BLOOD BY AUTOMATED COUNT: 17.7 % (ref 11.5–17)
GFR SERPLBLD CREATININE-BSD FMLA CKD-EPI: 58 ML/MIN/1.73/M2
GLUCOSE SERPL-MCNC: 146 MG/DL (ref 82–115)
HCT VFR BLD AUTO: 45.3 % (ref 42–52)
HGB BLD-MCNC: 13.4 G/DL (ref 14–18)
INR PPP: 2.6
MCH RBC QN AUTO: 23.8 PG (ref 27–31)
MCHC RBC AUTO-ENTMCNC: 29.6 G/DL (ref 33–36)
MCV RBC AUTO: 80.3 FL (ref 80–94)
NRBC BLD AUTO-RTO: 0 %
PLATELET # BLD AUTO: 308 X10(3)/MCL (ref 130–400)
PMV BLD AUTO: 10.2 FL (ref 7.4–10.4)
POTASSIUM SERPL-SCNC: 3.4 MMOL/L (ref 3.5–5.1)
PROTHROMBIN TIME: 28.9 SECONDS (ref 12.4–14.9)
RBC # BLD AUTO: 5.64 X10(6)/MCL (ref 4.7–6.1)
SODIUM SERPL-SCNC: 139 MMOL/L (ref 136–145)
WBC # BLD AUTO: 23.79 X10(3)/MCL (ref 4.5–11.5)

## 2025-03-10 PROCEDURE — 85610 PROTHROMBIN TIME: CPT

## 2025-03-10 PROCEDURE — 36415 COLL VENOUS BLD VENIPUNCTURE: CPT

## 2025-03-10 PROCEDURE — 85730 THROMBOPLASTIN TIME PARTIAL: CPT

## 2025-03-10 PROCEDURE — 80048 BASIC METABOLIC PNL TOTAL CA: CPT

## 2025-03-10 PROCEDURE — 85027 COMPLETE CBC AUTOMATED: CPT

## 2025-03-10 NOTE — DISCHARGE INSTRUCTIONS
DRINK PLENTY OF FLUIDS TO FLUSH BLADDER.    MAY BE BLOODY FOR NEXT COUPLE OF DAYS.     CALL OFFICE IF NOT PASSING URINE, EXCESSIVE PAIN OR ANY FEVER

## 2025-03-11 LAB
BACTERIA UR CULT: ABNORMAL
BACTERIA UR CULT: ABNORMAL

## 2025-03-13 ENCOUNTER — HOSPITAL ENCOUNTER (OUTPATIENT)
Facility: HOSPITAL | Age: 80
Discharge: NURSING, REHAB OR LONG-TERM CARE | End: 2025-03-13
Attending: UROLOGY | Admitting: UROLOGY
Payer: MEDICARE

## 2025-03-13 VITALS
SYSTOLIC BLOOD PRESSURE: 127 MMHG | WEIGHT: 235 LBS | HEART RATE: 57 BPM | BODY MASS INDEX: 36.88 KG/M2 | RESPIRATION RATE: 18 BRPM | DIASTOLIC BLOOD PRESSURE: 58 MMHG | TEMPERATURE: 98 F | OXYGEN SATURATION: 97 % | HEIGHT: 67 IN

## 2025-03-13 DIAGNOSIS — R33.9 URINARY RETENTION: ICD-10-CM

## 2025-03-13 PROBLEM — N47.1 PHIMOSIS OF PENIS: Status: ACTIVE | Noted: 2025-03-13

## 2025-03-13 PROCEDURE — 71000016 HC POSTOP RECOV ADDL HR: Performed by: UROLOGY

## 2025-03-13 PROCEDURE — 37000009 HC ANESTHESIA EA ADD 15 MINS: Performed by: UROLOGY

## 2025-03-13 PROCEDURE — 36000706: Performed by: UROLOGY

## 2025-03-13 PROCEDURE — 36000707: Performed by: UROLOGY

## 2025-03-13 PROCEDURE — 37000008 HC ANESTHESIA 1ST 15 MINUTES: Performed by: UROLOGY

## 2025-03-13 PROCEDURE — 63600175 PHARM REV CODE 636 W HCPCS: Performed by: UROLOGY

## 2025-03-13 PROCEDURE — 25000003 PHARM REV CODE 250: Performed by: UROLOGY

## 2025-03-13 PROCEDURE — 63600175 PHARM REV CODE 636 W HCPCS: Performed by: ANESTHESIOLOGY

## 2025-03-13 PROCEDURE — 71000015 HC POSTOP RECOV 1ST HR: Performed by: UROLOGY

## 2025-03-13 PROCEDURE — 63600175 PHARM REV CODE 636 W HCPCS: Performed by: NURSE ANESTHETIST, CERTIFIED REGISTERED

## 2025-03-13 RX ORDER — PHENYTOIN SODIUM 50 MG/ML
125 INJECTION INTRAMUSCULAR; INTRAVENOUS
Status: DISCONTINUED | OUTPATIENT
Start: 2025-03-13 | End: 2025-03-13

## 2025-03-13 RX ORDER — METOPROLOL SUCCINATE 25 MG/1
25 TABLET, EXTENDED RELEASE ORAL ONCE
Status: COMPLETED | OUTPATIENT
Start: 2025-03-13 | End: 2025-03-13

## 2025-03-13 RX ORDER — AMIODARONE HYDROCHLORIDE 200 MG/1
200 TABLET ORAL DAILY
Status: DISCONTINUED | OUTPATIENT
Start: 2025-03-13 | End: 2025-03-13 | Stop reason: HOSPADM

## 2025-03-13 RX ORDER — METOPROLOL SUCCINATE 25 MG/1
25 TABLET, EXTENDED RELEASE ORAL
Status: DISCONTINUED | OUTPATIENT
Start: 2025-03-13 | End: 2025-03-13

## 2025-03-13 RX ORDER — PROPOFOL 10 MG/ML
VIAL (ML) INTRAVENOUS
Status: DISCONTINUED | OUTPATIENT
Start: 2025-03-13 | End: 2025-03-13

## 2025-03-13 RX ORDER — LEVETIRACETAM 500 MG/1
1000 TABLET ORAL ONCE
Status: COMPLETED | OUTPATIENT
Start: 2025-03-13 | End: 2025-03-13

## 2025-03-13 RX ORDER — GENTAMICIN SULFATE 80 MG/100ML
80 INJECTION, SOLUTION INTRAVENOUS
Status: COMPLETED | OUTPATIENT
Start: 2025-03-13 | End: 2025-03-13

## 2025-03-13 RX ORDER — LIDOCAINE HYDROCHLORIDE 20 MG/ML
JELLY TOPICAL
Status: DISCONTINUED | OUTPATIENT
Start: 2025-03-13 | End: 2025-03-13 | Stop reason: HOSPADM

## 2025-03-13 RX ORDER — PHENYTOIN SODIUM 50 MG/ML
150 INJECTION INTRAMUSCULAR; INTRAVENOUS
Status: DISCONTINUED | OUTPATIENT
Start: 2025-03-13 | End: 2025-03-13

## 2025-03-13 RX ORDER — PHENYTOIN SODIUM 100 MG/1
100 CAPSULE, EXTENDED RELEASE ORAL ONCE
Status: COMPLETED | OUTPATIENT
Start: 2025-03-13 | End: 2025-03-13

## 2025-03-13 RX ORDER — AMIODARONE HYDROCHLORIDE 200 MG/1
200 TABLET ORAL
Status: DISCONTINUED | OUTPATIENT
Start: 2025-03-13 | End: 2025-03-13

## 2025-03-13 RX ORDER — AMIODARONE HYDROCHLORIDE 200 MG/1
200 TABLET ORAL ONCE
Status: DISCONTINUED | OUTPATIENT
Start: 2025-03-13 | End: 2025-03-13

## 2025-03-13 RX ORDER — LIDOCAINE HYDROCHLORIDE 20 MG/ML
INJECTION INTRAVENOUS
Status: DISCONTINUED | OUTPATIENT
Start: 2025-03-13 | End: 2025-03-13

## 2025-03-13 RX ORDER — LEVETIRACETAM 500 MG/1
1000 TABLET ORAL
Status: DISCONTINUED | OUTPATIENT
Start: 2025-03-13 | End: 2025-03-13

## 2025-03-13 RX ADMIN — GENTAMICIN SULFATE 80 MG: 80 INJECTION, SOLUTION INTRAVENOUS at 08:03

## 2025-03-13 RX ADMIN — PROPOFOL 30 MG: 10 INJECTION, EMULSION INTRAVENOUS at 08:03

## 2025-03-13 RX ADMIN — AMIODARONE HYDROCHLORIDE 200 MG: 200 TABLET ORAL at 07:03

## 2025-03-13 RX ADMIN — SODIUM CHLORIDE, POTASSIUM CHLORIDE, SODIUM LACTATE AND CALCIUM CHLORIDE: 600; 310; 30; 20 INJECTION, SOLUTION INTRAVENOUS at 07:03

## 2025-03-13 RX ADMIN — PROPOFOL 40 MG: 10 INJECTION, EMULSION INTRAVENOUS at 08:03

## 2025-03-13 RX ADMIN — LIDOCAINE HYDROCHLORIDE 30 MG: 20 INJECTION, SOLUTION INTRAVENOUS at 08:03

## 2025-03-13 RX ADMIN — METOPROLOL SUCCINATE 25 MG: 25 TABLET, EXTENDED RELEASE ORAL at 07:03

## 2025-03-13 RX ADMIN — LEVETIRACETAM 1000 MG: 500 TABLET, FILM COATED ORAL at 07:03

## 2025-03-13 RX ADMIN — PHENYTOIN SODIUM 100 MG: 100 CAPSULE, EXTENDED RELEASE ORAL at 07:03

## 2025-03-13 NOTE — OP NOTE
[facility] - Medical Surgical Unit  Operative Note      Date of Procedure: 3/13/25    Procedure: cystoscopy  Surgeon(s) and Role:     * Jaiden Hernandez MD - Primary    Assisting Surgeon: none  Pre-Operative Diagnosis: BPH/WEBER, Urinary retention    Post-Operative Diagnosis: Post-Op Diagnosis Codes:same , Phimosis       Anesthesia: IV sedation and local  Operative Findings (including complications, if any): phimosis, BPH/WEBER, 3 + bladder trabeculation    Description of Technical Procedures:  After operative consent patient brought to the operating room placed on table in supine position IV sedation induced patient converted to dorsal lithotomy position genital area prepped and draped in usual sterile fashion for adequate local anesthesia 22 Mohawk cystoscope was advanced per urethra in the bladder under direct vision he had some erosion of his distal urethra remained the urethra within normal limits prostate was 3 fingerbreadths with trilobar obstruction bladder demonstrated diffuse 3+ trabeculation ureteral orifices were identified with the were very difficult secondary to the severe trabeculation there were no mucosal lesions or tumors or foreign bodies identified cystoscope was then removed and a 16 Mohawk 5 cc Fletcher catheter was placed per urethra in the bladder examination revealed a phimosis rectal examination revealed an enlarged feeling prostate there was no bleeding he tolerated the procedure well he will need medical evaluation in his to whether we can proceed with a TURP in his circumcision    Significant Surgical Tasks Conducted by the Assistant(s), if Applicable:     Estimated Blood Loss (EBL): * No values recorded between 5/3/2023 12:00 AM and 5/3/2023  2:57 PM *none           Implants: * No implants in log *fletcher catheter    Specimens:   Specimen (24h ago, onward)      None                    Condition: stable    Disposition:     Attestation:     Discharge Note    OUTCOME:     DISPOSITION:  ""}    FINAL DIAGNOSIS:  FOLLOWUP:   DISCHARGE INSTRUCTIONS:    "

## 2025-03-13 NOTE — ANESTHESIA POSTPROCEDURE EVALUATION
Anesthesia Post Evaluation    Patient: John Wayne    Procedure(s) Performed: Procedure(s) (LRB):  CYSTOSCOPY (N/A)  CATHETERIZATION, URETHRA (N/A)    Final Anesthesia Type: general      Patient location during evaluation: OPS  Patient participation: Yes- Able to Participate  Level of consciousness: awake and alert  Post-procedure vital signs: reviewed and stable  Pain management: adequate  Airway patency: patent  LESTER mitigation strategies: Multimodal analgesia  PONV status at discharge: No PONV  Anesthetic complications: no      Cardiovascular status: hemodynamically stable  Respiratory status: unassisted, spontaneous ventilation and room air  Hydration status: euvolemic  Follow-up not needed.              Vitals  Taken Time   BP  03/13/25 08:27   Temp  03/13/25 08:27   Pulse  03/13/25 08:27   Resp  03/13/25 08:27   SpO2  03/13/25 08:27         No case tracking events are documented in the log.      Pain/Jose E Score: No data recorded

## 2025-03-13 NOTE — DISCHARGE SUMMARY
[facility]  Discharge Note  Short Stay    Procedure(s) (LRB):  CYSTOSCOPY (N/A)  CATHETERIZATION, URETHRA (N/A)      OUTCOME: Patient tolerated treatment/procedure well without complication and is now ready for discharge.    DISPOSITION: Home or Self Care    FINAL DIAGNOSIS:  <principal problem not specified>    FOLLOWUP: In clinic    DISCHARGE INSTRUCTIONS:  No discharge procedures on file.      Clinical Reference Documents Added to Patient Instructions         Document    CYSTOSCOPY DISCHARGE INSTRUCTIONS (ENGLISH)            TIME SPENT ON DISCHARGE: 15 minutes

## 2025-03-14 ENCOUNTER — HOSPITAL ENCOUNTER (EMERGENCY)
Facility: HOSPITAL | Age: 80
End: 2025-03-14
Attending: INTERNAL MEDICINE
Payer: MEDICARE

## 2025-03-14 VITALS
RESPIRATION RATE: 18 BRPM | DIASTOLIC BLOOD PRESSURE: 57 MMHG | HEART RATE: 60 BPM | WEIGHT: 235 LBS | TEMPERATURE: 97 F | OXYGEN SATURATION: 98 % | SYSTOLIC BLOOD PRESSURE: 142 MMHG | BODY MASS INDEX: 36.81 KG/M2

## 2025-03-14 DIAGNOSIS — S09.90XA CLOSED HEAD INJURY, INITIAL ENCOUNTER: Primary | ICD-10-CM

## 2025-03-14 DIAGNOSIS — S01.81XA LACERATION OF FOREHEAD, INITIAL ENCOUNTER: ICD-10-CM

## 2025-03-14 LAB — BACTERIA BLD CULT: NORMAL

## 2025-03-14 PROCEDURE — 63600175 PHARM REV CODE 636 W HCPCS: Performed by: INTERNAL MEDICINE

## 2025-03-14 PROCEDURE — 99284 EMERGENCY DEPT VISIT MOD MDM: CPT | Mod: 25

## 2025-03-14 PROCEDURE — 12013 RPR F/E/E/N/L/M 2.6-5.0 CM: CPT

## 2025-03-14 RX ORDER — LIDOCAINE HYDROCHLORIDE 10 MG/ML
10 INJECTION, SOLUTION EPIDURAL; INFILTRATION; INTRACAUDAL; PERINEURAL
Status: COMPLETED | OUTPATIENT
Start: 2025-03-14 | End: 2025-03-14

## 2025-03-14 RX ORDER — LIDOCAINE HYDROCHLORIDE 10 MG/ML
10 INJECTION, SOLUTION EPIDURAL; INFILTRATION; INTRACAUDAL; PERINEURAL
Status: DISCONTINUED | OUTPATIENT
Start: 2025-03-14 | End: 2025-03-14

## 2025-03-14 RX ADMIN — LIDOCAINE HYDROCHLORIDE 100 MG: 10 SOLUTION INTRAVENOUS at 04:03

## 2025-03-14 NOTE — ED PROVIDER NOTES
Encounter Date: 3/14/2025       History     Chief Complaint   Patient presents with    Fall     Pt from \[facility]\"" with reports of unwitnessed fall. Laceration to right eyebrow. On warfarin      79-year-old male with a past medical history of CHF, CLL, COPD and CAD who presents via EMS from nursing home for fall with laceration.  EMS reports that patient had fallen from low bed height and has a mild laceration above his right eyebrow.  Patient does take Coumadin.  He has a GCS of 14, which is his baseline.  Patient is denying any pain, headache, nausea.  He is denying and neck pain, headache, visual changes, chest pain, shortness of breath, back pain, dizziness, lightheadedness, cough, rhinorrhea, sore throat    The history is provided by the patient and the EMS personnel.     Review of patient's allergies indicates:   Allergen Reactions    Ancef in dextrose (iso-osm)     Codeine     Penicillins      Past Medical History:   Diagnosis Date    Anticoagulant long-term use     CHF (congestive heart failure) 10/2021    EF of 25-30% on ECHO    CLL (chronic lymphocytic leukemia)     COPD (chronic obstructive pulmonary disease)     Coronary artery disease     Depression     Difficult intubation     GERD (gastroesophageal reflux disease)     Hypertension     Mixed hyperlipidemia     Oxygen deficit     O2 as needed    PVD (peripheral vascular disease)     Seizures     Sleep apnea, unspecified     CPAP    TIA (transient ischemic attack)      Past Surgical History:   Procedure Laterality Date    CORONARY ANGIOPLASTY WITH STENT PLACEMENT  10/18/2016    pLAD, pLAD, dLAD, mCX,    INSERTION OF BARE METAL STENT INTO PERIPHERAL ARTERY  2018    B/L [place] Vessels    PERCUTANEOUS BALLOON VALVULOPLASTY  04/04/2018     Family History   Problem Relation Name Age of Onset    Hypertension Mother      Hypertension Father      Coronary artery disease Father      Heart attack Father       Social History[1]  Review of Systems   All other  systems reviewed and are negative.      Physical Exam     Initial Vitals [03/14/25 0328]   BP Pulse Resp Temp SpO2   (!) 130/52 62 19 96.9 °F (36.1 °C) 97 %      MAP       --         Physical Exam    Constitutional: He appears well-developed and well-nourished. He is not diaphoretic. He is cooperative.  Non-toxic appearance. He appears ill (Chronically).   HENT:   Head: Normocephalic. Head is with contusion and with laceration. Head is without raccoon's eyes, without Tyson's sign, without abrasion, without right periorbital erythema and without left periorbital erythema. Hair is normal.       Right Ear: Hearing, tympanic membrane, external ear and ear canal normal.   Left Ear: Hearing, tympanic membrane, external ear and ear canal normal.   Nose: Nose normal. Mouth/Throat: Uvula is midline, oropharynx is clear and moist and mucous membranes are normal.   4 cm v-shaped laceration over the right forehead with surrounding edema.  No tenderness.  No active bleeding.   Eyes: Conjunctivae, EOM and lids are normal. Pupils are equal, round, and reactive to light.   Neck: Trachea normal and phonation normal. Neck supple. Carotid bruit is not present. No JVD present.    Full passive range of motion without pain.     Cardiovascular:  Normal rate, regular rhythm, normal heart sounds and normal pulses.           No murmur heard.  Pulmonary/Chest: No accessory muscle usage. No tachypnea. No respiratory distress. He has no decreased breath sounds. He has no wheezes. He has no rhonchi. He has no rales.   Abdominal: Abdomen is soft. Bowel sounds are normal. He exhibits no distension. There is no abdominal tenderness. No hernia. There is no rebound and no guarding.   Musculoskeletal:      Cervical back: Full passive range of motion without pain and neck supple. No spinous process tenderness or muscular tenderness.     Neurological: He is alert. He has normal strength. No cranial nerve deficit or sensory deficit. GCS eye subscore is  4. GCS verbal subscore is 4. GCS motor subscore is 6.   Skin: Skin is warm and dry. Capillary refill takes less than 2 seconds. Laceration noted. No abrasion, no burn, no ecchymosis, no lesion, no rash and no abscess noted. No erythema.   Psychiatric: He has a normal mood and affect. His speech is normal and behavior is normal. Judgment and thought content normal. Cognition and memory are normal.         ED Course   Lac Repair    Date/Time: 3/14/2025 4:19 AM    Performed by: Alfa Cain DO  Authorized by: Alfa Cain DO    Consent:     Consent obtained:  Verbal    Consent given by:  Patient    Risks, benefits, and alternatives were discussed: yes      Risks discussed:  Infection, need for additional repair, nerve damage, poor wound healing, poor cosmetic result, pain, retained foreign body, tendon damage and vascular damage    Alternatives discussed:  No treatment  Universal protocol:     Procedure explained and questions answered to patient or proxy's satisfaction: yes      Required blood products, implants, devices, and special equipment available: yes      Immediately prior to procedure, a time out was called: yes      Patient identity confirmed:  Arm band  Anesthesia:     Anesthesia method:  Local infiltration    Local anesthetic:  Lidocaine 1% w/o epi  Laceration details:     Location:  Face    Face location:  Forehead    Length (cm):  4  Pre-procedure details:     Preparation:  Patient was prepped and draped in usual sterile fashion and imaging obtained to evaluate for foreign bodies  Exploration:     Hemostasis achieved with:  Direct pressure    Imaging obtained comment:  CT    Wound exploration: wound explored through full range of motion and entire depth of wound visualized      Wound extent: no fascia violation noted, no foreign bodies/material noted, no muscle damage noted, no nerve damage noted, no tendon damage noted, no underlying fracture noted and no vascular damage noted       Contaminated: no    Treatment:     Area cleansed with:  Saline    Amount of cleaning:  Standard    Irrigation solution:  Sterile saline    Irrigation method:  Tap    Visualized foreign bodies/material removed: no      Debridement:  None    Undermining:  None    Scar revision: no    Skin repair:     Repair method:  Sutures    Suture size:  5-0    Suture material:  Prolene    Suture technique:  Simple interrupted    Number of sutures:  5  Approximation:     Approximation:  Close  Repair type:     Repair type:  Simple  Post-procedure details:     Dressing:  Bulky dressing and non-adherent dressing    Procedure completion:  Tolerated well, no immediate complications    Labs Reviewed - No data to display       Imaging Results              CT Cervical Spine Without Contrast (Preliminary result)  Result time 03/14/25 04:02:00      Preliminary result by oBo Smith MD (03/14/25 04:02:00)                   Narrative:    START OF REPORT:  Technique: CT of the cervical spine was performed without intravenous contrast with axial as well as sagittal and coronal images.    Comparison: None.    Dosage Information: Automated Exposure Control was utilized 345.75 mGy.cm.    Clinical history: Neck trauma.    Findings:  Lung apices: The visualized lung apices appear unremarkable.  Spine:  Spinal canal: The spinal canal appears unremarkable.  Mineralization: Moderate osteopenia is seen in the visualized bony structures of the cervical spine.  Rotation: No significant rotation is seen.  Scoliosis: No significant scoliosis is seen.  Vertebral Fusion: No vertebral fusion is identified.  Listhesis: No significant listhesis is identified.  Lordosis: The cervical lordosis is maintained.  Intervertebral disc spaces: Multilevel loss of disc height is seen.  Osteophytes: Severe multilevel endplate osteophytes are seen.  Endplate Sclerosis: No significant endplate sclerosis is seen.  Uncovertebral degenerative changes: Moderate multilevel  uncovertebral joint arthrosis is seen.  Facet degenerative changes: Moderate multilevel facet degenerative changes are seen.  Calcifications: None.  Fractures: No acute cervical spine fracture dislocation or subluxation is seen.  Orthopedic Hardware: None.    Miscellaneous:  Mastoid air cells: The visualized mastoid air cells appear clear.  Soft Tissues: Unremarkable.      Impression:  1. No acute cervical spine fracture dislocation or subluxation is seen.  2. Degenerative changes and other details as above.                                         CT Head Without Contrast (Preliminary result)  Result time 03/14/25 04:01:05      Preliminary result by Boo Smith MD (03/14/25 04:01:05)                   Narrative:    START OF REPORT:  Technique: CT of the head was performed without intravenous contrast with axial as well as coronal and sagittal images.    Comparison: Comparison is with study dated 2025-03-07 20:36:30.    Dosage Information: Automated Exposure Control was utilized 941.46 mGy.cm.    Clinical history: Head trauma.    Findings:  Hemorrhage: No acute intracranial hemorrhage is seen.  CSF spaces: The ventricles, sulci and basal cisterns all appear moderately prominent consistent with stable global cerebral atrophy.  Brain parenchyma: There is preservation of the grey white junction throughout. Moderate stable appearing microvascular change is seen in portions of the periventricular and deep white matter tracts.  Cerebellum: Unremarkable.  Vascular: Severe stable appearing atheromatous calcification of the intracranial arteries is seen.  Sella and skull base: The sella appears to be within normal limits for age.  Intracranial calcifications: Incidental note is made of bilateral choroid plexus calcification. Incidental note is made of some pineal region calcification.  Calvarium: No acute linear or depressed skull fracture is seen.    Maxillofacial Structures:  Paranasal sinuses: The visualized paranasal  sinuses appear clear with no mucoperiosteal thickening or air fluid levels identified.  Orbits: The orbits appear unremarkable.  Zygomatic arches: The zygomatic arches are intact and unremarkable.  Temporal bones and mastoids: The temporal bones and mastoids appear unremarkable.  TMJ: The mandibular condyles appear normally placed with respect to the mandibular fossa.      Impression:  1. No acute intracranial traumatic injury identified. Details and other findings as noted above.                                         Medications   LIDOcaine (PF) 10 mg/ml (1%) injection 100 mg (100 mg Infiltration Given by Provider 3/14/25 0405)       Medical Decision Making  79-year-old male presenting with fall, head injury and forehead laceration.    Differential Diagnosis:   Judging by the patient's chief complaint and pertinent history, the patient has the following possible differential diagnoses, including but not limited to the following.  Some of these are deemed to be lower likelihood and some more likely based on my physical exam and history combined with possible lab work and/or imaging studies.   Please see the pertinent studies, and refer to the HPI.  Some of these diagnoses will take further evaluation to fully rule out, perhaps as an outpatient and the patient was encouraged to follow up when discharged for more comprehensive evaluation    Intracranial hemorrhage vision, strain, sprain, whiplash, laceration, fracture, dislocation    Amount and/or Complexity of Data Reviewed  Independent Historian: EMS  External Data Reviewed: radiology and notes.  Radiology: ordered and independent interpretation performed. Decision-making details documented in ED Course.    Risk  Prescription drug management.               ED Course as of 03/14/25 0420   Fri Mar 14, 2025   0417 CT head and cervical spine are without subluxation, hemorrhage, fracture, dislocation.  Patient's wound was clean, however it was gaping therefore was  repaired with 5 sutures.  Wound was approximately 4 cm long after repair.  Patient is stable for discharge back to his nursing home.  [MM]      ED Course User Index  [MM] Alfa Cain DO                           Clinical Impression:  Final diagnoses:  [S09.90XA] Closed head injury, initial encounter (Primary)  [S01.81XA] Laceration of forehead, initial encounter          ED Disposition Condition    Discharge Stable          ED Prescriptions    None       Follow-up Information       Follow up With Specialties Details Why Contact Info    ISAC Schmidt MD Family Medicine  As needed [address] [facility] [address] [phone]                   [1]   Social History  Tobacco Use    Smoking status: Former    Smokeless tobacco: Never   Substance Use Topics    Alcohol use: Not Currently    Drug use: Never        Alfa Cain DO  03/14/25 0421

## 2025-03-15 LAB — BACTERIA BLD CULT: NORMAL

## 2025-03-17 ENCOUNTER — HOSPITAL ENCOUNTER (EMERGENCY)
Facility: HOSPITAL | Age: 80
Discharge: NURSING, REHAB OR LONG-TERM CARE | End: 2025-03-17
Attending: INTERNAL MEDICINE
Payer: MEDICARE

## 2025-03-17 VITALS
RESPIRATION RATE: 17 BRPM | HEART RATE: 60 BPM | SYSTOLIC BLOOD PRESSURE: 155 MMHG | OXYGEN SATURATION: 96 % | TEMPERATURE: 98 F | DIASTOLIC BLOOD PRESSURE: 75 MMHG

## 2025-03-17 DIAGNOSIS — S09.90XA CLOSED HEAD INJURY, INITIAL ENCOUNTER: Primary | ICD-10-CM

## 2025-03-17 PROCEDURE — 99283 EMERGENCY DEPT VISIT LOW MDM: CPT | Mod: 25

## 2025-03-18 NOTE — ED PROVIDER NOTES
Encounter Date: 3/17/2025       History     Chief Complaint   Patient presents with    Fall     Fall from wheelchair tonight. Bleeding from sutures to R forehead. Denies LOC     79-year-old white male lives at the nursing home in his wheelchair bound was wheeling himself to the bathroom went in his front wheel caught something and he flipped forward out of his chair hitting his face on the floor.  Patient was recently in the emergency department and had sutures placed in his right upper eyebrow area and this has where he fell again in the nursing home could not tell if the sutures were still intact due to the bleeding so they just called the ambulance had him come to the emergency department      Review of patient's allergies indicates:   Allergen Reactions    Ancef in dextrose (iso-osm)     Codeine     Penicillins      Past Medical History:   Diagnosis Date    Anticoagulant long-term use     CHF (congestive heart failure) 10/2021    EF of 25-30% on ECHO    CLL (chronic lymphocytic leukemia)     COPD (chronic obstructive pulmonary disease)     Coronary artery disease     Depression     Difficult intubation     GERD (gastroesophageal reflux disease)     Hypertension     Mixed hyperlipidemia     Oxygen deficit     O2 as needed    PVD (peripheral vascular disease)     Seizures     Sleep apnea, unspecified     CPAP    TIA (transient ischemic attack)      Past Surgical History:   Procedure Laterality Date    CORONARY ANGIOPLASTY WITH STENT PLACEMENT  10/18/2016    pLAD, pLAD, dLAD, mCX,    CYSTOSCOPY N/A 3/13/2025    Procedure: CYSTOSCOPY;  Surgeon: Jaiden Hernandez MD;  Location: [facility];  Service: Urology;  Laterality: N/A;    INSERTION OF BARE METAL STENT INTO PERIPHERAL ARTERY  2018    B/L [place] Vessels    PERCUTANEOUS BALLOON VALVULOPLASTY  04/04/2018    URETHRAL CATHETERIZATION N/A 3/13/2025    Procedure: CATHETERIZATION, URETHRA;  Surgeon: Jaiden Hernandez MD;  Location: [facility];  Service: Urology;   Laterality: N/A;     Family History   Problem Relation Name Age of Onset    Hypertension Mother      Hypertension Father      Coronary artery disease Father      Heart attack Father       Social History[1]  Review of Systems   Skin:  Positive for wound.   All other systems reviewed and are negative.      Physical Exam     Initial Vitals [03/17/25 1953]   BP Pulse Resp Temp SpO2   (!) 159/61 (!) 59 16 -- 98 %      MAP       --         Physical Exam    Nursing note and vitals reviewed.  Constitutional: He appears well-developed and well-nourished.   HENT:   Head: Normocephalic.       Sutures still intact, dried blood cleaned up And shows that the wound is still together with sutures intact   Eyes: EOM are normal. Pupils are equal, round, and reactive to light.   Neck: Neck supple.   Musculoskeletal:      Cervical back: Neck supple.     Neurological: He is alert.   Skin: Skin is warm. Capillary refill takes less than 2 seconds.         ED Course   Procedures  Labs Reviewed - No data to display       Imaging Results              CT Head Without Contrast (Final result)  Result time 03/17/25 20:10:43      Final result by Damian Cole MD (03/17/25 20:10:43)                   Impression:      No acute intracranial abnormality.  Findings of chronic microvascular ischemic disease.    Hematoma overlies the frontal bone with no underlying fracture.      Electronically signed by: Damian Cole  Date:    03/17/2025  Time:    20:10               Narrative:    EXAMINATION:  CT HEAD WITHOUT CONTRAST    CLINICAL HISTORY:  Head trauma, moderate-severe;    TECHNIQUE:  Low dose axial images were obtained through the head.  Coronal and sagittal reformations were also performed. Contrast was not administered.    Automatic exposure control was utilized to reduce the patient's radiation dose.    DLP= 920    COMPARISON:  03/14/2025    FINDINGS:  No acute intracranial hemorrhage, edema or mass. No acute parenchymal  "abnormality.    Diffuse cerebral atrophy with concordant ventricular enlargement.    There is normal gray white differentiation.    Hematoma overlies the right frontal bone with no underlying fracture.    The mastoid air cells are clear.    The auditory canals are patent bilaterally.    The globes and orbital contents are normal bilaterally.    The visualized maxillary, ethmoid and sphenoid sinuses are clear.                                       Medications - No data to display  Medical Decision Making  79-year-old white male with a fall at the nursing home hitting his head where his sutures were placed 3 nights ago.  Differential diagnosis includes was not limited to dehiscence of wound, closed head injury, open head injury, contusion, abrasion.  Patient was sent through the CT scan immediately shows no evidence of intracranial bleeding and no fractures were seen.  His wound was cleaned and bandaged in his sutures are still intact so will discharge back to the nursing home    Problems Addressed:  Closed head injury, initial encounter: acute illness or injury    Amount and/or Complexity of Data Reviewed  Independent Historian: EMS  External Data Reviewed: labs, radiology and notes.  Radiology: ordered. Decision-making details documented in ED Course.                                      Clinical Impression:  Final diagnoses:  [S09.90XA] Closed head injury, initial encounter (Primary)          ED Disposition Condition    Discharge Stable          ED Prescriptions    None       Follow-up Information       Follow up With Specialties Details Why Contact Info    ISAC Schmidt MD Family Medicine In 3 days  [address] [facility] [address] [phone]            Portions of this note have been created with voice recognition software. Occasional "wrong-words" or "sound alike" substitutions may have occurred due to inherent limitations of voice software. Please read the note carefully and recognize, using context, " word substitutions may have occurred.         [1]   Social History  Tobacco Use    Smoking status: Former    Smokeless tobacco: Never   Substance Use Topics    Alcohol use: Not Currently    Drug use: Never        Brandon Gibbons MD  03/17/25 2023

## 2025-03-18 NOTE — DISCHARGE SUMMARY
"  Hospital Medicine discharge summary      Chief Complaint: Inpatient Follow-up for     HPI:   The patient is a 79-year-old man from the nursing home who presents with altered middle status, weakness and multiple falls at the nursing home. He does have evidence of urinary tract infection. 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 His viral serologist are negative. White blood cell count is 24.7. He does have an indwelling fully catheter and the tip has been sent for culture. He has an extensive allergy list, including penicillin allergy and he currently is on Ciprofloxacin.     March 8, 2025-UTI was treated with IV Cipro, WBC improved to 18 from 24, the patient is still lethargic, creatinine improved 0.9 from 1.2    March 9, 2025-urine culture shows Gram-negative joy, the patient mental status improved significantly overnight, no fever, no shortness for breath, the patient had chronic leukocytosis last 5 years probable Hematology disorder-CLL?    The patient can be transferred back to nursing home today     I gave the patient Cipro prescription     I lowered Coumadin, phenytoin, phenobarbital dosage on discharge med rec    Case was discussed with patient's nurse and  on the floor.    Objective/physical exam:  General: In no acute distress, afebrile  Chest: Clear to auscultation bilaterally  Heart: RRR, +S1, S2, no appreciable murmur  Abdomen: Soft, nontender, BS +  MSK: Warm, no lower extremity edema, no clubbing or cyanosis  Neurologic:  Alert and oriented    VITAL SIGNS: 24 HRS MIN & MAX LAST   No data recorded 97.7 °F (36.5 °C)   No data recorded (!) 147/68   No data recorded  (!) 54   No data recorded 20   No data recorded (!) 94 %     I have reviewed the following labs:  No results for input(s): "WBC", "RBC", "HGB", "HCT", "MCV", "MCH", " ""MCHC", "RDW", "PLT", "MPV", "GRAN", "LYMPH", "MONO", "BASO", "NRBC" in the last 168 hours.    No results for input(s): "NA", "K", "CL", "CO2", "ANIONGAP", "BUN", "CREATININE", "GLU", "CALCIUM", "PH", "MG", "ALBUMIN", "PROT", "ALKPHOS", "ALT", "AST", "BILITOT" in the last 168 hours.    Microbiology Results (last 7 days)       ** No results found for the last 168 hours. **             See below for Radiology    Discharge diagnosis    Altered mental status secondary to UTI  Acute UTI secondary to indwelling Anaya catheterization--not a complication of procedure  Acute renal failure--resolved   Atrial fibrillation   Chronic CHF-ejection fraction 25%   Chronic COPD   Depression   Hypertension   Seizure disorder--elevated phenytoin and phenobarbital level  Supratherapeutic INR     Discharge condition-stable     Discharge destination-nursing home-discharge time 30 minutes        VTE prophylaxis:     Patient condition:  Stable/Fair/Guarded/ Serious/ Critical    Anticipated discharge and Disposition:         All diagnosis and differential diagnosis have been reviewed; assessment and plan has been documented; I have personally reviewed the labs and test results that are presently available; I have reviewed the patients medication list; I have reviewed the consulting providers response and recommendations. I have reviewed or attempted to review medical records based upon their availability    All of the patient's questions have been  addressed and answered. Patient's is agreeable to the above stated plan. I will continue to monitor closely and make adjustments to medical management as needed.    Portions of this note dictated using EMR integrated voice recognition software, and may be subject to voice recognition errors not corrected at proofreading. Please contact writer for clarification if needed.   _____________________________________________________________________    Malnutrition Status:  Nutrition consulted. Most " recent weight and BMI monitored-     Measurements:  Wt Readings from Last 1 Encounters:   03/14/25 106.6 kg (235 lb)   Body mass index is 33.72 kg/m².    Patient has been screened and assessed by RD.    Malnutrition Type:  Context:    Level:      Malnutrition Characteristic Summary:       Interventions/Recommendations (treatment strategy):        Scheduled Med:       Continuous Infusions:     PRN Meds:       Radiology:  I have personally reviewed the following imaging and agree with the radiologist.     CT Head Without Contrast  Narrative: EXAMINATION:  CT HEAD WITHOUT CONTRAST    CLINICAL HISTORY:  Head trauma, moderate-severe;    TECHNIQUE:  Low dose axial images were obtained through the head.  Coronal and sagittal reformations were also performed. Contrast was not administered.    Automatic exposure control was utilized to reduce the patient's radiation dose.    DLP= 920    COMPARISON:  03/14/2025    FINDINGS:  No acute intracranial hemorrhage, edema or mass. No acute parenchymal abnormality.    Diffuse cerebral atrophy with concordant ventricular enlargement.    There is normal gray white differentiation.    Hematoma overlies the right frontal bone with no underlying fracture.    The mastoid air cells are clear.    The auditory canals are patent bilaterally.    The globes and orbital contents are normal bilaterally.    The visualized maxillary, ethmoid and sphenoid sinuses are clear.  Impression: No acute intracranial abnormality.  Findings of chronic microvascular ischemic disease.    Hematoma overlies the frontal bone with no underlying fracture.    Electronically signed by: Damian Cole  Date:    03/17/2025  Time:    20:10      Emily Brennan MD  Department of Hospital Medicine   [facility]   03/18/2025

## 2025-04-10 ENCOUNTER — HOSPITAL ENCOUNTER (EMERGENCY)
Facility: HOSPITAL | Age: 80
Discharge: ANOTHER HOSPITAL | End: 2025-04-10
Attending: INTERNAL MEDICINE
Payer: MEDICARE

## 2025-04-10 VITALS
DIASTOLIC BLOOD PRESSURE: 61 MMHG | RESPIRATION RATE: 18 BRPM | TEMPERATURE: 100 F | HEART RATE: 105 BPM | BODY MASS INDEX: 36.88 KG/M2 | WEIGHT: 235 LBS | HEIGHT: 67 IN | OXYGEN SATURATION: 99 % | SYSTOLIC BLOOD PRESSURE: 123 MMHG

## 2025-04-10 DIAGNOSIS — N17.9 AKI (ACUTE KIDNEY INJURY): ICD-10-CM

## 2025-04-10 DIAGNOSIS — T83.091A OBSTRUCTION OF FOLEY CATHETER, INITIAL ENCOUNTER: ICD-10-CM

## 2025-04-10 DIAGNOSIS — N39.0 URINARY TRACT INFECTION WITH HEMATURIA, SITE UNSPECIFIED: ICD-10-CM

## 2025-04-10 DIAGNOSIS — A41.9 SEPTIC SHOCK: ICD-10-CM

## 2025-04-10 DIAGNOSIS — C91.10 CLL (CHRONIC LYMPHOCYTIC LEUKEMIA): Primary | ICD-10-CM

## 2025-04-10 DIAGNOSIS — R65.21 SEPTIC SHOCK: ICD-10-CM

## 2025-04-10 DIAGNOSIS — Z13.6 SCREENING FOR CARDIOVASCULAR CONDITION: ICD-10-CM

## 2025-04-10 DIAGNOSIS — R31.9 URINARY TRACT INFECTION WITH HEMATURIA, SITE UNSPECIFIED: ICD-10-CM

## 2025-04-10 LAB
ABS NEUT CALC (OHS): 19.53 X10(3)/MCL (ref 2.1–9.2)
ALBUMIN SERPL-MCNC: 2.7 G/DL (ref 3.4–4.8)
ALBUMIN/GLOB SERPL: 0.7 RATIO (ref 1.1–2)
ALP SERPL-CCNC: 106 UNIT/L (ref 40–150)
ALT SERPL-CCNC: 15 UNIT/L (ref 0–55)
ANION GAP SERPL CALC-SCNC: 19 MEQ/L
APTT PPP: 53.4 SECONDS (ref 24.2–35.9)
AST SERPL-CCNC: 15 UNIT/L (ref 11–45)
BACTERIA #/AREA URNS AUTO: ABNORMAL /HPF
BILIRUB SERPL-MCNC: 0.6 MG/DL
BILIRUB UR QL STRIP.AUTO: ABNORMAL
BUN SERPL-MCNC: 36 MG/DL (ref 8.4–25.7)
BURR CELLS (OLG): SLIGHT
CALCIUM SERPL-MCNC: 10.5 MG/DL (ref 8.8–10)
CHLORIDE SERPL-SCNC: 101 MMOL/L (ref 98–107)
CLARITY UR: ABNORMAL
CO2 SERPL-SCNC: 17 MMOL/L (ref 23–31)
COLOR UR AUTO: ABNORMAL
CREAT SERPL-MCNC: 3.44 MG/DL (ref 0.72–1.25)
CREAT/UREA NIT SERPL: 10
ERYTHROCYTE [DISTWIDTH] IN BLOOD BY AUTOMATED COUNT: 21.2 % (ref 11.5–17)
GFR SERPLBLD CREATININE-BSD FMLA CKD-EPI: 17 ML/MIN/1.73/M2
GLOBULIN SER-MCNC: 4.1 GM/DL (ref 2.4–3.5)
GLUCOSE SERPL-MCNC: 130 MG/DL (ref 82–115)
GLUCOSE UR QL STRIP: NEGATIVE
HCT VFR BLD AUTO: 44.9 % (ref 42–52)
HGB BLD-MCNC: 13.5 G/DL (ref 14–18)
HGB UR QL STRIP: ABNORMAL
INR PPP: 2.6
KETONES UR QL STRIP: ABNORMAL
LACTATE SERPL-SCNC: 11.3 MMOL/L (ref 0.5–2.2)
LACTATE SERPL-SCNC: 7.5 MMOL/L (ref 0.5–2.2)
LEUKOCYTE ESTERASE UR QL STRIP: ABNORMAL
LIPASE SERPL-CCNC: 11 U/L
LYMPHOCYTES NFR BLD MANUAL: 48.83 X10(3)/MCL (ref 0.6–4.6)
LYMPHOCYTES NFR BLD MANUAL: 70 % (ref 13–40)
MCH RBC QN AUTO: 24.1 PG (ref 27–31)
MCHC RBC AUTO-ENTMCNC: 30.1 G/DL (ref 33–36)
MCV RBC AUTO: 80 FL (ref 80–94)
METAMYELOCYTES NFR BLD MANUAL: 1 %
MONOCYTES NFR BLD MANUAL: 1.4 X10(3)/MCL (ref 0.1–1.3)
MONOCYTES NFR BLD MANUAL: 2 % (ref 2–11)
NEUTROPHILS NFR BLD MANUAL: 27 % (ref 47–80)
NEUTS BAND NFR BLD MANUAL: 1 % (ref 0–11)
NITRITE UR QL STRIP: NEGATIVE
NRBC BLD AUTO-RTO: 0 %
PH UR STRIP: >=9 [PH]
PLATELET # BLD AUTO: 253 X10(3)/MCL (ref 130–400)
PLATELET # BLD EST: ADEQUATE 10*3/UL
PMV BLD AUTO: 10 FL (ref 7.4–10.4)
POIKILOCYTOSIS BLD QL SMEAR: SLIGHT
POTASSIUM SERPL-SCNC: 4.4 MMOL/L (ref 3.5–5.1)
PROT SERPL-MCNC: 6.8 GM/DL (ref 5.8–7.6)
PROT UR QL STRIP: ABNORMAL
PROTHROMBIN TIME: 29.1 SECONDS (ref 12.4–14.9)
RBC # BLD AUTO: 5.61 X10(6)/MCL (ref 4.7–6.1)
RBC #/AREA URNS AUTO: >100 /HPF
RBC MORPH BLD: ABNORMAL
SODIUM SERPL-SCNC: 137 MMOL/L (ref 136–145)
SP GR UR STRIP.AUTO: 1.01 (ref 1–1.03)
SQUAMOUS #/AREA URNS AUTO: ABNORMAL /HPF
UROBILINOGEN UR STRIP-ACNC: 1
WBC # BLD AUTO: 69.76 X10(3)/MCL (ref 4.5–11.5)
WBC #/AREA URNS AUTO: ABNORMAL /HPF

## 2025-04-10 PROCEDURE — 83690 ASSAY OF LIPASE: CPT | Performed by: INTERNAL MEDICINE

## 2025-04-10 PROCEDURE — 93005 ELECTROCARDIOGRAM TRACING: CPT

## 2025-04-10 PROCEDURE — 51702 INSERT TEMP BLADDER CATH: CPT

## 2025-04-10 PROCEDURE — 85025 COMPLETE CBC W/AUTO DIFF WBC: CPT | Performed by: INTERNAL MEDICINE

## 2025-04-10 PROCEDURE — 96361 HYDRATE IV INFUSION ADD-ON: CPT

## 2025-04-10 PROCEDURE — 96375 TX/PRO/DX INJ NEW DRUG ADDON: CPT

## 2025-04-10 PROCEDURE — 25000003 PHARM REV CODE 250: Performed by: INTERNAL MEDICINE

## 2025-04-10 PROCEDURE — 96365 THER/PROPH/DIAG IV INF INIT: CPT

## 2025-04-10 PROCEDURE — 81003 URINALYSIS AUTO W/O SCOPE: CPT | Performed by: INTERNAL MEDICINE

## 2025-04-10 PROCEDURE — 63600175 PHARM REV CODE 636 W HCPCS: Performed by: INTERNAL MEDICINE

## 2025-04-10 PROCEDURE — 87077 CULTURE AEROBIC IDENTIFY: CPT | Performed by: INTERNAL MEDICINE

## 2025-04-10 PROCEDURE — 85610 PROTHROMBIN TIME: CPT | Performed by: INTERNAL MEDICINE

## 2025-04-10 PROCEDURE — 87186 SC STD MICRODIL/AGAR DIL: CPT | Performed by: INTERNAL MEDICINE

## 2025-04-10 PROCEDURE — 83605 ASSAY OF LACTIC ACID: CPT | Performed by: INTERNAL MEDICINE

## 2025-04-10 PROCEDURE — 99291 CRITICAL CARE FIRST HOUR: CPT

## 2025-04-10 PROCEDURE — 93010 ELECTROCARDIOGRAM REPORT: CPT | Mod: ,,, | Performed by: INTERNAL MEDICINE

## 2025-04-10 PROCEDURE — 80053 COMPREHEN METABOLIC PANEL: CPT | Performed by: INTERNAL MEDICINE

## 2025-04-10 PROCEDURE — 85730 THROMBOPLASTIN TIME PARTIAL: CPT | Performed by: INTERNAL MEDICINE

## 2025-04-10 RX ORDER — WARFARIN SODIUM 5 MG/1
5 TABLET ORAL
COMMUNITY
End: 2025-04-10

## 2025-04-10 RX ORDER — ONDANSETRON HYDROCHLORIDE 2 MG/ML
4 INJECTION, SOLUTION INTRAVENOUS
Status: COMPLETED | OUTPATIENT
Start: 2025-04-10 | End: 2025-04-10

## 2025-04-10 RX ORDER — NOREPINEPHRINE BITARTRATE/D5W 4MG/250ML
0-3 PLASTIC BAG, INJECTION (ML) INTRAVENOUS CONTINUOUS
Status: DISCONTINUED | OUTPATIENT
Start: 2025-04-10 | End: 2025-04-11 | Stop reason: HOSPADM

## 2025-04-10 RX ORDER — WARFARIN 2.5 MG/1
2.5 TABLET ORAL
COMMUNITY

## 2025-04-10 RX ORDER — LEVETIRACETAM 750 MG/1
750 TABLET ORAL EVERY MORNING
COMMUNITY

## 2025-04-10 RX ORDER — SODIUM CHLORIDE 9 MG/ML
1000 INJECTION, SOLUTION INTRAVENOUS CONTINUOUS
Status: DISCONTINUED | OUTPATIENT
Start: 2025-04-10 | End: 2025-04-11 | Stop reason: HOSPADM

## 2025-04-10 RX ORDER — LEVOFLOXACIN 5 MG/ML
750 INJECTION, SOLUTION INTRAVENOUS
Status: DISCONTINUED | OUTPATIENT
Start: 2025-04-10 | End: 2025-04-11 | Stop reason: HOSPADM

## 2025-04-10 RX ORDER — LEVETIRACETAM 1000 MG/1
1000 TABLET ORAL NIGHTLY
COMMUNITY

## 2025-04-10 RX ADMIN — SODIUM CHLORIDE 2469 ML: 9 INJECTION, SOLUTION INTRAVENOUS at 07:04

## 2025-04-10 RX ADMIN — SODIUM CHLORIDE 1000 ML: 9 INJECTION, SOLUTION INTRAVENOUS at 08:04

## 2025-04-10 RX ADMIN — NOREPINEPHRINE BITARTRATE 0.05 MCG/KG/MIN: 4 INJECTION, SOLUTION INTRAVENOUS at 08:04

## 2025-04-10 RX ADMIN — ONDANSETRON 4 MG: 2 INJECTION INTRAMUSCULAR; INTRAVENOUS at 07:04

## 2025-04-10 RX ADMIN — NOREPINEPHRINE BITARTRATE 0.24 MCG/KG/MIN: 4 INJECTION, SOLUTION INTRAVENOUS at 10:04

## 2025-04-10 RX ADMIN — LEVOFLOXACIN 750 MG: 5 INJECTION, SOLUTION INTRAVENOUS at 07:04

## 2025-04-11 NOTE — ED NOTES
Removed patient's indwelling catheter in order to exchange and obtain fresh UA sample. Very little output noted in original urinary bag. Upon deflating catheter, copius dark red blood tinged urine is expelled under pressure around the tube. One adult diaper saturated with urine. Upon inserting fresh catheter, 1100mL dark red blood tinged urine output in collection bag, followed by cessation in flow. Suspected clot obstruction, irrigated catheter with some resumption of flow. Provider notified. Provider not in favor of CBI at this time. Will provide intermittent irriagation for clots obstructing drainage.

## 2025-04-11 NOTE — ED PROVIDER NOTES
Encounter Date: 4/10/2025  History from patient     History     Chief Complaint   Patient presents with    Altered Mental Status     Presents via EMS from [facility] w/ c/o AMS/unresponsive state PTA. EMS reports hypoxia and hypotension upon arrival. Arrives covered in vomit.     TATI Wayne is 79 y.o. male who  has a past medical history of Anticoagulant long-term use, CHF (congestive heart failure) (10/2021), CLL (chronic lymphocytic leukemia), COPD (chronic obstructive pulmonary disease), Coronary artery disease, Depression, Difficult intubation, GERD (gastroesophageal reflux disease), Hypertension, Mixed hyperlipidemia, Oxygen deficit, PVD (peripheral vascular disease), Seizures, Sleep apnea, unspecified, and TIA (transient ischemic attack). arrives in ER with c/o Altered Mental Status (Presents via EMS from [facility] [place] w/ c/o AMS/unresponsive state PTA. EMS reports hypoxia and hypotension upon arrival. Arrives covered in vomit.)    Review of patient's allergies indicates:   Allergen Reactions    Ancef in dextrose (iso-osm)     Codeine     Penicillins      Past Medical History:   Diagnosis Date    Anticoagulant long-term use     CHF (congestive heart failure) 10/2021    EF of 25-30% on ECHO    CLL (chronic lymphocytic leukemia)     COPD (chronic obstructive pulmonary disease)     Coronary artery disease     Depression     Difficult intubation     GERD (gastroesophageal reflux disease)     Hypertension     Mixed hyperlipidemia     Oxygen deficit     O2 as needed    PVD (peripheral vascular disease)     Seizures     Sleep apnea, unspecified     CPAP    TIA (transient ischemic attack)      Past Surgical History:   Procedure Laterality Date    CORONARY ANGIOPLASTY WITH STENT PLACEMENT  10/18/2016    pLAD, pLAD, dLAD, mCX,    CYSTOSCOPY N/A 3/13/2025    Procedure: CYSTOSCOPY;  Surgeon: Jaiden Hernandez MD;  Location: [facility];  Service: Urology;  Laterality: N/A;    INSERTION OF BARE  METAL STENT INTO PERIPHERAL ARTERY  2018    B/L [place] Vessels    PERCUTANEOUS BALLOON VALVULOPLASTY  04/04/2018    URETHRAL CATHETERIZATION N/A 3/13/2025    Procedure: CATHETERIZATION, URETHRA;  Surgeon: Jaiden Hernandez MD;  Location: [facility];  Service: Urology;  Laterality: N/A;     Family History   Problem Relation Name Age of Onset    Hypertension Mother      Hypertension Father      Coronary artery disease Father      Heart attack Father       Social History[1]  Review of Systems   Unable to perform ROS: Mental status change   Constitutional:  Positive for fatigue.        Hypotensive when medics arrived in the nursing home   Respiratory:          Hypoxia when medics went to pick him up   Gastrointestinal:  Positive for vomiting.        Vomited on the way to the emergency room   Genitourinary:         Blood in the urine when we pull the Anaya out to replace it and he had about 1100 cc of urine in the bladder although the Anaya was in place in the nursing home.   Neurological:  Positive for weakness.        History of seizures and had postictal symptoms from time to time       Physical Exam     Initial Vitals [04/10/25 1834]   BP Pulse Resp Temp SpO2   (!) 71/31 106 (!) 24 96.8 °F (36 °C) (!) 92 %      MAP       --         Physical Exam    Nursing note and vitals reviewed.  Constitutional: He appears well-developed.   HENT:   Head: Atraumatic.   Vomited on the way to the emergency room appears to be food and some brown stuff.   Eyes: EOM are normal.   Cardiovascular:            Tachycardia   Pulmonary/Chest: No respiratory distress. He has rales.   Abdominal: Abdomen is soft. Bowel sounds are normal. He exhibits no distension. There is no abdominal tenderness.   Genitourinary:    Genitourinary Comments: Anaya was in place on arrival, we changed the Anaya and he had about 1100 cc of urine in the bladder.  And had curt blood.     Musculoskeletal:         General: Edema present.     Neurological: He is alert.    Respond appropriately to my questions.  Just says feeling weak.  And not good.  He is in the bed, I have seen him multiple times usually he stays in the bed most of the time         ED Course   Critical Care    Date/Time: 4/10/2025 9:42 PM    Performed by: Glenn Sagastume MD  Authorized by: Glenn Sagastume MD  Direct patient critical care time: 50 minutes  Consulting other physicians critical care time: 15 minutes  Total critical care time (exclusive of procedural time) : 65 minutes  Critical care was necessary to treat or prevent imminent or life-threatening deterioration of the following conditions: shock, renal failure, metabolic crisis and sepsis.  Critical care was time spent personally by me on the following activities: development of treatment plan with patient or surrogate, discussions with consultants, evaluation of patient's response to treatment, examination of patient, obtaining history from patient or surrogate, ordering and performing treatments and interventions, ordering and review of laboratory studies, ordering and review of radiographic studies, pulse oximetry, re-evaluation of patient's condition and review of old charts.        Orders Placed This Encounter    Critical Care    Blood Culture x two cultures. Draw prior to antibiotics    Urine culture    X-Ray Chest AP Portable    CBC auto differential    Comprehensive metabolic panel    Lactic acid, plasma    Urinalysis, Reflex to Urine Culture    CBC with Differential    Lipase    Manual Differential    Lactic Acid, Plasma    Urinalysis, Microscopic    Protime-INR    APTT    ED Preference List Used to Initiate Sepsis Orders    Vital signs    Cardiac Monitoring - Adult    Strict intake and output    Fletcher to [place]    Fletcher Catheter Care every 12 hours    Nurse to discontinue fletcher when patient no longer meets criteria    Post Fletcher Catheter Removal Protocol    PI Stage 2 - Keep pressure off affected area    PI Stage 2 - Change foam  dressing twice weekly: Remove current foam dressing, cleanse area with normal saline, apply silicone bordered foam dressing    PI Stage 2 - Apply silicone bordered foam dressing now: Cleanse area with normal saline, apply silicone bordered foam dressing    Notify MD of Skin Injury    IP Wound Care consult Nurse    Pulse Oximetry Continuous    EKG 12-lead    Saline lock IV    PFC Facilitated Request    sodium chloride 0.9% bolus 2,469 mL 2,469 mL    ondansetron injection 4 mg    levoFLOXacin 750 mg/150 mL IVPB 750 mg    0.9% NaCl infusion    NORepinephrine 4 mg in dextrose 5% 250 mL infusion (premix)    Bed rest    Turn patient every 2 hours       Labs Reviewed   COMPREHENSIVE METABOLIC PANEL - Abnormal       Result Value    Sodium 137      Potassium 4.4      Chloride 101      CO2 17 (*)     Glucose 130 (*)     Blood Urea Nitrogen 36.0 (*)     Creatinine 3.44 (*)     Calcium 10.5 (*)     Protein Total 6.8      Albumin 2.7 (*)     Globulin 4.1 (*)     Albumin/Globulin Ratio 0.7 (*)     Bilirubin Total 0.6            ALT 15      AST 15      eGFR 17      Anion Gap 19.0      BUN/Creatinine Ratio 10     LACTIC ACID, PLASMA - Abnormal    Lactic Acid Level 11.3 (*)    URINALYSIS, REFLEX TO URINE CULTURE - Abnormal    Color, UA Brown (*)     Appearance, UA Turbid (*)     Specific Gravity, UA 1.015      pH, UA >=9.0 (*)     Protein, UA 3+ (*)     Glucose, UA Negative      Ketones, UA Trace (*)     Blood, UA 3+ (*)     Bilirubin, UA 1+ (*)     Urobilinogen, UA 1.0      Nitrites, UA Negative      Leukocyte Esterase, UA Trace (*)    CBC WITH DIFFERENTIAL - Abnormal    WBC 69.76 (*)     RBC 5.61      Hgb 13.5 (*)     Hct 44.9      MCV 80.0      MCH 24.1 (*)     MCHC 30.1 (*)     RDW 21.2 (*)     Platelet 253      MPV 10.0      NRBC% 0.0     MANUAL DIFFERENTIAL - Abnormal    Neutrophils % 27 (*)     Bands % 1      Lymphs % 70 (*)     Monocytes % 2      Metamyelocytes % 1 (*)     Neutrophils Abs Calc 19.5328 (*)     Lymphs  Abs 48.832 (*)     Monocytes Abs 1.3952 (*)     Platelets Adequate      RBC Morph Abnormal (*)     Poikilocytosis Slight (*)     [place] Cells Slight (*)    LACTIC ACID, PLASMA - Abnormal    Lactic Acid Level 7.5 (*)    URINALYSIS, MICROSCOPIC - Abnormal    Bacteria, UA Moderate (*)     RBC, UA >100 (*)     WBC, UA 11-20 (*)     Squamous Epithelial Cells, UA Rare     PROTIME-INR - Abnormal    PT 29.1 (*)     INR 2.6 (*)     Narrative:     Protimes are used to monitor anticoagulant agents such as warfarin. PT INR values are based on the current patient normal mean and the MATTHIAS value for the specific instrument reagent used.  **Routine theraputic target values for the INR are 2.0-3.0**   APTT - Abnormal    PTT 53.4 (*)    LIPASE - Normal    Lipase Level 11     BLOOD CULTURE OLG   BLOOD CULTURE [facility]   CULTURE, URINE   CBC W/ AUTO DIFFERENTIAL    Narrative:     The following orders were created for panel order CBC auto differential.  Procedure                               Abnormality         Status                     ---------                               -----------         ------                     CBC with Differential[[id number]]       Abnormal            Final result               Manual Differential[[id number]]         Abnormal            Final result                 Please view results for these tests on the individual orders.        ECG Results              EKG 12-lead (Preliminary result)  Result time 04/10/25 19:20:49      Wet Read by Glenn Sagastume MD (04/10/25 19:20:49, [facility] - Emergency Dept, Emergency Medicine)    EKG: Independently reviewed and / or Interpreted by Glenn Sagastume MD. independently as Normal Sinus Rhythm, Rate 111, Sinus Tachycardia, Normal Axis, Normal Intervals., No STEMI, no acute abnormality identified.                                    Imaging Results              X-Ray Chest AP Portable (Final result)  Result time 04/10/25 19:39:59      Final result by Buzz  Manuel MADSEN MD (04/10/25 19:39:59)                   Narrative:    EXAMINATION  XR CHEST AP PORTABLE    CLINICAL HISTORY  Sepsis;    TECHNIQUE  A total of 1 frontal image(s) submitted of the chest.    COMPARISON  7 March 2025    FINDINGS  Lines/tubes/devices: ECG leads overlie the imaged region.    The cardiac silhouette and central vascular structures are unchanged.  The trachea is midline. No new or worsening consolidation is developed in the interval.  There is no large pleural effusion or convincing pneumothorax.    Regional osseous structures and extrathoracic soft tissues are similar.    IMPRESSION  No acute process or other adverse interval change.      Electronically signed by: Manuel Gerber  Date:    04/10/2025  Time:    19:39                      Wet Read by Glenn Sagastume MD (04/10/25 19:22:01, [facility] - Emergency Dept, Emergency Medicine)    Chest One View:  Independently reviewed and/or interpreted by Glenn Sagastume MD.  No Focal Consolidation, No Acute Cardiopulmonary abnormality identified grossly.                                     Medications   levoFLOXacin 750 mg/150 mL IVPB 750 mg (0 mg Intravenous Stopped 4/10/25 2058)   0.9% NaCl infusion ( Intravenous Verify Only 4/10/25 2318)   NORepinephrine 4 mg in dextrose 5% 250 mL infusion (premix) (0.2 mcg/kg/min × 106.6 kg Intravenous Verify Only 4/10/25 2318)   sodium chloride 0.9% bolus 2,469 mL 2,469 mL (0 mLs Intravenous Stopped 4/10/25 2014)   ondansetron injection 4 mg (4 mg Intravenous Given 4/10/25 1903)     Medical Decision Making    John Wayne is 79 y.o. male who  has a past medical history of Anticoagulant long-term use, CHF (congestive heart failure) (10/2021), CLL (chronic lymphocytic leukemia), COPD (chronic obstructive pulmonary disease), Coronary artery disease, Depression, Difficult intubation, GERD (gastroesophageal reflux disease), Hypertension, Mixed hyperlipidemia, Oxygen deficit, PVD (peripheral vascular  disease), Seizures, Sleep apnea, unspecified, and TIA (transient ischemic attack). arrives in ER with c/o Altered Mental Status (Presents via EMS from [facility] home w/ c/o AMS/unresponsive state PTA. EMS reports hypoxia and hypotension upon arrival. Arrives covered in vomit.)    Patient has chronic lymphocytic leukemia, today nurses found him unresponsive    Amount and/or Complexity of Data Reviewed  Labs: ordered. Decision-making details documented in ED Course.  Radiology: ordered and independent interpretation performed.    Risk  Prescription drug management.               ED Course as of 04/10/25 2326   Thu Apr 10, 2025   1922 WBC(!!): 69.76  Patient has a history of leukemia, he has had high blood count as high as 53,000 also last year.  But since he has hypotension and low-grade fever we will treat him as sepsis.  He is getting 30 mL/kg bolus, his blood pressure is trending up.  He is now awake and responds appropriately.  I have talked to him multiple times in the past and he does not want to be put on ventilator anymore.  But he wants everything else done.  We were able to get an IV with the help of ultrasound. [GQ]   1923 Lactic Acid Level(!!): 11.3  As expected his lactic acid is 11.3, but he did have a seizure earlier today, we are going to repeat that in 2 hours in the meantime we will be getting 30 mL/kg bolus for sepsis, blood cultures have been drawn, IV antibiotics will be given, we are going to change his catheter which is chronic indwelling Anaya catheter.  Chest x-ray does not show pneumonia, he is not in any distress, he is maintaining his oxygen saturation. [GQ]   1959 WBC(!!): 69.76 [GQ]   1959 Lymphocyte %(!): 70 [GQ]   1959 Bands: 1 [GQ]   1959 Neutrophils Relative(!): 27 [GQ]   1959 CO2(!): 17 [GQ]   1959 BUN(!): 36.0 [GQ]   1959 Creatinine(!): 3.44 [GQ]   1959 Lactic Acid Level(!!): 11.3 [GQ]   1959 Anion Gap: 19.0 [GQ]   2000 Patient has hematuria, catheter is in, he has low-grade  fever, lactic acid is 11.3, his blood pressure is in 80s, map is 62, I will go ahead and put him on Levophed to keep his map above 65.  As mentioned earlier patient has voiced multiple times that he does not want to be intubated, he has signed a DNR in 2024 [GQ]   2048 BP(!): 90/57 [GQ]   2048 MAP (mmHg): 66 [GQ]   2049 Pulse: 107 [GQ]   2049 SpO2: 96 %  With Levophed patient's blood pressure has come up to 102/56 with a map of 83, heart rate is 109, I have known patient for a long time, he responds appropriately to questions, O2 sat is maintained at 96%.  I will transfer him for higher level of care essentially needs to be in ICU treated for sepsis.  Need evaluation by Hematology again.  He has a acute kidney injury. [GQ]   2108 After starting Levophed patient's blood pressure has come up nicely, he is on 01/20 5/66 with a map of 75 and a heart rate of 102 with a O2 sat of 96% on room air, he does have fever, we have given him IV antibiotics we do not have any ICU bed I will essentially have to transfer him over to [place] for ICU. [GQ]   2141 Lactic Acid Level(!!): 7.5  Patient's lactic acid level has come down to 7.5, is still maintaining his blood pressure, his blood pressure actually is 124/60 with a map of 83 with a O2 sat of 99% on room air.  He is comfortable, he does have tachypnea but he always breathes like that I have been seeing him for years.  I saw that last year he had told me that he does not want to be on ventilator, I would put a DNR order on him but then the nurse found a new were code sheet where he says full code.  So essentially the DNR order has been withdrawn once he got discharged from the hospital last time. [GQ]   7257 I talked to Bandar at [facility] emergency room, they do not have Urology and since patient has hematuria they were not comfortable in taking the patient over there.  I talked to Dr. Rudd at our [facility] emergency room he accepted the patient.  I am going to transfer  "the patient over to our [facility] for admission. [GQ]   2257 He will essentially needs ICU, further treatment for sepsis, acute kidney injury, [GQ]   2257 Obstructive uropathy causing acute kidney injury.   [GQ]   2309 Sepsis Reassessment:    Patient's condition is improved, in terms of he had not had any seizures, he is not in any distress, he is still so [place] but easily arousable.  Review of systems, see above.  Vital signs reviewed see charting above  Oxygen Saturation: 99%  Heart S1, S2 is audible, no S3 no S4, no murmurs.  Chest clear to auscultation  Abdomen is soft, nondistended, nontender, bowel sounds are audible.  Mental status unchanged  Tissue Perfusion:        Skin in normal for ethnic origin       Capillary refill normal       Peripheral pulses Radial are palpable b/L.       Urine Out put: Adequate.  Hematuria    Patient got his 30 mL/kg bolus, he is on maintenance IV fluids, he is on Levophed, his mean arterial pressure is maintained actually last mean arterial pressure is 70, I do not want to give him too much Levophed.  He does have history of cardiomyopathy with congestive heart failure and low EF.    He is accepted in our [facility], I will transfer him over there for admission in ICU. [GQ]   2323 BP: 123/61 [GQ]   2325 MAP (mmHg): 79 [GQ]   2325 Pulse: 105 [GQ]   2325 SpO2: 99 % [GQ]   2325 Medics are here to  the patient. [GQ]      ED Course User Index  [GQ] Glenn Sagastume MD    Sepsis Perfusion Assessment: "I attest a sepsis perfusion exam was performed within 6 hours of sepsis, severe sepsis, or septic shock presentation, following fluid resuscitation."                Clinical Impression:  Final diagnoses:  [Z13.6] Screening for cardiovascular condition  [C91.10] CLL (chronic lymphocytic leukemia) (Primary)  [A41.9, R65.21] Septic shock  [N39.0, R31.9] Urinary tract infection with hematuria, site unspecified  [T83.091A] Obstruction of Anaya catheter, " initial encounter  [N17.9] ALTAGRACIA (acute kidney injury)          ED Disposition Condition    Transfer to Another Facility Serious                  Glenn Sagastume MD  04/10/25 2311         Glenn Sagastume MD  04/10/25 2319       [1]   Social History  Tobacco Use    Smoking status: Former    Smokeless tobacco: Never   Substance Use Topics    Alcohol use: Not Currently    Drug use: Never        Glenn Sagastume MD  04/10/25 5575

## 2025-04-12 LAB
OHS QRS DURATION: 74 MS
OHS QTC CALCULATION: 408 MS

## 2025-04-14 LAB
BACTERIA BLD CULT: ABNORMAL
BACTERIA UR CULT: ABNORMAL
BACTERIA UR CULT: ABNORMAL
GRAM STN SPEC: ABNORMAL

## 2025-04-16 ENCOUNTER — RESULTS FOLLOW-UP (OUTPATIENT)
Dept: EMERGENCY MEDICINE | Facility: HOSPITAL | Age: 80
End: 2025-04-16

## (undated) DEVICE — TOWEL OR DISP STRL BLUE 4/PK

## (undated) DEVICE — DRAPE LEGGINGS CUFF 33X51IN

## (undated) DEVICE — COVER TABLE 44X90 STERILE

## (undated) DEVICE — GLOVE SIGNATURE ESSNTL LTX 7.5

## (undated) DEVICE — DRAPE T CYSTOSCOPY STERILE

## (undated) DEVICE — GAUZE WOVEN STRL 12-PLY 4X4IN

## (undated) DEVICE — GOWN POLY REINF BRTH SLV XL

## (undated) DEVICE — CAP THERMOLITE ADULT #TS5110-

## (undated) DEVICE — BLANKET THERMOFLECT 4X7

## (undated) DEVICE — TRAY SKIN SCRUB WET PREMIUM

## (undated) DEVICE — GLOVE SENSICARE NEOPRENE 6.5

## (undated) DEVICE — TRAY CATH 1-LYR URIMTR 16FR

## (undated) DEVICE — SET CYSTO IRR DRP CHMBR 84IN